# Patient Record
Sex: MALE | Race: BLACK OR AFRICAN AMERICAN | NOT HISPANIC OR LATINO | Employment: OTHER | URBAN - METROPOLITAN AREA
[De-identification: names, ages, dates, MRNs, and addresses within clinical notes are randomized per-mention and may not be internally consistent; named-entity substitution may affect disease eponyms.]

---

## 2017-01-04 ENCOUNTER — ANTI-COAG VISIT (OUTPATIENT)
Dept: CARDIOLOGY | Facility: CLINIC | Age: 62
End: 2017-01-04

## 2017-01-04 DIAGNOSIS — Z79.01 ANTICOAGULATED ON COUMADIN: ICD-10-CM

## 2017-01-04 DIAGNOSIS — Z95.2 STATUS POST HEART VALVE REPLACEMENT WITH MECHANICAL VALVE: ICD-10-CM

## 2017-01-04 LAB — INR PPP: 2.3

## 2017-01-04 NOTE — PROGRESS NOTES
Pt called in a verbal INR result dated 1/03/17 as: INR -2.3, states has been trying to reach [facility] but has been unsuccessful, advised pt to keep trying to reach [facility] and give them the result from 1/03/17 so a hard copy can be faxed to [facility]

## 2017-01-09 ENCOUNTER — ANTI-COAG VISIT (OUTPATIENT)
Dept: CARDIOLOGY | Facility: CLINIC | Age: 62
End: 2017-01-09

## 2017-01-09 DIAGNOSIS — Z95.2 STATUS POST HEART VALVE REPLACEMENT WITH MECHANICAL VALVE: ICD-10-CM

## 2017-01-09 DIAGNOSIS — Z79.01 ANTICOAGULATED ON COUMADIN: ICD-10-CM

## 2017-01-09 LAB — INR PPP: 2.8

## 2017-01-16 ENCOUNTER — ANTI-COAG VISIT (OUTPATIENT)
Dept: CARDIOLOGY | Facility: CLINIC | Age: 62
End: 2017-01-16

## 2017-01-16 DIAGNOSIS — Z95.2 STATUS POST HEART VALVE REPLACEMENT WITH MECHANICAL VALVE: ICD-10-CM

## 2017-01-16 DIAGNOSIS — Z79.01 ANTICOAGULATED ON COUMADIN: ICD-10-CM

## 2017-01-16 LAB — INR PPP: 3

## 2017-01-25 ENCOUNTER — ANTI-COAG VISIT (OUTPATIENT)
Dept: CARDIOLOGY | Facility: CLINIC | Age: 62
End: 2017-01-25

## 2017-01-25 DIAGNOSIS — Z95.2 STATUS POST HEART VALVE REPLACEMENT WITH MECHANICAL VALVE: ICD-10-CM

## 2017-01-25 DIAGNOSIS — Z79.01 ANTICOAGULATED ON COUMADIN: ICD-10-CM

## 2017-01-25 LAB — INR PPP: 3.7

## 2017-01-26 ENCOUNTER — OFFICE VISIT (OUTPATIENT)
Dept: INTERNAL MEDICINE | Facility: CLINIC | Age: 62
End: 2017-01-26
Payer: MEDICARE

## 2017-01-26 VITALS
DIASTOLIC BLOOD PRESSURE: 84 MMHG | SYSTOLIC BLOOD PRESSURE: 120 MMHG | HEIGHT: 69 IN | WEIGHT: 166.25 LBS | TEMPERATURE: 98 F | HEART RATE: 87 BPM | BODY MASS INDEX: 24.62 KG/M2

## 2017-01-26 DIAGNOSIS — J32.9 SINUSITIS, UNSPECIFIED CHRONICITY, UNSPECIFIED LOCATION: Primary | ICD-10-CM

## 2017-01-26 DIAGNOSIS — R21 RASH AND NONSPECIFIC SKIN ERUPTION: ICD-10-CM

## 2017-01-26 PROCEDURE — 99999 PR PBB SHADOW E&M-EST. PATIENT-LVL IV: CPT | Mod: PBBFAC,,, | Performed by: PHYSICIAN ASSISTANT

## 2017-01-26 PROCEDURE — 99213 OFFICE O/P EST LOW 20 MIN: CPT | Mod: S$PBB,,, | Performed by: PHYSICIAN ASSISTANT

## 2017-01-26 PROCEDURE — 99214 OFFICE O/P EST MOD 30 MIN: CPT | Mod: PBBFAC | Performed by: PHYSICIAN ASSISTANT

## 2017-01-26 RX ORDER — CODEINE PHOSPHATE AND GUAIFENESIN 10; 100 MG/5ML; MG/5ML
5-10 SOLUTION ORAL NIGHTLY
Qty: 120 ML | Refills: 0 | Status: SHIPPED | OUTPATIENT
Start: 2017-01-26 | End: 2017-07-26

## 2017-01-26 RX ORDER — AMOXICILLIN 875 MG/1
875 TABLET, FILM COATED ORAL 2 TIMES DAILY
Qty: 14 TABLET | Refills: 0 | Status: SHIPPED | OUTPATIENT
Start: 2017-01-26 | End: 2017-02-05

## 2017-01-26 NOTE — PROGRESS NOTES
Subjective:       Patient ID: Paul Villalobos Sr. is a 61 y.o. male.        Chief Complaint: Rash (on pt neck)    HPI Comments: Paul Villalobos Sr. is an established patient of Tye Concepcion MD here today for urgent care visit.    Had some type of skin lesion behind the right knee, then in the right antecubital area, and now on the right side of his neck.  Initially a red flat area that crusts over and resolves.  The knee and arm lesions have resolved. The neck skin lesion has crusted over and is healing.  Not painful or pruritic.  He is primarily concerned he could have Shingles again as he had it before and it was quite a severe case.  This skin lesion has been present x 1 week and is resolving.    Also with nasal congestion and cough productive of green mucus x 2 weeks.  No N/V/D/C.  No chest pain or shortness of breath.  No fever.         Review of Systems   Constitutional: Negative for appetite change, chills, fatigue and fever.   HENT: Positive for congestion. Negative for sore throat.    Eyes: Negative for visual disturbance.   Respiratory: Positive for cough. Negative for chest tightness and shortness of breath.    Cardiovascular: Negative for chest pain, palpitations and leg swelling.   Gastrointestinal: Negative for abdominal pain, blood in stool, constipation, diarrhea, nausea and vomiting.   Genitourinary: Negative for dysuria, frequency, hematuria and urgency.   Musculoskeletal: Negative for arthralgias and back pain.   Skin: Positive for rash.   Neurological: Negative for dizziness, syncope, weakness and headaches.   Psychiatric/Behavioral: Negative for dysphoric mood and sleep disturbance. The patient is not nervous/anxious.        Objective:      Physical Exam   Constitutional: He appears well-developed and well-nourished.   HENT:   Head: Normocephalic.   Right Ear: External ear normal.   Left Ear: External ear normal.   Nose: Rhinorrhea present. Right sinus exhibits maxillary sinus tenderness. Right sinus  "exhibits no frontal sinus tenderness. Left sinus exhibits maxillary sinus tenderness. Left sinus exhibits no frontal sinus tenderness.   Mouth/Throat: Oropharynx is clear and moist.   Eyes: Pupils are equal, round, and reactive to light.   Cardiovascular: Normal rate and regular rhythm.  Exam reveals no gallop and no friction rub.    Murmur heard.  Pulmonary/Chest: Effort normal and breath sounds normal. No respiratory distress.   Abdominal: Soft. There is no tenderness.   Musculoskeletal: He exhibits no edema.   Neurological: He is alert.   Skin: Skin is warm and dry.        Psychiatric: He has a normal mood and affect.   Nursing note and vitals reviewed.      Assessment:       1. Sinusitis, unspecified chronicity, unspecified location    2. Rash and nonspecific skin eruption        Plan:       Paul was seen today for rash.    Diagnoses and all orders for this visit:    Sinusitis, unspecified chronicity, unspecified location  -     guaifenesin-codeine 100-10 mg/5 ml (TUSSI-ORGANIDIN NR)  mg/5 mL syrup; Take 5-10 mLs by mouth every evening.  -     amoxicillin (AMOXIL) 875 MG tablet; Take 1 tablet (875 mg total) by mouth 2 (two) times daily.    Rash and nonspecific skin eruption-if he continues to have lesions appear, will consult dermatology, at this time seems to be resolving    Drink plenty of fluids, get lots of rest, and follow-up poor results.      Pt has been given instructions populated from [employer] database and has verbalized understanding of the after visit summary and information contained wherein.    Follow up with a primary care provider. May go to ER for acute shortness of breath, lightheadedness, fever, or any other emergent complaints or changes in condition.    "This note will be shared with the patient"    Future Appointments  Date Time Provider Department Center   3/13/2017 8:20 AM [facility] [address]               "

## 2017-01-26 NOTE — MR AVS SNAPSHOT
[facility]  [address]  [place] [address]  Phone: [phone]  Fax: [phone]                  Paul Villalobos Maria M   2017 9:00 AM   Office Visit    Description:  Male : 1955   Provider:  Sarah Frazier PA-C   Department:  [facility]           Reason for Visit     Rash           Diagnoses this Visit        Comments    Sinusitis, unspecified chronicity, unspecified location    -  Primary            To Do List           Future Appointments        Provider Department Dept Phone    3/13/2017 8:20 AM Nolan Rosas MD [facility] [phone]      Goals (5 Years of Data)     None       These Medications        Disp Refills Start End    guaifenesin-codeine 100-10 mg/5 ml (TUSSI-ORGANIDIN NR)  mg/5 mL syrup 120 mL 0 2017     Take 5-10 mLs by mouth every evening. - Oral    Pharmacy: [id number] - [place], [place] - [address] AT [facility] Ph #: [phone]       amoxicillin (AMOXIL) 875 MG tablet 14 tablet 0 2017    Take 1 tablet (875 mg total) by mouth 2 (two) times daily. - Oral    Pharmacy: [id number] - [place], [place] - [address] AT [facility] [address] Ph #: [phone]         [facility] On [facility]     [facility] On Call Nurse Care Line -  Assistance  Registered nurses in the [facility] On Call Center provide clinical advisement, health education, appointment booking, and other advisory services.  Call for this free service at [phone].             Medications           Message regarding Medications     Verify the changes and/or additions to your medication regime listed below are the same as discussed with your clinician today.  If any of these changes or additions are incorrect, please notify your healthcare provider.        START taking these NEW medications        Refills    guaifenesin-codeine 100-10 mg/5 ml  (TUSSI-ORGANIDIN NR)  mg/5 mL syrup 0    Sig: Take 5-10 mLs by mouth every evening.    Class: Print    Route: Oral    amoxicillin (AMOXIL) 875 MG tablet 0    Sig: Take 1 tablet (875 mg total) by mouth 2 (two) times daily.    Class: Normal    Route: Oral           Verify that the below list of medications is an accurate representation of the medications you are currently taking.  If none reported, the list may be blank. If incorrect, please contact your healthcare provider. Carry this list with you in case of emergency.           Current Medications     albuterol-ipratropium 2.5mg-0.5mg/3mL (DUO-NEB) 0.5 mg-3 mg(2.5 mg base)/3 mL nebulizer solution TAKE 3ML BY NEBULIZATION EVERY 6 HOURS AS NEEDED FOR WHEEZING.    carvedilol (COREG) 25 MG tablet Take 1 tablet (25 mg total) by mouth 2 (two) times daily with meals.    fluticasone (FLONASE) 50 mcg/actuation nasal spray SPRAY TWICE IN EACH NOSTRIL EVERY DAY    furosemide (LASIX) 80 MG tablet TAKE 1 TABLET BY MOUTH ON MONDAY WEDNESDAY AND FRIDAY AND 1/2 TABLET BY MOUTH ON ALL OTHER DAYS    hydrALAZINE (APRESOLINE) 50 MG tablet Take 1 tablet (50 mg total) by mouth every 8 (eight) hours.    ipratropium-albuterol (COMBIVENT RESPIMAT)  mcg/actuation inhaler Inhale 1 puff into the lungs every 4 (four) hours as needed for Wheezing. INHALE 2 PUFF BY MOUTH TWICE DAILY    isosorbide mononitrate (IMDUR) 30 MG 24 hr tablet Take 1 tablet (30 mg total) by mouth once daily.    lisinopril 10 MG tablet Take 1 tablet (10 mg total) by mouth once daily.    metOLazone (ZAROXOLYN) 2.5 MG tablet Take 1 tablet (2.5 mg total) by mouth daily as needed.    montelukast (SINGULAIR) 10 mg tablet TAKE 1 TABLET(10 MG) BY MOUTH EVERY EVENING    warfarin (COUMADIN) 5 MG tablet Take 2 tablets daily. Take as directed by Coumadin clinic.    amoxicillin (AMOXIL) 875 MG tablet Take 1 tablet (875 mg total) by mouth 2 (two) times daily.    guaifenesin-codeine 100-10 mg/5 ml (TUSSI-ORGANIDIN NR)  " mg/5 mL syrup Take 5-10 mLs by mouth every evening.    potassium chloride SA (K-DUR,KLOR-CON) 20 MEQ tablet TAKE 2 TABLETS BY MOUTH ON MONDAY WEDNESDAY AND FRIDAY AND 1 TABLET ON ALL OTHER DAYS    tadalafil (CIALIS) 5 MG tablet Take 1 tablet (5 mg total) by mouth daily as needed for Erectile Dysfunction.           Clinical Reference Information           Vital Signs - Last Recorded  Most recent update: 1/26/2017  9:10 AM by Princess Lu MA    BP Pulse Ht Wt BMI    120/84 (BP Location: Left arm, Patient Position: Sitting, BP Method: Manual) 87 5' 9" (1.753 m) 75.4 kg (166 lb 3.6 oz) 24.55 kg/m2      Blood Pressure          Most Recent Value    BP  120/84      Allergies as of 1/26/2017     No Known Allergies      Immunizations Administered on Date of Encounter - 1/26/2017     None      Instructions      Sinusitis (Antibiotic Treatment)    The sinuses are air-filled spaces within the bones of the face. They connect to the inside of the nose. Sinusitis is an inflammation of the tissue lining the sinus cavity. Sinus inflammation can occur during a cold. It can also be due to allergies to pollens and other particles in the air. Sinusitis can cause symptoms of sinus congestion and fullness. A sinus infection causes fever, headache and facial pain. There is often green or yellow drainage from the nose or into the back of the throat (post-nasal drip). You have been given antibiotics to treat this condition.  Home care:  · Take the full course of antibiotics as instructed. Do not stop taking them, even if you feel better.  · Drink plenty of water, hot tea, and other liquids. This may help thin mucus. It also may promote sinus drainage.  · Heat may help soothe painful areas of the face. Use a towel soaked in hot water. Or,  the shower and direct the hot spray onto your face. Using a vaporizer along with a menthol rub at night may also help.   · An expectorant containing guaifenesin may help thin the mucus and " promote drainage from the sinuses.  · Over-the-counter decongestants may be used unless a similar medicine was prescribed. Nasal sprays work the fastest. Use one that contains phenylephrine or oxymetazoline. First blow the nose gently. Then use the spray. Do not use these medicines more often than directed on the label or symptoms may get worse. You may also use tablets containing pseudoephedrine. Avoid products that combine ingredients, because side effects may be increased. Read labels. You can also ask the pharmacist for help. (NOTE: Persons with high blood pressure should not use decongestants. They can raise blood pressure.)  · Over-the-counter antihistamines may help if allergies contributed to your sinusitis.    · Do not use nasal rinses or irrigation during an acute sinus infection, unless told to by your health care provider. Rinsing may spread the infection to other sinuses.  · Use acetaminophen or ibuprofen to control pain, unless another pain medicine was prescribed. (If you have chronic liver or kidney disease or ever had a stomach ulcer, talk with your doctor before using these medicines. Aspirin should never be used in anyone under 18 years of age who is ill with a fever. It may cause severe liver damage.)  · Don't smoke. This can worsen symptoms.  Follow-up care  Follow up with your healthcare provider or our staff if you are not improving within the next week.  When to seek medical advice  Call your healthcare provider if any of these occur:  · Facial pain or headache becoming more severe  · Stiff neck  · Unusual drowsiness or confusion  · Swelling of the forehead or eyelids  · Vision problems, including blurred or double vision  · Fever of 100.4ºF (38ºC) or higher, or as directed by your healthcare provider  · Seizure  · Breathing problems  · Symptoms not resolving within 10 days  © 7794-4657 [employer]. [address] [place] [address], [place] [address]. All rights reserved. This information  is not intended as a substitute for professional medical care. Always follow your healthcare professional's instructions.             Smoking Cessation     If you would like to quit smoking:   You may be eligible for free services if you are a [place] resident and started smoking cigarettes before September 1, 1988.  Call the [employer]oking [employer]essation [employer] (S[employer]) toll free at (843) 751-3746 or (054) 923-1460.   Call 0-800-QUIT-NOW if you do not meet the above criteria.

## 2017-01-26 NOTE — PATIENT INSTRUCTIONS
Sinusitis (Antibiotic Treatment)    The sinuses are air-filled spaces within the bones of the face. They connect to the inside of the nose. Sinusitis is an inflammation of the tissue lining the sinus cavity. Sinus inflammation can occur during a cold. It can also be due to allergies to pollens and other particles in the air. Sinusitis can cause symptoms of sinus congestion and fullness. A sinus infection causes fever, headache and facial pain. There is often green or yellow drainage from the nose or into the back of the throat (post-nasal drip). You have been given antibiotics to treat this condition.  Home care:  · Take the full course of antibiotics as instructed. Do not stop taking them, even if you feel better.  · Drink plenty of water, hot tea, and other liquids. This may help thin mucus. It also may promote sinus drainage.  · Heat may help soothe painful areas of the face. Use a towel soaked in hot water. Or,  the shower and direct the hot spray onto your face. Using a vaporizer along with a menthol rub at night may also help.   · An expectorant containing guaifenesin may help thin the mucus and promote drainage from the sinuses.  · Over-the-counter decongestants may be used unless a similar medicine was prescribed. Nasal sprays work the fastest. Use one that contains phenylephrine or oxymetazoline. First blow the nose gently. Then use the spray. Do not use these medicines more often than directed on the label or symptoms may get worse. You may also use tablets containing pseudoephedrine. Avoid products that combine ingredients, because side effects may be increased. Read labels. You can also ask the pharmacist for help. (NOTE: Persons with high blood pressure should not use decongestants. They can raise blood pressure.)  · Over-the-counter antihistamines may help if allergies contributed to your sinusitis.    · Do not use nasal rinses or irrigation during an acute sinus infection, unless told to by  your health care provider. Rinsing may spread the infection to other sinuses.  · Use acetaminophen or ibuprofen to control pain, unless another pain medicine was prescribed. (If you have chronic liver or kidney disease or ever had a stomach ulcer, talk with your doctor before using these medicines. Aspirin should never be used in anyone under 18 years of age who is ill with a fever. It may cause severe liver damage.)  · Don't smoke. This can worsen symptoms.  Follow-up care  Follow up with your healthcare provider or our staff if you are not improving within the next week.  When to seek medical advice  Call your healthcare provider if any of these occur:  · Facial pain or headache becoming more severe  · Stiff neck  · Unusual drowsiness or confusion  · Swelling of the forehead or eyelids  · Vision problems, including blurred or double vision  · Fever of 100.4ºF (38ºC) or higher, or as directed by your healthcare provider  · Seizure  · Breathing problems  · Symptoms not resolving within 10 days  © 2371-3658 [employer]. [address] [place] [address], [place] [address]. All rights reserved. This information is not intended as a substitute for professional medical care. Always follow your healthcare professional's instructions.

## 2017-02-01 LAB — INR PPP: 3.9

## 2017-02-02 ENCOUNTER — ANTI-COAG VISIT (OUTPATIENT)
Dept: CARDIOLOGY | Facility: CLINIC | Age: 62
End: 2017-02-02
Payer: COMMERCIAL

## 2017-02-02 DIAGNOSIS — Z79.01 ANTICOAGULATED ON COUMADIN: ICD-10-CM

## 2017-02-02 DIAGNOSIS — Z95.2 STATUS POST HEART VALVE REPLACEMENT WITH MECHANICAL VALVE: ICD-10-CM

## 2017-02-02 PROCEDURE — G0250 MD INR TEST REVIE INTER MGMT: HCPCS | Mod: ,,,

## 2017-02-02 NOTE — PROGRESS NOTES
Patient took a much lower dose than expected and his INR climbed. He denies changes in ETOH and has been avoiding greens. He held a dose last night and will begin a reduced weekly dose until follow-up.

## 2017-02-08 ENCOUNTER — ANTI-COAG VISIT (OUTPATIENT)
Dept: CARDIOLOGY | Facility: CLINIC | Age: 62
End: 2017-02-08
Payer: MEDICARE

## 2017-02-08 DIAGNOSIS — Z95.2 STATUS POST HEART VALVE REPLACEMENT WITH MECHANICAL VALVE: ICD-10-CM

## 2017-02-08 DIAGNOSIS — Z79.01 ANTICOAGULATED ON COUMADIN: ICD-10-CM

## 2017-02-08 LAB — INR PPP: 1.5

## 2017-02-08 PROCEDURE — 99999 PR PBB SHADOW E&M-EST. PATIENT-LVL I: CPT | Mod: PBBFAC,,,

## 2017-02-08 PROCEDURE — 99211 OFF/OP EST MAY X REQ PHY/QHP: CPT | Mod: PBBFAC

## 2017-02-08 PROCEDURE — G0248 DEMONSTRATE USE HOME INR MON: HCPCS | Mod: PBBFAC,59

## 2017-02-08 NOTE — PROGRESS NOTES
Patient presents for meter follow-up appointment.  Patient demonstrated proper use of meter.  Patient denies any difficulty using home monitor or reporting results.  INR results in meter are consistent with those reported and are without discrepancies.  Patient denies self-adjusting diet or dose based on readings.  Reminded patient to continue to contact clinic with any new medications, changes in health, or changes in diet. Patient will continue monitoring INR weekly and return to clinic in 6 months.  Patient advised to contact clinic with any changes, questions, or concerns. INR low today due to the dose change last week. He will begin a new weekly dose until follow-up in one week. He will continue weekly INR results until stability is reached. I advised him and his wife to contact us with any changes or problems.

## 2017-02-15 ENCOUNTER — ANTI-COAG VISIT (OUTPATIENT)
Dept: CARDIOLOGY | Facility: CLINIC | Age: 62
End: 2017-02-15

## 2017-02-15 DIAGNOSIS — Z79.01 ANTICOAGULATED ON COUMADIN: ICD-10-CM

## 2017-02-15 DIAGNOSIS — Z95.2 STATUS POST HEART VALVE REPLACEMENT WITH MECHANICAL VALVE: ICD-10-CM

## 2017-02-15 LAB — INR PPP: 2.7

## 2017-02-20 ENCOUNTER — NURSE TRIAGE (OUTPATIENT)
Dept: ADMINISTRATIVE | Facility: CLINIC | Age: 62
End: 2017-02-20

## 2017-02-20 ENCOUNTER — HOSPITAL ENCOUNTER (EMERGENCY)
Facility: HOSPITAL | Age: 62
Discharge: HOME | End: 2017-02-20
Attending: EMERGENCY MEDICINE
Payer: COMMERCIAL

## 2017-02-20 VITALS
OXYGEN SATURATION: 97 % | WEIGHT: 165 LBS | DIASTOLIC BLOOD PRESSURE: 82 MMHG | TEMPERATURE: 99 F | BODY MASS INDEX: 24.44 KG/M2 | SYSTOLIC BLOOD PRESSURE: 145 MMHG | RESPIRATION RATE: 20 BRPM | HEIGHT: 69 IN | HEART RATE: 69 BPM

## 2017-02-20 DIAGNOSIS — N39.0 URINARY TRACT INFECTION WITH HEMATURIA, SITE UNSPECIFIED: Primary | ICD-10-CM

## 2017-02-20 DIAGNOSIS — R05.9 COUGH: ICD-10-CM

## 2017-02-20 DIAGNOSIS — R31.9 URINARY TRACT INFECTION WITH HEMATURIA, SITE UNSPECIFIED: Primary | ICD-10-CM

## 2017-02-20 LAB
BACTERIA #/AREA URNS AUTO: ABNORMAL /HPF
BASOPHILS # BLD AUTO: 0.02 K/UL
BASOPHILS NFR BLD: 0.4 %
BILIRUB UR QL STRIP: NEGATIVE
CLARITY UR REFRACT.AUTO: ABNORMAL
COLOR UR AUTO: YELLOW
DIFFERENTIAL METHOD: ABNORMAL
EOSINOPHIL # BLD AUTO: 0.1 K/UL
EOSINOPHIL NFR BLD: 2.6 %
ERYTHROCYTE [DISTWIDTH] IN BLOOD BY AUTOMATED COUNT: 13.5 %
GLUCOSE UR QL STRIP: NEGATIVE
HCT VFR BLD AUTO: 38.4 %
HGB BLD-MCNC: 12.8 G/DL
HGB UR QL STRIP: ABNORMAL
HYALINE CASTS UR QL AUTO: 5 /LPF
INR PPP: 2.6
KETONES UR QL STRIP: NEGATIVE
LEUKOCYTE ESTERASE UR QL STRIP: ABNORMAL
LYMPHOCYTES # BLD AUTO: 1.3 K/UL
LYMPHOCYTES NFR BLD: 24.9 %
MCH RBC QN AUTO: 33.1 PG
MCHC RBC AUTO-ENTMCNC: 33.3 %
MCV RBC AUTO: 99 FL
MICROSCOPIC COMMENT: ABNORMAL
MONOCYTES # BLD AUTO: 0.8 K/UL
MONOCYTES NFR BLD: 15.8 %
NEUTROPHILS # BLD AUTO: 2.8 K/UL
NEUTROPHILS NFR BLD: 56.1 %
NITRITE UR QL STRIP: POSITIVE
PH UR STRIP: 7 [PH] (ref 5–8)
PLATELET # BLD AUTO: 136 K/UL
PMV BLD AUTO: 11 FL
PROT UR QL STRIP: ABNORMAL
PROTHROMBIN TIME: 26.3 SEC
RBC # BLD AUTO: 3.87 M/UL
RBC #/AREA URNS AUTO: 8 /HPF (ref 0–4)
SP GR UR STRIP: 1.01 (ref 1–1.03)
SQUAMOUS #/AREA URNS AUTO: 2 /HPF
URN SPEC COLLECT METH UR: ABNORMAL
UROBILINOGEN UR STRIP-ACNC: 2 EU/DL
WBC # BLD AUTO: 5.06 K/UL
WBC #/AREA URNS AUTO: >100 /HPF (ref 0–5)
WBC CLUMPS UR QL AUTO: ABNORMAL

## 2017-02-20 PROCEDURE — 94761 N-INVAS EAR/PLS OXIMETRY MLT: CPT

## 2017-02-20 PROCEDURE — 85025 COMPLETE CBC W/AUTO DIFF WBC: CPT

## 2017-02-20 PROCEDURE — 87186 SC STD MICRODIL/AGAR DIL: CPT

## 2017-02-20 PROCEDURE — 99284 EMERGENCY DEPT VISIT MOD MDM: CPT | Mod: 25

## 2017-02-20 PROCEDURE — 87088 URINE BACTERIA CULTURE: CPT

## 2017-02-20 PROCEDURE — 85610 PROTHROMBIN TIME: CPT

## 2017-02-20 PROCEDURE — 25000242 PHARM REV CODE 250 ALT 637 W/ HCPCS: Performed by: PHYSICIAN ASSISTANT

## 2017-02-20 PROCEDURE — 25000003 PHARM REV CODE 250: Performed by: PHYSICIAN ASSISTANT

## 2017-02-20 PROCEDURE — 87086 URINE CULTURE/COLONY COUNT: CPT

## 2017-02-20 PROCEDURE — 81001 URINALYSIS AUTO W/SCOPE: CPT

## 2017-02-20 PROCEDURE — 99283 EMERGENCY DEPT VISIT LOW MDM: CPT | Mod: ,,, | Performed by: EMERGENCY MEDICINE

## 2017-02-20 PROCEDURE — 87077 CULTURE AEROBIC IDENTIFY: CPT

## 2017-02-20 PROCEDURE — 94640 AIRWAY INHALATION TREATMENT: CPT

## 2017-02-20 RX ORDER — IPRATROPIUM BROMIDE AND ALBUTEROL SULFATE 2.5; .5 MG/3ML; MG/3ML
3 SOLUTION RESPIRATORY (INHALATION)
Status: COMPLETED | OUTPATIENT
Start: 2017-02-20 | End: 2017-02-20

## 2017-02-20 RX ORDER — SULFAMETHOXAZOLE AND TRIMETHOPRIM 800; 160 MG/1; MG/1
1 TABLET ORAL
Status: COMPLETED | OUTPATIENT
Start: 2017-02-20 | End: 2017-02-20

## 2017-02-20 RX ORDER — SULFAMETHOXAZOLE AND TRIMETHOPRIM 800; 160 MG/1; MG/1
1 TABLET ORAL 2 TIMES DAILY
Qty: 20 TABLET | Refills: 0 | Status: SHIPPED | OUTPATIENT
Start: 2017-02-20 | End: 2017-03-02

## 2017-02-20 RX ADMIN — IPRATROPIUM BROMIDE AND ALBUTEROL SULFATE 3 ML: .5; 3 SOLUTION RESPIRATORY (INHALATION) at 07:02

## 2017-02-20 RX ADMIN — SULFAMETHOXAZOLE AND TRIMETHOPRIM 1 TABLET: 800; 160 TABLET ORAL at 08:02

## 2017-02-20 NOTE — ED AVS SNAPSHOT
[facility]  [address] [facility] [address] [facility] [address]               Paul ROBLEDO Wilfredo Sanchez   2017  7:14 PM   ED    Description:  Male : 1955   Department:  [facility]           Your Care was Coordinated By:     Provider Role From To    Ramos Gr MD Attending Provider 17 --    Sonya Monsalve PA-C Physician Assistant 17    Sonya Monsalve PA-C Physician Assistant 17 --      Reason for Visit     Hematuria     Cough           Diagnoses this Visit        Comments    Urinary tract infection with hematuria, site unspecified    -  Primary       ED Disposition     None           To Do List           Follow-up Information     Follow up with Tye Concepcion MD. Schedule an appointment as soon as possible for a visit in 1 week.    Specialty:  Internal Medicine    Contact information:    [address]  [place] [address]  [phone]         These Medications        Disp Refills Start End    sulfamethoxazole-trimethoprim 800-160mg (BACTRIM DS) 800-160 mg Tab 20 tablet 0 2017 3/2/2017    Take 1 tablet by mouth 2 (two) times daily. - Oral    [facility] [id number] [place], [place] - [address] AT [facility] Ph #: [phone]       ipratropium-albuterol (COMBIVENT RESPIMAT)  mcg/actuation inhaler 4 g 0 2017     Inhale 1 puff into the lungs every 4 (four) hours as needed for Wheezing. INHALE 2 PUFF BY MOUTH TWICE DAILY - Inhalation    [id number] - [place], [place] - [address] AT [facility] Ph #: [phone]         [facility]     [facility] -  Assistance  Registered nurses in the [facility] provide clinical advisement, health education, appointment booking, and other advisory services.  Call for this free service at [phone].              Medications           Message regarding Medications     Verify the changes and/or additions to your medication regime listed below are the same as discussed with your clinician today.  If any of these changes or additions are incorrect, please notify your healthcare provider.        START taking these NEW medications        Refills    sulfamethoxazole-trimethoprim 800-160mg (BACTRIM DS) 800-160 mg Tab 0    Sig: Take 1 tablet by mouth 2 (two) times daily.    Class: Print    Route: Oral      These medications were administered today        Dose Freq    albuterol-ipratropium 2.5mg-0.5mg/3mL nebulizer solution 3 mL 3 mL ED 1 Time    Sig: Take 3 mLs by nebulization ED 1 Time.    Class: Normal    Route: Nebulization    sulfamethoxazole-trimethoprim 800-160mg per tablet 1 tablet 1 tablet ED 1 Time    Sig: Take 1 tablet by mouth ED 1 Time.    Class: Normal    Route: Oral      STOP taking these medications     albuterol-ipratropium 2.5mg-0.5mg/3mL (DUO-NEB) 0.5 mg-3 mg(2.5 mg base)/3 mL nebulizer solution TAKE 3ML BY NEBULIZATION EVERY 6 HOURS AS NEEDED FOR WHEEZING.           Verify that the below list of medications is an accurate representation of the medications you are currently taking.  If none reported, the list may be blank. If incorrect, please contact your healthcare provider. Carry this list with you in case of emergency.           Current Medications     albuterol-ipratropium 2.5mg-0.5mg/3mL nebulizer solution 3 mL Take 3 mLs by nebulization ED 1 Time.    carvedilol (COREG) 25 MG tablet Take 1 tablet (25 mg total) by mouth 2 (two) times daily with meals.    fluticasone (FLONASE) 50 mcg/actuation nasal spray SPRAY TWICE IN EACH NOSTRIL EVERY DAY    furosemide (LASIX) 80 MG tablet TAKE 1 TABLET BY MOUTH ON MONDAY WEDNESDAY AND FRIDAY AND 1/2 TABLET BY MOUTH ON ALL OTHER DAYS    guaifenesin-codeine 100-10 mg/5 ml (TUSSI-ORGANIDIN NR)  mg/5 mL syrup Take 5-10 mLs by mouth every evening.    hydrALAZINE  "(APRESOLINE) 50 MG tablet Take 1 tablet (50 mg total) by mouth every 8 (eight) hours.    ipratropium-albuterol (COMBIVENT RESPIMAT)  mcg/actuation inhaler Inhale 1 puff into the lungs every 4 (four) hours as needed for Wheezing. INHALE 2 PUFF BY MOUTH TWICE DAILY    isosorbide mononitrate (IMDUR) 30 MG 24 hr tablet Take 1 tablet (30 mg total) by mouth once daily.    lisinopril 10 MG tablet Take 1 tablet (10 mg total) by mouth once daily.    metOLazone (ZAROXOLYN) 2.5 MG tablet Take 1 tablet (2.5 mg total) by mouth daily as needed.    montelukast (SINGULAIR) 10 mg tablet TAKE 1 TABLET(10 MG) BY MOUTH EVERY EVENING    potassium chloride SA (K-DUR,KLOR-CON) 20 MEQ tablet TAKE 2 TABLETS BY MOUTH ON MONDAY WEDNESDAY AND FRIDAY AND 1 TABLET ON ALL OTHER DAYS    sulfamethoxazole-trimethoprim 800-160mg (BACTRIM DS) 800-160 mg Tab Take 1 tablet by mouth 2 (two) times daily.    tadalafil (CIALIS) 5 MG tablet Take 1 tablet (5 mg total) by mouth daily as needed for Erectile Dysfunction.    warfarin (COUMADIN) 5 MG tablet Take 2 tablets daily. Take as directed by Coumadin clinic.           Clinical Reference Information           Your Vitals Were     BP Pulse Temp Resp Height Weight    145/82 (BP Location: Right arm, Patient Position: Sitting) 69 98.6 °F (37 °C) (Oral) 20 5' 9" (1.753 m) 74.8 kg (165 lb)    SpO2 BMI             97% 24.37 kg/m2         Allergies as of 2/20/2017     No Known Allergies      Immunizations Administered on Date of Encounter - 2/20/2017     None      ED Micro, Lab, POCT     Start Ordered       Status Ordering Provider    02/20/17 2053 02/20/17 2052  CBC auto differential  Add-on      Completed     02/20/17 1957 02/20/17 1956  Protime-INR  STAT      Final result     02/20/17 1944 02/20/17 1944    STAT,   Status:  Canceled      Canceled     02/20/17 1828 02/20/17 1828  Urinalysis Clean Catch  STAT      Final result     02/20/17 1828 02/20/17 1828  Urine culture **CANNOT BE ORDERED STAT**  Once      " In process     02/20/17 1828 02/20/17 1828  Urinalysis Microscopic  Once      Final result       ED Imaging Orders     None        Discharge Instructions         Bladder Infection, Male (Adult)    You have a bladder infection.  Urine is normally free of bacteria. But bacteria can get into the urinary tract from the skin around the rectum or it may travel in the blood from elsewhere in the body.  This is called a urinary tract infection (UTI). An infection can occur anywhere in the urinary tract. It could be in a kidney (pyelonephritis)or in the bladder (cystitis) and urethra (urethritis). The urethra is the tube that drains the urine from the bladder through the tip of the penis.  The most common place for a UTI is in the bladder. This is called a bladder infection. Most bladder infections are easily treated. They are not serious unless the infection spreads up to the kidney.  The terms bladder infection, UTI, and cystitis are often used to describe the same thing, but they arent always the same. Cystitis is an inflammation of the bladder. The most common cause of cystitis is an infection.   Keep in mind:  · Infections in the urine are called UTIs.  · Cystitis is usually caused by a UTI.  · Not all UTIs and cases of cystitis are bladder infections.  · Bladder infections are the most common type of cystitis.  Symptoms of a bladder infection  The infection causes inflammation in the urethra and bladder. This inflammation causes many of the symptoms. The most common symptoms of a bladder infection are:  · Pain or burning when urinating  · Having to go more often than usual  · Feeling like you need to go right away  · Only a small amount comes out  · Blood in urine  · Discomfort in your belly (abdomen), usually in the lower abdomen, above the pubic bone  · Cloudy, strong, or bad smelling urine  · Unable to urinate (retention)  · Urinary incontinence  · Fever  · Loss of appetite  Older adults may also feel  confused.  Causes of a bladder infection  Bladder infections are not contagious. You can't get one from someone else, from a toilet seat, or from sharing a bath.  The most common cause of bladder infections is bacteria from the bowels. The bacteria get onto the skin around the opening of the urethra. From there they can get into the urine and travel up to the bladder. This causes inflammation and an infection. This usually happens because of:  · An enlarged prostate  · Poor cleaning of the genitals  · Procedures that put a tube in your bladder, like a Schwarz catheter  · Bowel incontinence  · Older age  · Not emptying your bladder (The urine stays there, giving the bacteria a chance to grow.)  · Dehydration (This allows urine to stay in the bladder longer.)  · Constipation (This can cause the bowels to push on the bladder or urethra and keep the bladder from emptying.)  Treatment  Bladder infections are treated with antibiotics. They usually clear up quickly without complications. Treatment helps prevent a more serious kidney infection.  Medicines  Medicines can help in the treatment of a bladder infection:  · You may have been given phenazopyridine to ease burning when you urinate. It will cause your urine to be bright orange. It can stain clothing.  · You may have been prescribed antibiotics. Take this medicine until you have finished it, even if you feel better. Taking all of the medicine will make sure the infection has cleared.  You can use acetaminophen or ibuprofen for pain, fever, or discomfort, unless another medicine was prescribed. You can also alternate them, or use both together. They work differently and are a different class of medicines, so taking them together is not an overdose. If you have chronic liver or kidney disease, talk with your healthcare provider before using these medicines. Also talk with your provider if youve had a stomach ulcer or GI bleeding or are taking blood thinner  medicines.  Home care  Here are some guidelines to help you care for yourself at home:  · Drink plenty of fluids, unless your healthcare provider told you not to. Fluids will prevent dehydration and flush out your bladder.  · Use good personal hygiene. Wipe from front to back after using the toilet, and clean your penis regularly. If you arent circumcised, retract the foreskin when cleaning.  · Urinate more frequently, and dont try to hold it in for long periods of time, if possible.  · Wear loose-fitting clothes and cotton underwear. Avoid tight-fitting pants. This helps keep you clean and dry.  · Change your diet to prevent constipation. This means eating more fresh foods and more fiber, and less junk and fatty foods.  · Avoid sex until your symptoms are gone.  · Avoid caffeine, alcohol, and spicy foods. These can irritate the bladder.  Follow-up care  Follow up with your healthcare provider, or as advised if all symptoms have not cleared up within 5 days. It is important to keep your follow-up appointment. You can talk with your provider to see if you need more tests of the urinary tract. This is especially important if you have infections that keep coming back.  If a culture was done, you will be told if your treatment needs to be changed. If directed, you can call to find out the results.  If X-rays were taken, you will be told of any findings that may affect your care.  Call 911  Call 911 if any of these occur:  · Trouble breathing  · Difficulty waking up  · Feeling confused  · Fainting or loss of consciousness  · Rapid heart rate  When to seek medical advice  Call your healthcare provider right away if any of these occur:  · Fever of 100.4ºF (38ºC) or higher, or as directed by your healthcare provider  · Your symptoms dont improve after 2 days of treatment  · Back or abdominal pain that gets worse  · Repeated vomiting, or you arent able to keep medicine down  · Weakness or dizziness  Date Last Reviewed:  10/1/2016  © 4998-1939 [employer]. [address] [place] [address], [place] [address]. All rights reserved. This information is not intended as a substitute for professional medical care. Always follow your healthcare professional's instructions.          Your Scheduled Appointments     Feb 21, 2017  9:45 AM CST   Established Patient Visit with MD Christos Vazquez - Internal Medicine ([facility])    [address]  [place] [address] [phone]            Mar 13, 2017  8:20 AM CDT   Consult with MD [facility] ([facility] )    [address] [facility] [address]  [facility] [address] [phone]              Smoking Cessation     If you would like to quit smoking:   You may be eligible for free services if you are a [place] resident and started smoking cigarettes before September 1, 1988.  Call the Smoking Cessation Trust (SCT) toll free at (699) 921-1543 or (357) 917-7796.   Call 9-094-QUIT-NOW if you do not meet the above criteria.             [facility] complies with applicable Federal civil rights laws and does not discriminate on the basis of race, color, national origin, age, disability, or sex.        Language Assistance Services     ATTENTION: Language assistance services are available, free of charge. Please call [phone].      ATENCIÓN: Si habla español, tiene a flood disposición servicios gratuitos de asistencia lingüística. Llame al [phone].     CHÚ Ý: N?u b?n nói Ti?ng Vi?t, có các d?ch v? h? tr? ngôn ng? mi?n phí dành cho b?n. G?i s? [phone].

## 2017-02-20 NOTE — TELEPHONE ENCOUNTER
Reason for Disposition   Pain or burning with passing urine    Protocols used: ST URINE - BLOOD IN-A-AH

## 2017-02-21 NOTE — DISCHARGE INSTRUCTIONS
Bladder Infection, Male (Adult)    You have a bladder infection.  Urine is normally free of bacteria. But bacteria can get into the urinary tract from the skin around the rectum or it may travel in the blood from elsewhere in the body.  This is called a urinary tract infection (UTI). An infection can occur anywhere in the urinary tract. It could be in a kidney (pyelonephritis)or in the bladder (cystitis) and urethra (urethritis). The urethra is the tube that drains the urine from the bladder through the tip of the penis.  The most common place for a UTI is in the bladder. This is called a bladder infection. Most bladder infections are easily treated. They are not serious unless the infection spreads up to the kidney.  The terms bladder infection, UTI, and cystitis are often used to describe the same thing, but they arent always the same. Cystitis is an inflammation of the bladder. The most common cause of cystitis is an infection.   Keep in mind:  · Infections in the urine are called UTIs.  · Cystitis is usually caused by a UTI.  · Not all UTIs and cases of cystitis are bladder infections.  · Bladder infections are the most common type of cystitis.  Symptoms of a bladder infection  The infection causes inflammation in the urethra and bladder. This inflammation causes many of the symptoms. The most common symptoms of a bladder infection are:  · Pain or burning when urinating  · Having to go more often than usual  · Feeling like you need to go right away  · Only a small amount comes out  · Blood in urine  · Discomfort in your belly (abdomen), usually in the lower abdomen, above the pubic bone  · Cloudy, strong, or bad smelling urine  · Unable to urinate (retention)  · Urinary incontinence  · Fever  · Loss of appetite  Older adults may also feel confused.  Causes of a bladder infection  Bladder infections are not contagious. You can't get one from someone else, from a toilet seat, or from sharing a bath.  The most  common cause of bladder infections is bacteria from the bowels. The bacteria get onto the skin around the opening of the urethra. From there they can get into the urine and travel up to the bladder. This causes inflammation and an infection. This usually happens because of:  · An enlarged prostate  · Poor cleaning of the genitals  · Procedures that put a tube in your bladder, like a Schwarz catheter  · Bowel incontinence  · Older age  · Not emptying your bladder (The urine stays there, giving the bacteria a chance to grow.)  · Dehydration (This allows urine to stay in the bladder longer.)  · Constipation (This can cause the bowels to push on the bladder or urethra and keep the bladder from emptying.)  Treatment  Bladder infections are treated with antibiotics. They usually clear up quickly without complications. Treatment helps prevent a more serious kidney infection.  Medicines  Medicines can help in the treatment of a bladder infection:  · You may have been given phenazopyridine to ease burning when you urinate. It will cause your urine to be bright orange. It can stain clothing.  · You may have been prescribed antibiotics. Take this medicine until you have finished it, even if you feel better. Taking all of the medicine will make sure the infection has cleared.  You can use acetaminophen or ibuprofen for pain, fever, or discomfort, unless another medicine was prescribed. You can also alternate them, or use both together. They work differently and are a different class of medicines, so taking them together is not an overdose. If you have chronic liver or kidney disease, talk with your healthcare provider before using these medicines. Also talk with your provider if youve had a stomach ulcer or GI bleeding or are taking blood thinner medicines.  Home care  Here are some guidelines to help you care for yourself at home:  · Drink plenty of fluids, unless your healthcare provider told you not to. Fluids will prevent  dehydration and flush out your bladder.  · Use good personal hygiene. Wipe from front to back after using the toilet, and clean your penis regularly. If you arent circumcised, retract the foreskin when cleaning.  · Urinate more frequently, and dont try to hold it in for long periods of time, if possible.  · Wear loose-fitting clothes and cotton underwear. Avoid tight-fitting pants. This helps keep you clean and dry.  · Change your diet to prevent constipation. This means eating more fresh foods and more fiber, and less junk and fatty foods.  · Avoid sex until your symptoms are gone.  · Avoid caffeine, alcohol, and spicy foods. These can irritate the bladder.  Follow-up care  Follow up with your healthcare provider, or as advised if all symptoms have not cleared up within 5 days. It is important to keep your follow-up appointment. You can talk with your provider to see if you need more tests of the urinary tract. This is especially important if you have infections that keep coming back.  If a culture was done, you will be told if your treatment needs to be changed. If directed, you can call to find out the results.  If X-rays were taken, you will be told of any findings that may affect your care.  Call 911  Call 911 if any of these occur:  · Trouble breathing  · Difficulty waking up  · Feeling confused  · Fainting or loss of consciousness  · Rapid heart rate  When to seek medical advice  Call your healthcare provider right away if any of these occur:  · Fever of 100.4ºF (38ºC) or higher, or as directed by your healthcare provider  · Your symptoms dont improve after 2 days of treatment  · Back or abdominal pain that gets worse  · Repeated vomiting, or you arent able to keep medicine down  · Weakness or dizziness  Date Last Reviewed: 10/1/2016  © 6445-2758 [employer] [address] [place] [address], [place] [address]. All rights reserved. This information is not intended as a substitute for professional  medical care. Always follow your healthcare professional's instructions.

## 2017-02-21 NOTE — ED PROVIDER NOTES
Encounter Date: 2/20/2017    SCRIBE #1 NOTE: I, Melo Tavares, am scribing for, and in the presence of,  Dr. Gr. I have scribed the following portions of the note - the APC attestation.       History     Chief Complaint   Patient presents with    Hematuria    Cough     Review of patient's allergies indicates:  No Known Allergies  HPI Comments: Patient is a 60 y/o male with PMH of CHF, HTN, and asthma who presents due to a one month history of productive cough.  Patient states that he was seen by PCP on 1/25/2017 and was prescribed antibiotics and given a cough syrup with some improvement of symptoms.  Patient states he is to be taking Combivent, however has been out of medication.  Patient also complaining of of some blood in his urine and dysuria for the past three days.  Patient denies any nausea, vomiting, fevers, chills, chest pain, or any other complaints.      The history is provided by the patient.     Past Medical History   Diagnosis Date    Anemia     Asthma     CHF (congestive heart failure)     Chronic bronchitis     Cirrhosis 1/19/2015    Hepatitis C     Hyperlipidemia     Hypertension     Lung disease     Pulmonary hypertension      Past Medical History Pertinent Negatives   Diagnosis Date Noted    Diabetic retinopathy 4/4/2013    Glaucoma 4/4/2013    Macular degeneration 4/4/2013    Retinal detachment 4/4/2013    Strabismus 4/4/2013    Uveitis 4/4/2013     Past Surgical History   Procedure Laterality Date    Open heart surgery       redo AVR and MVR 2009    Lung decortication       right thoracotomy 2010    Hernia repair  2006     questionable mesh, right inguinal, unbilical    Cardiac valve replacement       AVR WITH MVr 2007    Lung decortication  2010     Family History   Problem Relation Age of Onset    Hypertension Brother     Hypertension Sister      Social History   Substance Use Topics    Smoking status: Heavy Tobacco Smoker     Packs/day: 0.50     Years: 48.00      Types: Cigarettes    Smokeless tobacco: Never Used      Comment: 5 a day    Alcohol use 0.0 oz/week     0 Standard drinks or equivalent per week     Review of Systems   Constitutional: Negative for appetite change, chills and fatigue.   HENT: Negative for congestion, ear discharge and facial swelling.    Eyes: Negative for pain, discharge and redness.   Respiratory: Positive for cough and wheezing. Negative for apnea, chest tightness and shortness of breath.    Cardiovascular: Negative for chest pain and palpitations.   Gastrointestinal: Negative for abdominal distention, blood in stool and diarrhea.   Endocrine: Negative for cold intolerance and polydipsia.   Genitourinary: Positive for dysuria and hematuria. Negative for difficulty urinating and flank pain.   Musculoskeletal: Negative for arthralgias, myalgias and neck stiffness.   Skin: Negative for color change.   Neurological: Negative for dizziness, numbness and headaches.   Psychiatric/Behavioral: Negative for agitation.       Physical Exam   Initial Vitals   BP Pulse Resp Temp SpO2   02/20/17 1823 02/20/17 1823 02/20/17 1823 02/20/17 1823 02/20/17 1823   145/82 89 18 98.6 °F (37 °C) 95 %     Physical Exam    Nursing note and vitals reviewed.  Constitutional: He appears well-developed and well-nourished.   HENT:   Head: Normocephalic and atraumatic.   Eyes: EOM are normal. Pupils are equal, round, and reactive to light.   Neck: Normal range of motion. Neck supple. No thyromegaly present. No tracheal deviation present.   Cardiovascular: Normal rate and regular rhythm. Exam reveals no gallop and no friction rub.    No murmur heard.  Pulmonary/Chest: No stridor. No respiratory distress. He has wheezes. He has no rales. He exhibits no tenderness.   Abdominal: Soft. Bowel sounds are normal. He exhibits no distension. There is no tenderness. There is no rebound and no guarding.   Musculoskeletal: Normal range of motion.   Neurological: He is alert and oriented to  person, place, and time.   Skin: Skin is warm and dry.   Psychiatric: He has a normal mood and affect.         ED Course   Procedures  Labs Reviewed   URINALYSIS - Abnormal; Notable for the following:        Result Value    Appearance, UA Hazy (*)     Protein, UA 2+ (*)     Occult Blood UA 2+ (*)     Nitrite, UA Positive (*)     Leukocytes, UA 3+ (*)     All other components within normal limits   URINALYSIS MICROSCOPIC - Abnormal; Notable for the following:     RBC, UA 8 (*)     WBC, UA >100 (*)     WBC Clumps, UA Many (*)     Bacteria, UA Few (*)     Hyaline Casts, UA 5 (*)     All other components within normal limits   PROTIME-INR - Abnormal; Notable for the following:     Prothrombin Time 26.3 (*)     INR 2.6 (*)     All other components within normal limits   CULTURE, URINE   CBC W/ AUTO DIFFERENTIAL             Medical Decision Making:   History:   Old Medical Records: I decided to obtain old medical records.  Clinical Tests:   Lab Tests: Ordered and Reviewed       APC / Resident Notes:   Patient is a 60 y/o male with PMH of CHF, HTN, and asthma who presents due to a one month history of productive cough and three day history of hematuria.  Physical exam reveals well developed male in no acute distress.  Expiratory wheezes in right upper lung fields.  No CVA tenderness.  Will obtain UA and get a PT/INR.  Patient will also undergo breathing treatment.     UA shows 2+ protein, 3+ leukocytes with >100 WBC in clumps and postive nitrates.  Patient will be placed on Bactrim and urine culture is pending. INR was 2.6.  Patient breathing improved with treatment.  Patient told to return is symptoms worsen and follow up with PCP in near future.  Plan of treatment discussed with attending and he is agreeable to above plan.        Scribe Attestation:   Scribe #1: I performed the above scribed service and the documentation accurately describes the services I performed. I attest to the accuracy of the note.    Attending  Attestation:     Physician Attestation Statement for NP/PA:   I have conducted a face to face encounter with this patient in addition to the NP/PA, due to Medical Complexity    Other NP/PA Attestation Additions:      Medical Decision Making: This is a 61 y.o. male who presents with dysuria and mild cough that has been getting better. UA was positive for a UTI so will treat for such and discharge pt to follow up with PCP.        Physician Attestation for Scribe:  Physician Attestation Statement for Scribe #1: I, Dr. Gr, reviewed documentation, as scribed by Melo Tavares in my presence, and it is both accurate and complete.                 ED Course     Clinical Impression:   There were no encounter diagnoses.    Disposition:   Disposition: Discharged  Condition: Stable       Sonya Monsalve PA-C  02/20/17 8575

## 2017-02-22 DIAGNOSIS — I48.0 PAROXYSMAL ATRIAL FIBRILLATION: ICD-10-CM

## 2017-02-22 DIAGNOSIS — B18.2 HEPATITIS C, CHRONIC: ICD-10-CM

## 2017-02-22 DIAGNOSIS — E04.1 RIGHT THYROID NODULE: ICD-10-CM

## 2017-02-22 DIAGNOSIS — E78.5 HYPERLIPIDEMIA: ICD-10-CM

## 2017-02-22 DIAGNOSIS — J44.9 COPD (CHRONIC OBSTRUCTIVE PULMONARY DISEASE): ICD-10-CM

## 2017-02-22 DIAGNOSIS — Z95.2 STATUS POST HEART VALVE REPLACEMENT WITH MECHANICAL VALVE: ICD-10-CM

## 2017-02-22 DIAGNOSIS — N18.2 CKD (CHRONIC KIDNEY DISEASE) STAGE 2, GFR 60-89 ML/MIN: ICD-10-CM

## 2017-02-22 DIAGNOSIS — I50.22 CHRONIC SYSTOLIC CONGESTIVE HEART FAILURE, NYHA CLASS 3: ICD-10-CM

## 2017-02-22 DIAGNOSIS — Z72.0 TOBACCO ABUSE: ICD-10-CM

## 2017-02-22 DIAGNOSIS — R80.9 PROTEINURIA: ICD-10-CM

## 2017-02-22 DIAGNOSIS — I10 HTN (HYPERTENSION), BENIGN: ICD-10-CM

## 2017-02-22 RX ORDER — FUROSEMIDE 80 MG/1
TABLET ORAL
Qty: 60 TABLET | Refills: 0 | OUTPATIENT
Start: 2017-02-22

## 2017-02-23 ENCOUNTER — ANTI-COAG VISIT (OUTPATIENT)
Dept: CARDIOLOGY | Facility: CLINIC | Age: 62
End: 2017-02-23

## 2017-02-23 DIAGNOSIS — Z95.2 STATUS POST HEART VALVE REPLACEMENT WITH MECHANICAL VALVE: ICD-10-CM

## 2017-02-23 DIAGNOSIS — Z79.01 ANTICOAGULATED ON COUMADIN: ICD-10-CM

## 2017-02-23 LAB
BACTERIA UR CULT: NORMAL
INR PPP: 2.3

## 2017-02-27 DIAGNOSIS — B18.2 CHRONIC HEPATITIS C WITHOUT HEPATIC COMA: ICD-10-CM

## 2017-02-27 DIAGNOSIS — I50.22 CHRONIC SYSTOLIC CONGESTIVE HEART FAILURE, NYHA CLASS 3: ICD-10-CM

## 2017-02-27 DIAGNOSIS — Z72.0 TOBACCO ABUSE: ICD-10-CM

## 2017-02-27 DIAGNOSIS — N18.2 CKD (CHRONIC KIDNEY DISEASE) STAGE 2, GFR 60-89 ML/MIN: ICD-10-CM

## 2017-02-27 DIAGNOSIS — R80.9 PROTEINURIA, UNSPECIFIED TYPE: ICD-10-CM

## 2017-02-27 DIAGNOSIS — I48.0 PAROXYSMAL ATRIAL FIBRILLATION: ICD-10-CM

## 2017-02-27 DIAGNOSIS — Z95.2 STATUS POST HEART VALVE REPLACEMENT WITH MECHANICAL VALVE: ICD-10-CM

## 2017-02-27 DIAGNOSIS — J44.9 CHRONIC OBSTRUCTIVE PULMONARY DISEASE, UNSPECIFIED COPD TYPE: ICD-10-CM

## 2017-02-27 DIAGNOSIS — E78.5 HYPERLIPIDEMIA, UNSPECIFIED HYPERLIPIDEMIA TYPE: ICD-10-CM

## 2017-02-27 DIAGNOSIS — E04.1 RIGHT THYROID NODULE: ICD-10-CM

## 2017-02-27 DIAGNOSIS — I10 HTN (HYPERTENSION), BENIGN: ICD-10-CM

## 2017-02-27 RX ORDER — FUROSEMIDE 80 MG/1
TABLET ORAL
Qty: 60 TABLET | Refills: 3 | Status: SHIPPED | OUTPATIENT
Start: 2017-02-27 | End: 2018-01-02 | Stop reason: SDUPTHER

## 2017-03-01 ENCOUNTER — ANTI-COAG VISIT (OUTPATIENT)
Dept: CARDIOLOGY | Facility: CLINIC | Age: 62
End: 2017-03-01

## 2017-03-01 DIAGNOSIS — Z95.2 STATUS POST HEART VALVE REPLACEMENT WITH MECHANICAL VALVE: ICD-10-CM

## 2017-03-01 DIAGNOSIS — Z79.01 ANTICOAGULATED ON COUMADIN: ICD-10-CM

## 2017-03-01 LAB — INR PPP: 2.9

## 2017-03-15 ENCOUNTER — ANTI-COAG VISIT (OUTPATIENT)
Dept: CARDIOLOGY | Facility: CLINIC | Age: 62
End: 2017-03-15
Payer: COMMERCIAL

## 2017-03-15 DIAGNOSIS — Z95.2 STATUS POST HEART VALVE REPLACEMENT WITH MECHANICAL VALVE: ICD-10-CM

## 2017-03-15 DIAGNOSIS — Z79.01 ANTICOAGULATED ON COUMADIN: ICD-10-CM

## 2017-03-15 LAB — INR PPP: 1.8

## 2017-03-15 PROCEDURE — G0250 MD INR TEST REVIE INTER MGMT: HCPCS | Mod: S$GLB,,, | Performed by: PHARMACIST

## 2017-03-22 ENCOUNTER — ANTI-COAG VISIT (OUTPATIENT)
Dept: CARDIOLOGY | Facility: CLINIC | Age: 62
End: 2017-03-22

## 2017-03-22 DIAGNOSIS — Z95.2 STATUS POST HEART VALVE REPLACEMENT WITH MECHANICAL VALVE: ICD-10-CM

## 2017-03-22 DIAGNOSIS — Z79.01 ANTICOAGULATED ON COUMADIN: ICD-10-CM

## 2017-03-22 LAB — INR PPP: 2

## 2017-04-05 ENCOUNTER — ANTI-COAG VISIT (OUTPATIENT)
Dept: CARDIOLOGY | Facility: CLINIC | Age: 62
End: 2017-04-05

## 2017-04-05 DIAGNOSIS — Z79.01 ANTICOAGULATED ON COUMADIN: ICD-10-CM

## 2017-04-05 DIAGNOSIS — Z95.2 STATUS POST HEART VALVE REPLACEMENT WITH MECHANICAL VALVE: ICD-10-CM

## 2017-04-05 LAB — INR PPP: 2.8

## 2017-04-05 NOTE — PROGRESS NOTES
Per patient call he was not able to call in INR result today due to insurance issue.  Reports he was advised to now call results to coumadin clinic.   Requesting to speak to someone concerning this issue.

## 2017-04-05 NOTE — PROGRESS NOTES
[facility] is in the process of verifying his new insurance company. This should be done within a week and the patient will receive a phone call from [facility] to confirm the out of pocket costs. Once he agrees to the new costs he can resume testing through [facility].

## 2017-04-07 DIAGNOSIS — Z72.0 TOBACCO ABUSE: ICD-10-CM

## 2017-04-07 DIAGNOSIS — N18.2 CKD (CHRONIC KIDNEY DISEASE) STAGE 2, GFR 60-89 ML/MIN: ICD-10-CM

## 2017-04-07 DIAGNOSIS — J44.9 CHRONIC OBSTRUCTIVE PULMONARY DISEASE, UNSPECIFIED COPD TYPE: ICD-10-CM

## 2017-04-07 DIAGNOSIS — I10 HTN (HYPERTENSION), BENIGN: ICD-10-CM

## 2017-04-07 DIAGNOSIS — I50.22 CHRONIC SYSTOLIC CONGESTIVE HEART FAILURE: ICD-10-CM

## 2017-04-07 DIAGNOSIS — B18.2 CHRONIC HEPATITIS C WITHOUT HEPATIC COMA: ICD-10-CM

## 2017-04-07 DIAGNOSIS — E78.5 HYPERLIPIDEMIA: ICD-10-CM

## 2017-04-07 DIAGNOSIS — R80.9 PROTEINURIA: ICD-10-CM

## 2017-04-07 DIAGNOSIS — Z95.2 STATUS POST HEART VALVE REPLACEMENT WITH MECHANICAL VALVE: ICD-10-CM

## 2017-04-07 DIAGNOSIS — I48.0 PAROXYSMAL ATRIAL FIBRILLATION: ICD-10-CM

## 2017-04-10 RX ORDER — METOLAZONE 2.5 MG/1
TABLET ORAL
Qty: 90 TABLET | Refills: 3 | Status: SHIPPED | OUTPATIENT
Start: 2017-04-10 | End: 2017-07-20 | Stop reason: SDUPTHER

## 2017-04-10 NOTE — PROGRESS NOTES
Verbal result taken from ____patient_____. PT/INR __2.8_____ Date drawn__4/10/17______ Hardcopy to be faxed.

## 2017-04-10 NOTE — PROGRESS NOTES
Per the patient he is still waiting on [facility]. Per patient his INR was 2.8 4/5. I advised him to continue his dose and will test again 4/19.

## 2017-04-19 ENCOUNTER — ANTI-COAG VISIT (OUTPATIENT)
Dept: CARDIOLOGY | Facility: CLINIC | Age: 62
End: 2017-04-19

## 2017-04-19 DIAGNOSIS — Z79.01 ANTICOAGULATED ON COUMADIN: ICD-10-CM

## 2017-04-19 DIAGNOSIS — Z95.2 STATUS POST HEART VALVE REPLACEMENT WITH MECHANICAL VALVE: ICD-10-CM

## 2017-04-19 LAB — INR PPP: 1.8

## 2017-04-26 ENCOUNTER — ANTI-COAG VISIT (OUTPATIENT)
Dept: CARDIOLOGY | Facility: CLINIC | Age: 62
End: 2017-04-26
Payer: COMMERCIAL

## 2017-04-26 DIAGNOSIS — Z95.2 STATUS POST HEART VALVE REPLACEMENT WITH MECHANICAL VALVE: ICD-10-CM

## 2017-04-26 DIAGNOSIS — Z79.01 ANTICOAGULATED ON COUMADIN: ICD-10-CM

## 2017-04-26 LAB — INR PPP: 2.6

## 2017-04-26 PROCEDURE — G0250 MD INR TEST REVIE INTER MGMT: HCPCS | Mod: S$GLB,,, | Performed by: PHARMACIST

## 2017-04-26 NOTE — PROGRESS NOTES
Verbal result taken from Pt called  _________. PT/INR _2.6______ Date drawn_4/26/17_______ Hardcopy to be faxed. Pt said he cannot get ([facility]) to except his INr so he calls it in. This has to due with change of INsurance they are not excepting.

## 2017-05-09 DIAGNOSIS — Z95.2 STATUS POST HEART VALVE REPLACEMENT WITH MECHANICAL VALVE: ICD-10-CM

## 2017-05-09 DIAGNOSIS — Z79.01 ANTICOAGULATED ON COUMADIN: ICD-10-CM

## 2017-05-09 RX ORDER — WARFARIN SODIUM 5 MG/1
TABLET ORAL
Qty: 60 TABLET | Refills: 0 | Status: SHIPPED | OUTPATIENT
Start: 2017-05-09 | End: 2017-07-17 | Stop reason: SDUPTHER

## 2017-05-10 ENCOUNTER — ANTI-COAG VISIT (OUTPATIENT)
Dept: CARDIOLOGY | Facility: CLINIC | Age: 62
End: 2017-05-10

## 2017-05-10 DIAGNOSIS — Z95.2 STATUS POST HEART VALVE REPLACEMENT WITH MECHANICAL VALVE: ICD-10-CM

## 2017-05-10 DIAGNOSIS — Z79.01 ANTICOAGULATED ON COUMADIN: ICD-10-CM

## 2017-05-10 LAB — INR PPP: 2.6

## 2017-05-24 ENCOUNTER — ANTI-COAG VISIT (OUTPATIENT)
Dept: CARDIOLOGY | Facility: CLINIC | Age: 62
End: 2017-05-24

## 2017-05-24 DIAGNOSIS — Z79.01 ANTICOAGULATED ON COUMADIN: ICD-10-CM

## 2017-05-24 DIAGNOSIS — Z95.2 STATUS POST HEART VALVE REPLACEMENT WITH MECHANICAL VALVE: ICD-10-CM

## 2017-05-24 LAB — INR PPP: 2.2

## 2017-06-07 ENCOUNTER — ANTI-COAG VISIT (OUTPATIENT)
Dept: CARDIOLOGY | Facility: CLINIC | Age: 62
End: 2017-06-07

## 2017-06-07 DIAGNOSIS — Z95.2 STATUS POST HEART VALVE REPLACEMENT WITH MECHANICAL VALVE: ICD-10-CM

## 2017-06-07 DIAGNOSIS — Z79.01 ANTICOAGULATED ON COUMADIN: ICD-10-CM

## 2017-06-07 LAB — INR PPP: 5

## 2017-06-12 ENCOUNTER — ANTI-COAG VISIT (OUTPATIENT)
Dept: CARDIOLOGY | Facility: CLINIC | Age: 62
End: 2017-06-12
Payer: COMMERCIAL

## 2017-06-12 DIAGNOSIS — Z95.2 STATUS POST HEART VALVE REPLACEMENT WITH MECHANICAL VALVE: ICD-10-CM

## 2017-06-12 DIAGNOSIS — Z79.01 ANTICOAGULATED ON COUMADIN: ICD-10-CM

## 2017-06-12 LAB — INR PPP: 1.5

## 2017-06-12 PROCEDURE — G0250 MD INR TEST REVIE INTER MGMT: HCPCS | Mod: ,,, | Performed by: PHARMACIST

## 2017-06-15 ENCOUNTER — ANTI-COAG VISIT (OUTPATIENT)
Dept: CARDIOLOGY | Facility: CLINIC | Age: 62
End: 2017-06-15

## 2017-06-15 DIAGNOSIS — Z95.2 STATUS POST HEART VALVE REPLACEMENT WITH MECHANICAL VALVE: ICD-10-CM

## 2017-06-15 DIAGNOSIS — I10 HTN (HYPERTENSION), BENIGN: ICD-10-CM

## 2017-06-15 DIAGNOSIS — J44.9 CHRONIC OBSTRUCTIVE PULMONARY DISEASE, UNSPECIFIED COPD TYPE: ICD-10-CM

## 2017-06-15 DIAGNOSIS — N18.2 CKD (CHRONIC KIDNEY DISEASE) STAGE 2, GFR 60-89 ML/MIN: ICD-10-CM

## 2017-06-15 DIAGNOSIS — R80.9 PROTEINURIA: ICD-10-CM

## 2017-06-15 DIAGNOSIS — E78.5 HYPERLIPIDEMIA: ICD-10-CM

## 2017-06-15 DIAGNOSIS — B18.2 CHRONIC HEPATITIS C WITHOUT HEPATIC COMA: ICD-10-CM

## 2017-06-15 DIAGNOSIS — Z72.0 TOBACCO ABUSE: ICD-10-CM

## 2017-06-15 DIAGNOSIS — I48.0 PAROXYSMAL ATRIAL FIBRILLATION: ICD-10-CM

## 2017-06-15 DIAGNOSIS — I50.22 CHRONIC SYSTOLIC CONGESTIVE HEART FAILURE: ICD-10-CM

## 2017-06-15 DIAGNOSIS — Z79.01 ANTICOAGULATED ON COUMADIN: Primary | ICD-10-CM

## 2017-06-15 LAB — INR PPP: 1.6

## 2017-06-15 RX ORDER — METOLAZONE 2.5 MG/1
TABLET ORAL
Qty: 30 TABLET | Refills: 0 | Status: SHIPPED | OUTPATIENT
Start: 2017-06-15 | End: 2017-07-20 | Stop reason: SDUPTHER

## 2017-06-15 RX ORDER — ENOXAPARIN SODIUM 100 MG/ML
INJECTION SUBCUTANEOUS
Qty: 10 SYRINGE | Refills: 1 | Status: SHIPPED | OUTPATIENT
Start: 2017-06-15 | End: 2017-07-11

## 2017-06-15 NOTE — PROGRESS NOTES
"The pt's INR is subtherapeutic for the second time in a row.  Due to his hx of a [facility] heart valve, I am asking him to use lovenox while I boost his INR.  The pt is 60yo M with Height: 5'9" (varying entries in EPIC), Weight: 165lbs./75kg, Scr: 1.2 and CrCl: Greater than 30mL/min.  I have called in Lovenox 80mg Q12hrs.  See calendar for warfarin dosing.  "

## 2017-06-19 LAB — INR PPP: 2.6

## 2017-06-20 ENCOUNTER — ANTI-COAG VISIT (OUTPATIENT)
Dept: CARDIOLOGY | Facility: CLINIC | Age: 62
End: 2017-06-20

## 2017-06-20 DIAGNOSIS — Z79.01 ANTICOAGULATED ON COUMADIN: ICD-10-CM

## 2017-06-20 DIAGNOSIS — Z95.2 STATUS POST HEART VALVE REPLACEMENT WITH MECHANICAL VALVE: ICD-10-CM

## 2017-06-27 ENCOUNTER — HOSPITAL ENCOUNTER (EMERGENCY)
Facility: HOSPITAL | Age: 62
Discharge: HOME | End: 2017-06-27
Attending: EMERGENCY MEDICINE
Payer: COMMERCIAL

## 2017-06-27 VITALS
DIASTOLIC BLOOD PRESSURE: 101 MMHG | TEMPERATURE: 98 F | OXYGEN SATURATION: 98 % | BODY MASS INDEX: 25.48 KG/M2 | HEIGHT: 69 IN | HEART RATE: 80 BPM | WEIGHT: 172 LBS | SYSTOLIC BLOOD PRESSURE: 160 MMHG | RESPIRATION RATE: 18 BRPM

## 2017-06-27 DIAGNOSIS — J81.0 ACUTE PULMONARY EDEMA: Primary | ICD-10-CM

## 2017-06-27 DIAGNOSIS — R06.02 SOB (SHORTNESS OF BREATH): ICD-10-CM

## 2017-06-27 DIAGNOSIS — I10 UNCONTROLLED HYPERTENSION: ICD-10-CM

## 2017-06-27 DIAGNOSIS — I50.43 ACUTE ON CHRONIC COMBINED SYSTOLIC AND DIASTOLIC CONGESTIVE HEART FAILURE: ICD-10-CM

## 2017-06-27 LAB
ALBUMIN SERPL BCP-MCNC: 3.7 G/DL
ALP SERPL-CCNC: 46 U/L
ALT SERPL W/O P-5'-P-CCNC: 30 U/L
ANION GAP SERPL CALC-SCNC: 8 MMOL/L
AST SERPL-CCNC: 32 U/L
BASOPHILS # BLD AUTO: 0.03 K/UL
BASOPHILS NFR BLD: 0.6 %
BILIRUB SERPL-MCNC: 1.7 MG/DL
BNP SERPL-MCNC: 1486 PG/ML
BUN SERPL-MCNC: 16 MG/DL
CALCIUM SERPL-MCNC: 9.6 MG/DL
CHLORIDE SERPL-SCNC: 108 MMOL/L
CO2 SERPL-SCNC: 29 MMOL/L
CREAT SERPL-MCNC: 1.1 MG/DL
DIFFERENTIAL METHOD: ABNORMAL
EOSINOPHIL # BLD AUTO: 0.1 K/UL
EOSINOPHIL NFR BLD: 2.4 %
ERYTHROCYTE [DISTWIDTH] IN BLOOD BY AUTOMATED COUNT: 13.5 %
EST. GFR  (AFRICAN AMERICAN): >60 ML/MIN/1.73 M^2
EST. GFR  (NON AFRICAN AMERICAN): >60 ML/MIN/1.73 M^2
GLUCOSE SERPL-MCNC: 85 MG/DL
HCT VFR BLD AUTO: 39.2 %
HGB BLD-MCNC: 13 G/DL
LYMPHOCYTES # BLD AUTO: 0.8 K/UL
LYMPHOCYTES NFR BLD: 17.1 %
MAGNESIUM SERPL-MCNC: 2.1 MG/DL
MCH RBC QN AUTO: 32.7 PG
MCHC RBC AUTO-ENTMCNC: 33.2 %
MCV RBC AUTO: 99 FL
MONOCYTES # BLD AUTO: 0.9 K/UL
MONOCYTES NFR BLD: 17.5 %
NEUTROPHILS # BLD AUTO: 3.1 K/UL
NEUTROPHILS NFR BLD: 62.4 %
PLATELET # BLD AUTO: 107 K/UL
PMV BLD AUTO: 11.4 FL
POTASSIUM SERPL-SCNC: 4 MMOL/L
PROT SERPL-MCNC: 7.7 G/DL
RBC # BLD AUTO: 3.97 M/UL
SODIUM SERPL-SCNC: 145 MMOL/L
TROPONIN I SERPL DL<=0.01 NG/ML-MCNC: 0.04 NG/ML
WBC # BLD AUTO: 4.92 K/UL

## 2017-06-27 PROCEDURE — 83735 ASSAY OF MAGNESIUM: CPT

## 2017-06-27 PROCEDURE — 84484 ASSAY OF TROPONIN QUANT: CPT

## 2017-06-27 PROCEDURE — 80053 COMPREHEN METABOLIC PANEL: CPT

## 2017-06-27 PROCEDURE — 25000003 PHARM REV CODE 250: Performed by: EMERGENCY MEDICINE

## 2017-06-27 PROCEDURE — 83880 ASSAY OF NATRIURETIC PEPTIDE: CPT

## 2017-06-27 PROCEDURE — 99284 EMERGENCY DEPT VISIT MOD MDM: CPT | Mod: 25

## 2017-06-27 PROCEDURE — 94640 AIRWAY INHALATION TREATMENT: CPT

## 2017-06-27 PROCEDURE — 85025 COMPLETE CBC W/AUTO DIFF WBC: CPT

## 2017-06-27 PROCEDURE — 96375 TX/PRO/DX INJ NEW DRUG ADDON: CPT

## 2017-06-27 PROCEDURE — 93005 ELECTROCARDIOGRAM TRACING: CPT

## 2017-06-27 PROCEDURE — 25000242 PHARM REV CODE 250 ALT 637 W/ HCPCS: Performed by: EMERGENCY MEDICINE

## 2017-06-27 PROCEDURE — 96374 THER/PROPH/DIAG INJ IV PUSH: CPT

## 2017-06-27 PROCEDURE — 63600175 PHARM REV CODE 636 W HCPCS: Performed by: EMERGENCY MEDICINE

## 2017-06-27 RX ORDER — HYDRALAZINE HYDROCHLORIDE 20 MG/ML
20 INJECTION INTRAMUSCULAR; INTRAVENOUS
Status: COMPLETED | OUTPATIENT
Start: 2017-06-27 | End: 2017-06-27

## 2017-06-27 RX ORDER — FUROSEMIDE 10 MG/ML
80 INJECTION INTRAMUSCULAR; INTRAVENOUS
Status: COMPLETED | OUTPATIENT
Start: 2017-06-27 | End: 2017-06-27

## 2017-06-27 RX ORDER — IPRATROPIUM BROMIDE AND ALBUTEROL SULFATE 2.5; .5 MG/3ML; MG/3ML
3 SOLUTION RESPIRATORY (INHALATION)
Status: COMPLETED | OUTPATIENT
Start: 2017-06-27 | End: 2017-06-27

## 2017-06-27 RX ORDER — ALBUTEROL SULFATE 2.5 MG/.5ML
5 SOLUTION RESPIRATORY (INHALATION)
Status: COMPLETED | OUTPATIENT
Start: 2017-06-27 | End: 2017-06-27

## 2017-06-27 RX ADMIN — HYDRALAZINE HYDROCHLORIDE 20 MG: 20 INJECTION INTRAMUSCULAR; INTRAVENOUS at 04:06

## 2017-06-27 RX ADMIN — NITROGLYCERIN 1 INCH: 20 OINTMENT TOPICAL at 04:06

## 2017-06-27 RX ADMIN — FUROSEMIDE 80 MG: 10 INJECTION, SOLUTION INTRAMUSCULAR; INTRAVENOUS at 04:06

## 2017-06-27 RX ADMIN — IPRATROPIUM BROMIDE AND ALBUTEROL SULFATE 3 ML: .5; 3 SOLUTION RESPIRATORY (INHALATION) at 04:06

## 2017-06-27 RX ADMIN — ALBUTEROL SULFATE 5 MG: 2.5 SOLUTION RESPIRATORY (INHALATION) at 04:06

## 2017-06-27 NOTE — ED PROVIDER NOTES
"Encounter Date: 6/27/2017    SCRIBE #1 NOTE: I, Phoebeiwonashelly Benjie, am scribing for, and in the presence of,  Harmeet Campbell MD. I have scribed the following portions of the note - Other sections scribed: HPI and ROS.       History     Chief Complaint   Patient presents with    Shortness of Breath     " I have been short of breath since Sunday. I have been coughing stuff up since Sunday. I want yall to give me some Lasix."     Chief Complaint: SOB    HPI: This 61 y.o. Male with CHF, HTN, asthma, HDL, Hep C, anemia, COPD, bronchitis, cirrhosis, lung disease, open heart surgery, cardiac valve replacement presents to the ED c/o SOB. Symptoms began at 9 am this morning. Symptoms are severe and worsening. There's an associated cough. There's no attempted treatment. Patient denies fever, chills, headache, ear pain, sore throat, nausea, vomiting, diarrhea, dysuria or rash.       The history is provided by the patient. No  was used.     Review of patient's allergies indicates:  No Known Allergies  Past Medical History:   Diagnosis Date    Anemia     Asthma     CHF (congestive heart failure)     Chronic bronchitis     Cirrhosis 1/19/2015    Hepatitis C     Hyperlipidemia     Hypertension     Lung disease     Pulmonary hypertension      Past Surgical History:   Procedure Laterality Date    CARDIAC VALVE REPLACEMENT      AVR WITH MVr 2007    HERNIA REPAIR  2006    questionable mesh, right inguinal, unbilical    LUNG DECORTICATION      right thoracotomy 2010    LUNG DECORTICATION  2010    open heart surgery      redo AVR and MVR 2009     Family History   Problem Relation Age of Onset    Hypertension Brother     Hypertension Sister      Social History   Substance Use Topics    Smoking status: Heavy Tobacco Smoker     Packs/day: 0.50     Years: 48.00     Types: Cigarettes    Smokeless tobacco: Never Used      Comment: 5 a day    Alcohol use 0.0 oz/week     Review of Systems   Constitutional: " Negative for chills and fever.   HENT: Negative for ear pain and sore throat.    Eyes: Negative for pain.   Respiratory: Positive for cough and shortness of breath.    Cardiovascular: Negative for chest pain.   Gastrointestinal: Negative for abdominal pain, diarrhea, nausea and vomiting.   Genitourinary: Negative for dysuria.   Musculoskeletal: Negative for myalgias (arm or leg pain).   Skin: Negative for rash.   Neurological: Negative for headaches.       Physical Exam     Initial Vitals [06/27/17 1447]   BP Pulse Resp Temp SpO2   (!) 191/95 89 20 98.4 °F (36.9 °C) 96 %      MAP       127         Physical Exam  The patient was examined specifically for the following:   General:No significant distress, Good color, Warm and dry. Head and neck:Scalp atraumatic, Neck supple. Neurological:Appropriate conversation, Gross motor deficits. Eyes:Conjugate gaze, Clear corneas. ENT: No epistaxis. Cardiac: Regular rate and rhythm, Grossly normal heart tones. Pulmonary: Wheezing, Rales. Gastrointestinal: Abdominal tenderness, Abdominal distention. Musculoskeletal: Extremity deformity, Apparent pain with range of motion of the joints. Skin: Rash.   The findings on examination were normal except for the following: Patient has rales and wheezing bilaterally.  The blood pressures 191/95.  The abdomen is soft.  There is minimal bilateral pedal edema.    ED Course   Procedures  Labs Reviewed   COMPREHENSIVE METABOLIC PANEL - Abnormal; Notable for the following:        Result Value    Total Bilirubin 1.7 (*)     Alkaline Phosphatase 46 (*)     All other components within normal limits   CBC W/ AUTO DIFFERENTIAL - Abnormal; Notable for the following:     RBC 3.97 (*)     Hemoglobin 13.0 (*)     Hematocrit 39.2 (*)     MCV 99 (*)     MCH 32.7 (*)     Platelets 107 (*)     Lymph # 0.8 (*)     Lymph% 17.1 (*)     Mono% 17.5 (*)     All other components within normal limits   B-TYPE NATRIURETIC PEPTIDE - Abnormal; Notable for the following:      BNP 1,486 (*)     All other components within normal limits   TROPONIN I - Abnormal; Notable for the following:     Troponin I 0.036 (*)     All other components within normal limits   MAGNESIUM     EKG Readings: (Independently Interpreted)   This patient is in a       X-Rays:   Independently Interpreted Readings:   Other Readings:  Chest x-ray reveals mild pulmonary edema.      Medical decision making: Given the above, this patient presents to the emergency room with shortness of breath and uncontrolled hypertension.  The patient did not take his medicine this morning as a history of congestive heart failure.  He was treated with nitroglycerin hydralazine and Lasix.  He made more than a liter of urine.  He is improved.  He feels back to normal.  He was offered admission but declined.  I will discharge him to return if he gets worse or if new problems develop.  I believe he had uncontrolled hypertension and pulmonary edema.  He has improved.  I will discharge him to continue his regular medicines.          Scribe Attestation:   Scribe #1: I performed the above scribed service and the documentation accurately describes the services I performed. I attest to the accuracy of the note.    Attending Attestation:           Physician Attestation for Scribe:  Physician Attestation Statement for Scribe #1: I, Harmeet Campbell MD, reviewed documentation, as scribed by Dustin Waldrop in my presence, and it is both accurate and complete.                 ED Course     Clinical Impression:   The primary encounter diagnosis was Acute pulmonary edema. Diagnoses of SOB (shortness of breath), Uncontrolled hypertension, and Acute on chronic combined systolic and diastolic congestive heart failure were also pertinent to this visit.                           Harmeet Campbell MD  06/28/17 1490

## 2017-06-27 NOTE — DISCHARGE INSTRUCTIONS
Low-sodium diet.  Please continue usual medicines including Lasix 80 mg twice a day for 5 days.  Return immediately if you get worse or if new problems develop.  Please restrict her fluid intake 2 500 mL or 1 pint per day.  Please restrict her salt intake.  Return immediately if you get worse or if new problems develop, more shortness of breath.

## 2017-06-27 NOTE — ED TRIAGE NOTES
Pt presents to ED c/o sob at rest and upon exertion x 3 days.  Hx of smoking, but states he last smoked 3 days ago.  Hx of copd, HTN, CHF.  Denies chest pains, ha, nvd. States he didn't take any medications today.

## 2017-06-28 ENCOUNTER — ANTI-COAG VISIT (OUTPATIENT)
Dept: CARDIOLOGY | Facility: CLINIC | Age: 62
End: 2017-06-28

## 2017-06-28 DIAGNOSIS — Z79.01 ANTICOAGULATED ON COUMADIN: ICD-10-CM

## 2017-06-28 DIAGNOSIS — Z95.2 STATUS POST HEART VALVE REPLACEMENT WITH MECHANICAL VALVE: ICD-10-CM

## 2017-06-28 LAB — INR PPP: 3

## 2017-07-06 ENCOUNTER — ANTI-COAG VISIT (OUTPATIENT)
Dept: CARDIOLOGY | Facility: CLINIC | Age: 62
End: 2017-07-06

## 2017-07-06 DIAGNOSIS — Z79.01 ANTICOAGULATED ON COUMADIN: ICD-10-CM

## 2017-07-06 DIAGNOSIS — Z95.2 STATUS POST HEART VALVE REPLACEMENT WITH MECHANICAL VALVE: ICD-10-CM

## 2017-07-06 LAB — INR PPP: 3.9

## 2017-07-11 ENCOUNTER — HOSPITAL ENCOUNTER (EMERGENCY)
Facility: HOSPITAL | Age: 62
Discharge: HOME | End: 2017-07-12
Attending: EMERGENCY MEDICINE | Admitting: EMERGENCY MEDICINE
Payer: MEDICARE

## 2017-07-11 VITALS
HEIGHT: 69 IN | RESPIRATION RATE: 16 BRPM | SYSTOLIC BLOOD PRESSURE: 151 MMHG | WEIGHT: 165 LBS | HEART RATE: 79 BPM | DIASTOLIC BLOOD PRESSURE: 75 MMHG | TEMPERATURE: 101 F | OXYGEN SATURATION: 93 % | BODY MASS INDEX: 24.44 KG/M2

## 2017-07-11 DIAGNOSIS — K59.00 CONSTIPATION, UNSPECIFIED CONSTIPATION TYPE: Primary | ICD-10-CM

## 2017-07-11 DIAGNOSIS — R05.9 COUGH: ICD-10-CM

## 2017-07-11 DIAGNOSIS — R50.9 FEVER, UNSPECIFIED FEVER CAUSE: ICD-10-CM

## 2017-07-11 LAB
ALBUMIN SERPL BCP-MCNC: 3.7 G/DL
ALP SERPL-CCNC: 52 U/L
ALT SERPL W/O P-5'-P-CCNC: 26 U/L
ANION GAP SERPL CALC-SCNC: 12 MMOL/L
AST SERPL-CCNC: 36 U/L
BACTERIA #/AREA URNS AUTO: ABNORMAL /HPF
BASOPHILS # BLD AUTO: 0.04 K/UL
BASOPHILS NFR BLD: 0.5 %
BILIRUB SERPL-MCNC: 1.4 MG/DL
BILIRUB UR QL STRIP: NEGATIVE
BUN SERPL-MCNC: 21 MG/DL
CALCIUM SERPL-MCNC: 9.8 MG/DL
CHLORIDE SERPL-SCNC: 98 MMOL/L
CLARITY UR REFRACT.AUTO: CLEAR
CO2 SERPL-SCNC: 29 MMOL/L
COLOR UR AUTO: YELLOW
CREAT SERPL-MCNC: 1 MG/DL
DIFFERENTIAL METHOD: ABNORMAL
EOSINOPHIL # BLD AUTO: 0.1 K/UL
EOSINOPHIL NFR BLD: 0.7 %
ERYTHROCYTE [DISTWIDTH] IN BLOOD BY AUTOMATED COUNT: 13.7 %
EST. GFR  (AFRICAN AMERICAN): >60 ML/MIN/1.73 M^2
EST. GFR  (NON AFRICAN AMERICAN): >60 ML/MIN/1.73 M^2
GLUCOSE SERPL-MCNC: 98 MG/DL
GLUCOSE UR QL STRIP: NEGATIVE
HCT VFR BLD AUTO: 39.4 %
HGB BLD-MCNC: 13.4 G/DL
HGB UR QL STRIP: ABNORMAL
HYALINE CASTS UR QL AUTO: 0 /LPF
INR PPP: 3.6
KETONES UR QL STRIP: NEGATIVE
LEUKOCYTE ESTERASE UR QL STRIP: NEGATIVE
LYMPHOCYTES # BLD AUTO: 1.6 K/UL
LYMPHOCYTES NFR BLD: 18.8 %
MAGNESIUM SERPL-MCNC: 2 MG/DL
MCH RBC QN AUTO: 32.6 PG
MCHC RBC AUTO-ENTMCNC: 34 %
MCV RBC AUTO: 96 FL
MICROSCOPIC COMMENT: ABNORMAL
MONOCYTES # BLD AUTO: 1.3 K/UL
MONOCYTES NFR BLD: 14.5 %
NEUTROPHILS # BLD AUTO: 5.7 K/UL
NEUTROPHILS NFR BLD: 65.2 %
NITRITE UR QL STRIP: NEGATIVE
PH UR STRIP: 5 [PH] (ref 5–8)
PLATELET # BLD AUTO: 138 K/UL
PMV BLD AUTO: 11.1 FL
POTASSIUM SERPL-SCNC: 3.6 MMOL/L
PROT SERPL-MCNC: 8.1 G/DL
PROT UR QL STRIP: ABNORMAL
PROTHROMBIN TIME: 36.8 SEC
RBC # BLD AUTO: 4.11 M/UL
RBC #/AREA URNS AUTO: 10 /HPF (ref 0–4)
SODIUM SERPL-SCNC: 139 MMOL/L
SP GR UR STRIP: 1.02 (ref 1–1.03)
SQUAMOUS #/AREA URNS AUTO: 1 /HPF
URN SPEC COLLECT METH UR: ABNORMAL
UROBILINOGEN UR STRIP-ACNC: ABNORMAL EU/DL
WBC # BLD AUTO: 8.71 K/UL
WBC #/AREA URNS AUTO: 4 /HPF (ref 0–5)

## 2017-07-11 PROCEDURE — 83735 ASSAY OF MAGNESIUM: CPT

## 2017-07-11 PROCEDURE — 99284 EMERGENCY DEPT VISIT MOD MDM: CPT | Mod: 25

## 2017-07-11 PROCEDURE — 25000242 PHARM REV CODE 250 ALT 637 W/ HCPCS: Performed by: INTERNAL MEDICINE

## 2017-07-11 PROCEDURE — 94760 N-INVAS EAR/PLS OXIMETRY 1: CPT

## 2017-07-11 PROCEDURE — 94640 AIRWAY INHALATION TREATMENT: CPT

## 2017-07-11 PROCEDURE — 81001 URINALYSIS AUTO W/SCOPE: CPT

## 2017-07-11 PROCEDURE — 85610 PROTHROMBIN TIME: CPT

## 2017-07-11 PROCEDURE — 25000003 PHARM REV CODE 250: Performed by: INTERNAL MEDICINE

## 2017-07-11 PROCEDURE — 99284 EMERGENCY DEPT VISIT MOD MDM: CPT | Mod: ,,, | Performed by: EMERGENCY MEDICINE

## 2017-07-11 PROCEDURE — 85025 COMPLETE CBC W/AUTO DIFF WBC: CPT

## 2017-07-11 PROCEDURE — 80053 COMPREHEN METABOLIC PANEL: CPT

## 2017-07-11 RX ORDER — PSEUDOEPHEDRINE/ACETAMINOPHEN 30MG-500MG
100 TABLET ORAL
Status: COMPLETED | OUTPATIENT
Start: 2017-07-11 | End: 2017-07-11

## 2017-07-11 RX ORDER — IPRATROPIUM BROMIDE AND ALBUTEROL SULFATE 2.5; .5 MG/3ML; MG/3ML
3 SOLUTION RESPIRATORY (INHALATION)
Status: COMPLETED | OUTPATIENT
Start: 2017-07-11 | End: 2017-07-11

## 2017-07-11 RX ORDER — SYRING-NEEDL,DISP,INSUL,0.3 ML 29 G X1/2"
300 SYRINGE, EMPTY DISPOSABLE MISCELLANEOUS
Status: COMPLETED | OUTPATIENT
Start: 2017-07-11 | End: 2017-07-11

## 2017-07-11 RX ADMIN — Medication 100 ML: at 11:07

## 2017-07-11 RX ADMIN — MAGNESIUM CITRATE 300 ML: 1.75 LIQUID ORAL at 11:07

## 2017-07-11 RX ADMIN — SODIUM CHLORIDE 500 ML: 0.9 INJECTION, SOLUTION INTRAVENOUS at 11:07

## 2017-07-11 RX ADMIN — IPRATROPIUM BROMIDE AND ALBUTEROL SULFATE 3 ML: .5; 3 SOLUTION RESPIRATORY (INHALATION) at 11:07

## 2017-07-12 ENCOUNTER — ANTI-COAG VISIT (OUTPATIENT)
Dept: CARDIOLOGY | Facility: CLINIC | Age: 62
End: 2017-07-12

## 2017-07-12 DIAGNOSIS — Z79.01 ANTICOAGULATED ON COUMADIN: ICD-10-CM

## 2017-07-12 DIAGNOSIS — Z95.2 STATUS POST HEART VALVE REPLACEMENT WITH MECHANICAL VALVE: ICD-10-CM

## 2017-07-12 LAB — INR PPP: 3

## 2017-07-12 RX ORDER — POLYETHYLENE GLYCOL 3350 17 G/17G
17 POWDER, FOR SOLUTION ORAL DAILY
Qty: 510 G | Refills: 0 | Status: SHIPPED | OUTPATIENT
Start: 2017-07-12 | End: 2017-07-20 | Stop reason: SDUPTHER

## 2017-07-12 NOTE — ED PROVIDER NOTES
"Encounter Date: 7/11/2017    SCRIBE #1 NOTE: I, Gómez Ruth, am scribing for, and in the presence of,  Dr. Terry . I have scribed the following portions of the note - the Resident attestation.       History     Chief Complaint   Patient presents with    Constipation     states hasnt had a BM or passed gas in over 1 week.      Paul Villalobos Sr. is a 61 y.o. male with pmhx of HTN, HLD, HF, COPD who presents to the ED with c/o constipation x 1 week.  States 1 week ago, had BM described as hard to pass, "few hard pellets" only.  Since that time, has been straining, unable to have BM.  Associated symptoms include "muscle spasms" of the entire back and fingers/hands.  Has chronic cough with white sputum production, no change from previous.  Denies fevers/chills, chest pain, dyspnea, abdominal pain, nausea/vomiting, blood in stool, dark/tarry stool, dysuria, hematuria, or other complaints.            Review of patient's allergies indicates:  No Known Allergies  Past Medical History:   Diagnosis Date    Anemia     Asthma     CHF (congestive heart failure)     Chronic bronchitis     Cirrhosis 1/19/2015    Hepatitis C     Hyperlipidemia     Hypertension     Lung disease     Pulmonary hypertension      Past Surgical History:   Procedure Laterality Date    CARDIAC VALVE REPLACEMENT      AVR WITH MVr 2007    HERNIA REPAIR  2006    questionable mesh, right inguinal, unbilical    LUNG DECORTICATION      right thoracotomy 2010    LUNG DECORTICATION  2010    open heart surgery      redo AVR and MVR 2009     Family History   Problem Relation Age of Onset    Hypertension Brother     Hypertension Sister      Social History   Substance Use Topics    Smoking status: Heavy Tobacco Smoker     Packs/day: 0.50     Years: 48.00     Types: Cigarettes    Smokeless tobacco: Never Used      Comment: 5 a day    Alcohol use 0.0 oz/week     Review of Systems   Constitutional: Negative for chills and fever.   HENT: Negative " for sore throat and trouble swallowing.    Eyes: Negative for photophobia and visual disturbance.   Respiratory: Negative for cough and shortness of breath.    Cardiovascular: Negative for chest pain, palpitations and leg swelling.   Gastrointestinal: Positive for constipation. Negative for abdominal distention, abdominal pain, blood in stool, diarrhea, nausea, rectal pain and vomiting.   Genitourinary: Negative for dysuria and hematuria.   Musculoskeletal: Negative for arthralgias and myalgias.   Skin: Negative for rash and wound.   Neurological: Negative for weakness and light-headedness.   Psychiatric/Behavioral: Negative for agitation and confusion.   All other systems reviewed and are negative.      Physical Exam     Initial Vitals [07/11/17 1931]   BP Pulse Resp Temp SpO2   (!) 151/75 85 16 (!) 100.7 °F (38.2 °C) 95 %      MAP       100.33         Physical Exam    Nursing note and vitals reviewed.  Constitutional: He appears well-developed and well-nourished. He is not diaphoretic. No distress.   HENT:   Head: Normocephalic and atraumatic.   Eyes: EOM are normal. Pupils are equal, round, and reactive to light. Right eye exhibits no discharge. Left eye exhibits no discharge.   Neck: Normal range of motion. Neck supple. No JVD present.   Cardiovascular: Normal rate, regular rhythm and intact distal pulses. Exam reveals no friction rub.    No murmur heard.  Pulmonary/Chest: He has wheezes (diffuse end expiratory wheezing). He has no rhonchi. He has no rales.   Abdominal: Soft. Bowel sounds are normal. He exhibits no mass. There is no tenderness. There is no rebound and no guarding.   Abdomen softly distended.  No CVA tenderness.   Genitourinary:   Genitourinary Comments: Normal rectal tone, no hard stool in vault.  No gross blood, no masses.   Musculoskeletal: Normal range of motion. He exhibits no edema or tenderness.        Thoracic back: He exhibits spasm. He exhibits normal range of motion and no bony  tenderness.        Lumbar back: He exhibits spasm. He exhibits normal range of motion and no bony tenderness.   Mild spasm bilateral paraspinous muscles   Neurological: He is alert and oriented to person, place, and time. He has normal strength. No cranial nerve deficit.   5/5 gross motor flexion/extension bilateral LE, normal gait   Skin: Skin is warm and dry. No pallor.         ED Course   Procedures  Labs Reviewed - No data to display                APC / Resident Notes:   U EM resident note:  61 y.o. M with c/o constipation, exam significant only for diffuse end expiratory wheezing and mild paraspinous muscle spasm; mildly febrile at 100.5 though pt denies subjective fevers.  CXR without evidence of pneumonia.  UA with few RBCs, some protein, no evidence of UTI.  Pt with relief of all symptoms s/p enema in ED.  Given fever and unclear etiology, offered further workup including admission to which he declines given resolution of symptoms, states that he would prefer to follow up with his PCP this week.  He denies any history of IVDU, abdominal exam remains overall benign.  Stressed the importance of close follow up and strict return precautions given for development of any new or otherwise concerning symptoms.  Patient amenable to plan.    Sonya Cordova  [facility]-Emergency Medicine [facility]  07/12/2017  5:26 AM         Scribe Attestation:   Scribe #1: I performed the above scribed service and the documentation accurately describes the services I performed. I attest to the accuracy of the note.    Attending Attestation:   Physician Attestation Statement for Resident:  As the supervising MD   Physician Attestation Statement: I have personally seen and examined this patient.   I agree with the above history. -: 61 y.o. male presets to the ED for constipation and back stiffness. Pt denies leg weakness.      As the supervising MD I agree with the above PE.   -: Pt has a low-grade fever. Pt appears non toxic  and has a benign abdomen.    As the supervising MD I agree with the above treatment, course, plan, and disposition.   -: Labs, CXR, and urine are all normal. Pt requested to be discharged and will follow- up with doctor. Pt is not septic. I have concern for having a heart valve and stressed the importance of a follow-up.           Physician Attestation for Scribe:  Physician Attestation Statement for Scribe #1: I, Dr. Terry, reviewed documentation, as scribed by Gómez Ruth  in my presence, and it is both accurate and complete.                 ED Course     Clinical Impression:   The primary encounter diagnosis was Constipation, unspecified constipation type. Diagnoses of Cough and Fever, unspecified fever cause were also pertinent to this visit.                           Sonya Cordova MD  Resident  07/12/17 2178

## 2017-07-12 NOTE — ED TRIAGE NOTES
Pt reports inability to pass gas or BM x 7 days. Pt denies any changes to medications, lifestyle, or surgeries. Pt reports generalized abdominal and rectal pain.

## 2017-07-12 NOTE — ED NOTES
LOC: The patient is awake, alert, aware of environment with an appropriate affect.  APPEARANCE: Pt resting comfortably, in no acute distress  SKIN: Skin warm, dry and intact, normal skin turgor, moist mucus membranes  RESPIRATORY: Airway is open and patent, respirations are spontaneous  CARDIAC: Normal rate and rhythm  ABDOMEN: generalized tenderness, inability to pass gas or BM x7days. Decreased appetite.   NEUROLOGIC: patient moving all extremities spontaneously  Follows all commands appropriately  MUSCULOSKELETAL: No obvious deformities.

## 2017-07-17 ENCOUNTER — ANTI-COAG VISIT (OUTPATIENT)
Dept: CARDIOLOGY | Facility: CLINIC | Age: 62
End: 2017-07-17
Payer: MEDICARE

## 2017-07-17 DIAGNOSIS — Z95.2 STATUS POST HEART VALVE REPLACEMENT WITH MECHANICAL VALVE: ICD-10-CM

## 2017-07-17 DIAGNOSIS — Z79.01 ANTICOAGULATED ON COUMADIN: ICD-10-CM

## 2017-07-17 LAB — INR PPP: 2.7

## 2017-07-17 PROCEDURE — 99211 OFF/OP EST MAY X REQ PHY/QHP: CPT | Mod: PBBFAC

## 2017-07-17 PROCEDURE — G0250 MD INR TEST REVIE INTER MGMT: HCPCS | Mod: ,,,

## 2017-07-17 PROCEDURE — 99999 PR PBB SHADOW E&M-EST. PATIENT-LVL I: CPT | Mod: PBBFAC,,,

## 2017-07-17 RX ORDER — WARFARIN SODIUM 5 MG/1
TABLET ORAL
Qty: 60 TABLET | Refills: 0 | Status: SHIPPED | OUTPATIENT
Start: 2017-07-17 | End: 2017-08-28 | Stop reason: SDUPTHER

## 2017-07-17 NOTE — PROGRESS NOTES
Patient presents for meter follow-up appointment.  Patient demonstrated proper use of meter.  Patient denies any difficulty using home monitor or reporting results.  INR results in meter are consistent with those reported and are without discrepancies.  Patient denies self-adjusting diet or dose based on readings.  Reminded patient to continue to contact clinic with any new medications, changes in health, or changes in diet. Patient will continue monitoring INR weekly and return to clinic in 12 months.  Patient advised to contact clinic with any changes, questions, or concerns.

## 2017-07-19 DIAGNOSIS — I50.22 CHRONIC SYSTOLIC CONGESTIVE HEART FAILURE: ICD-10-CM

## 2017-07-19 DIAGNOSIS — Z95.2 STATUS POST HEART VALVE REPLACEMENT WITH MECHANICAL VALVE: ICD-10-CM

## 2017-07-19 DIAGNOSIS — E78.5 HYPERLIPIDEMIA: ICD-10-CM

## 2017-07-19 DIAGNOSIS — N18.2 CKD (CHRONIC KIDNEY DISEASE) STAGE 2, GFR 60-89 ML/MIN: ICD-10-CM

## 2017-07-19 DIAGNOSIS — J44.9 CHRONIC OBSTRUCTIVE PULMONARY DISEASE, UNSPECIFIED COPD TYPE: ICD-10-CM

## 2017-07-19 DIAGNOSIS — Z72.0 TOBACCO ABUSE: ICD-10-CM

## 2017-07-19 DIAGNOSIS — I10 HTN (HYPERTENSION), BENIGN: ICD-10-CM

## 2017-07-19 DIAGNOSIS — I48.0 PAROXYSMAL ATRIAL FIBRILLATION: ICD-10-CM

## 2017-07-19 DIAGNOSIS — R80.9 PROTEINURIA: ICD-10-CM

## 2017-07-19 DIAGNOSIS — B18.2 CHRONIC HEPATITIS C WITHOUT HEPATIC COMA: ICD-10-CM

## 2017-07-20 ENCOUNTER — TELEPHONE (OUTPATIENT)
Dept: INTERNAL MEDICINE | Facility: CLINIC | Age: 62
End: 2017-07-20

## 2017-07-20 ENCOUNTER — OFFICE VISIT (OUTPATIENT)
Dept: INTERNAL MEDICINE | Facility: CLINIC | Age: 62
End: 2017-07-20
Payer: MEDICARE

## 2017-07-20 VITALS
OXYGEN SATURATION: 96 % | BODY MASS INDEX: 23.67 KG/M2 | HEART RATE: 83 BPM | WEIGHT: 159.81 LBS | SYSTOLIC BLOOD PRESSURE: 132 MMHG | HEIGHT: 69 IN | TEMPERATURE: 98 F | DIASTOLIC BLOOD PRESSURE: 74 MMHG

## 2017-07-20 DIAGNOSIS — N18.2 CKD (CHRONIC KIDNEY DISEASE) STAGE 2, GFR 60-89 ML/MIN: ICD-10-CM

## 2017-07-20 DIAGNOSIS — J44.9 CHRONIC OBSTRUCTIVE PULMONARY DISEASE, UNSPECIFIED COPD TYPE: ICD-10-CM

## 2017-07-20 DIAGNOSIS — R04.0 EPISTAXIS: ICD-10-CM

## 2017-07-20 DIAGNOSIS — Z95.2 STATUS POST HEART VALVE REPLACEMENT WITH MECHANICAL VALVE: ICD-10-CM

## 2017-07-20 DIAGNOSIS — E04.1 RIGHT THYROID NODULE: ICD-10-CM

## 2017-07-20 DIAGNOSIS — I48.0 PAROXYSMAL ATRIAL FIBRILLATION: Primary | ICD-10-CM

## 2017-07-20 DIAGNOSIS — K59.00 CONSTIPATION, UNSPECIFIED CONSTIPATION TYPE: ICD-10-CM

## 2017-07-20 DIAGNOSIS — I10 HTN (HYPERTENSION), BENIGN: ICD-10-CM

## 2017-07-20 DIAGNOSIS — R80.9 PROTEINURIA, UNSPECIFIED TYPE: ICD-10-CM

## 2017-07-20 DIAGNOSIS — Z72.0 TOBACCO ABUSE: ICD-10-CM

## 2017-07-20 DIAGNOSIS — E78.5 HYPERLIPIDEMIA, UNSPECIFIED HYPERLIPIDEMIA TYPE: ICD-10-CM

## 2017-07-20 DIAGNOSIS — I50.22 CHRONIC SYSTOLIC CONGESTIVE HEART FAILURE, NYHA CLASS 3: ICD-10-CM

## 2017-07-20 PROCEDURE — 99214 OFFICE O/P EST MOD 30 MIN: CPT | Mod: S$PBB,,, | Performed by: INTERNAL MEDICINE

## 2017-07-20 PROCEDURE — 99213 OFFICE O/P EST LOW 20 MIN: CPT | Mod: PBBFAC | Performed by: INTERNAL MEDICINE

## 2017-07-20 PROCEDURE — 99999 PR PBB SHADOW E&M-EST. PATIENT-LVL III: CPT | Mod: PBBFAC,,, | Performed by: INTERNAL MEDICINE

## 2017-07-20 RX ORDER — METOLAZONE 2.5 MG/1
TABLET ORAL
Qty: 30 TABLET | Refills: 0 | Status: SHIPPED | OUTPATIENT
Start: 2017-07-20 | End: 2017-07-24 | Stop reason: SDUPTHER

## 2017-07-20 RX ORDER — LISINOPRIL 10 MG/1
10 TABLET ORAL DAILY
Refills: 9 | COMMUNITY
Start: 2017-05-09 | End: 2017-11-20

## 2017-07-20 RX ORDER — POLYETHYLENE GLYCOL 3350 17 G/17G
17 POWDER, FOR SOLUTION ORAL DAILY
Qty: 510 G | Refills: 11 | Status: ON HOLD | OUTPATIENT
Start: 2017-07-20 | End: 2017-08-09 | Stop reason: HOSPADM

## 2017-07-20 RX ORDER — POTASSIUM CHLORIDE 20 MEQ/1
TABLET, EXTENDED RELEASE ORAL
Qty: 180 TABLET | Refills: 11 | Status: SHIPPED | OUTPATIENT
Start: 2017-07-20 | End: 2017-11-28 | Stop reason: SDUPTHER

## 2017-07-20 NOTE — TELEPHONE ENCOUNTER
----- Message from Celeste Das sent at 7/20/2017  1:48 PM CDT -----  Contact: [facility] [phone]   [facility] called to clarify directions for combient respimat. There are two sets of directions.

## 2017-07-20 NOTE — PROGRESS NOTES
Subjective:       Patient ID: Paul Villalobos Sr. is a 61 y.o. male.    Chief Complaint: Follow-up    2 ED visits recent.    1st one in ED for volume overload 2/2 med noncompliance, given liquid IV with good diuresis in ED and then discharged.    Last week --  Constipation, scabulous stools. Given enema, now on daily mirilax, symptoms much btr.    Since I last saw him saw ENT, who then referred him to second ENT doctor, he did not want to f/u with that one.      Review of Systems   Constitutional: Negative for activity change, appetite change, fatigue, fever and unexpected weight change.   HENT: Positive for voice change (chronic hoarseness). Negative for congestion.    Respiratory: Positive for cough and shortness of breath (stable though). Negative for chest tightness and wheezing.    Cardiovascular: Negative for chest pain, palpitations and leg swelling.   Gastrointestinal: Positive for constipation. Negative for abdominal distention, abdominal pain, nausea and vomiting.   Genitourinary: Negative for decreased urine volume.   Musculoskeletal: Negative for neck pain and neck stiffness.   Skin: Negative for rash and wound.   Neurological: Negative for tremors, syncope and weakness.   Hematological: Negative for adenopathy. Does not bruise/bleed easily.   Psychiatric/Behavioral: Negative for dysphoric mood.       Objective:      Physical Exam   Constitutional: He is oriented to person, place, and time. He appears well-developed and well-nourished. No distress.   HENT:   Head: Normocephalic and atraumatic.   Eyes: No scleral icterus.   Neck: Normal range of motion. No thyromegaly present.   Cardiovascular: Exam reveals no gallop and no friction rub.    Murmur heard.  Bigeminy sounding   Pulmonary/Chest: Effort normal. No respiratory distress. He has wheezes. He has no rales.   Abdominal: Soft. Bowel sounds are normal. He exhibits no distension and no mass. There is no tenderness. There is no rebound and no guarding.    Musculoskeletal: Normal range of motion. He exhibits no edema.   Lymphadenopathy:     He has no cervical adenopathy.   Neurological: He is alert and oriented to person, place, and time.   Skin: No lesion noted.   Psychiatric: He has a normal mood and affect. Thought content normal.       Assessment:       1. Constipation, unspecified constipation type    2. Tobacco abuse    3. Status post heart valve replacement with mechanical valve    4. Right thyroid nodule    5. Paroxysmal atrial fibrillation    6. Proteinuria, unspecified type    7. Hyperlipidemia, unspecified hyperlipidemia type    8. HTN (hypertension), benign    9. Chronic obstructive pulmonary disease, unspecified COPD type    10. CKD (chronic kidney disease) stage 2, GFR 60-89 ml/min    11. Chronic systolic congestive heart failure, NYHA class 3    12. Epistaxis        Plan:       Paul was seen today for follow-up.    Diagnoses and all orders for this visit:    Constipation, unspecified constipation type  -     polyethylene glycol (GLYCOLAX) 17 gram/dose powder; Take 17 g by mouth once daily.  Will try half serving, 17g maybe too strong    Tobacco abuse  68552: Smoking and tobacco cessation counseling visit; intensive, greater than 10 minutes    Status post heart valve replacement with mechanical valve      Paroxysmal atrial fibrillation  Has rate control, refer to cards, on coumadin    Proteinuria, unspecified type    Hyperlipidemia, unspecified hyperlipidemia type  Consider recheck in future    HTN (hypertension), benign  controlled    Chronic obstructive pulmonary disease, unspecified COPD type  -     Discontinue: ipratropium-albuterol (COMBIVENT RESPIMAT)  mcg/actuation inhaler; Inhale 1 puff into the lungs every 4 (four) hours as needed for Wheezing. INHALE 2 PUFF BY MOUTH TWICE DAILY    CKD (chronic kidney disease) stage 2, GFR 60-89 ml/min    Chronic systolic congestive heart failure, NYHA class 3  -     potassium chloride SA  (K-RAIN,KLOR-CON) 20 MEQ tablet; TAKE 2 TABLETS BY MOUTH ON MONDAY WEDNESDAY AND FRIDAY AND 1 TABLET ON ALL OTHER DAYS  -     Ambulatory Referral to Cardiology    Hoarseness, declines second ENT opinion    Epistaxis  -     sodium chloride 0.9 % SprA; 1 spray by Nasal route 2 (two) times daily.    Health Maintenance       Date Due Completion Date    TETANUS VACCINE 12/29/1971 ---    Colonoscopy 12/29/2003 ---    Zoster Vaccine 12/29/2013 ---    Influenza Vaccine 08/01/2017 9/20/2016    Pneumococcal PPSV23 (Medium Risk) (2) 12/29/2018 3/27/2013    Lipid Panel 01/31/2019 1/31/2014      Next time check in re colo      Return in about 4 months (around 11/20/2017).

## 2017-07-24 DIAGNOSIS — Z72.0 TOBACCO ABUSE: ICD-10-CM

## 2017-07-24 DIAGNOSIS — I10 HTN (HYPERTENSION), BENIGN: ICD-10-CM

## 2017-07-24 DIAGNOSIS — N18.2 CKD (CHRONIC KIDNEY DISEASE) STAGE 2, GFR 60-89 ML/MIN: ICD-10-CM

## 2017-07-24 DIAGNOSIS — I48.0 PAROXYSMAL ATRIAL FIBRILLATION: ICD-10-CM

## 2017-07-24 DIAGNOSIS — Z95.2 STATUS POST HEART VALVE REPLACEMENT WITH MECHANICAL VALVE: ICD-10-CM

## 2017-07-24 DIAGNOSIS — B18.2 CHRONIC HEPATITIS C WITHOUT HEPATIC COMA: ICD-10-CM

## 2017-07-24 DIAGNOSIS — I50.22 CHRONIC SYSTOLIC CONGESTIVE HEART FAILURE: ICD-10-CM

## 2017-07-24 DIAGNOSIS — J44.9 CHRONIC OBSTRUCTIVE PULMONARY DISEASE, UNSPECIFIED COPD TYPE: ICD-10-CM

## 2017-07-24 RX ORDER — METOLAZONE 2.5 MG/1
TABLET ORAL
Qty: 30 TABLET | Refills: 6 | Status: SHIPPED | OUTPATIENT
Start: 2017-07-24 | End: 2018-01-02 | Stop reason: SDUPTHER

## 2017-07-26 ENCOUNTER — OFFICE VISIT (OUTPATIENT)
Dept: CARDIOLOGY | Facility: CLINIC | Age: 62
End: 2017-07-26
Payer: MEDICARE

## 2017-07-26 VITALS
BODY MASS INDEX: 23.54 KG/M2 | WEIGHT: 158.94 LBS | DIASTOLIC BLOOD PRESSURE: 87 MMHG | HEART RATE: 82 BPM | SYSTOLIC BLOOD PRESSURE: 167 MMHG | HEIGHT: 69 IN

## 2017-07-26 DIAGNOSIS — Z95.2 STATUS POST HEART VALVE REPLACEMENT WITH MECHANICAL VALVE: Primary | ICD-10-CM

## 2017-07-26 DIAGNOSIS — I48.0 PAROXYSMAL ATRIAL FIBRILLATION: ICD-10-CM

## 2017-07-26 DIAGNOSIS — I50.22 CHRONIC SYSTOLIC CONGESTIVE HEART FAILURE, NYHA CLASS 3: ICD-10-CM

## 2017-07-26 DIAGNOSIS — N18.2 CKD (CHRONIC KIDNEY DISEASE) STAGE 2, GFR 60-89 ML/MIN: ICD-10-CM

## 2017-07-26 DIAGNOSIS — I10 HTN (HYPERTENSION), BENIGN: ICD-10-CM

## 2017-07-26 DIAGNOSIS — Z95.2 S/P AORTIC VALVE AND MITRAL VALVE REPLACEMENT: ICD-10-CM

## 2017-07-26 DIAGNOSIS — E78.5 HYPERLIPIDEMIA, UNSPECIFIED HYPERLIPIDEMIA TYPE: ICD-10-CM

## 2017-07-26 DIAGNOSIS — Z72.0 TOBACCO ABUSE: ICD-10-CM

## 2017-07-26 PROCEDURE — 99213 OFFICE O/P EST LOW 20 MIN: CPT | Mod: PBBFAC | Performed by: INTERNAL MEDICINE

## 2017-07-26 PROCEDURE — 99215 OFFICE O/P EST HI 40 MIN: CPT | Mod: S$PBB,,, | Performed by: INTERNAL MEDICINE

## 2017-07-26 PROCEDURE — 99999 PR PBB SHADOW E&M-EST. PATIENT-LVL III: CPT | Mod: PBBFAC,,, | Performed by: INTERNAL MEDICINE

## 2017-07-26 RX ORDER — NAPROXEN SODIUM 220 MG/1
81 TABLET, FILM COATED ORAL DAILY
Qty: 90 TABLET | Refills: 3 | Status: SHIPPED | OUTPATIENT
Start: 2017-07-26 | End: 2017-11-14 | Stop reason: ALTCHOICE

## 2017-07-26 NOTE — LETTER
July 29, 2017      Tye Concepcion MD  [address]  [place] [address]           [facility] - Cardiology  [address]  [place] [address]  Phone: [phone]          Patient: Paul Villalobos Sr.   MR Number: [id number]   YOB: 1955   Date of Visit: 7/26/2017       Dear Dr. Tye Concepcion:    Thank you for referring Paul Villalobos to me for evaluation. Attached you will find relevant portions of my assessment and plan of care.    If you have questions, please do not hesitate to call me. I look forward to following Paul Villalobos along with you.    Sincerely,        Enclosure  CC:  No Recipients    If you would like to receive this communication electronically, please contact [email or web address] or [phone] to request more information on [place] [facility] access.    For providers and/or their staff who would like to refer a patient to [facility], please contact us through our one-stop-shop provider referral line, [facility], at [phone].    If you feel you have received this communication in error or would no longer like to receive these types of communications, please e-mail [email or web address]

## 2017-07-26 NOTE — PROGRESS NOTES
Subjective:   Patient ID:  Paul Villalobos Sr. is a 61 y.o. male is a new patient who presents for evaluation of Chronic systolic congestive heart failure, NYHA class 3    60 Y/O M with PMH of chronic HFrEF EF 35%, Mechanical AV, Mechanical Mitral Valve 2009 for valve regurge , HTN, HLD, Hep C, asthma. Patient saw Dr Guzman and Dr CLARISSE amaya in 2012 but hasn't been following with cardiologist. Stated that he has been feeling SOB since January with NYHA class II to III symptoms, can walk for 2 blocks before getting SOB, patient has been complaining of dry cough, 4 pillows orthopnea now prefer to sleep in the semisitting position, leg swelling over the past 2 month. Today patient was getting more short of breath decided to come to the ED.  Denied chest pain, denied ever having cardiac catheterization or cardiac stress test.  EKG: NSR, Old RBBB with RVH    Echo:    1 - Severe left atrial enlargement.     2 - Eccentric hypertrophy.     3 - Mildly to moderately depressed left ventricular systolic function (EF 40%).     4 - Right ventricular enlargement with low normal to mildly depressed systolic function.     5 - Mechanical aortic valve prosthesis 21 mm, effective prosthetic valve area is 1.0 cm2.     6 - Mechanical mitral valve prosthesis 27 mm , effective orifice area 2.9 cm2.     7 - Pulmonary hypertension. The estimated PA systolic pressure is 44 mmHg.    HPI:  Patient's feel well.   No chest pain, Orthopnea, PND of heart failure symptoms.       Patient Active Problem List   Diagnosis    Status post heart valve replacement with mechanical valve    HTN (hypertension), benign    Tobacco abuse    CHF (NYHA class III, ACC/AHA stage C)    Hyperlipidemia    Anticoagulated on Coumadin    H/O hepatitis C    CKD (chronic kidney disease) stage 2, GFR 60-89 ml/min    Asymptomatic cholelithiasis    Proteinuria    Right thyroid nodule    Paroxysmal atrial fibrillation    Hepatic cirrhosis due to chronic hepatitis C  "infection    Asthmatic bronchitis    Erectile dysfunction    Cirrhosis    Acute pulmonary edema     BP (!) 167/87 (BP Location: Left arm, Patient Position: Sitting, BP Method: Automatic)   Pulse 82   Ht 5' 9" (1.753 m)   Wt 72.1 kg (158 lb 15.2 oz)   BMI 23.47 kg/m²   Body mass index is 23.47 kg/m².  CrCl cannot be calculated (Patient's most recent sCr result is older than the maximum 7 days allowed.).    Lab Results   Component Value Date     07/11/2017    K 3.6 07/11/2017    CL 98 07/11/2017    CO2 29 07/11/2017    BUN 21 07/11/2017    CREATININE 1.0 07/11/2017    GLU 98 07/11/2017    HGBA1C 5.6 10/29/2015    MG 2.0 07/11/2017    AST 36 07/11/2017    ALT 26 07/11/2017    ALBUMIN 3.7 07/11/2017    PROT 8.1 07/11/2017    BILITOT 1.4 (H) 07/11/2017    WBC 8.71 07/11/2017    HGB 13.4 (L) 07/11/2017    HCT 39.4 (L) 07/11/2017    MCV 96 07/11/2017     (L) 07/11/2017    INR 2.7 07/17/2017    INR 3.8 (H) 12/14/2009    PSA 0.73 04/03/2014    TSH 1.068 03/27/2013    CHOL 208 (H) 01/31/2014    HDL 77 (H) 01/31/2014    LDLCALC 113.0 01/31/2014    TRIG 90 01/31/2014       Current Outpatient Prescriptions   Medication Sig    carvedilol (COREG) 25 MG tablet Take 1 tablet (25 mg total) by mouth 2 (two) times daily with meals.    furosemide (LASIX) 80 MG tablet TAKE 1 TABLET BY MOUTH ON MONDAY WEDNESDAY AND FRIDAY AND 1/2 TABLET BY MOUTH ON ALL OTHER DAYS    ipratropium-albuterol (COMBIVENT RESPIMAT)  mcg/actuation inhaler Inhale 1 puff into the lungs every 4 (four) hours as needed for Wheezing.    isosorbide mononitrate (IMDUR) 30 MG 24 hr tablet Take 1 tablet (30 mg total) by mouth once daily.    lisinopril 10 MG tablet Take 10 mg by mouth once daily.     metOLazone (ZAROXOLYN) 2.5 MG tablet Take 1 tablet (2.5 mg total) by mouth daily as needed.    metOLazone (ZAROXOLYN) 2.5 MG tablet TAKE 1 TABLET(2.5 MG) BY MOUTH DAILY AS NEEDED    polyethylene glycol (GLYCOLAX) 17 gram/dose powder Take 17 g " by mouth once daily.    potassium chloride SA (K-DUR,KLOR-CON) 20 MEQ tablet TAKE 2 TABLETS BY MOUTH ON MONDAY WEDNESDAY AND FRIDAY AND 1 TABLET ON ALL OTHER DAYS    sodium chloride 0.9 % SprA 1 spray by Nasal route 2 (two) times daily.    warfarin (COUMADIN) 5 MG tablet TAKE 2 TABLETS BY MOUTH EVERY DAY     No current facility-administered medications for this visit.        Review of Systems   Constitution: Positive for malaise/fatigue (some fatigue). Negative for chills, fever, weakness, weight gain and weight loss.   Cardiovascular: Positive for dyspnea on exertion. Negative for chest pain, irregular heartbeat, near-syncope, orthopnea, palpitations, paroxysmal nocturnal dyspnea and syncope. Leg swelling: sometimes.   Respiratory: Positive for cough, shortness of breath, sputum production and wheezing.    Hematologic/Lymphatic: Does not bruise/bleed easily.   Musculoskeletal: Negative for arthritis, joint pain and stiffness.   Gastrointestinal: Positive for constipation. Negative for hematochezia and melena.        Completed hepatitis C in July or August   Genitourinary: Negative for hematuria.   Neurological: Negative for brief paralysis, focal weakness and seizures.       Objective:   Physical Exam   Constitutional: He is oriented to person, place, and time. He appears well-developed and well-nourished. No distress.   HENT:   Head: Normocephalic and atraumatic.   Nose: Nose normal.   Mouth/Throat: No oropharyngeal exudate.   Eyes: Conjunctivae and EOM are normal. Pupils are equal, round, and reactive to light. Right eye exhibits no discharge. Left eye exhibits no discharge. No scleral icterus.   Neck: Normal range of motion. Neck supple. No JVD present. No tracheal deviation present. No thyromegaly present.   Cardiovascular: Normal rate, regular rhythm, normal heart sounds and intact distal pulses.  Exam reveals no gallop and no friction rub.    No murmur heard.  Mechanical click of the mitral and aortic  valve   Pulmonary/Chest: Effort normal and breath sounds normal. No stridor. No respiratory distress. He has no wheezes. He has no rales. He exhibits no tenderness.   Abdominal: Soft. Bowel sounds are normal. He exhibits no distension and no mass. There is no tenderness.   Musculoskeletal: He exhibits no edema or tenderness.   Lymphadenopathy:     He has no cervical adenopathy.   Neurological: He is alert and oriented to person, place, and time. He displays normal reflexes. No cranial nerve deficit. He exhibits normal muscle tone. Coordination normal.   Skin: Skin is warm. No rash noted. He is not diaphoretic. No erythema. No pallor.   Psychiatric: He has a normal mood and affect. His behavior is normal. Judgment and thought content normal.       Assessment:     1. Status post heart valve replacement with mechanical valve    2. CKD (chronic kidney disease) stage 2, GFR 60-89 ml/min    3. HTN (hypertension), benign    4. Paroxysmal atrial fibrillation    5. Chronic systolic congestive heart failure, NYHA class 3    6. Hyperlipidemia, unspecified hyperlipidemia type    7. Tobacco abuse    8. S/P aortic valve and mitral valve replacement        Plan:   ASCVD 34%

## 2017-08-01 ENCOUNTER — CLINICAL SUPPORT (OUTPATIENT)
Dept: SMOKING CESSATION | Facility: CLINIC | Age: 62
End: 2017-08-01
Payer: COMMERCIAL

## 2017-08-01 DIAGNOSIS — F17.200 NICOTINE DEPENDENCE: Primary | ICD-10-CM

## 2017-08-01 PROCEDURE — 99407 BEHAV CHNG SMOKING > 10 MIN: CPT | Mod: S$GLB,,,

## 2017-08-01 NOTE — PROGRESS NOTES
Pt not ready to return to the program at this time. Contact information provided to schedule when ready. Will resolve this episode.

## 2017-08-02 ENCOUNTER — ANTI-COAG VISIT (OUTPATIENT)
Dept: CARDIOLOGY | Facility: CLINIC | Age: 62
End: 2017-08-02

## 2017-08-02 DIAGNOSIS — Z79.01 ANTICOAGULATED ON COUMADIN: ICD-10-CM

## 2017-08-02 DIAGNOSIS — Z95.2 STATUS POST HEART VALVE REPLACEMENT WITH MECHANICAL VALVE: ICD-10-CM

## 2017-08-02 LAB — INR PPP: 2.5

## 2017-08-08 ENCOUNTER — HOSPITAL ENCOUNTER (OUTPATIENT)
Facility: HOSPITAL | Age: 62
Discharge: HOME | End: 2017-08-09
Attending: EMERGENCY MEDICINE | Admitting: EMERGENCY MEDICINE
Payer: MEDICARE

## 2017-08-08 DIAGNOSIS — Z79.01 ANTICOAGULATED ON COUMADIN: ICD-10-CM

## 2017-08-08 DIAGNOSIS — R07.9 CHEST PAIN: ICD-10-CM

## 2017-08-08 DIAGNOSIS — M54.6 ACUTE BILATERAL THORACIC BACK PAIN: ICD-10-CM

## 2017-08-08 DIAGNOSIS — N18.2 CKD (CHRONIC KIDNEY DISEASE) STAGE 2, GFR 60-89 ML/MIN: ICD-10-CM

## 2017-08-08 DIAGNOSIS — I95.9 HYPOTENSION, UNSPECIFIED HYPOTENSION TYPE: Primary | ICD-10-CM

## 2017-08-08 DIAGNOSIS — I48.0 PAROXYSMAL ATRIAL FIBRILLATION: ICD-10-CM

## 2017-08-08 DIAGNOSIS — I10 HTN (HYPERTENSION), BENIGN: ICD-10-CM

## 2017-08-08 PROBLEM — R79.89 ELEVATED TROPONIN: Status: ACTIVE | Noted: 2017-08-08

## 2017-08-08 PROBLEM — R19.7 DIARRHEA: Status: ACTIVE | Noted: 2017-08-08

## 2017-08-08 LAB
ABO + RH BLD: NORMAL
ALBUMIN SERPL BCP-MCNC: 3.7 G/DL
ALP SERPL-CCNC: 59 U/L
ALT SERPL W/O P-5'-P-CCNC: 28 U/L
ANION GAP SERPL CALC-SCNC: 9 MMOL/L
APTT BLDCRRT: 40.2 SEC
AST SERPL-CCNC: 31 U/L
BACTERIA #/AREA URNS AUTO: ABNORMAL /HPF
BASOPHILS # BLD AUTO: 0.02 K/UL
BASOPHILS NFR BLD: 0.3 %
BILIRUB SERPL-MCNC: 1.2 MG/DL
BILIRUB UR QL STRIP: NEGATIVE
BLD GP AB SCN CELLS X3 SERPL QL: NORMAL
BNP SERPL-MCNC: 677 PG/ML
BUN SERPL-MCNC: 21 MG/DL
CALCIUM SERPL-MCNC: 9.7 MG/DL
CHLORIDE SERPL-SCNC: 97 MMOL/L
CK SERPL-CCNC: 81 U/L
CLARITY UR REFRACT.AUTO: ABNORMAL
CO2 SERPL-SCNC: 29 MMOL/L
COLOR UR AUTO: YELLOW
CREAT SERPL-MCNC: 1.4 MG/DL
CRP SERPL-MCNC: 36.6 MG/L
DIFFERENTIAL METHOD: ABNORMAL
EOSINOPHIL # BLD AUTO: 0.1 K/UL
EOSINOPHIL NFR BLD: 1.9 %
ERYTHROCYTE [DISTWIDTH] IN BLOOD BY AUTOMATED COUNT: 13.3 %
ERYTHROCYTE [SEDIMENTATION RATE] IN BLOOD BY WESTERGREN METHOD: 17 MM/HR
EST. GFR  (AFRICAN AMERICAN): >60 ML/MIN/1.73 M^2
EST. GFR  (NON AFRICAN AMERICAN): 53.1 ML/MIN/1.73 M^2
GLUCOSE SERPL-MCNC: 105 MG/DL
GLUCOSE UR QL STRIP: ABNORMAL
HCT VFR BLD AUTO: 43.5 %
HGB BLD-MCNC: 14.6 G/DL
HGB UR QL STRIP: ABNORMAL
HYALINE CASTS UR QL AUTO: 10 /LPF
INR PPP: 3.9
KETONES UR QL STRIP: NEGATIVE
LACTATE SERPL-SCNC: 1.4 MMOL/L
LEUKOCYTE ESTERASE UR QL STRIP: NEGATIVE
LYMPHOCYTES # BLD AUTO: 1.5 K/UL
LYMPHOCYTES NFR BLD: 22.5 %
MCH RBC QN AUTO: 32.7 PG
MCHC RBC AUTO-ENTMCNC: 33.6 G/DL
MCV RBC AUTO: 97 FL
MICROSCOPIC COMMENT: ABNORMAL
MONOCYTES # BLD AUTO: 1.1 K/UL
MONOCYTES NFR BLD: 16 %
NEUTROPHILS # BLD AUTO: 4 K/UL
NEUTROPHILS NFR BLD: 59.3 %
NITRITE UR QL STRIP: NEGATIVE
PH UR STRIP: 5 [PH] (ref 5–8)
PLATELET # BLD AUTO: 168 K/UL
PMV BLD AUTO: 11.5 FL
POTASSIUM SERPL-SCNC: 3.6 MMOL/L
PROT SERPL-MCNC: 8 G/DL
PROT UR QL STRIP: ABNORMAL
PROTHROMBIN TIME: 39.5 SEC
RBC # BLD AUTO: 4.47 M/UL
RBC #/AREA URNS AUTO: 13 /HPF (ref 0–4)
SODIUM SERPL-SCNC: 135 MMOL/L
SP GR UR STRIP: 1.02 (ref 1–1.03)
SQUAMOUS #/AREA URNS AUTO: 1 /HPF
TROPONIN I SERPL DL<=0.01 NG/ML-MCNC: 0.05 NG/ML
TROPONIN I SERPL DL<=0.01 NG/ML-MCNC: 0.07 NG/ML
URN SPEC COLLECT METH UR: ABNORMAL
UROBILINOGEN UR STRIP-ACNC: 2 EU/DL
WBC # BLD AUTO: 6.67 K/UL
WBC #/AREA URNS AUTO: 4 /HPF (ref 0–5)

## 2017-08-08 PROCEDURE — 85025 COMPLETE CBC W/AUTO DIFF WBC: CPT

## 2017-08-08 PROCEDURE — 84484 ASSAY OF TROPONIN QUANT: CPT | Mod: 91

## 2017-08-08 PROCEDURE — 93010 ELECTROCARDIOGRAM REPORT: CPT | Mod: ,,, | Performed by: INTERNAL MEDICINE

## 2017-08-08 PROCEDURE — 82550 ASSAY OF CK (CPK): CPT

## 2017-08-08 PROCEDURE — 99285 EMERGENCY DEPT VISIT HI MDM: CPT | Mod: 25

## 2017-08-08 PROCEDURE — 85730 THROMBOPLASTIN TIME PARTIAL: CPT

## 2017-08-08 PROCEDURE — 85610 PROTHROMBIN TIME: CPT

## 2017-08-08 PROCEDURE — 85651 RBC SED RATE NONAUTOMATED: CPT

## 2017-08-08 PROCEDURE — 93005 ELECTROCARDIOGRAM TRACING: CPT

## 2017-08-08 PROCEDURE — 86901 BLOOD TYPING SEROLOGIC RH(D): CPT

## 2017-08-08 PROCEDURE — 25000242 PHARM REV CODE 250 ALT 637 W/ HCPCS: Performed by: PHYSICIAN ASSISTANT

## 2017-08-08 PROCEDURE — 96361 HYDRATE IV INFUSION ADD-ON: CPT

## 2017-08-08 PROCEDURE — 93010 ELECTROCARDIOGRAM REPORT: CPT | Mod: 76,,, | Performed by: INTERNAL MEDICINE

## 2017-08-08 PROCEDURE — 83605 ASSAY OF LACTIC ACID: CPT

## 2017-08-08 PROCEDURE — G0378 HOSPITAL OBSERVATION PER HR: HCPCS

## 2017-08-08 PROCEDURE — 84484 ASSAY OF TROPONIN QUANT: CPT

## 2017-08-08 PROCEDURE — 81001 URINALYSIS AUTO W/SCOPE: CPT

## 2017-08-08 PROCEDURE — 94640 AIRWAY INHALATION TREATMENT: CPT

## 2017-08-08 PROCEDURE — 25000003 PHARM REV CODE 250: Performed by: EMERGENCY MEDICINE

## 2017-08-08 PROCEDURE — 63600175 PHARM REV CODE 636 W HCPCS: Performed by: EMERGENCY MEDICINE

## 2017-08-08 PROCEDURE — 87040 BLOOD CULTURE FOR BACTERIA: CPT | Mod: 59

## 2017-08-08 PROCEDURE — 80053 COMPREHEN METABOLIC PANEL: CPT

## 2017-08-08 PROCEDURE — 83880 ASSAY OF NATRIURETIC PEPTIDE: CPT

## 2017-08-08 PROCEDURE — 25000003 PHARM REV CODE 250: Performed by: PHYSICIAN ASSISTANT

## 2017-08-08 PROCEDURE — 96374 THER/PROPH/DIAG INJ IV PUSH: CPT

## 2017-08-08 PROCEDURE — 99220 PR INITIAL OBSERVATION CARE,LEVL III: CPT | Mod: ,,, | Performed by: PHYSICIAN ASSISTANT

## 2017-08-08 PROCEDURE — 99285 EMERGENCY DEPT VISIT HI MDM: CPT | Mod: ,,, | Performed by: EMERGENCY MEDICINE

## 2017-08-08 PROCEDURE — 86140 C-REACTIVE PROTEIN: CPT

## 2017-08-08 PROCEDURE — 86900 BLOOD TYPING SEROLOGIC ABO: CPT

## 2017-08-08 RX ORDER — FUROSEMIDE 80 MG/1
80 TABLET ORAL
Status: DISCONTINUED | OUTPATIENT
Start: 2017-08-09 | End: 2017-08-09 | Stop reason: HOSPADM

## 2017-08-08 RX ORDER — SIMETHICONE 80 MG
1 TABLET,CHEWABLE ORAL 3 TIMES DAILY PRN
Status: DISCONTINUED | OUTPATIENT
Start: 2017-08-08 | End: 2017-08-09 | Stop reason: HOSPADM

## 2017-08-08 RX ORDER — RAMELTEON 8 MG/1
8 TABLET ORAL NIGHTLY PRN
Status: DISCONTINUED | OUTPATIENT
Start: 2017-08-08 | End: 2017-08-09 | Stop reason: HOSPADM

## 2017-08-08 RX ORDER — MORPHINE SULFATE 2 MG/ML
4 INJECTION, SOLUTION INTRAMUSCULAR; INTRAVENOUS
Status: COMPLETED | OUTPATIENT
Start: 2017-08-08 | End: 2017-08-08

## 2017-08-08 RX ORDER — ONDANSETRON 2 MG/ML
4 INJECTION INTRAMUSCULAR; INTRAVENOUS EVERY 12 HOURS PRN
Status: DISCONTINUED | OUTPATIENT
Start: 2017-08-08 | End: 2017-08-09 | Stop reason: HOSPADM

## 2017-08-08 RX ORDER — SODIUM CHLORIDE 9 MG/ML
INJECTION, SOLUTION INTRAVENOUS CONTINUOUS
Status: ACTIVE | OUTPATIENT
Start: 2017-08-08 | End: 2017-08-09

## 2017-08-08 RX ORDER — FUROSEMIDE 40 MG/1
40 TABLET ORAL
Status: DISCONTINUED | OUTPATIENT
Start: 2017-08-10 | End: 2017-08-09 | Stop reason: HOSPADM

## 2017-08-08 RX ORDER — POTASSIUM CHLORIDE 20 MEQ/1
20 TABLET, EXTENDED RELEASE ORAL
Status: DISCONTINUED | OUTPATIENT
Start: 2017-08-10 | End: 2017-08-09 | Stop reason: HOSPADM

## 2017-08-08 RX ORDER — ACETAMINOPHEN 325 MG/1
650 TABLET ORAL EVERY 4 HOURS PRN
Status: DISCONTINUED | OUTPATIENT
Start: 2017-08-08 | End: 2017-08-09 | Stop reason: HOSPADM

## 2017-08-08 RX ORDER — LISINOPRIL 10 MG/1
10 TABLET ORAL DAILY
Status: DISCONTINUED | OUTPATIENT
Start: 2017-08-09 | End: 2017-08-09 | Stop reason: HOSPADM

## 2017-08-08 RX ORDER — CYCLOBENZAPRINE HCL 5 MG
5 TABLET ORAL 3 TIMES DAILY PRN
Status: DISCONTINUED | OUTPATIENT
Start: 2017-08-08 | End: 2017-08-09 | Stop reason: HOSPADM

## 2017-08-08 RX ORDER — IPRATROPIUM BROMIDE AND ALBUTEROL SULFATE 2.5; .5 MG/3ML; MG/3ML
3 SOLUTION RESPIRATORY (INHALATION) EVERY 4 HOURS PRN
Status: DISCONTINUED | OUTPATIENT
Start: 2017-08-08 | End: 2017-08-09 | Stop reason: HOSPADM

## 2017-08-08 RX ORDER — ISOSORBIDE MONONITRATE 30 MG/1
30 TABLET, EXTENDED RELEASE ORAL DAILY
Status: DISCONTINUED | OUTPATIENT
Start: 2017-08-09 | End: 2017-08-09 | Stop reason: HOSPADM

## 2017-08-08 RX ORDER — IBUPROFEN 200 MG
24 TABLET ORAL
Status: DISCONTINUED | OUTPATIENT
Start: 2017-08-08 | End: 2017-08-09 | Stop reason: HOSPADM

## 2017-08-08 RX ORDER — IBUPROFEN 200 MG
16 TABLET ORAL
Status: DISCONTINUED | OUTPATIENT
Start: 2017-08-08 | End: 2017-08-09 | Stop reason: HOSPADM

## 2017-08-08 RX ORDER — FAMOTIDINE 20 MG/1
20 TABLET, FILM COATED ORAL 2 TIMES DAILY
Status: DISCONTINUED | OUTPATIENT
Start: 2017-08-08 | End: 2017-08-09 | Stop reason: HOSPADM

## 2017-08-08 RX ORDER — POTASSIUM CHLORIDE 20 MEQ/1
40 TABLET, EXTENDED RELEASE ORAL
Status: DISCONTINUED | OUTPATIENT
Start: 2017-08-09 | End: 2017-08-09 | Stop reason: HOSPADM

## 2017-08-08 RX ORDER — ACETAMINOPHEN 500 MG
1000 TABLET ORAL
Status: COMPLETED | OUTPATIENT
Start: 2017-08-08 | End: 2017-08-08

## 2017-08-08 RX ORDER — LOPERAMIDE HYDROCHLORIDE 2 MG/1
2 CAPSULE ORAL ONCE
Status: COMPLETED | OUTPATIENT
Start: 2017-08-08 | End: 2017-08-08

## 2017-08-08 RX ORDER — ONDANSETRON 8 MG/1
8 TABLET, ORALLY DISINTEGRATING ORAL EVERY 8 HOURS PRN
Status: DISCONTINUED | OUTPATIENT
Start: 2017-08-08 | End: 2017-08-09 | Stop reason: HOSPADM

## 2017-08-08 RX ORDER — GLUCAGON 1 MG
1 KIT INJECTION
Status: DISCONTINUED | OUTPATIENT
Start: 2017-08-08 | End: 2017-08-09 | Stop reason: HOSPADM

## 2017-08-08 RX ORDER — NAPROXEN SODIUM 220 MG/1
81 TABLET, FILM COATED ORAL DAILY
Status: DISCONTINUED | OUTPATIENT
Start: 2017-08-09 | End: 2017-08-09 | Stop reason: HOSPADM

## 2017-08-08 RX ORDER — HYDROCODONE BITARTRATE AND ACETAMINOPHEN 5; 325 MG/1; MG/1
1 TABLET ORAL EVERY 4 HOURS PRN
Status: DISCONTINUED | OUTPATIENT
Start: 2017-08-08 | End: 2017-08-09 | Stop reason: HOSPADM

## 2017-08-08 RX ORDER — WARFARIN SODIUM 5 MG/1
10 TABLET ORAL DAILY
Status: DISCONTINUED | OUTPATIENT
Start: 2017-08-09 | End: 2017-08-09 | Stop reason: HOSPADM

## 2017-08-08 RX ORDER — LIDOCAINE 50 MG/G
1 PATCH TOPICAL
Status: DISCONTINUED | OUTPATIENT
Start: 2017-08-08 | End: 2017-08-09 | Stop reason: HOSPADM

## 2017-08-08 RX ORDER — CARVEDILOL 25 MG/1
25 TABLET ORAL 2 TIMES DAILY WITH MEALS
Status: DISCONTINUED | OUTPATIENT
Start: 2017-08-09 | End: 2017-08-09 | Stop reason: HOSPADM

## 2017-08-08 RX ADMIN — SODIUM CHLORIDE 1000 ML: 0.9 INJECTION, SOLUTION INTRAVENOUS at 06:08

## 2017-08-08 RX ADMIN — IPRATROPIUM BROMIDE AND ALBUTEROL SULFATE 3 ML: .5; 3 SOLUTION RESPIRATORY (INHALATION) at 08:08

## 2017-08-08 RX ADMIN — SODIUM CHLORIDE: 0.9 INJECTION, SOLUTION INTRAVENOUS at 11:08

## 2017-08-08 RX ADMIN — ACETAMINOPHEN 1000 MG: 500 TABLET ORAL at 06:08

## 2017-08-08 RX ADMIN — SODIUM CHLORIDE 1000 ML: 0.9 INJECTION, SOLUTION INTRAVENOUS at 04:08

## 2017-08-08 RX ADMIN — FAMOTIDINE 20 MG: 20 TABLET, FILM COATED ORAL at 10:08

## 2017-08-08 RX ADMIN — HYDROCODONE BITARTRATE AND ACETAMINOPHEN 1 TABLET: 5; 325 TABLET ORAL at 10:08

## 2017-08-08 RX ADMIN — MORPHINE SULFATE 4 MG: 2 INJECTION, SOLUTION INTRAMUSCULAR; INTRAVENOUS at 07:08

## 2017-08-08 RX ADMIN — LIDOCAINE 1 PATCH: 50 PATCH TOPICAL at 10:08

## 2017-08-08 RX ADMIN — LOPERAMIDE HYDROCHLORIDE 2 MG: 2 CAPSULE ORAL at 10:08

## 2017-08-08 NOTE — ED PROVIDER NOTES
Encounter Date: 8/8/2017    SCRIBE #1 NOTE: I, Torie Thomas, am scribing for, and in the presence of, Dr. Estevez.       History     Chief Complaint   Patient presents with    Chest Pain     Time seen by provider: 3:57 PM    This is a 61 y.o. male with a history of CHF, EF of 35%,  aortic and mitral valve, and Hep C and who is anticoagulated on Coumadin presents with complaint of worsening CP around to his back which he describes as sharp for 2 days. The patient reports that his discomfort worsens with lifting of his head, deep inspiration, and any movement. The patient reports associated neck pain, pain across shoulders, flatulence, loose stool, worsening SOB with the pain but denies bloating, diaphoresis, black/bloody stool, and generalized weakness. The patient reports that he took Carvedilol, Lisinopril, and Isosorbide Mononitrate this morning. He indicates that he currently has associated dizziness and CP.        The history is provided by the patient.     Review of patient's allergies indicates:  No Known Allergies  Past Medical History:   Diagnosis Date    Anemia     Asthma     CHF (congestive heart failure)     Chronic bronchitis     Cirrhosis 1/19/2015    Hepatitis C     Hyperlipidemia     Hypertension     Lung disease     Pulmonary hypertension      Past Surgical History:   Procedure Laterality Date    CARDIAC VALVE REPLACEMENT      AVR WITH MVr 2007    HERNIA REPAIR  2006    questionable mesh, right inguinal, unbilical    LUNG DECORTICATION      right thoracotomy 2010    LUNG DECORTICATION  2010    open heart surgery      redo AVR and MVR 2009     Family History   Problem Relation Age of Onset    Hypertension Mother     Hypertension Father     Hypertension Brother     Heart disease Brother     Heart attack Brother     Hypertension Sister      Social History   Substance Use Topics    Smoking status: Current Every Day Smoker     Packs/day: 0.50     Years: 48.00     Types:  Cigarettes    Smokeless tobacco: Never Used      Comment: 5 a day    Alcohol use 0.0 oz/week      Comment: seldom     Review of Systems   Constitutional: Negative for diaphoresis.   HENT: Negative for sore throat.    Eyes: Negative for visual disturbance.   Respiratory: Positive for shortness of breath.    Cardiovascular: Positive for chest pain.   Gastrointestinal: Negative for blood in stool.        Flatulence and loose stools. Denies bloating.   Musculoskeletal: Positive for back pain (Across shoulders) and neck pain.   Skin: Negative for pallor.   Neurological: Positive for dizziness. Negative for weakness (Generalized).       Physical Exam     Initial Vitals [08/08/17 1543]   BP Pulse Resp Temp SpO2   (!) 77/51 88 19 98.6 °F (37 °C) 96 %      MAP       59.67         Physical Exam    Nursing note and vitals reviewed.  Constitutional: No distress.   Comfortable and well-appearing.   HENT:   Mouth/Throat: Mucous membranes are normal.   Eyes:   Negative for conjunctival pallor.   Neck: Neck supple.   Cardiovascular: Normal rate and regular rhythm.   Pulmonary/Chest: Breath sounds normal.   Diffuse upper chest wall and upper back musculoskeletal tenderness.   Abdominal: Soft. There is no tenderness.   Musculoskeletal: He exhibits no edema.   Neurological: He is alert and oriented to person, place, and time.   Patient alert and answers questions appropriately.   Skin: No rash noted.         ED Course   Procedures  Labs Reviewed   B-TYPE NATRIURETIC PEPTIDE - Abnormal; Notable for the following:        Result Value     (*)     All other components within normal limits   CBC W/ AUTO DIFFERENTIAL - Abnormal; Notable for the following:     RBC 4.47 (*)     MCH 32.7 (*)     Mono # 1.1 (*)     Mono% 16.0 (*)     All other components within normal limits   COMPREHENSIVE METABOLIC PANEL - Abnormal; Notable for the following:     Sodium 135 (*)     Total Bilirubin 1.2 (*)     eGFR if non  53.1 (*)      All other components within normal limits   APTT - Abnormal; Notable for the following:     aPTT 40.2 (*)     All other components within normal limits   TROPONIN I - Abnormal; Notable for the following:     Troponin I 0.067 (*)     All other components within normal limits   PROTIME-INR - Abnormal; Notable for the following:     Prothrombin Time 39.5 (*)     INR 3.9 (*)     All other components within normal limits   URINALYSIS, REFLEX TO URINE CULTURE - Abnormal; Notable for the following:     Appearance, UA Hazy (*)     Protein, UA 2+ (*)     Glucose, UA 1+ (*)     Occult Blood UA 2+ (*)     All other components within normal limits    Narrative:     Preferred Collection Type->Urine, Clean Catch   URINALYSIS MICROSCOPIC - Abnormal; Notable for the following:     RBC, UA 13 (*)     Hyaline Casts, UA 10 (*)     All other components within normal limits    Narrative:     Preferred Collection Type->Urine, Clean Catch   LACTIC ACID, PLASMA   CK   TYPE & SCREEN             Medical Decision Making:   History:   Old Medical Records: I decided to obtain old medical records.  Old Records Summarized: other records.       <> Summary of Records: 61 y.o. male with a history of CHF, EF of 35%,  aortic and mitral valve, and Hep C.  He is followed by Cardiology here.  Anticoagulated on Coumadin.  Initial Assessment:   62 yo m, hx as above, seen here 3 weeks ago for constipation, received an enema and rx'd laxatives and since then has been taking laxatives regularly with frequent loose BMs.  Now presenting for severe CP, worse w movement and inspiration.  No SOB.  No abd pain. On initial exam, pt very hypotensive though well-appearing.  Significant reproducible chest and back tenderness on exam  Differential Diagnosis:   Hypotension - suspect dehydration 2/2 laxative use.  Pt very well-appearing clinically and responding well to IVF.  Lactate normal and EKG unchanged (RBBB)    CP - pt with significant cardiac hx but pain  seems very reproducible in nature/very musculoskeletal.  Other considerations would be rhabdo, dissection, pericarditis  Clinical Tests:   Lab Tests: Ordered and Reviewed  Radiological Study: Ordered and Reviewed  Medical Tests: Ordered and Reviewed  ED Management:  -labs  -IVF  -admit to obs if all negative            Scribe Attestation:   Scribe #1: I performed the above scribed service and the documentation accurately describes the services I performed. I attest to the accuracy of the note.    Attending Attestation:           Physician Attestation for Scribe:  Physician Attestation Statement for Scribe #1: I, Dr. Estevez, reviewed documentation, as scribed by Torie Petty in my presence, and it is both accurate and complete.                 ED Course     Clinical Impression:   The primary encounter diagnosis was Hypotension, unspecified hypotension type. A diagnosis of Chest pain was also pertinent to this visit.                           Komal Estevez MD  08/09/17 0989

## 2017-08-08 NOTE — PROVIDER PROGRESS NOTES - EMERGENCY DEPT.
Encounter Date: 8/8/2017    ED Physician Progress Notes       SCRIBE NOTE: I, Torie Petty, am scribing for, and in the presence of,  Dr. Estevez.  Physician Statement: I, Dr. Estevez, personally performed the services described in this documentation as scribed by Torie Petty in my presence, and it is both accurate and complete.      EKG - STEMI Decision  Initial Reading: No STEMI present.

## 2017-08-08 NOTE — ED TRIAGE NOTES
Patient presents with chest tenderness that started on Sunday. Patient is tender to touch across chest and through back. Patient also has been taking multiple laxatives and having loose stool. Upon arrival to room patients blood pressure 69/46, Dr. Estevez at bedside

## 2017-08-09 VITALS
BODY MASS INDEX: 23.05 KG/M2 | OXYGEN SATURATION: 96 % | HEIGHT: 69 IN | WEIGHT: 155.63 LBS | DIASTOLIC BLOOD PRESSURE: 62 MMHG | SYSTOLIC BLOOD PRESSURE: 97 MMHG | TEMPERATURE: 98 F | RESPIRATION RATE: 18 BRPM | HEART RATE: 83 BPM

## 2017-08-09 LAB
ANION GAP SERPL CALC-SCNC: 12 MMOL/L
BUN SERPL-MCNC: 22 MG/DL
CALCIUM SERPL-MCNC: 8.6 MG/DL
CHLORIDE SERPL-SCNC: 103 MMOL/L
CO2 SERPL-SCNC: 24 MMOL/L
CREAT SERPL-MCNC: 1.3 MG/DL
EST. GFR  (AFRICAN AMERICAN): >60 ML/MIN/1.73 M^2
EST. GFR  (NON AFRICAN AMERICAN): 58.1 ML/MIN/1.73 M^2
GLUCOSE SERPL-MCNC: 89 MG/DL
INR PPP: 3.6
MAGNESIUM SERPL-MCNC: 1.9 MG/DL
PHOSPHATE SERPL-MCNC: 3 MG/DL
POTASSIUM SERPL-SCNC: 3.5 MMOL/L
PROTHROMBIN TIME: 36.5 SEC
SODIUM SERPL-SCNC: 139 MMOL/L
TROPONIN I SERPL DL<=0.01 NG/ML-MCNC: 0.06 NG/ML

## 2017-08-09 PROCEDURE — 84484 ASSAY OF TROPONIN QUANT: CPT

## 2017-08-09 PROCEDURE — G0378 HOSPITAL OBSERVATION PER HR: HCPCS

## 2017-08-09 PROCEDURE — 36415 COLL VENOUS BLD VENIPUNCTURE: CPT

## 2017-08-09 PROCEDURE — 99217 PR OBSERVATION CARE DISCHARGE: CPT | Mod: ,,, | Performed by: PHYSICIAN ASSISTANT

## 2017-08-09 PROCEDURE — 84100 ASSAY OF PHOSPHORUS: CPT

## 2017-08-09 PROCEDURE — 25000003 PHARM REV CODE 250: Performed by: PHYSICIAN ASSISTANT

## 2017-08-09 PROCEDURE — 85610 PROTHROMBIN TIME: CPT

## 2017-08-09 PROCEDURE — 80048 BASIC METABOLIC PNL TOTAL CA: CPT

## 2017-08-09 PROCEDURE — 94640 AIRWAY INHALATION TREATMENT: CPT

## 2017-08-09 PROCEDURE — 94761 N-INVAS EAR/PLS OXIMETRY MLT: CPT

## 2017-08-09 PROCEDURE — 83735 ASSAY OF MAGNESIUM: CPT

## 2017-08-09 PROCEDURE — 25000242 PHARM REV CODE 250 ALT 637 W/ HCPCS: Performed by: PHYSICIAN ASSISTANT

## 2017-08-09 RX ORDER — HYDROCODONE BITARTRATE AND ACETAMINOPHEN 5; 325 MG/1; MG/1
1 TABLET ORAL EVERY 4 HOURS PRN
Qty: 8 TABLET | Refills: 0 | Status: SHIPPED | OUTPATIENT
Start: 2017-08-09 | End: 2017-11-14 | Stop reason: ALTCHOICE

## 2017-08-09 RX ADMIN — POTASSIUM CHLORIDE 40 MEQ: 1500 TABLET, EXTENDED RELEASE ORAL at 08:08

## 2017-08-09 RX ADMIN — IPRATROPIUM BROMIDE AND ALBUTEROL SULFATE 3 ML: .5; 3 SOLUTION RESPIRATORY (INHALATION) at 04:08

## 2017-08-09 RX ADMIN — LISINOPRIL 10 MG: 10 TABLET ORAL at 08:08

## 2017-08-09 RX ADMIN — HYDROCODONE BITARTRATE AND ACETAMINOPHEN 1 TABLET: 5; 325 TABLET ORAL at 08:08

## 2017-08-09 RX ADMIN — HYDROCODONE BITARTRATE AND ACETAMINOPHEN 1 TABLET: 5; 325 TABLET ORAL at 02:08

## 2017-08-09 RX ADMIN — FAMOTIDINE 20 MG: 20 TABLET, FILM COATED ORAL at 08:08

## 2017-08-09 RX ADMIN — FUROSEMIDE 80 MG: 80 TABLET ORAL at 08:08

## 2017-08-09 RX ADMIN — ASPIRIN 81 MG CHEWABLE TABLET 81 MG: 81 TABLET CHEWABLE at 08:08

## 2017-08-09 RX ADMIN — CARVEDILOL 25 MG: 25 TABLET, FILM COATED ORAL at 08:08

## 2017-08-09 RX ADMIN — ISOSORBIDE MONONITRATE 30 MG: 30 TABLET, EXTENDED RELEASE ORAL at 08:08

## 2017-08-09 RX ADMIN — IPRATROPIUM BROMIDE AND ALBUTEROL SULFATE 3 ML: .5; 3 SOLUTION RESPIRATORY (INHALATION) at 12:08

## 2017-08-09 NOTE — H&P
"[facility] Medicine  History & Physical    Patient Name: Paul Villalobos Sr.  MRN: [id number]  Admission Date: 8/8/2017  Attending Physician: Jose Zaldivar MD   Primary Care Provider: Tye Concepcion MD    [facility]: [facility] MED E Sridhar Becerra PA-C     Patient information was obtained from patient, past medical records and ER records.     Subjective:     Principal Problem:Hypotension    Chief Complaint:   Chief Complaint   Patient presents with    Chest Pain        HPI: Paul Villalobos Sr. is a 61 M with PMHx of CHF (EF of 35%),  aortic and mitral valve (on coumadin), Hep C, tobacco abuse, paroxysmal afib, CKD who presents for evaluation of worsening diarrhea and flatulence since initiating miralax for consitpation 2 weeks ago. Patient reports having 6-8 BMs a day, watery, no blood. He has tried to back off the miralax dose amount without much success. He additionally complaints of sharp, constant neck, back, and chest pain for the past 2 weeks as well. His pain is worse with movements of either shoulder and he has discomfort holding up and rotating his head. His pain worsens with lifting of his head, deep inspiration, and he associates it with his "gas". He denies any fever, recent abx, sick contacts, abdominal pain, N/V, SOB/LEIVA worse than usual, syncope, weakness. He denies any trauma and states he sits at home all day and watches TV.     In ED, patient found to be hypotensive which improved s/p 2L. He was given 4mg morphine with almost complete resolution of symptoms.     Past Medical History:   Diagnosis Date    Anemia     Asthma     CHF (congestive heart failure)     Chronic bronchitis     Cirrhosis 1/19/2015    Hepatitis C     Hyperlipidemia     Hypertension     Lung disease     Pulmonary hypertension        Past Surgical History:   Procedure Laterality Date    CARDIAC VALVE REPLACEMENT      AVR WITH MVr 2007    HERNIA REPAIR  2006    questionable " mesh, right inguinal, unbilical    LUNG DECORTICATION      right thoracotomy 2010    LUNG DECORTICATION  2010    open heart surgery      redo AVR and MVR 2009       Review of patient's allergies indicates:  No Known Allergies    No current facility-administered medications on file prior to encounter.      Current Outpatient Prescriptions on File Prior to Encounter   Medication Sig    aspirin 81 MG Chew Take 1 tablet (81 mg total) by mouth once daily.    carvedilol (COREG) 25 MG tablet Take 1 tablet (25 mg total) by mouth 2 (two) times daily with meals.    furosemide (LASIX) 80 MG tablet TAKE 1 TABLET BY MOUTH ON MONDAY WEDNESDAY AND FRIDAY AND 1/2 TABLET BY MOUTH ON ALL OTHER DAYS    ipratropium-albuterol (COMBIVENT RESPIMAT)  mcg/actuation inhaler Inhale 1 puff into the lungs every 4 (four) hours as needed for Wheezing.    isosorbide mononitrate (IMDUR) 30 MG 24 hr tablet Take 1 tablet (30 mg total) by mouth once daily.    lisinopril 10 MG tablet Take 10 mg by mouth once daily.     metOLazone (ZAROXOLYN) 2.5 MG tablet Take 1 tablet (2.5 mg total) by mouth daily as needed.    metOLazone (ZAROXOLYN) 2.5 MG tablet TAKE 1 TABLET(2.5 MG) BY MOUTH DAILY AS NEEDED    polyethylene glycol (GLYCOLAX) 17 gram/dose powder Take 17 g by mouth once daily.    potassium chloride SA (K-DUR,KLOR-CON) 20 MEQ tablet TAKE 2 TABLETS BY MOUTH ON MONDAY WEDNESDAY AND FRIDAY AND 1 TABLET ON ALL OTHER DAYS    sodium chloride 0.9 % SprA 1 spray by Nasal route 2 (two) times daily.    warfarin (COUMADIN) 5 MG tablet TAKE 2 TABLETS BY MOUTH EVERY DAY     Family History     Problem Relation (Age of Onset)    Heart attack Brother    Heart disease Brother    Hypertension Mother, Father, Brother, Sister        Social History Main Topics    Smoking status: Current Every Day Smoker     Packs/day: 0.50     Years: 48.00     Types: Cigarettes    Smokeless tobacco: Never Used      Comment: 5 a day    Alcohol use 0.0 oz/week       Comment: seldom    Drug use: No    Sexual activity: Not on file     Review of Systems   Constitutional: Negative for chills, fatigue and fever.   Respiratory: Positive for cough, shortness of breath and wheezing. Negative for chest tightness.    Cardiovascular: Negative for palpitations and leg swelling.   Gastrointestinal: Positive for diarrhea. Negative for abdominal distention, abdominal pain, constipation, nausea and vomiting.        Flatulence   Genitourinary: Negative for difficulty urinating and dysuria.   Musculoskeletal: Positive for back pain, myalgias and neck pain. Negative for arthralgias, gait problem and neck stiffness.   Neurological: Negative for dizziness, tremors, weakness, light-headedness, numbness and headaches.   Psychiatric/Behavioral: Negative for agitation and confusion.     Objective:     Vital Signs (Most Recent):  Temp: 98.7 °F (37.1 °C) (08/08/17 1836)  Pulse: 71 (08/08/17 2132)  Resp: 18 (08/08/17 2022)  BP: 102/64 (08/08/17 2132)  SpO2: 99 % (08/08/17 2132) Vital Signs (24h Range):  Temp:  [98.6 °F (37 °C)-98.7 °F (37.1 °C)] 98.7 °F (37.1 °C)  Pulse:  [70-88] 71  Resp:  [16-19] 18  SpO2:  [96 %-100 %] 99 %  BP: ()/(46-66) 102/64     Weight: 70.6 kg (155 lb 10.3 oz)  Body mass index is 22.98 kg/m².    Physical Exam   Constitutional: He is oriented to person, place, and time. He appears well-developed and well-nourished. No distress.   HENT:   Head: Normocephalic and atraumatic.   Eyes: EOM are normal. Pupils are equal, round, and reactive to light.   Neck: Normal range of motion. Neck supple. No thyromegaly present.   Cardiovascular: Normal rate and regular rhythm.    Murmur heard.  Pulmonary/Chest: Effort normal. No stridor. No respiratory distress. He has wheezes. He has rales.   Abdominal: Soft. Bowel sounds are normal. He exhibits no distension. There is no tenderness.   Musculoskeletal: Normal range of motion. He exhibits tenderness (paraspinal tenderness and increased mm  tone in thoracic back, elicits pain response). He exhibits no edema or deformity.   Bilateral full passive shoulder ROM without pain, FROM neck   Neurological: He is alert and oriented to person, place, and time. No cranial nerve deficit.   No focal deficits   Skin: No rash noted. He is not diaphoretic.   Psychiatric: He has a normal mood and affect. His behavior is normal.   Nursing note and vitals reviewed.       Significant Labs:   CBC:   Recent Labs  Lab 08/08/17  1616   WBC 6.67   HGB 14.6   HCT 43.5        CMP:   Recent Labs  Lab 08/08/17  1616   *   K 3.6   CL 97   CO2 29      BUN 21   CREATININE 1.4   CALCIUM 9.7   PROT 8.0   ALBUMIN 3.7   BILITOT 1.2*   ALKPHOS 59   AST 31   ALT 28   ANIONGAP 9   EGFRNONAA 53.1*     Cardiac Markers:   Recent Labs  Lab 08/08/17  1616   *     Lactic Acid:   Recent Labs  Lab 08/08/17  1616   LACTATE 1.4     Troponin:   Recent Labs  Lab 08/08/17  1616 08/08/17  2103   TROPONINI 0.067* 0.052*     Urine Studies:   Recent Labs  Lab 08/08/17 2002   COLORU Yellow   APPEARANCEUA Hazy*   PHUR 5.0   SPECGRAV 1.020   PROTEINUA 2+*   GLUCUA 1+*   KETONESU Negative   BILIRUBINUA Negative   OCCULTUA 2+*   NITRITE Negative   UROBILINOGEN 2.0   LEUKOCYTESUR Negative   RBCUA 13*   WBCUA 4   BACTERIA None   SQUAMEPITHEL 1   HYALINECASTS 10*       Significant Imaging: CXR: I have reviewed all pertinent results/findings within the past 24 hours and my personal findings are:  no acute changes  EKG: I have reviewed all pertinent results/findings within the past 24 hours and my personal findings are: RBBB, hold. no new ischemic changes    Assessment/Plan:     * Hypotension    - in setting of diarrhea from miralax  - hold nightly BP meds, gentle IVFs overnight  - can consider titration down of medications in outpatient setting  - patient asymptomatic, check orthostatics        Acute bilateral thoracic back pain    - lidoderm, flexeril  - denies trauma  - check Mg, Phos  -  CPK WNL, will check ESR/CRP to r/o polymyalgia rheumatica, suspicion low  - no rash to suggest shingles  - no spinal tenderness or trauma to suggest compression fracture  - likely muscular        Diarrhea    - hold miralax, recently started for constipation  - will give simethicone PRN and imodium x1  - do not suspect infectious cause at this time        Elevated troponin    - at baseline, will trend as patient with vague complaints of chest pain, but suspect largely MSK  - EKG without ischemic changes        Paroxysmal atrial fibrillation    - monitor on tele, rate controlled on admit        HTN (hypertension), benign    - continue coreg, lasix, lisinopril, imdur  - per patient, he is usually around SBP 100s        Anticoagulated on Coumadin    - continue coumadin  - hold dose tonight, INR 3.9, goal 2.5-3.5  - daily INR        Status post heart valve replacement with mechanical valve    - on coumadin        CHF (NYHA class III, ACC/AHA stage C)    - compensated, continue coreg, lasix, lisinopril  - hold nightly doses tonight in setting of hypotension        Cirrhosis    - no signs of overt liver failure        CKD (chronic kidney disease) stage 2, GFR 60-89 ml/min    - slight decrease in GFR from baseline, suspect pre-renal cause in setting of diarrhea        Tobacco abuse    - cessation counseling           VTE Risk Mitigation         Ordered     warfarin (COUMADIN) tablet 10 mg  Daily     Route:  Oral        08/08/17 2003     Medium Risk of VTE  Once      08/08/17 2003     Reason for No Pharmacological VTE Prophylaxis  Once      08/08/17 2003             Sridhar Becerra PA-C  Department of Hospital Medicine   [facility]

## 2017-08-09 NOTE — ASSESSMENT & PLAN NOTE
- in setting of diarrhea from miralax  - hold nightly BP meds, gentle IVFs overnight, BP improved today  - can consider titration down of medications in outpatient setting - patient to follow up with PCP  - patient asymptomatic, requesting discharge home today

## 2017-08-09 NOTE — ASSESSMENT & PLAN NOTE
- in setting of diarrhea from miralax  - hold nightly BP meds, gentle IVFs overnight  - can consider titration down of medications in outpatient setting  - patient asymptomatic, check orthostatics

## 2017-08-09 NOTE — PLAN OF CARE
Tye Concepcion MD  [address] / [place] [address]    Future Appointments  Date Time Provider Department Center   11/21/2017 8:40 AM Tye Concepcion MD [facility] Mckeon [facility]         [id number] - [place], [place] - [address] AT [facility] [address]  [address]  [facility] [address]  Phone: [phone] Fax: [phone]    [facility] - [place], [place] - [address]  [address] [facility] [address]  Phone: [phone] Fax: [phone]      Payor: MEDICARE / Plan: MEDICARE PART A & B / Product Type: Government /        08/09/17 1405   Discharge Assessment   Assessment Type Discharge Planning Assessment   Confirmed/corrected address and phone number on facesheet? Yes   Assessment information obtained from? Patient   Expected Length of Stay (days) 2   Communicated expected length of stay with patient/caregiver yes   Prior to hospitilization cognitive status: Alert/Oriented   Prior to hospitalization functional status: Assistive Equipment   Current cognitive status: Alert/Oriented   Current Functional Status: Assistive Equipment   Arrived From home or self-care   Lives With spouse   Able to Return to Prior Arrangements yes   Is patient able to care for self after discharge? Yes   Who are your caregiver(s) and their phone number(s)? (Yasmin Villalobos  spouse [phone])   Patient's perception of discharge disposition home or selfcare   Patient currently receives home health services? No   Patient currently receives any other outside agency services? No   Equipment Currently Used at Home rollator   Does the patient have transportation to healthcare appointments? Yes   Transportation Available family or friend will provide   On Dialysis? No   Discharge Plan A Home with family   Discharge Plan B Home with family   Patient/Family In Agreement With Plan yes

## 2017-08-09 NOTE — SUBJECTIVE & OBJECTIVE
Past Medical History:   Diagnosis Date    Anemia     Asthma     CHF (congestive heart failure)     Chronic bronchitis     Cirrhosis 1/19/2015    Hepatitis C     Hyperlipidemia     Hypertension     Lung disease     Pulmonary hypertension        Past Surgical History:   Procedure Laterality Date    CARDIAC VALVE REPLACEMENT      AVR WITH MVr 2007    HERNIA REPAIR  2006    questionable mesh, right inguinal, unbilical    LUNG DECORTICATION      right thoracotomy 2010    LUNG DECORTICATION  2010    open heart surgery      redo AVR and MVR 2009       Review of patient's allergies indicates:  No Known Allergies    No current facility-administered medications on file prior to encounter.      Current Outpatient Prescriptions on File Prior to Encounter   Medication Sig    aspirin 81 MG Chew Take 1 tablet (81 mg total) by mouth once daily.    carvedilol (COREG) 25 MG tablet Take 1 tablet (25 mg total) by mouth 2 (two) times daily with meals.    furosemide (LASIX) 80 MG tablet TAKE 1 TABLET BY MOUTH ON MONDAY WEDNESDAY AND FRIDAY AND 1/2 TABLET BY MOUTH ON ALL OTHER DAYS    ipratropium-albuterol (COMBIVENT RESPIMAT)  mcg/actuation inhaler Inhale 1 puff into the lungs every 4 (four) hours as needed for Wheezing.    isosorbide mononitrate (IMDUR) 30 MG 24 hr tablet Take 1 tablet (30 mg total) by mouth once daily.    lisinopril 10 MG tablet Take 10 mg by mouth once daily.     metOLazone (ZAROXOLYN) 2.5 MG tablet Take 1 tablet (2.5 mg total) by mouth daily as needed.    metOLazone (ZAROXOLYN) 2.5 MG tablet TAKE 1 TABLET(2.5 MG) BY MOUTH DAILY AS NEEDED    polyethylene glycol (GLYCOLAX) 17 gram/dose powder Take 17 g by mouth once daily.    potassium chloride SA (K-DUR,KLOR-CON) 20 MEQ tablet TAKE 2 TABLETS BY MOUTH ON MONDAY WEDNESDAY AND FRIDAY AND 1 TABLET ON ALL OTHER DAYS    sodium chloride 0.9 % SprA 1 spray by Nasal route 2 (two) times daily.    warfarin (COUMADIN) 5 MG tablet TAKE 2 TABLETS BY  MOUTH EVERY DAY     Family History     Problem Relation (Age of Onset)    Heart attack Brother    Heart disease Brother    Hypertension Mother, Father, Brother, Sister        Social History Main Topics    Smoking status: Current Every Day Smoker     Packs/day: 0.50     Years: 48.00     Types: Cigarettes    Smokeless tobacco: Never Used      Comment: 5 a day    Alcohol use 0.0 oz/week      Comment: seldom    Drug use: No    Sexual activity: Not on file     Review of Systems   Constitutional: Negative for chills, fatigue and fever.   Respiratory: Positive for cough, shortness of breath and wheezing. Negative for chest tightness.    Cardiovascular: Negative for palpitations and leg swelling.   Gastrointestinal: Positive for diarrhea. Negative for abdominal distention, abdominal pain, constipation, nausea and vomiting.        Flatulence   Genitourinary: Negative for difficulty urinating and dysuria.   Musculoskeletal: Positive for back pain, myalgias and neck pain. Negative for arthralgias, gait problem and neck stiffness.   Neurological: Negative for dizziness, tremors, weakness, light-headedness, numbness and headaches.   Psychiatric/Behavioral: Negative for agitation and confusion.     Objective:     Vital Signs (Most Recent):  Temp: 98.7 °F (37.1 °C) (08/08/17 1836)  Pulse: 71 (08/08/17 2132)  Resp: 18 (08/08/17 2022)  BP: 102/64 (08/08/17 2132)  SpO2: 99 % (08/08/17 2132) Vital Signs (24h Range):  Temp:  [98.6 °F (37 °C)-98.7 °F (37.1 °C)] 98.7 °F (37.1 °C)  Pulse:  [70-88] 71  Resp:  [16-19] 18  SpO2:  [96 %-100 %] 99 %  BP: ()/(46-66) 102/64     Weight: 70.6 kg (155 lb 10.3 oz)  Body mass index is 22.98 kg/m².    Physical Exam   Constitutional: He is oriented to person, place, and time. He appears well-developed and well-nourished. No distress.   HENT:   Head: Normocephalic and atraumatic.   Eyes: EOM are normal. Pupils are equal, round, and reactive to light.   Neck: Normal range of motion. Neck  supple. No thyromegaly present.   Cardiovascular: Normal rate and regular rhythm.    Murmur heard.  Pulmonary/Chest: Effort normal. No stridor. No respiratory distress. He has wheezes. He has rales.   Abdominal: Soft. Bowel sounds are normal. He exhibits no distension. There is no tenderness.   Musculoskeletal: Normal range of motion. He exhibits tenderness (paraspinal tenderness and increased mm tone in thoracic back, elicits pain response). He exhibits no edema or deformity.   Bilateral full passive shoulder ROM without pain, FROM neck   Neurological: He is alert and oriented to person, place, and time. No cranial nerve deficit.   No focal deficits   Skin: No rash noted. He is not diaphoretic.   Psychiatric: He has a normal mood and affect. His behavior is normal.   Nursing note and vitals reviewed.       Significant Labs:   CBC:   Recent Labs  Lab 08/08/17  1616   WBC 6.67   HGB 14.6   HCT 43.5        CMP:   Recent Labs  Lab 08/08/17  1616   *   K 3.6   CL 97   CO2 29      BUN 21   CREATININE 1.4   CALCIUM 9.7   PROT 8.0   ALBUMIN 3.7   BILITOT 1.2*   ALKPHOS 59   AST 31   ALT 28   ANIONGAP 9   EGFRNONAA 53.1*     Cardiac Markers:   Recent Labs  Lab 08/08/17  1616   *     Lactic Acid:   Recent Labs  Lab 08/08/17  1616   LACTATE 1.4     Troponin:   Recent Labs  Lab 08/08/17  1616 08/08/17  2103   TROPONINI 0.067* 0.052*     Urine Studies:   Recent Labs  Lab 08/08/17 2002   COLORU Yellow   APPEARANCEUA Hazy*   PHUR 5.0   SPECGRAV 1.020   PROTEINUA 2+*   GLUCUA 1+*   KETONESU Negative   BILIRUBINUA Negative   OCCULTUA 2+*   NITRITE Negative   UROBILINOGEN 2.0   LEUKOCYTESUR Negative   RBCUA 13*   WBCUA 4   BACTERIA None   SQUAMEPITHEL 1   HYALINECASTS 10*       Significant Imaging: CXR: I have reviewed all pertinent results/findings within the past 24 hours and my personal findings are:  no acute changes  EKG: I have reviewed all pertinent results/findings within the past 24 hours and my  personal findings are: RBBB, hold. no new ischemic changes

## 2017-08-09 NOTE — NURSING
Pt discharged Home with family.  Went over discharge instructions, new and/or changed medications, follow-up appointments and education with patient. Pt given a paper prescription for medication. Patient verbalized understanding. Answered all questions in reference to discharge.     Pain rating at time of discharge: 5  Good.   NADN    Patient ambulated with a steady gait to wheelchair with a family member and transport at discharge.    Vitals:    08/09/17 1211   BP:    Pulse: 83   Resp: 18   Temp:        Nancy Omer RN

## 2017-08-09 NOTE — MEDICAL/APP STUDENT
"[facility] Hospital Medicine  Progress Note    Patient Name: Paul Villalobos Sr.  MRN: [id number]  Patient Class: OP- Observation   Admission Date: 8/8/2017  Length of Stay: 0 days  Attending Physician: Jose Zaldivar MD  Primary Care Provider: Tye Concepcion MD    [facility] Medicine Team: [facility]P MED E Oscar Beckham    Subjective:     Principal Problem:Hypotension    HPI: Paul Villalobos Sr. is a 61 M with PMHx of CHF (EF of 35%),  aortic and mitral valve (on coumadin), Hep C, tobacco abuse, paroxysmal afib, CKD who presents for evaluation of worsening diarrhea and flatulence since initiating miralax for consitpation 2 weeks ago. Patient reports having 6-8 BMs a day, watery, no blood. He has tried to back off the miralax dose amount without much success. He additionally complaints of sharp, constant neck, back, and chest pain for the past 2 weeks as well. His pain is worse with movements of either shoulder and he has discomfort holding up and rotating his head. His pain worsens with lifting of his head, deep inspiration, and he associates it with his "gas". He denies any fever, recent abx, sick contacts, abdominal pain, N/V, SOB/LEIVA worse than usual, syncope, weakness. He denies any trauma and states he sits at home all day and watches TV.          Hospital Course: In ED, patient found to be hypotensive which improved s/p 2L. He was given 4mg morphine with almost complete resolution of symptoms. Troponins elevated but at patient's baseline; will continue trending. CXR shows no acute changes. UA negative for leukocytes/nitrites, WBC4, blood cultures NGTD. Sed rate elevated at 17, CRP elevated at 36.6.  Patient is now normotensive and still experiencing back/thoracic wall pain. His diarrhea resolved overnight after administration of imodium.     Interval History: Mr. Villalobos was in his chair eating breakfast this morning and in good spirits. He reported resolution of his diarrhea after he received " imodium overnight, with one last episode 5 minutes after administration. He rates his back, shoulder, and thoracic pain as 20/10, and he has been using heating pads with not much relief. He denies any acute events overnight, F/C, cardiac chest pain, SOB, and abdominal pain.    Review of Systems   Constitutional: Positive for activity change. Negative for appetite change, chills, fatigue and fever.   Respiratory: Positive for cough, shortness of breath and wheezing.    Cardiovascular: Negative for chest pain, palpitations and leg swelling.   Gastrointestinal: Positive for diarrhea. Negative for abdominal pain, blood in stool, constipation, nausea and vomiting.        Flatulence     Endocrine: Negative for cold intolerance.   Genitourinary: Negative for difficulty urinating and dysuria.   Musculoskeletal: Positive for arthralgias, back pain, myalgias and neck pain.   Neurological: Negative for dizziness, seizures, syncope, light-headedness and headaches.     Objective:     Vital Signs (Most Recent):  Temp: 97.9 °F (36.6 °C) (08/09/17 0800)  Pulse: 79 (08/09/17 0800)  Resp: 18 (08/09/17 0800)  BP: 136/66 (08/09/17 0800)  SpO2: 95 % (08/09/17 0800) Vital Signs (24h Range):  Temp:  [97.9 °F (36.6 °C)-98.7 °F (37.1 °C)] 97.9 °F (36.6 °C)  Pulse:  [66-88] 79  Resp:  [16-19] 18  SpO2:  [95 %-100 %] 95 %  BP: ()/(46-71) 136/66     Weight: 70.6 kg (155 lb 10.3 oz)  Body mass index is 22.98 kg/m².    Intake/Output Summary (Last 24 hours) at 08/09/17 1131  Last data filed at 08/08/17 2002   Gross per 24 hour   Intake                0 ml   Output              200 ml   Net             -200 ml      Physical Exam   Constitutional: He appears well-developed and well-nourished. No distress.   Cardiovascular: Normal rate.  An irregularly irregular rhythm present.   Murmur heard.  Pulmonary/Chest: Effort normal. He has wheezes. He has rhonchi. He has rales. He exhibits tenderness.   Abdominal: Soft. Bowel sounds are normal. There  is no tenderness.   Musculoskeletal:        Right shoulder: He exhibits tenderness and bony tenderness. He exhibits no swelling.        Left shoulder: He exhibits tenderness and bony tenderness. He exhibits no swelling.        Right hip: Normal.        Left hip: Normal.        Right knee: Normal.        Left knee: Normal.        Cervical back: He exhibits tenderness and bony tenderness. He exhibits no swelling.        Thoracic back: He exhibits tenderness and bony tenderness. He exhibits no edema.   Skin: Skin is warm and intact. Capillary refill takes less than 2 seconds.       Significant Labs:   Blood Culture:   Recent Labs  Lab 08/08/17  1616 08/08/17  1929   LABBLOO No Growth to date No Growth to date     CBC:   Recent Labs  Lab 08/08/17  1616   WBC 6.67   HGB 14.6   HCT 43.5        CMP:   Recent Labs  Lab 08/08/17  1616 08/09/17  0359   * 139   K 3.6 3.5   CL 97 103   CO2 29 24    89   BUN 21 22   CREATININE 1.4 1.3   CALCIUM 9.7 8.6*   PROT 8.0  --    ALBUMIN 3.7  --    BILITOT 1.2*  --    ALKPHOS 59  --    AST 31  --    ALT 28  --    ANIONGAP 9 12   EGFRNONAA 53.1* 58.1*     Cardiac Markers:   Recent Labs  Lab 08/08/17  1616   *     Coagulation:   Recent Labs  Lab 08/08/17  1616 08/09/17  0359   INR 3.9* 3.6*   APTT 40.2*  --      Lactic Acid:   Recent Labs  Lab 08/08/17  1616   LACTATE 1.4       Significant Imaging: I have reviewed all pertinent imaging results/findings within the past 24 hours.    Assessment/Plan:      Active Diagnoses:    Diagnosis Date Noted POA    PRINCIPAL PROBLEM:  Hypotension [i95.9]  -admitted with BP of 77/51; resolved after 2L NS in ED and normotensive at this time  -likely due hypovolemia 2/2 multiple diarrhea episodes PTA  -afebrile, WBC and lactate WNL; blood cultures NGTD; UA clear  -continue home HTN meds  -check orthostatics  08/08/2017 Yes    Elevated troponin [r74.8]  -troponins elevated but flat; patient at baseline from previous  encounters  -EKG shows no signs of acute ischemia or infarction  -will continue trending 08/08/2017 Yes    Diarrhea [r19.7]  -last episode overnight; patient reports resolution s/p imodium admin  -infectious cause not suspected at this time; likely due to recent miralax use at home for constipation  -continue to hold miralax  -simethicone PRN if diarrhea returns 08/08/2017 Yes    Acute bilateral thoracic back pain [m54.6]  -patient denies recent injury/overexertion; no signs of trauma per exam  -Sed rate elevated at 17, CRP elevated at 36.6; cannot r/o polymyalgia rheumatica but low suspicion at this time  -CXR shows no acute changes with degenerative changes of shoulders and spine  -continue flexeril, lidoderm; norco 5-325 q4 PRN 08/08/2017 Yes    Cirrhosis [k74.60]  -no signs of overt hepatic failure  -AST/ALT WNL 01/19/2015 Yes    Paroxysmal atrial fibrillation [i48.0]  -afib with NVR present on EKG at admission  -on warfarin and ASA for anticoagulation  -telemetry while in house   05/02/2013 Yes    CKD (chronic kidney disease) stage 2, GFR 60-89 ml/min [n18.2]  -GFR 58.1 with slight decrease in function since labs in 06/17 likely 2/2 hypovolemia, Cr 1.3   03/28/2013 Yes    Anticoagulated on Coumadin [Z51.81, Z79.01]  -3.9 on arrival, 3.6 on 8/9  -continue home meds 03/28/2013 Not Applicable    CHF (NYHA class III, ACC/AHA stage C) [i50.9]  -no acute exacerbation; CXR shows chronic interstitial changes  -continue lasix, lisinopril, and coreg with consideration and adjustments for any further hypotensive episodes 07/17/2012 Yes    HTN (hypertension), benign [i10]  -patient normotensive since administration of 2L NS in ED  -continue lasix, imdur, lisinopril, and coreg 07/17/2012 Yes    Status post heart valve replacement with mechanical valve [z95.2]  -continue coumadin and ASA 07/17/2012 Not Applicable    Tobacco abuse [z72.0]  -discussed smoking cessation with patient; he acknowledged his need to quit  and expressed interest.  -cessation counseling while in house 07/17/2012 Yes      Problems Resolved During this Admission:    Diagnosis Date Noted Date Resolved POA     VTE Risk Mitigation         Ordered     warfarin (COUMADIN) tablet 10 mg  Daily     Route:  Oral        08/08/17 2003     Medium Risk of VTE  Once      08/08/17 2003     Reason for No Pharmacological VTE Prophylaxis  Once      08/08/17 2003             CHUNG Villanueva  Department of Hospital Medicine   [facility]

## 2017-08-09 NOTE — ASSESSMENT & PLAN NOTE
- at baseline, will trend as patient with vague complaints of chest pain, but suspect largely MSK  - EKG without ischemic changes

## 2017-08-09 NOTE — HOSPITAL COURSE
62 yo male admitted to observation for further evaluation and treatment of hypotension. In the ED, patient found to be hypotensive to 77/51, improved s/p 2L NS and remained WNL overnight. He was given 4mg morphine with almost complete resolution of symptoms. Continued PRN pain control for bilateral thoracic back pain, patient ambulating and functioning well in room today, no acute distress. No further diarrhea since admission. Patient remained afebrile, without leukocytosis or significant electrolyte derangements during admission. INR elevated (3.9) on admission, held 8/8 and 3.6 on day of discharge. Patient has home INR monitoring machine, instructed to hold evening dose of coumadin again tonight and re-check tomorrow, he will contact the coumadin clinic with INR results for further instructions. Patient requesting discharge home. Stable for discharge home with close outpatient PCP follow up.

## 2017-08-09 NOTE — ASSESSMENT & PLAN NOTE
- hold miralax, recently started for constipation  - will give simethicone PRN and imodium x1  - do not suspect infectious cause at this time

## 2017-08-09 NOTE — ASSESSMENT & PLAN NOTE
- hold miralax, recently started for constipation  - Resolved with simethicone PRN and imodium x1  - do not suspect infectious cause at this time

## 2017-08-09 NOTE — ASSESSMENT & PLAN NOTE
- compensated, continue coreg, lasix, lisinopril  - hold nightly doses tonight in setting of hypotension

## 2017-08-09 NOTE — HPI
"Paul Villalobos Sr. is a 61 M with PMHx of CHF (EF of 35%),  aortic and mitral valve (on coumadin), Hep C, tobacco abuse, paroxysmal afib, CKD who presents for evaluation of worsening diarrhea and flatulence since initiating miralax for consitpation 2 weeks ago. Patient reports having 6-8 BMs a day, watery, no blood. He has tried to back off the miralax dose amount without much success. He additionally complaints of sharp, constant neck, back, and chest pain for the past 2 weeks as well. His pain is worse with movements of either shoulder and he has discomfort holding up and rotating his head. His pain worsens with lifting of his head, deep inspiration, and he associates it with his "gas". He denies any fever, recent abx, sick contacts, abdominal pain, N/V, SOB/LEIVA worse than usual, syncope, weakness. He denies any trauma and states he sits at home all day and watches TV.     In ED, patient found to be hypotensive which improved s/p 2L. He was given 4mg morphine with almost complete resolution of symptoms.   "

## 2017-08-09 NOTE — ASSESSMENT & PLAN NOTE
- compensated, continue coreg, lasix, lisinopril  - held nightly doses tonight in setting of hypotension

## 2017-08-09 NOTE — DISCHARGE INSTRUCTIONS
Please hold warfarin tonight, re-check INR with home machine tomorrow and call coumadin clinic to discuss results/for further instructions.

## 2017-08-09 NOTE — ASSESSMENT & PLAN NOTE
- continue coreg, lasix, lisinopril, imdur  - per patient, he is usually around SBP 100s  - Patient to follow up with PCP for further titration of BP medications

## 2017-08-09 NOTE — ASSESSMENT & PLAN NOTE
- lidoderm, flexeril  - denies trauma  - check Mg, Phos  - CPK WNL, will check ESR/CRP to r/o polymyalgia rheumatica, suspicion low  - no rash to suggest shingles  - no spinal tenderness or trauma to suggest compression fracture  - likely muscular

## 2017-08-09 NOTE — ASSESSMENT & PLAN NOTE
- lidoderm, flexeril  - denies trauma  - Mg, Phos WNL  - CPK WNL, ESR/CRP mildly elevated. Sill low suspicion for polymyalgia rheumatica given presentation and ESR not significantly elevated   - no rash to suggest shingles  - no spinal tenderness or trauma to suggest compression fracture  - likely muscular. Improved with PRN pain control today.

## 2017-08-09 NOTE — DISCHARGE SUMMARY
"[facility] Hospital Medicine  Discharge Summary      Patient Name: Paul Villalobos Sr.  MRN: [id number]  Admission Date: 8/8/2017  Hospital Length of Stay: 0 days  Discharge Date and Time:  08/09/2017 3:25 PM  Attending Physician: Jose Zaldivar MD   Discharging Provider: Sonya Burrell PA-C  Primary Care Provider: Tye Concepcion MD  Hospital Medicine Team: [facility] MED E Sonya Burrell PA-C    HPI:   Paul Villalobos Sr. is a 61 M with PMHx of CHF (EF of 35%),  aortic and mitral valve (on coumadin), Hep C, tobacco abuse, paroxysmal afib, CKD who presents for evaluation of worsening diarrhea and flatulence since initiating miralax for consitpation 2 weeks ago. Patient reports having 6-8 BMs a day, watery, no blood. He has tried to back off the miralax dose amount without much success. He additionally complaints of sharp, constant neck, back, and chest pain for the past 2 weeks as well. His pain is worse with movements of either shoulder and he has discomfort holding up and rotating his head. His pain worsens with lifting of his head, deep inspiration, and he associates it with his "gas". He denies any fever, recent abx, sick contacts, abdominal pain, N/V, SOB/LEIVA worse than usual, syncope, weakness. He denies any trauma and states he sits at home all day and watches TV.     In ED, patient found to be hypotensive which improved s/p 2L. He was given 4mg morphine with almost complete resolution of symptoms.     * No surgery found *      Indwelling Lines/Drains at time of discharge:   Lines/Drains/Airways          No matching active lines, drains, or airways        Hospital Course:   60 yo male admitted to observation for further evaluation and treatment of hypotension. In the ED, patient found to be hypotensive to 77/51, improved s/p 2L NS and remained WNL overnight. He was given 4mg morphine with almost complete resolution of symptoms. Continued PRN pain control for bilateral thoracic back pain, " patient ambulating and functioning well in room today, no acute distress. No further diarrhea since admission. Patient remained afebrile, without leukocytosis or significant electrolyte derangements during admission. INR elevated (3.9) on admission, held 8/8 and 3.6 on day of discharge. Patient has home INR monitoring machine, instructed to hold evening dose of coumadin again tonight and re-check tomorrow, he will contact the coumadin clinic with INR results for further instructions. Patient requesting discharge home. Stable for discharge home with close outpatient PCP follow up.      Consults:     Significant Diagnostic Studies: Radiology: X-Ray: CXR: X-Ray Chest 1 View (CXR):   Results for orders placed or performed during the hospital encounter of 08/08/17   X-Ray Chest 1 View    Narrative    Chest AP portable    Indication:Chest pain    Comparison:7/11/2017    Findings:  The cardiomediastinal silhouette is enlarged, noting postsurgical change and calcification of the aorta, stable.  There is no pleural effusion.  The trachea is midline.  The lungs are symmetrically expanded bilaterally with coarse interstitial attenuation, somewhat improved since the previous exam suggesting chronic change versus mild interstitial edema. No large focal consolidation seen.  There is no pneumothorax.  The osseous structures are remarkable for degenerative changes of the spine and shoulders.    Impression    Chronic interstitial findings, slightly improved, suggesting possible chronic interstitial edema, no large focal consolidation.        Electronically signed by: CONRAD MOLINA MD  Date:     08/08/17  Time:    16:56        Pending Diagnostic Studies:     None        Final Active Diagnoses:    Diagnosis Date Noted POA    PRINCIPAL PROBLEM:  Hypotension [I95.9] 08/08/2017 Yes    Acute bilateral thoracic back pain [M54.6] 08/08/2017 Yes    Diarrhea [R19.7] 08/08/2017 Yes    Elevated troponin [R74.8] 08/08/2017 Yes     Paroxysmal atrial fibrillation [I48.0] 05/02/2013 Yes    HTN (hypertension), benign [I10] 07/17/2012 Yes    Anticoagulated on Coumadin [Z51.81, Z79.01] 03/28/2013 Not Applicable    Status post heart valve replacement with mechanical valve [Z95.2] 07/17/2012 Not Applicable    CHF (NYHA class III, ACC/AHA stage C) [I50.9] 07/17/2012 Yes    Cirrhosis [K74.60] 01/19/2015 Yes    CKD (chronic kidney disease) stage 2, GFR 60-89 ml/min [N18.2] 03/28/2013 Yes    Tobacco abuse [Z72.0] 07/17/2012 Yes      Problems Resolved During this Admission:    Diagnosis Date Noted Date Resolved POA      * Hypotension    - in setting of diarrhea from miralax  - hold nightly BP meds, gentle IVFs overnight, BP improved today  - can consider titration down of medications in outpatient setting - patient to follow up with PCP  - patient asymptomatic, requesting discharge home today        Acute bilateral thoracic back pain    - lidoderm, flexeril  - denies trauma  - Mg, Phos WNL  - CPK WNL, ESR/CRP mildly elevated. Sill low suspicion for polymyalgia rheumatica given presentation and ESR not significantly elevated   - no rash to suggest shingles  - no spinal tenderness or trauma to suggest compression fracture  - likely muscular. Improved with PRN pain control today.        Diarrhea    - hold miralax, recently started for constipation  - Resolved with simethicone PRN and imodium x1  - do not suspect infectious cause at this time        Elevated troponin    - at baseline, trended and remained flat  - EKG without ischemic changes        Paroxysmal atrial fibrillation    - monitor on tele, rate controlled on admit        HTN (hypertension), benign    - continue coreg, lasix, lisinopril, imdur  - per patient, he is usually around SBP 100s  - Patient to follow up with PCP for further titration of BP medications        Anticoagulated on Coumadin    - Dose held 8/8 as INR 3.9, goal 2.5-3.5  - INR 3.6 on 8/9. Patient instructed to hold tonights  dose, will re-check on home coumadin monitoring machine tomorrow morning and call coumadin clinic for further medication adjustment.         Status post heart valve replacement with mechanical valve    - on coumadin        CHF (NYHA class III, ACC/AHA stage C)    - compensated, continue coreg, lasix, lisinopril  - held nightly doses tonight in setting of hypotension        Cirrhosis    - no signs of overt liver failure        CKD (chronic kidney disease) stage 2, GFR 60-89 ml/min    - slight decrease in GFR from baseline, suspect pre-renal cause in setting of diarrhea        Tobacco abuse    - cessation counseling             Discharged Condition: stable    Disposition: Home or Self Care    Follow Up:  Follow-up Information     Tye Concepcion MD On 9/29/2017.    Specialty:  Internal Medicine  Why:  10:50am  Primary Care hospital discharge follow up  Contact information:  [address]  [facility] [address] [phone]                 Patient Instructions:     Diet Cardiac     Activity as tolerated     Call MD for:  severe uncontrolled pain     Call MD for:  persistent nausea and vomiting or diarrhea     Call MD for:  persistent dizziness, light-headedness, or visual disturbances     Call MD for:  increased confusion or weakness       Medications:  Reconciled Home Medications:   Current Discharge Medication List      START taking these medications    Details   hydrocodone-acetaminophen 5-325mg (NORCO) 5-325 mg per tablet Take 1 tablet by mouth every 4 (four) hours as needed for Pain.  Qty: 8 tablet, Refills: 0         CONTINUE these medications which have NOT CHANGED    Details   aspirin 81 MG Chew Take 1 tablet (81 mg total) by mouth once daily.  Qty: 90 tablet, Refills: 3      carvedilol (COREG) 25 MG tablet Take 1 tablet (25 mg total) by mouth 2 (two) times daily with meals.  Qty: 180 tablet, Refills: 11    Associated Diagnoses: Tobacco abuse; Status post heart valve replacement with mechanical valve; Right  thyroid nodule; Paroxysmal atrial fibrillation; Proteinuria; Hyperlipidemia, unspecified hyperlipidemia type; HTN (hypertension), benign; Chronic hepatitis C without hepatic coma; Chronic obstructive pulmonary disease, unspecified COPD type; CKD (chronic kidney disease) stage 2, GFR 60-89 ml/min; Chronic systolic congestive heart failure, NYHA class 3      furosemide (LASIX) 80 MG tablet TAKE 1 TABLET BY MOUTH ON MONDAY WEDNESDAY AND FRIDAY AND 1/2 TABLET BY MOUTH ON ALL OTHER DAYS  Qty: 60 tablet, Refills: 3    Associated Diagnoses: Tobacco abuse; Status post heart valve replacement with mechanical valve; Right thyroid nodule; Paroxysmal atrial fibrillation; Proteinuria, unspecified type; Hyperlipidemia, unspecified hyperlipidemia type; HTN (hypertension), benign; Chronic hepatitis C without hepatic coma; Chronic obstructive pulmonary disease, unspecified COPD type; CKD (chronic kidney disease) stage 2, GFR 60-89 ml/min; Chronic systolic congestive heart failure, NYHA class 3      ipratropium-albuterol (COMBIVENT RESPIMAT)  mcg/actuation inhaler Inhale 1 puff into the lungs every 4 (four) hours as needed for Wheezing.  Qty: 4 g, Refills: 11    Associated Diagnoses: Chronic obstructive pulmonary disease, unspecified COPD type      isosorbide mononitrate (IMDUR) 30 MG 24 hr tablet Take 1 tablet (30 mg total) by mouth once daily.  Qty: 90 tablet, Refills: 11    Associated Diagnoses: Tobacco abuse; Status post heart valve replacement with mechanical valve; Right thyroid nodule; Paroxysmal atrial fibrillation; Proteinuria; Hyperlipidemia, unspecified hyperlipidemia type; HTN (hypertension), benign; Chronic hepatitis C without hepatic coma; Chronic obstructive pulmonary disease, unspecified COPD type; CKD (chronic kidney disease) stage 2, GFR 60-89 ml/min; Chronic systolic congestive heart failure, NYHA class 3      lisinopril 10 MG tablet Take 10 mg by mouth once daily.   Refills: 9      !! metOLazone (ZAROXOLYN)  2.5 MG tablet Take 1 tablet (2.5 mg total) by mouth daily as needed.  Qty: 90 tablet, Refills: 11    Associated Diagnoses: Status post heart valve replacement with mechanical valve; Paroxysmal atrial fibrillation; Proteinuria; Hyperlipidemia, unspecified hyperlipidemia type; HTN (hypertension), benign; Tobacco abuse; Chronic hepatitis C without hepatic coma; Chronic obstructive pulmonary disease, unspecified COPD type; CKD (chronic kidney disease) stage 2, GFR 60-89 ml/min; Chronic systolic congestive heart failure      !! metOLazone (ZAROXOLYN) 2.5 MG tablet TAKE 1 TABLET(2.5 MG) BY MOUTH DAILY AS NEEDED  Qty: 30 tablet, Refills: 6    Associated Diagnoses: Status post heart valve replacement with mechanical valve; Paroxysmal atrial fibrillation; HTN (hypertension), benign; Tobacco abuse; Chronic hepatitis C without hepatic coma; Chronic obstructive pulmonary disease, unspecified COPD type; CKD (chronic kidney disease) stage 2, GFR 60-89 ml/min; Chronic systolic congestive heart failure      potassium chloride SA (K-DUR,KLOR-CON) 20 MEQ tablet TAKE 2 TABLETS BY MOUTH ON MONDAY WEDNESDAY AND FRIDAY AND 1 TABLET ON ALL OTHER DAYS  Qty: 180 tablet, Refills: 11    Associated Diagnoses: Chronic systolic congestive heart failure, NYHA class 3      sodium chloride 0.9 % SprA 1 spray by Nasal route 2 (two) times daily.  Qty: 126 mL, Refills: 11    Associated Diagnoses: Epistaxis      warfarin (COUMADIN) 5 MG tablet TAKE 2 TABLETS BY MOUTH EVERY DAY  Qty: 60 tablet, Refills: 0    Associated Diagnoses: Anticoagulated on Coumadin; Status post heart valve replacement with mechanical valve       !! - Potential duplicate medications found. Please discuss with provider.      STOP taking these medications       polyethylene glycol (GLYCOLAX) 17 gram/dose powder Comments:   Reason for Stopping:             Time spent on the discharge of patient: 35 minutes    HOS POC IP DISCHARGE SUMMARY    Sonya Burrell PA-C  Department of  Hospital Medicine [facility]  Staff: Dr. Zaldivar

## 2017-08-09 NOTE — ASSESSMENT & PLAN NOTE
- Dose held 8/8 as INR 3.9, goal 2.5-3.5  - INR 3.6 on 8/9. Patient instructed to hold tonights dose, will re-check on home coumadin monitoring machine tomorrow morning and call coumadin clinic for further medication adjustment.

## 2017-08-09 NOTE — PLAN OF CARE
Problem: Patient Care Overview  Goal: Plan of Care Review  Outcome: Ongoing (interventions implemented as appropriate)  Patient awake, alert, and oriented. Patient's vitals remain stable. Patient remains free from falls/injury throughout shift. Patient on continuous cardiac monitoring per MD orders. Pain managed with prn meds. Normal saline infusing continuously at rate of 75ml/hr. Will continue to monitor.

## 2017-08-10 NOTE — PLAN OF CARE
08/10/17 1122   Final Note   Assessment Type Final Discharge Note   Discharge Disposition Home   Discharge planning education complete? Yes   What phone number can be called within the next 1-3 days to see how you are doing after discharge? [phone]   Hospital Follow Up  Appt(s) scheduled? Yes   Discharge plans and expectations educations in teach back method with documentation complete? Yes   Discharge/Hospital Encounter Summary to (non[facility]) PCP n/a   Referral to Outpatient Case Management complete? No   Referral to / orders for Home Health Complete? No   Any social issues identified prior to discharge? No   Did you assess the readiness or willingness of the family or caregiver to support self management of care? Yes     Follow-up With  Details  Why  Contact Info   Tye Concepcion MD  On 9/29/2017  10:50am Primary Care hospital discharge follow up/ message sent to clinic to assist with earlier hospital discharge follow up  [address]  [facility] [address]  [phone]     Future Appointments  Date Time Provider Department Center   9/29/2017 10:50 AM Tye Concepcion MD [facility]   11/21/2017 8:40 AM Tye Concepcion MD [facility]

## 2017-08-11 NOTE — PROGRESS NOTES
Per patient admitted 8/8, discharged 8/9  Back pain  Reports taking 10mg 8/9 & 8/10   Reports Hydrocodone

## 2017-08-11 NOTE — PROGRESS NOTES
The pt was recently admitted from 8/8 through 8/9 for worsening diarrhea and neck/back/chest pain.  See calendar for recent INRs and warfarin doses and he was discharged with [place].  His INR was elevated on 8/9, which may have been from the pain he was having, and his warfarin dose had been held on 8/8. I am asking him to resume his past stable weekly dose and to re-check another level for me on 8/14.

## 2017-08-13 LAB
BACTERIA BLD CULT: NORMAL
BACTERIA BLD CULT: NORMAL

## 2017-08-14 ENCOUNTER — ANTI-COAG VISIT (OUTPATIENT)
Dept: CARDIOLOGY | Facility: CLINIC | Age: 62
End: 2017-08-14

## 2017-08-14 DIAGNOSIS — Z79.01 ANTICOAGULATED ON COUMADIN: ICD-10-CM

## 2017-08-14 DIAGNOSIS — Z95.2 STATUS POST HEART VALVE REPLACEMENT WITH MECHANICAL VALVE: ICD-10-CM

## 2017-08-14 LAB — INR PPP: 2.9

## 2017-08-16 ENCOUNTER — TELEPHONE (OUTPATIENT)
Dept: INTERNAL MEDICINE | Facility: CLINIC | Age: 62
End: 2017-08-16

## 2017-08-16 NOTE — TELEPHONE ENCOUNTER
Spoke with pt, he wanted an appt after 2 pm but none was available before his upcoming so he kept the appt.

## 2017-08-16 NOTE — TELEPHONE ENCOUNTER
Spoke with pt and scheduled pt appt and a appt letter will be mailed out as a reminder for the pt upcoming appts

## 2017-08-16 NOTE — TELEPHONE ENCOUNTER
----- Message from Michelle Duncan sent at 8/16/2017 11:30 AM CDT -----  Contact: Pt  Pt is wanting to get his appt scheduled at a later time    Pt can be reached at [phone]    Thanks

## 2017-08-16 NOTE — TELEPHONE ENCOUNTER
Hi, please call him -- I see that he was just admitted for one day to the hospital. Please offer him a hospital followup appt with me within 1-2 weeks.  Thank you, Tye Concepcion

## 2017-08-21 ENCOUNTER — ANTI-COAG VISIT (OUTPATIENT)
Dept: CARDIOLOGY | Facility: CLINIC | Age: 62
End: 2017-08-21
Payer: MEDICARE

## 2017-08-21 DIAGNOSIS — Z79.01 ANTICOAGULATED ON COUMADIN: ICD-10-CM

## 2017-08-21 DIAGNOSIS — Z95.2 STATUS POST HEART VALVE REPLACEMENT WITH MECHANICAL VALVE: ICD-10-CM

## 2017-08-21 LAB — INR PPP: 3.3

## 2017-08-21 PROCEDURE — G0250 MD INR TEST REVIE INTER MGMT: HCPCS | Mod: ,,, | Performed by: PHARMACIST

## 2017-08-24 ENCOUNTER — OFFICE VISIT (OUTPATIENT)
Dept: INTERNAL MEDICINE | Facility: CLINIC | Age: 62
End: 2017-08-24
Payer: MEDICARE

## 2017-08-24 VITALS
DIASTOLIC BLOOD PRESSURE: 70 MMHG | TEMPERATURE: 98 F | OXYGEN SATURATION: 98 % | SYSTOLIC BLOOD PRESSURE: 110 MMHG | BODY MASS INDEX: 25.44 KG/M2 | WEIGHT: 158.31 LBS | HEIGHT: 66 IN | HEART RATE: 81 BPM

## 2017-08-24 DIAGNOSIS — K74.60 HEPATIC CIRRHOSIS, UNSPECIFIED HEPATIC CIRRHOSIS TYPE: ICD-10-CM

## 2017-08-24 DIAGNOSIS — Z72.0 TOBACCO ABUSE: ICD-10-CM

## 2017-08-24 DIAGNOSIS — I50.9 CHF (NYHA CLASS III, ACC/AHA STAGE C): Primary | ICD-10-CM

## 2017-08-24 DIAGNOSIS — I48.0 PAROXYSMAL ATRIAL FIBRILLATION: ICD-10-CM

## 2017-08-24 DIAGNOSIS — I10 HTN (HYPERTENSION), BENIGN: ICD-10-CM

## 2017-08-24 PROCEDURE — 3074F SYST BP LT 130 MM HG: CPT | Mod: ,,, | Performed by: INTERNAL MEDICINE

## 2017-08-24 PROCEDURE — 99214 OFFICE O/P EST MOD 30 MIN: CPT | Mod: S$PBB,,, | Performed by: INTERNAL MEDICINE

## 2017-08-24 PROCEDURE — 3078F DIAST BP <80 MM HG: CPT | Mod: ,,, | Performed by: INTERNAL MEDICINE

## 2017-08-24 PROCEDURE — 99999 PR PBB SHADOW E&M-EST. PATIENT-LVL IV: CPT | Mod: PBBFAC,,, | Performed by: INTERNAL MEDICINE

## 2017-08-24 PROCEDURE — 99214 OFFICE O/P EST MOD 30 MIN: CPT | Mod: PBBFAC | Performed by: INTERNAL MEDICINE

## 2017-08-24 NOTE — PROGRESS NOTES
Subjective:       Patient ID: Paul Villalobos Sr. is a 61 y.o. male.    Chief Complaint: Follow-up    Feeling well now.    Recent admission for low BPs, diarrhea. Symptoms resolved    Back pains and shoulder pains -- resolved as well.    No new other symptoms.    Still smoking.          Review of Systems   Constitutional: Negative for activity change, appetite change, fatigue, fever and unexpected weight change.   HENT: Negative for congestion and voice change.    Respiratory: Positive for cough and shortness of breath (stable though). Negative for chest tightness and wheezing.    Cardiovascular: Negative for chest pain, palpitations and leg swelling.   Gastrointestinal: Negative for abdominal distention, abdominal pain, constipation, nausea and vomiting.   Genitourinary: Negative for decreased urine volume.   Musculoskeletal: Negative for neck pain and neck stiffness.   Skin: Negative for rash and wound.   Neurological: Negative for tremors, syncope and weakness.   Hematological: Negative for adenopathy. Does not bruise/bleed easily.   Psychiatric/Behavioral: Negative for dysphoric mood.       Objective:      Physical Exam   Constitutional: He is oriented to person, place, and time. He appears well-developed and well-nourished. No distress.   HENT:   Head: Normocephalic and atraumatic.   Eyes: No scleral icterus.   Neck: Normal range of motion. No thyromegaly present.   Cardiovascular: Normal rate.  Exam reveals no gallop and no friction rub.    Murmur heard.  Irregularly irregular, mechanical systolic murmur     Pulmonary/Chest: Effort normal. No respiratory distress. He has wheezes. He has no rales.   Abdominal: Soft. Bowel sounds are normal. He exhibits no distension and no mass. There is no tenderness. There is no rebound and no guarding.   Musculoskeletal: Normal range of motion. He exhibits no edema.   Lymphadenopathy:     He has no cervical adenopathy.   Neurological: He is alert and oriented to person, place,  and time.   Skin: No lesion noted.   Psychiatric: He has a normal mood and affect. Thought content normal.       Assessment:       No diagnosis found.    Plan:       Here for hosp f/u appointment.    Diarrhea, shoulder/back pains have resolved.    HTN controlled.    73520: Smoking and tobacco cessation counseling visit; intensive, greater than 10 minutes not ready to go back to smoking cessation    CHF euvolemic    Next time discuss colo, need coumadin though    Keep next scheduled visit

## 2017-08-28 DIAGNOSIS — Z79.01 ANTICOAGULATED ON COUMADIN: ICD-10-CM

## 2017-08-28 DIAGNOSIS — Z95.2 STATUS POST HEART VALVE REPLACEMENT WITH MECHANICAL VALVE: ICD-10-CM

## 2017-08-29 RX ORDER — WARFARIN SODIUM 5 MG/1
TABLET ORAL
Qty: 60 TABLET | Refills: 0 | Status: SHIPPED | OUTPATIENT
Start: 2017-08-29 | End: 2017-10-01 | Stop reason: SDUPTHER

## 2017-08-30 ENCOUNTER — ANTI-COAG VISIT (OUTPATIENT)
Dept: CARDIOLOGY | Facility: CLINIC | Age: 62
End: 2017-08-30

## 2017-08-30 DIAGNOSIS — Z95.2 STATUS POST HEART VALVE REPLACEMENT WITH MECHANICAL VALVE: ICD-10-CM

## 2017-08-30 DIAGNOSIS — Z79.01 ANTICOAGULATED ON COUMADIN: ICD-10-CM

## 2017-08-30 LAB — INR PPP: 4.1

## 2017-08-30 NOTE — PROGRESS NOTES
Y[place] with [facility] called in a verbal result dated 8/30/17 as: INR -4.1, hard copy to be faxed

## 2017-09-13 ENCOUNTER — ANTI-COAG VISIT (OUTPATIENT)
Dept: CARDIOLOGY | Facility: CLINIC | Age: 62
End: 2017-09-13

## 2017-09-13 DIAGNOSIS — Z79.01 ANTICOAGULATED ON COUMADIN: ICD-10-CM

## 2017-09-13 DIAGNOSIS — Z95.2 STATUS POST HEART VALVE REPLACEMENT WITH MECHANICAL VALVE: ICD-10-CM

## 2017-09-13 LAB — INR PPP: 3.8

## 2017-09-18 ENCOUNTER — PATIENT MESSAGE (OUTPATIENT)
Dept: INTERNAL MEDICINE | Facility: CLINIC | Age: 62
End: 2017-09-18

## 2017-09-26 ENCOUNTER — OFFICE VISIT (OUTPATIENT)
Dept: INTERNAL MEDICINE | Facility: CLINIC | Age: 62
End: 2017-09-26
Payer: MEDICARE

## 2017-09-26 ENCOUNTER — DOCUMENTATION ONLY (OUTPATIENT)
Dept: INTERNAL MEDICINE | Facility: CLINIC | Age: 62
End: 2017-09-26

## 2017-09-26 VITALS
HEART RATE: 80 BPM | SYSTOLIC BLOOD PRESSURE: 140 MMHG | HEIGHT: 69 IN | BODY MASS INDEX: 23.54 KG/M2 | DIASTOLIC BLOOD PRESSURE: 80 MMHG | WEIGHT: 158.94 LBS

## 2017-09-26 DIAGNOSIS — Z12.11 ENCOUNTER FOR COLORECTAL CANCER SCREENING: ICD-10-CM

## 2017-09-26 DIAGNOSIS — N47.8 FORESKIN PROBLEM: Primary | ICD-10-CM

## 2017-09-26 DIAGNOSIS — N48.1 BALANITIS: ICD-10-CM

## 2017-09-26 DIAGNOSIS — Z12.12 ENCOUNTER FOR COLORECTAL CANCER SCREENING: ICD-10-CM

## 2017-09-26 PROCEDURE — 99999 PR PBB SHADOW E&M-EST. PATIENT-LVL III: CPT | Mod: PBBFAC,,, | Performed by: INTERNAL MEDICINE

## 2017-09-26 PROCEDURE — 3079F DIAST BP 80-89 MM HG: CPT | Mod: ,,, | Performed by: INTERNAL MEDICINE

## 2017-09-26 PROCEDURE — 99213 OFFICE O/P EST LOW 20 MIN: CPT | Mod: S$PBB,,, | Performed by: INTERNAL MEDICINE

## 2017-09-26 PROCEDURE — 3077F SYST BP >= 140 MM HG: CPT | Mod: ,,, | Performed by: INTERNAL MEDICINE

## 2017-09-26 PROCEDURE — 99213 OFFICE O/P EST LOW 20 MIN: CPT | Mod: PBBFAC | Performed by: INTERNAL MEDICINE

## 2017-09-26 NOTE — MEDICAL/APP STUDENT
Subjective:       Patient ID: Paul Villalobos Sr. is a 61 y.o. male.  PMH: CHF, Cirrhosis, Hep C    Chief Complaint: No chief complaint on file.    HPI   Mr. Villalobos present to clinic due to a rash on his genitals.  The rash started on September 18th and was located on the glans and foreskin of his penis.  He complained of a red itchy rash without any discharge and no trouble with voiding.  He used his wife's antifungal cream Terconazole since September 18th during the day and then used DNA ointment cream at night.  He is not complaining of pain today and wanted to come and make sure that there wasn't anything major going wrong.  He said he has previously had a rash like this and is interested in getting circumcision done.    SHx: Still smokes cigarettes and is interested in quitting but not through any program.  Drinks on the weekends 1-2 drinks per week.    Review of Systems   Constitutional: Negative for activity change, appetite change, chills and diaphoresis.   HENT: Negative for congestion, dental problem, drooling and ear discharge.    Eyes: Negative for pain, discharge, redness and itching.   Respiratory: Negative for apnea, cough and chest tightness.    Cardiovascular: Negative for chest pain.   Gastrointestinal: Negative for abdominal distention, abdominal pain and anal bleeding.   Endocrine: Negative for cold intolerance and heat intolerance.   Genitourinary: Positive for penile pain and penile swelling. Negative for difficulty urinating and dysuria.   Musculoskeletal: Negative for arthralgias, back pain, gait problem and joint swelling.   Skin: Positive for color change. Negative for pallor.   Allergic/Immunologic: Negative for environmental allergies and food allergies.   Neurological: Negative for dizziness and facial asymmetry.   Hematological: Negative for adenopathy. Does not bruise/bleed easily.   Psychiatric/Behavioral: Negative for agitation and behavioral problems.       Objective:       Vitals:     09/26/17 1009   BP: (!) 140/80   Pulse: 80       Physical Exam    Gen: Ptx well developed well nourished and looks stated age.  HEENT:  PERTL,  Sclera mildly yellow.  No lymphadenopathy  CVS: pulses 2+; Audible click due to valve replacement with S1 and S2.  Resp: lungs clear with normal breath sounds.  Abdo: soft non-tender non distended. Bowel sounds present.  :  Non tender glans penis and foreskin. No visible redness, swelling or discharge.    Assessment:       1. Foreskin problem    2. Balanitis likely to do location of rash and uncircumcised    3. Encounter for colorectal cancer screening        Plan:       Balanitis: Recovered at this time from wife's medication.  Informed to contact doctor about future use of other peoples medications.    Circumcision: referral to Urologist for evaluation of circumcision and if it would be appropriate.    Due for Colonoscopy: Not interested in this time but would do occult stool test.    Flu vaccine: Refused today plans to go with wife to [facility].    RTC: Nov for routine follow up.

## 2017-09-26 NOTE — PROGRESS NOTES
Subjective:       Patient ID: Paul Villalobos Sr. is a 61 y.o. male.    Chief Complaint: No chief complaint on file.    Here for rash. Groin irritation, 1 week, red and irriated on penis and shaft as well. Used wife's teraconazole cre and OTC rash cream. Has improved since. No urethral d/c.    Also wants a circ.      Review of Systems   Constitutional: Negative for activity change, appetite change, fatigue, fever and unexpected weight change.   HENT: Negative for congestion and voice change.    Respiratory: Positive for cough and shortness of breath (stable though). Negative for chest tightness and wheezing.    Cardiovascular: Negative for chest pain, palpitations and leg swelling.   Gastrointestinal: Negative for abdominal distention, abdominal pain, constipation, nausea and vomiting.   Genitourinary: Negative for decreased urine volume.   Musculoskeletal: Negative for neck pain and neck stiffness.   Skin: Positive for rash. Negative for wound.   Neurological: Negative for tremors, syncope and weakness.   Hematological: Negative for adenopathy. Does not bruise/bleed easily.   Psychiatric/Behavioral: Negative for dysphoric mood.       Objective:      Physical Exam   Constitutional: He appears well-developed and well-nourished. No distress.   Genitourinary:   Genitourinary Comments: uncirc foreskin, mild redness on glans w/o adhesions, foreskin retract well.    No scrotal rash.   Skin: He is not diaphoretic.       Assessment:       1. Foreskin problem    2. Balanitis    3. Encounter for colorectal cancer screening        Plan:       Diagnoses and all orders for this visit:    Foreskin problem  -     Ambulatory referral to Urology    Balanitis  -     Ambulatory referral to Urology  Well treated with the terconazole.    Encounter for colorectal cancer screening  -     Fecal Immunochemical Test (iFOBT); Future    htn did not yet take today's [employer]    Health Maintenance       Date Due Completion Date    TETANUS VACCINE  12/29/1971 ---    Colonoscopy 12/29/2003 ---    Zoster Vaccine 12/29/2013 ---    Influenza Vaccine 08/01/2017 9/20/2016    Pneumococcal PPSV23 (Medium Risk) (2) 12/29/2018 3/27/2013    Lipid Panel 01/31/2019 1/31/2014          Keep next scheduled visit

## 2017-09-27 ENCOUNTER — ANTI-COAG VISIT (OUTPATIENT)
Dept: CARDIOLOGY | Facility: CLINIC | Age: 62
End: 2017-09-27

## 2017-09-27 ENCOUNTER — OFFICE VISIT (OUTPATIENT)
Dept: UROLOGY | Facility: CLINIC | Age: 62
End: 2017-09-27
Payer: MEDICARE

## 2017-09-27 VITALS
HEIGHT: 69 IN | DIASTOLIC BLOOD PRESSURE: 90 MMHG | WEIGHT: 158.75 LBS | SYSTOLIC BLOOD PRESSURE: 148 MMHG | HEART RATE: 66 BPM | BODY MASS INDEX: 23.51 KG/M2

## 2017-09-27 DIAGNOSIS — Z95.2 STATUS POST HEART VALVE REPLACEMENT WITH MECHANICAL VALVE: ICD-10-CM

## 2017-09-27 DIAGNOSIS — Z79.01 ANTICOAGULATED ON COUMADIN: ICD-10-CM

## 2017-09-27 DIAGNOSIS — N13.8 BPH WITH URINARY OBSTRUCTION: Primary | ICD-10-CM

## 2017-09-27 DIAGNOSIS — N40.1 BPH WITH URINARY OBSTRUCTION: Primary | ICD-10-CM

## 2017-09-27 DIAGNOSIS — E78.5 HYPERLIPIDEMIA, UNSPECIFIED HYPERLIPIDEMIA TYPE: ICD-10-CM

## 2017-09-27 DIAGNOSIS — I10 HTN (HYPERTENSION), BENIGN: ICD-10-CM

## 2017-09-27 DIAGNOSIS — N52.01 ERECTILE DYSFUNCTION DUE TO ARTERIAL INSUFFICIENCY: ICD-10-CM

## 2017-09-27 PROBLEM — R19.7 DIARRHEA: Status: RESOLVED | Noted: 2017-08-08 | Resolved: 2017-09-27

## 2017-09-27 PROBLEM — R79.89 ELEVATED TROPONIN: Status: RESOLVED | Noted: 2017-08-08 | Resolved: 2017-09-27

## 2017-09-27 LAB — INR PPP: 4.1

## 2017-09-27 PROCEDURE — 99213 OFFICE O/P EST LOW 20 MIN: CPT | Mod: PBBFAC | Performed by: UROLOGY

## 2017-09-27 PROCEDURE — 99204 OFFICE O/P NEW MOD 45 MIN: CPT | Mod: S$PBB,,, | Performed by: UROLOGY

## 2017-09-27 PROCEDURE — 99999 PR PBB SHADOW E&M-EST. PATIENT-LVL III: CPT | Mod: PBBFAC,,, | Performed by: UROLOGY

## 2017-09-27 PROCEDURE — 3080F DIAST BP >= 90 MM HG: CPT | Mod: ,,, | Performed by: UROLOGY

## 2017-09-27 PROCEDURE — 3077F SYST BP >= 140 MM HG: CPT | Mod: ,,, | Performed by: UROLOGY

## 2017-09-27 NOTE — LETTER
September 27, 2017      Tye Concepcion MD  [address]  [place] [address]           [facility] - Urology 4th Floor  [address]  [facility] [address]  Phone: [phone]          Patient: Paul Villalobos Sr.   MR Number: [id number]   YOB: 1955   Date of Visit: 9/27/2017       Dear Dr. Tye Concepcion:    Thank you for referring Paul Villalobos to me for evaluation. Attached you will find relevant portions of my assessment and plan of care.    If you have questions, please do not hesitate to call me. I look forward to following Paul Villalobos along with you.    Sincerely,    Rodolfo Caicedo MD    Enclosure  CC:  No Recipients    If you would like to receive this communication electronically, please contact [email or web address] or [phone] to request more information on [employer] Link access.    For providers and/or their staff who would like to refer a patient to [facility], please contact us through our one-stop-shop provider referral line, [facility], at [phone].    If you feel you have received this communication in error or would no longer like to receive these types of communications, please e-mail [email or web address]

## 2017-09-27 NOTE — PROGRESS NOTES
CHIEF COMPLAINT:    Mr. Villalobos is a 61 y.o. male presenting with a history of balanitis.    PRESENTING ILLNESS:    Paul Villalobos Sr. is a 61 y.o. male with multiple medical problems including CHF and cirrhosis who developed what sounds like balanitis.  This resolved by using his wife's anti-yeast cream.  He requests circumcision.  No difficulty retracting his foreskin.    He also c/o ED.  He has an active Rx for NTG.  This has been present for > 1 year.    He has no hematuria.  No dysuria.    He refused prostate cancer screening.    REVIEW OF SYSTEMS:    Paul Villalobos Sr. denies any history of headache, blurred vision, fever, nausea, vomiting, chills, abdominal pain, bleeding per rectum, cough, SOB, recent loss of consciousness, recent mental status changes, seizures, dizziness, or upper or lower extremity weakness.    BABITA  1. 3  2. 1  3. 1  4. 1  5. 1      PATIENT HISTORY:    Past Medical History:   Diagnosis Date    Anemia     Asthma     CHF (congestive heart failure)     Chronic bronchitis     Cirrhosis 1/19/2015    Hepatitis C     Hyperlipidemia     Hypertension     Lung disease     Pulmonary hypertension        Past Surgical History:   Procedure Laterality Date    CARDIAC VALVE REPLACEMENT      AVR WITH MVr 2007    HERNIA REPAIR  2006    questionable mesh, right inguinal, unbilical    LUNG DECORTICATION      right thoracotomy 2010    LUNG DECORTICATION  2010    open heart surgery      redo AVR and MVR 2009       Family History   Problem Relation Age of Onset    Hypertension Mother     Hypertension Father     Hypertension Brother     Heart disease Brother     Heart attack Brother     Hypertension Sister        Social History     Social History    Marital status:      Spouse name: N/A    Number of children: N/A    Years of education: N/A     Occupational History    Not on file.     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       Allergies:  Review of patient's allergies indicates no known allergies.    Medications:    Current Outpatient Prescriptions:     aspirin 81 MG Chew, Take 1 tablet (81 mg total) by mouth once daily., Disp: 90 tablet, Rfl: 3    carvedilol (COREG) 25 MG tablet, Take 1 tablet (25 mg total) by mouth 2 (two) times daily with meals., Disp: 180 tablet, Rfl: 11    furosemide (LASIX) 80 MG tablet, TAKE 1 TABLET BY MOUTH ON MONDAY WEDNESDAY AND FRIDAY AND 1/2 TABLET BY MOUTH ON ALL OTHER DAYS, Disp: 60 tablet, Rfl: 3    hydrocodone-acetaminophen 5-325mg (NORCO) 5-325 mg per tablet, Take 1 tablet by mouth every 4 (four) hours as needed for Pain., Disp: 8 tablet, Rfl: 0    ipratropium-albuterol (COMBIVENT RESPIMAT)  mcg/actuation inhaler, Inhale 1 puff into the lungs every 4 (four) hours as needed for Wheezing., Disp: 4 g, Rfl: 11    isosorbide mononitrate (IMDUR) 30 MG 24 hr tablet, Take 1 tablet (30 mg total) by mouth once daily., Disp: 90 tablet, Rfl: 11    lisinopril 10 MG tablet, Take 10 mg by mouth once daily. , Disp: , Rfl: 9    metOLazone (ZAROXOLYN) 2.5 MG tablet, TAKE 1 TABLET(2.5 MG) BY MOUTH DAILY AS NEEDED, Disp: 30 tablet, Rfl: 6    potassium chloride SA (K-DUR,KLOR-CON) 20 MEQ tablet, TAKE 2 TABLETS BY MOUTH ON MONDAY WEDNESDAY AND FRIDAY AND 1 TABLET ON ALL OTHER DAYS, Disp: 180 tablet, Rfl: 11    sodium chloride 0.9 % SprA, 1 spray by Nasal route 2 (two) times daily., Disp: 126 mL, Rfl: 11    warfarin (COUMADIN) 5 MG tablet, TAKE 2 TABLETS BY MOUTH EVERY DAY, Disp: 60 tablet, Rfl: 0    PHYSICAL EXAMINATION:    The patient generally appears in good health, is appropriately  interactive, and is in no apparent distress.     Eyes: anicteric sclerae, moist conjunctivae; no lid-lag; PERRLA     HENT: Atraumatic; oropharynx clear with moist mucous membranes and no mucosal ulcerations;normal hard and soft palate.  No evidence of lymphadenopathy.    Neck: Trachea midline.  No thyromegaly.    Musculoskeletal: No abnormal gait.    Skin: No lesions.    Mental: Cooperative with normal affect.  Is oriented to time, place, and person.    Neuro: Grossly intact.    Chest: Normal inspiratory effort.   No accessory muscles.  No audible wheezes.  Respirations symmetric on inspiration and expiration.    Heart: Regular rhythm.      Abdomen:  Soft, non-tender. No masses or organomegaly. Bladder is not palpable. No evidence of flank discomfort. No evidence of inguinal hernia.    Genitourinary: The penis is not circumcised with no evidence of plaques or induration. The urethral meatus is normal. The testes, epididymides, and cord structures are normal in size and contour bilaterally. The scrotum is normal in size and contour.    Refused HOMERO    Extremities: No clubbing, cyanosis, or edema      LABS:      Lab Results   Component Value Date    PSA 0.73 04/03/2014       IMPRESSION:    Encounter Diagnoses   Name Primary?    BPH with urinary obstruction Yes    Erectile dysfunction due to arterial insufficiency     HTN (hypertension), benign     Hyperlipidemia, unspecified hyperlipidemia type      HTN, controlled  Hyperlipidemia, controlled    PLAN:    1. Recommend prostate cancer screening with HOMERO and PSA.  He refused.  Discussed possibility of death from prostate cancer.  2. Discussed that he is not a surgical candidate for elective circumcision.  He became very upset by this.  3. Discussed options for his ED.  He became very upset at the cost of 2nd line therapies and walked out the office.  4. RTC prn.    Copy to: Carlene

## 2017-09-29 ENCOUNTER — LAB VISIT (OUTPATIENT)
Dept: LAB | Facility: HOSPITAL | Age: 62
End: 2017-09-29
Attending: INTERNAL MEDICINE
Payer: MEDICARE

## 2017-09-29 DIAGNOSIS — Z12.11 ENCOUNTER FOR COLORECTAL CANCER SCREENING: ICD-10-CM

## 2017-09-29 DIAGNOSIS — Z12.12 ENCOUNTER FOR COLORECTAL CANCER SCREENING: ICD-10-CM

## 2017-09-29 PROCEDURE — 82274 ASSAY TEST FOR BLOOD FECAL: CPT

## 2017-10-01 DIAGNOSIS — Z95.2 STATUS POST HEART VALVE REPLACEMENT WITH MECHANICAL VALVE: ICD-10-CM

## 2017-10-01 DIAGNOSIS — Z79.01 ANTICOAGULATED ON COUMADIN: ICD-10-CM

## 2017-10-02 LAB — HEMOCCULT STL QL IA: NEGATIVE

## 2017-10-02 RX ORDER — WARFARIN SODIUM 5 MG/1
TABLET ORAL
Qty: 60 TABLET | Refills: 0 | Status: SHIPPED | OUTPATIENT
Start: 2017-10-02 | End: 2017-11-14 | Stop reason: DRUGHIGH

## 2017-10-03 RX ORDER — WARFARIN SODIUM 5 MG/1
7.5 TABLET ORAL DAILY
Qty: 150 TABLET | Refills: 3 | Status: SHIPPED | OUTPATIENT
Start: 2017-10-03 | End: 2018-11-06 | Stop reason: SDUPTHER

## 2017-10-11 ENCOUNTER — ANTI-COAG VISIT (OUTPATIENT)
Dept: CARDIOLOGY | Facility: CLINIC | Age: 62
End: 2017-10-11
Payer: MEDICARE

## 2017-10-11 DIAGNOSIS — Z95.2 STATUS POST HEART VALVE REPLACEMENT WITH MECHANICAL VALVE: ICD-10-CM

## 2017-10-11 DIAGNOSIS — Z79.01 ANTICOAGULATED ON COUMADIN: ICD-10-CM

## 2017-10-11 LAB — INR PPP: 2.7

## 2017-10-11 PROCEDURE — G0250 MD INR TEST REVIE INTER MGMT: HCPCS | Mod: S$GLB,,, | Performed by: PHARMACIST

## 2017-10-25 ENCOUNTER — ANTI-COAG VISIT (OUTPATIENT)
Dept: CARDIOLOGY | Facility: CLINIC | Age: 62
End: 2017-10-25

## 2017-10-25 DIAGNOSIS — Z95.2 STATUS POST HEART VALVE REPLACEMENT WITH MECHANICAL VALVE: ICD-10-CM

## 2017-10-25 DIAGNOSIS — Z79.01 ANTICOAGULATED ON COUMADIN: ICD-10-CM

## 2017-10-25 LAB — INR PPP: 2.3

## 2017-10-31 ENCOUNTER — PATIENT MESSAGE (OUTPATIENT)
Dept: HEPATOLOGY | Facility: CLINIC | Age: 62
End: 2017-10-31

## 2017-10-31 ENCOUNTER — PATIENT MESSAGE (OUTPATIENT)
Dept: TRANSPLANT | Facility: CLINIC | Age: 62
End: 2017-10-31

## 2017-10-31 ENCOUNTER — TELEPHONE (OUTPATIENT)
Dept: TRANSPLANT | Facility: CLINIC | Age: 62
End: 2017-10-31

## 2017-10-31 DIAGNOSIS — I50.22 CHRONIC SYSTOLIC CONGESTIVE HEART FAILURE: Primary | ICD-10-CM

## 2017-11-07 ENCOUNTER — TELEPHONE (OUTPATIENT)
Dept: HEPATOLOGY | Facility: CLINIC | Age: 62
End: 2017-11-07

## 2017-11-07 NOTE — TELEPHONE ENCOUNTER
----- Message from Andrea Lu sent at 11/6/2017 11:22 AM CST -----  Contact: Pt   Pt would like a call back from staff to reschedule missed appt    Pt is requesting to rescheduled to 11/14/17, if possible    Can be reached at [phone]

## 2017-11-08 ENCOUNTER — ANTI-COAG VISIT (OUTPATIENT)
Dept: CARDIOLOGY | Facility: CLINIC | Age: 62
End: 2017-11-08

## 2017-11-08 DIAGNOSIS — Z95.2 STATUS POST HEART VALVE REPLACEMENT WITH MECHANICAL VALVE: ICD-10-CM

## 2017-11-08 DIAGNOSIS — Z79.01 ANTICOAGULATED ON COUMADIN: ICD-10-CM

## 2017-11-08 LAB — INR PPP: 2

## 2017-11-14 ENCOUNTER — OFFICE VISIT (OUTPATIENT)
Dept: INTERNAL MEDICINE | Facility: CLINIC | Age: 62
End: 2017-11-14
Payer: MEDICARE

## 2017-11-14 ENCOUNTER — LAB VISIT (OUTPATIENT)
Dept: LAB | Facility: HOSPITAL | Age: 62
End: 2017-11-14
Attending: INTERNAL MEDICINE
Payer: MEDICARE

## 2017-11-14 ENCOUNTER — OFFICE VISIT (OUTPATIENT)
Dept: TRANSPLANT | Facility: CLINIC | Age: 62
End: 2017-11-14
Attending: INTERNAL MEDICINE
Payer: MEDICARE

## 2017-11-14 VITALS
WEIGHT: 158.5 LBS | OXYGEN SATURATION: 98 % | SYSTOLIC BLOOD PRESSURE: 130 MMHG | DIASTOLIC BLOOD PRESSURE: 70 MMHG | HEART RATE: 80 BPM | BODY MASS INDEX: 23.47 KG/M2 | HEIGHT: 69 IN

## 2017-11-14 VITALS
HEART RATE: 64 BPM | WEIGHT: 159.38 LBS | BODY MASS INDEX: 23.6 KG/M2 | SYSTOLIC BLOOD PRESSURE: 108 MMHG | HEIGHT: 69 IN | DIASTOLIC BLOOD PRESSURE: 55 MMHG

## 2017-11-14 DIAGNOSIS — R05.9 COUGH: ICD-10-CM

## 2017-11-14 DIAGNOSIS — I50.9 CHF (NYHA CLASS III, ACC/AHA STAGE C): Primary | ICD-10-CM

## 2017-11-14 DIAGNOSIS — E87.6 DIURETIC-INDUCED HYPOKALEMIA: ICD-10-CM

## 2017-11-14 DIAGNOSIS — Z79.01 ANTICOAGULATED ON COUMADIN: ICD-10-CM

## 2017-11-14 DIAGNOSIS — I50.22 CHRONIC SYSTOLIC CONGESTIVE HEART FAILURE: ICD-10-CM

## 2017-11-14 DIAGNOSIS — T50.2X5A DIURETIC-INDUCED HYPOKALEMIA: ICD-10-CM

## 2017-11-14 DIAGNOSIS — I10 HTN (HYPERTENSION), BENIGN: ICD-10-CM

## 2017-11-14 DIAGNOSIS — J44.1 COPD EXACERBATION: Primary | ICD-10-CM

## 2017-11-14 DIAGNOSIS — I48.0 PAROXYSMAL ATRIAL FIBRILLATION: ICD-10-CM

## 2017-11-14 DIAGNOSIS — Z95.2 STATUS POST HEART VALVE REPLACEMENT WITH MECHANICAL VALVE: ICD-10-CM

## 2017-11-14 DIAGNOSIS — Z72.0 TOBACCO ABUSE: ICD-10-CM

## 2017-11-14 LAB
ALBUMIN SERPL BCP-MCNC: 3.4 G/DL
ALP SERPL-CCNC: 58 U/L
ALT SERPL W/O P-5'-P-CCNC: 32 U/L
ANION GAP SERPL CALC-SCNC: 8 MMOL/L
AST SERPL-CCNC: 37 U/L
BILIRUB SERPL-MCNC: 1.5 MG/DL
BNP SERPL-MCNC: 286 PG/ML
BUN SERPL-MCNC: 24 MG/DL
CALCIUM SERPL-MCNC: 9.7 MG/DL
CHLORIDE SERPL-SCNC: 97 MMOL/L
CO2 SERPL-SCNC: 34 MMOL/L
CREAT SERPL-MCNC: 1.1 MG/DL
EST. GFR  (AFRICAN AMERICAN): >60 ML/MIN/1.73 M^2
EST. GFR  (NON AFRICAN AMERICAN): >60 ML/MIN/1.73 M^2
GLUCOSE SERPL-MCNC: 104 MG/DL
POTASSIUM SERPL-SCNC: 3.3 MMOL/L
PROT SERPL-MCNC: 7.7 G/DL
SODIUM SERPL-SCNC: 139 MMOL/L

## 2017-11-14 PROCEDURE — 99214 OFFICE O/P EST MOD 30 MIN: CPT | Mod: S$PBB,,, | Performed by: INTERNAL MEDICINE

## 2017-11-14 PROCEDURE — 99213 OFFICE O/P EST LOW 20 MIN: CPT | Mod: PBBFAC | Performed by: INTERNAL MEDICINE

## 2017-11-14 PROCEDURE — 36415 COLL VENOUS BLD VENIPUNCTURE: CPT

## 2017-11-14 PROCEDURE — 80053 COMPREHEN METABOLIC PANEL: CPT

## 2017-11-14 PROCEDURE — 99213 OFFICE O/P EST LOW 20 MIN: CPT | Mod: PBBFAC,27 | Performed by: INTERNAL MEDICINE

## 2017-11-14 PROCEDURE — 99999 PR PBB SHADOW E&M-EST. PATIENT-LVL III: CPT | Mod: PBBFAC,,, | Performed by: INTERNAL MEDICINE

## 2017-11-14 PROCEDURE — 83880 ASSAY OF NATRIURETIC PEPTIDE: CPT

## 2017-11-14 RX ORDER — CODEINE PHOSPHATE AND GUAIFENESIN 10; 100 MG/5ML; MG/5ML
10 SOLUTION ORAL EVERY 8 HOURS PRN
Qty: 236 ML | Refills: 1 | Status: SHIPPED | OUTPATIENT
Start: 2017-11-14 | End: 2018-01-02 | Stop reason: SDUPTHER

## 2017-11-14 RX ORDER — DOXYCYCLINE 100 MG/1
100 CAPSULE ORAL 2 TIMES DAILY
Qty: 14 CAPSULE | Refills: 0 | Status: SHIPPED | OUTPATIENT
Start: 2017-11-14 | End: 2017-11-21

## 2017-11-14 NOTE — PATIENT INSTRUCTIONS
Potassium take 2 of the 20 meq tablets today when get home and 2 more 4 hrs later  Take one potassium tablet every day and 2 on metolazone days

## 2017-11-14 NOTE — PROGRESS NOTES
Subjective:       Patient ID: Paul Villalobos Sr. is a 61 y.o. male.    Chief Complaint: Follow-up    Pains in L flank/back, since 2-3 months. He thinks from constipation. Very constipated. He has not been taking miralax daily out of concern that it will cause too much loose stools.    Also c/o worsening cough and thick sputum,. Sputum change is in the last few weeks, yellow and thick. No blood. Denies sinus congestion or pain.            Review of Systems   Constitutional: Positive for chills. Negative for activity change, appetite change, fatigue, fever and unexpected weight change.   HENT: Negative for congestion and voice change.    Respiratory: Positive for cough and shortness of breath (stable though). Negative for chest tightness and wheezing.    Cardiovascular: Negative for chest pain, palpitations and leg swelling.   Gastrointestinal: Positive for abdominal pain (flank pain) and constipation. Negative for abdominal distention, nausea and vomiting.   Genitourinary: Negative for decreased urine volume, difficulty urinating, dysuria and hematuria.   Musculoskeletal: Negative for neck pain and neck stiffness.   Skin: Negative for rash and wound.   Neurological: Negative for tremors, syncope and weakness.   Hematological: Negative for adenopathy. Does not bruise/bleed easily.   Psychiatric/Behavioral: Negative for dysphoric mood.       Objective:      Physical Exam   Constitutional: He is oriented to person, place, and time. He appears well-developed and well-nourished. No distress.   HENT:   Head: Normocephalic and atraumatic.   Eyes: No scleral icterus.   Neck: Normal range of motion. No thyromegaly present.   Cardiovascular: Normal rate.  Exam reveals no gallop and no friction rub.    Murmur heard.  Irregularly irregular, mechanical systolic murmur     Pulmonary/Chest: Effort normal. No respiratory distress. He has wheezes. He has no rales.   Abdominal: Soft. Bowel sounds are normal. He exhibits distension. He  exhibits no mass. There is tenderness. There is no rebound and no guarding. No hernia.   Mild distension.  Soft, normal bowel sounds.  Mild tenderness L flank area.   Musculoskeletal: Normal range of motion. He exhibits no edema.   Lymphadenopathy:     He has no cervical adenopathy.   Neurological: He is alert and oriented to person, place, and time.   Skin: No lesion noted.   Psychiatric: He has a normal mood and affect. Thought content normal.       Assessment:       1. COPD exacerbation    2. Cough        Plan:       Paul was seen today for follow-up.    Diagnoses and all orders for this visit:    COPD exacerbation  Cough  -     guaifenesin-codeine 100-10 mg/5 ml (TUSSI-ORGANIDIN NR)  mg/5 mL syrup; Take 10 mLs by mouth every 8 (eight) hours as needed for Cough.  -     doxycycline (VIBRAMYCIN) 100 MG Cap; Take 1 capsule (100 mg total) by mouth 2 (two) times daily.  Likely copd flare since changed sputum and hx of copd and chills as well.  Explained that if not better in 1-2 weeks, pt should rtc/call PCP    HTN controlled.    Euvolemic.    Smoking -- plans to quit after new year.    Constipation -- take daily miralax, likely cause of his L flank pains.    Health Maintenance       Date Due Completion Date    TETANUS VACCINE 12/29/1971 ---    Zoster Vaccine 12/29/2013 ---    Fecal Occult Blood Test (FOBT)/[employer] 09/29/2018 9/29/2017    Pneumococcal PPSV23 (Medium Risk) (2) 12/29/2018 3/27/2013    Lipid Panel 01/31/2019 1/31/2014          Return in about 3 months (around 2/14/2018).

## 2017-11-14 NOTE — PROGRESS NOTES
"Subjective:     Patient ID:  Paul Villalobos Sr. is a 61 y.o. male who presents for follow-up of Congestive Heart Failure    HPI:  60 yo AAM with CHF chronic systolic and diastolic, EF as low as 25% with recovery to 40-45%, s/p mechanical AVR/MVR with redo, DM, HTN, Hep C, asthma, tobacco abuse, and PH returns for visit though overdue almost 1.5 yrs. Currently sleeping on 3 pillows but for general comfort, not breathing.  No CP.  Some LEIVA but Class 2 symptoms.  He has not been taking extra KCl with metolazone and is taking metolazone 3x/week. Also, not consistently taking KCl on other days either.    Review of Systems   Constitution: Positive for weight loss (10# since last visit). Negative for chills, fever, weakness, malaise/fatigue, night sweats and weight gain.   Cardiovascular: Positive for dyspnea on exertion. Negative for chest pain, irregular heartbeat, leg swelling, near-syncope, orthopnea (see HPI), palpitations, paroxysmal nocturnal dyspnea and syncope.   Respiratory: Negative for cough, sputum production and wheezing.    Hematologic/Lymphatic: Does not bruise/bleed easily.   Musculoskeletal: Positive for back pain and neck pain. Negative for arthritis, joint pain and stiffness.        Pain over entire left side of body from neck to hip   Gastrointestinal: Negative for hematochezia and melena.   Genitourinary: Negative for hematuria.   Neurological: Negative for brief paralysis, focal weakness and seizures.     Objective:   Physical Exam   Constitutional: He is oriented to person, place, and time. No distress.   BP (!) 108/55   Pulse 64   Ht 5' 9" (1.753 m)   Wt 72.3 kg (159 lb 6.3 oz)   BMI 23.54 kg/m²   Last visit wt 76.4 kg (168 lb 6.9 oz)  BMI 24.86 kg/m2  Thin, chronically ill appearing AAM in NAD   HENT:   Head: Normocephalic and atraumatic.   Mouth/Throat: No oropharyngeal exudate.   Eyes: Conjunctivae are normal. Right eye exhibits no discharge. Left eye exhibits no discharge. No scleral icterus. "   Neck: No JVD (below 8 cm) present. No thyromegaly present.   Cardiovascular: Normal rate and regular rhythm.  Exam reveals no gallop.    Murmur (Metallic valve sounds and soft systolic murmur LSB with radiation into carotid right> left versus carotid bruits) heard.  Pulmonary/Chest: No respiratory distress. He has no wheezes. He has no rales (but quiet breath sounds).   Abdominal: Soft. Bowel sounds are normal. He exhibits no distension (liver span normal). There is no tenderness. There is no rebound and no guarding.   Musculoskeletal: He exhibits no edema or tenderness.   Neurological: He is alert and oriented to person, place, and time.   Skin: Skin is warm and dry. He is not diaphoretic.   Psychiatric: He has a normal mood and affect. His behavior is normal. Judgment and thought content normal.      Lab Results   Component Value Date     (H) 11/14/2017     11/14/2017    K 3.3 (L) 11/14/2017    CL 97 11/14/2017    CO2 34 (H) 11/14/2017    BUN 24 (H) 11/14/2017    CREATININE 1.1 11/14/2017     11/14/2017    AST 37 11/14/2017    ALT 32 11/14/2017    ALBUMIN 3.4 (L) 11/14/2017    PROT 7.7 11/14/2017    BILITOT 1.5 (H) 11/14/2017    WBC 6.67 08/08/2017    HGB 14.6 08/08/2017    HCT 43.5 08/08/2017     08/08/2017    INR 2.0 11/08/2017   Last lipids 2014  Lab Results   Component Value Date    CHOL 208 (H) 01/31/2014    HDL 77 (H) 01/31/2014    LDLCALC 113.0 01/31/2014    TRIG 90 01/31/2014     Assessment:     1. CHF (NYHA class III, ACC/AHA stage C)    2. Diuretic-induced hypokalemia    3. Status post heart valve replacement with mechanical valve    4. Anticoagulated on Coumadin    5. HTN (hypertension), benign    6. Paroxysmal atrial fibrillation    7. Tobacco abuse      Plan:   Potassium take 2 of the 20 meq tablets today when get home and 2 more 4 hrs later  Take one potassium tablet every day and 2 on metolazone days  F/u lipids 2018 or 2019  BMP on correct KCl dose in 2 weeks  RTC 6  months with ECHO, doppler

## 2017-11-18 DIAGNOSIS — Z95.2 STATUS POST HEART VALVE REPLACEMENT WITH MECHANICAL VALVE: ICD-10-CM

## 2017-11-18 DIAGNOSIS — I48.0 PAROXYSMAL ATRIAL FIBRILLATION: ICD-10-CM

## 2017-11-18 DIAGNOSIS — N18.2 CKD (CHRONIC KIDNEY DISEASE) STAGE 2, GFR 60-89 ML/MIN: ICD-10-CM

## 2017-11-18 DIAGNOSIS — J44.9 CHRONIC OBSTRUCTIVE PULMONARY DISEASE, UNSPECIFIED COPD TYPE: ICD-10-CM

## 2017-11-18 DIAGNOSIS — R80.9 PROTEINURIA: ICD-10-CM

## 2017-11-18 DIAGNOSIS — I50.22 CHRONIC SYSTOLIC CONGESTIVE HEART FAILURE: ICD-10-CM

## 2017-11-18 DIAGNOSIS — I50.22 CHRONIC SYSTOLIC CONGESTIVE HEART FAILURE, NYHA CLASS 3: ICD-10-CM

## 2017-11-18 DIAGNOSIS — Z72.0 TOBACCO ABUSE: ICD-10-CM

## 2017-11-18 DIAGNOSIS — I10 HTN (HYPERTENSION), BENIGN: ICD-10-CM

## 2017-11-18 DIAGNOSIS — E78.5 HYPERLIPIDEMIA, UNSPECIFIED HYPERLIPIDEMIA TYPE: ICD-10-CM

## 2017-11-18 DIAGNOSIS — B18.2 CHRONIC HEPATITIS C WITHOUT HEPATIC COMA: ICD-10-CM

## 2017-11-18 DIAGNOSIS — E04.1 RIGHT THYROID NODULE: ICD-10-CM

## 2017-11-20 RX ORDER — ISOSORBIDE MONONITRATE 30 MG/1
TABLET, EXTENDED RELEASE ORAL
Qty: 90 TABLET | Refills: 11 | Status: SHIPPED | OUTPATIENT
Start: 2017-11-20 | End: 2018-06-22

## 2017-11-20 RX ORDER — LISINOPRIL 10 MG/1
TABLET ORAL
Qty: 90 TABLET | Refills: 11 | Status: SHIPPED | OUTPATIENT
Start: 2017-11-20 | End: 2018-01-19

## 2017-11-20 RX ORDER — CARVEDILOL 25 MG/1
TABLET ORAL
Qty: 180 TABLET | Refills: 11 | Status: SHIPPED | OUTPATIENT
Start: 2017-11-20 | End: 2018-06-22 | Stop reason: SDUPTHER

## 2017-11-21 LAB — INR PPP: 2.3

## 2017-11-22 ENCOUNTER — ANTI-COAG VISIT (OUTPATIENT)
Dept: CARDIOLOGY | Facility: CLINIC | Age: 62
End: 2017-11-22

## 2017-11-22 DIAGNOSIS — Z95.2 STATUS POST HEART VALVE REPLACEMENT WITH MECHANICAL VALVE: ICD-10-CM

## 2017-11-22 DIAGNOSIS — Z79.01 ANTICOAGULATED ON COUMADIN: ICD-10-CM

## 2017-11-28 ENCOUNTER — LAB VISIT (OUTPATIENT)
Dept: LAB | Facility: HOSPITAL | Age: 62
End: 2017-11-28
Attending: INTERNAL MEDICINE
Payer: MEDICARE

## 2017-11-28 DIAGNOSIS — I10 HTN (HYPERTENSION), BENIGN: ICD-10-CM

## 2017-11-28 DIAGNOSIS — E87.6 DIURETIC-INDUCED HYPOKALEMIA: ICD-10-CM

## 2017-11-28 DIAGNOSIS — I50.22 CHRONIC SYSTOLIC CONGESTIVE HEART FAILURE, NYHA CLASS 3: ICD-10-CM

## 2017-11-28 DIAGNOSIS — I50.9 CHF (NYHA CLASS III, ACC/AHA STAGE C): ICD-10-CM

## 2017-11-28 DIAGNOSIS — T50.2X5A DIURETIC-INDUCED HYPOKALEMIA: ICD-10-CM

## 2017-11-28 LAB
ANION GAP SERPL CALC-SCNC: 8 MMOL/L
BUN SERPL-MCNC: 18 MG/DL
CALCIUM SERPL-MCNC: 9.4 MG/DL
CHLORIDE SERPL-SCNC: 101 MMOL/L
CO2 SERPL-SCNC: 31 MMOL/L
CREAT SERPL-MCNC: 1.1 MG/DL
EST. GFR  (AFRICAN AMERICAN): >60 ML/MIN/1.73 M^2
EST. GFR  (NON AFRICAN AMERICAN): >60 ML/MIN/1.73 M^2
GLUCOSE SERPL-MCNC: 134 MG/DL
POTASSIUM SERPL-SCNC: 4.4 MMOL/L
SODIUM SERPL-SCNC: 140 MMOL/L

## 2017-11-28 PROCEDURE — 80048 BASIC METABOLIC PNL TOTAL CA: CPT

## 2017-11-28 PROCEDURE — 36415 COLL VENOUS BLD VENIPUNCTURE: CPT

## 2017-11-28 RX ORDER — POTASSIUM CHLORIDE 20 MEQ/1
20 TABLET, EXTENDED RELEASE ORAL DAILY
Qty: 45 TABLET | Refills: 5 | Status: ON HOLD
Start: 2017-11-28 | End: 2017-12-26

## 2017-11-28 NOTE — TELEPHONE ENCOUNTER
1210 pm:  F/U on today'nurse lab phone review:   Reviewed BMP results from today's collection as well as recent clinic note by Dr. Echevarria.  K+ improved to 4.4 from 3.3.  Called pt at this time to inform of the improved results and determine how he is taking Potassium. Pt told me he is taking (1) potassium every day and takes (2) on the days he takes Metolazone.  Noted this K+ instruction in Dr. Villanueva clinic note and on the AVS. Will change medication profile to reflect this change.

## 2017-11-29 ENCOUNTER — ANTI-COAG VISIT (OUTPATIENT)
Dept: CARDIOLOGY | Facility: CLINIC | Age: 62
End: 2017-11-29
Payer: MEDICARE

## 2017-11-29 DIAGNOSIS — Z95.2 STATUS POST HEART VALVE REPLACEMENT WITH MECHANICAL VALVE: ICD-10-CM

## 2017-11-29 DIAGNOSIS — Z79.01 ANTICOAGULATED ON COUMADIN: ICD-10-CM

## 2017-11-29 LAB — INR PPP: 2.7

## 2017-11-29 PROCEDURE — G0250 MD INR TEST REVIE INTER MGMT: HCPCS | Mod: ,,, | Performed by: PHARMACIST

## 2017-12-13 ENCOUNTER — ANTI-COAG VISIT (OUTPATIENT)
Dept: CARDIOLOGY | Facility: CLINIC | Age: 62
End: 2017-12-13

## 2017-12-13 ENCOUNTER — TELEPHONE (OUTPATIENT)
Dept: HEPATOLOGY | Facility: CLINIC | Age: 62
End: 2017-12-13

## 2017-12-13 DIAGNOSIS — Z79.01 ANTICOAGULATED ON COUMADIN: ICD-10-CM

## 2017-12-13 DIAGNOSIS — Z95.2 STATUS POST HEART VALVE REPLACEMENT WITH MECHANICAL VALVE: ICD-10-CM

## 2017-12-13 LAB — INR PPP: 3.2

## 2017-12-13 NOTE — TELEPHONE ENCOUNTER
----- Message from Vlad Negro sent at 12/12/2017 12:35 PM CST -----  Contact: PT  Please call patient regarding rescheduling his appt from 12-11-17.    States he need a morning appt, evenings is not good for him.     Call him @ [phone]

## 2017-12-21 ENCOUNTER — TELEPHONE (OUTPATIENT)
Dept: HEPATOLOGY | Facility: CLINIC | Age: 62
End: 2017-12-21

## 2017-12-21 NOTE — TELEPHONE ENCOUNTER
----- Message from Rosalie Ayala sent at 12/21/2017  3:50 PM CST -----  Contact: pt  Returning call  to schedule appt said he needs morning appt please call him @ # `[phone].

## 2017-12-25 ENCOUNTER — HOSPITAL ENCOUNTER (INPATIENT)
Facility: HOSPITAL | Age: 62
LOS: 1 days | Discharge: HOME | DRG: 291 | End: 2017-12-26
Attending: EMERGENCY MEDICINE | Admitting: HOSPITALIST
Payer: MEDICARE

## 2017-12-25 DIAGNOSIS — I50.9 HEART FAILURE: ICD-10-CM

## 2017-12-25 DIAGNOSIS — I50.9 ACUTE ON CHRONIC CONGESTIVE HEART FAILURE, UNSPECIFIED CONGESTIVE HEART FAILURE TYPE: Primary | ICD-10-CM

## 2017-12-25 DIAGNOSIS — I50.22 CHRONIC SYSTOLIC CONGESTIVE HEART FAILURE, NYHA CLASS 3: ICD-10-CM

## 2017-12-25 DIAGNOSIS — R06.02 SOB (SHORTNESS OF BREATH): ICD-10-CM

## 2017-12-25 DIAGNOSIS — I49.9 CARDIAC RHYTHM DISTURBANCE: ICD-10-CM

## 2017-12-25 DIAGNOSIS — I50.9 CHF (NYHA CLASS III, ACC/AHA STAGE C): ICD-10-CM

## 2017-12-25 PROBLEM — J96.21 ACUTE ON CHRONIC RESPIRATORY FAILURE WITH HYPOXIA: Status: ACTIVE | Noted: 2017-12-25

## 2017-12-25 LAB
ALBUMIN SERPL BCP-MCNC: 3.5 G/DL
ALP SERPL-CCNC: 61 U/L
ALT SERPL W/O P-5'-P-CCNC: 26 U/L
ANION GAP SERPL CALC-SCNC: 10 MMOL/L
ANION GAP SERPL CALC-SCNC: 10 MMOL/L
APTT BLDCRRT: 35.6 SEC
AST SERPL-CCNC: 31 U/L
BASOPHILS # BLD AUTO: 0.04 K/UL
BASOPHILS NFR BLD: 0.6 %
BILIRUB SERPL-MCNC: 0.7 MG/DL
BNP SERPL-MCNC: 667 PG/ML
BUN SERPL-MCNC: 30 MG/DL
BUN SERPL-MCNC: 31 MG/DL
CALCIUM SERPL-MCNC: 9.5 MG/DL
CALCIUM SERPL-MCNC: 9.7 MG/DL
CHLORIDE SERPL-SCNC: 94 MMOL/L
CHLORIDE SERPL-SCNC: 97 MMOL/L
CHOLEST SERPL-MCNC: 178 MG/DL
CHOLEST/HDLC SERPL: 2.6 {RATIO}
CO2 SERPL-SCNC: 33 MMOL/L
CO2 SERPL-SCNC: 35 MMOL/L
CREAT SERPL-MCNC: 1.3 MG/DL
CREAT SERPL-MCNC: 1.3 MG/DL
DIFFERENTIAL METHOD: ABNORMAL
EOSINOPHIL # BLD AUTO: 0.3 K/UL
EOSINOPHIL NFR BLD: 4.4 %
ERYTHROCYTE [DISTWIDTH] IN BLOOD BY AUTOMATED COUNT: 13.4 %
EST. GFR  (AFRICAN AMERICAN): >60 ML/MIN/1.73 M^2
EST. GFR  (AFRICAN AMERICAN): >60 ML/MIN/1.73 M^2
EST. GFR  (NON AFRICAN AMERICAN): 58.1 ML/MIN/1.73 M^2
EST. GFR  (NON AFRICAN AMERICAN): 58.1 ML/MIN/1.73 M^2
FLUAV AG SPEC QL IA: NEGATIVE
FLUBV AG SPEC QL IA: NEGATIVE
GLUCOSE SERPL-MCNC: 121 MG/DL
GLUCOSE SERPL-MCNC: 164 MG/DL
HCT VFR BLD AUTO: 37.8 %
HDLC SERPL-MCNC: 69 MG/DL
HDLC SERPL: 38.8 %
HGB BLD-MCNC: 12.6 G/DL
IMM GRANULOCYTES # BLD AUTO: 0.01 K/UL
IMM GRANULOCYTES NFR BLD AUTO: 0.2 %
INR PPP: 2.7
LDLC SERPL CALC-MCNC: 98.8 MG/DL
LYMPHOCYTES # BLD AUTO: 1.3 K/UL
LYMPHOCYTES NFR BLD: 20.8 %
MAGNESIUM SERPL-MCNC: 2.1 MG/DL
MCH RBC QN AUTO: 32.8 PG
MCHC RBC AUTO-ENTMCNC: 33.3 G/DL
MCV RBC AUTO: 98 FL
MONOCYTES # BLD AUTO: 0.9 K/UL
MONOCYTES NFR BLD: 14.5 %
NEUTROPHILS # BLD AUTO: 3.8 K/UL
NEUTROPHILS NFR BLD: 59.5 %
NONHDLC SERPL-MCNC: 109 MG/DL
NRBC BLD-RTO: 0 /100 WBC
PLATELET # BLD AUTO: 128 K/UL
PMV BLD AUTO: 10.6 FL
POTASSIUM SERPL-SCNC: 2.9 MMOL/L
POTASSIUM SERPL-SCNC: 3.7 MMOL/L
PROT SERPL-MCNC: 7.5 G/DL
PROTHROMBIN TIME: 26.8 SEC
RBC # BLD AUTO: 3.84 M/UL
SODIUM SERPL-SCNC: 139 MMOL/L
SODIUM SERPL-SCNC: 140 MMOL/L
SPECIMEN SOURCE: NORMAL
TRIGL SERPL-MCNC: 51 MG/DL
TROPONIN I SERPL DL<=0.01 NG/ML-MCNC: 0.06 NG/ML
TROPONIN I SERPL DL<=0.01 NG/ML-MCNC: 0.07 NG/ML
WBC # BLD AUTO: 6.4 K/UL

## 2017-12-25 PROCEDURE — 63600175 PHARM REV CODE 636 W HCPCS: Performed by: STUDENT IN AN ORGANIZED HEALTH CARE EDUCATION/TRAINING PROGRAM

## 2017-12-25 PROCEDURE — 83880 ASSAY OF NATRIURETIC PEPTIDE: CPT

## 2017-12-25 PROCEDURE — 84484 ASSAY OF TROPONIN QUANT: CPT | Mod: 91

## 2017-12-25 PROCEDURE — 84484 ASSAY OF TROPONIN QUANT: CPT

## 2017-12-25 PROCEDURE — 93005 ELECTROCARDIOGRAM TRACING: CPT

## 2017-12-25 PROCEDURE — 36415 COLL VENOUS BLD VENIPUNCTURE: CPT

## 2017-12-25 PROCEDURE — 25000242 PHARM REV CODE 250 ALT 637 W/ HCPCS: Performed by: STUDENT IN AN ORGANIZED HEALTH CARE EDUCATION/TRAINING PROGRAM

## 2017-12-25 PROCEDURE — 99285 EMERGENCY DEPT VISIT HI MDM: CPT | Mod: 25

## 2017-12-25 PROCEDURE — 93010 ELECTROCARDIOGRAM REPORT: CPT | Mod: 77,,, | Performed by: INTERNAL MEDICINE

## 2017-12-25 PROCEDURE — 25000003 PHARM REV CODE 250: Performed by: INTERNAL MEDICINE

## 2017-12-25 PROCEDURE — 20600001 HC STEP DOWN PRIVATE ROOM

## 2017-12-25 PROCEDURE — 85730 THROMBOPLASTIN TIME PARTIAL: CPT

## 2017-12-25 PROCEDURE — 25000003 PHARM REV CODE 250: Performed by: STUDENT IN AN ORGANIZED HEALTH CARE EDUCATION/TRAINING PROGRAM

## 2017-12-25 PROCEDURE — 80053 COMPREHEN METABOLIC PANEL: CPT

## 2017-12-25 PROCEDURE — 80061 LIPID PANEL: CPT

## 2017-12-25 PROCEDURE — 85610 PROTHROMBIN TIME: CPT

## 2017-12-25 PROCEDURE — 99285 EMERGENCY DEPT VISIT HI MDM: CPT | Mod: ,,, | Performed by: EMERGENCY MEDICINE

## 2017-12-25 PROCEDURE — 63600175 PHARM REV CODE 636 W HCPCS: Performed by: INTERNAL MEDICINE

## 2017-12-25 PROCEDURE — 87400 INFLUENZA A/B EACH AG IA: CPT

## 2017-12-25 PROCEDURE — 85025 COMPLETE CBC W/AUTO DIFF WBC: CPT

## 2017-12-25 PROCEDURE — 93010 ELECTROCARDIOGRAM REPORT: CPT | Mod: ,,, | Performed by: INTERNAL MEDICINE

## 2017-12-25 PROCEDURE — 94640 AIRWAY INHALATION TREATMENT: CPT

## 2017-12-25 PROCEDURE — 63600175 PHARM REV CODE 636 W HCPCS: Performed by: EMERGENCY MEDICINE

## 2017-12-25 PROCEDURE — 83735 ASSAY OF MAGNESIUM: CPT

## 2017-12-25 PROCEDURE — 25000003 PHARM REV CODE 250: Performed by: EMERGENCY MEDICINE

## 2017-12-25 PROCEDURE — 96365 THER/PROPH/DIAG IV INF INIT: CPT

## 2017-12-25 PROCEDURE — 96366 THER/PROPH/DIAG IV INF ADDON: CPT

## 2017-12-25 PROCEDURE — 80048 BASIC METABOLIC PNL TOTAL CA: CPT

## 2017-12-25 PROCEDURE — 94761 N-INVAS EAR/PLS OXIMETRY MLT: CPT

## 2017-12-25 PROCEDURE — 96375 TX/PRO/DX INJ NEW DRUG ADDON: CPT

## 2017-12-25 PROCEDURE — 99223 1ST HOSP IP/OBS HIGH 75: CPT | Mod: AI,GC,, | Performed by: HOSPITALIST

## 2017-12-25 RX ORDER — PREDNISONE 20 MG/1
40 TABLET ORAL
Status: COMPLETED | OUTPATIENT
Start: 2017-12-25 | End: 2017-12-25

## 2017-12-25 RX ORDER — PREDNISONE 20 MG/1
40 TABLET ORAL DAILY
Status: DISCONTINUED | OUTPATIENT
Start: 2017-12-26 | End: 2017-12-26 | Stop reason: HOSPADM

## 2017-12-25 RX ORDER — MOXIFLOXACIN HYDROCHLORIDE 400 MG/1
400 TABLET ORAL DAILY
Status: DISCONTINUED | OUTPATIENT
Start: 2017-12-25 | End: 2017-12-26

## 2017-12-25 RX ORDER — ASPIRIN 325 MG
325 TABLET ORAL
Status: COMPLETED | OUTPATIENT
Start: 2017-12-25 | End: 2017-12-25

## 2017-12-25 RX ORDER — IPRATROPIUM BROMIDE AND ALBUTEROL SULFATE 2.5; .5 MG/3ML; MG/3ML
3 SOLUTION RESPIRATORY (INHALATION) EVERY 4 HOURS
Status: DISCONTINUED | OUTPATIENT
Start: 2017-12-25 | End: 2017-12-26 | Stop reason: HOSPADM

## 2017-12-25 RX ORDER — FUROSEMIDE 80 MG/1
80 TABLET ORAL DAILY
Status: DISCONTINUED | OUTPATIENT
Start: 2017-12-25 | End: 2017-12-25

## 2017-12-25 RX ORDER — FUROSEMIDE 10 MG/ML
60 INJECTION INTRAMUSCULAR; INTRAVENOUS ONCE
Status: DISCONTINUED | OUTPATIENT
Start: 2017-12-25 | End: 2017-12-25

## 2017-12-25 RX ORDER — POTASSIUM CHLORIDE 20 MEQ/1
60 TABLET, EXTENDED RELEASE ORAL ONCE
Status: COMPLETED | OUTPATIENT
Start: 2017-12-25 | End: 2017-12-25

## 2017-12-25 RX ORDER — FLUTICASONE FUROATE AND VILANTEROL 200; 25 UG/1; UG/1
1 POWDER RESPIRATORY (INHALATION) DAILY
Status: DISCONTINUED | OUTPATIENT
Start: 2017-12-25 | End: 2017-12-26 | Stop reason: HOSPADM

## 2017-12-25 RX ORDER — GLUCAGON 1 MG
1 KIT INJECTION
Status: DISCONTINUED | OUTPATIENT
Start: 2017-12-25 | End: 2017-12-26 | Stop reason: HOSPADM

## 2017-12-25 RX ORDER — SODIUM CHLORIDE 0.9 % (FLUSH) 0.9 %
5 SYRINGE (ML) INJECTION
Status: DISCONTINUED | OUTPATIENT
Start: 2017-12-25 | End: 2017-12-26 | Stop reason: HOSPADM

## 2017-12-25 RX ORDER — IBUPROFEN 200 MG
16 TABLET ORAL
Status: DISCONTINUED | OUTPATIENT
Start: 2017-12-25 | End: 2017-12-26 | Stop reason: HOSPADM

## 2017-12-25 RX ORDER — IPRATROPIUM BROMIDE AND ALBUTEROL SULFATE 2.5; .5 MG/3ML; MG/3ML
3 SOLUTION RESPIRATORY (INHALATION)
Status: COMPLETED | OUTPATIENT
Start: 2017-12-25 | End: 2017-12-25

## 2017-12-25 RX ORDER — DOXYCYCLINE HYCLATE 100 MG
100 TABLET ORAL
Status: DISCONTINUED | OUTPATIENT
Start: 2017-12-25 | End: 2017-12-25

## 2017-12-25 RX ORDER — POTASSIUM CHLORIDE 20 MEQ/15ML
40 SOLUTION ORAL ONCE
Status: COMPLETED | OUTPATIENT
Start: 2017-12-25 | End: 2017-12-25

## 2017-12-25 RX ORDER — FUROSEMIDE 10 MG/ML
60 INJECTION INTRAMUSCULAR; INTRAVENOUS
Status: COMPLETED | OUTPATIENT
Start: 2017-12-25 | End: 2017-12-25

## 2017-12-25 RX ORDER — POTASSIUM CHLORIDE 20 MEQ/15ML
40 SOLUTION ORAL EVERY 4 HOURS
Status: DISCONTINUED | OUTPATIENT
Start: 2017-12-25 | End: 2017-12-25

## 2017-12-25 RX ORDER — WARFARIN 7.5 MG/1
7.5 TABLET ORAL DAILY
Status: DISCONTINUED | OUTPATIENT
Start: 2017-12-25 | End: 2017-12-26

## 2017-12-25 RX ORDER — IBUPROFEN 200 MG
24 TABLET ORAL
Status: DISCONTINUED | OUTPATIENT
Start: 2017-12-25 | End: 2017-12-26 | Stop reason: HOSPADM

## 2017-12-25 RX ORDER — FUROSEMIDE 10 MG/ML
60 INJECTION INTRAMUSCULAR; INTRAVENOUS ONCE
Status: DISCONTINUED | OUTPATIENT
Start: 2017-12-25 | End: 2017-12-26

## 2017-12-25 RX ORDER — POTASSIUM CHLORIDE 7.45 MG/ML
10 INJECTION INTRAVENOUS
Status: COMPLETED | OUTPATIENT
Start: 2017-12-25 | End: 2017-12-25

## 2017-12-25 RX ORDER — METOLAZONE 2.5 MG/1
2.5 TABLET ORAL ONCE
Status: COMPLETED | OUTPATIENT
Start: 2017-12-25 | End: 2017-12-25

## 2017-12-25 RX ORDER — FUROSEMIDE 10 MG/ML
80 INJECTION INTRAMUSCULAR; INTRAVENOUS ONCE
Status: COMPLETED | OUTPATIENT
Start: 2017-12-25 | End: 2017-12-25

## 2017-12-25 RX ADMIN — PREDNISONE 40 MG: 20 TABLET ORAL at 04:12

## 2017-12-25 RX ADMIN — IPRATROPIUM BROMIDE AND ALBUTEROL SULFATE 3 ML: .5; 3 SOLUTION RESPIRATORY (INHALATION) at 05:12

## 2017-12-25 RX ADMIN — FUROSEMIDE 80 MG: 80 TABLET ORAL at 05:12

## 2017-12-25 RX ADMIN — FUROSEMIDE 60 MG: 10 INJECTION, SOLUTION INTRAMUSCULAR; INTRAVENOUS at 06:12

## 2017-12-25 RX ADMIN — POTASSIUM CHLORIDE 40 MEQ: 20 SOLUTION ORAL at 06:12

## 2017-12-25 RX ADMIN — POTASSIUM CHLORIDE 60 MEQ: 1500 TABLET, EXTENDED RELEASE ORAL at 12:12

## 2017-12-25 RX ADMIN — POTASSIUM CHLORIDE 40 MEQ: 20 SOLUTION ORAL at 01:12

## 2017-12-25 RX ADMIN — ASPIRIN 325 MG ORAL TABLET 325 MG: 325 PILL ORAL at 05:12

## 2017-12-25 RX ADMIN — IPRATROPIUM BROMIDE AND ALBUTEROL SULFATE 3 ML: .5; 3 SOLUTION RESPIRATORY (INHALATION) at 01:12

## 2017-12-25 RX ADMIN — WARFARIN SODIUM 7.5 MG: 7.5 TABLET ORAL at 05:12

## 2017-12-25 RX ADMIN — FLUTICASONE FUROATE AND VILANTEROL TRIFENATATE 1 PUFF: 200; 25 POWDER RESPIRATORY (INHALATION) at 12:12

## 2017-12-25 RX ADMIN — MOXIFLOXACIN HYDROCHLORIDE 400 MG: 400 TABLET, FILM COATED ORAL at 10:12

## 2017-12-25 RX ADMIN — FUROSEMIDE 80 MG: 10 INJECTION, SOLUTION INTRAMUSCULAR; INTRAVENOUS at 10:12

## 2017-12-25 RX ADMIN — POTASSIUM CHLORIDE 10 MEQ: 10 INJECTION, SOLUTION INTRAVENOUS at 05:12

## 2017-12-25 RX ADMIN — IPRATROPIUM BROMIDE AND ALBUTEROL SULFATE 3 ML: .5; 3 SOLUTION RESPIRATORY (INHALATION) at 04:12

## 2017-12-25 RX ADMIN — IPRATROPIUM BROMIDE AND ALBUTEROL SULFATE 3 ML: .5; 3 SOLUTION RESPIRATORY (INHALATION) at 09:12

## 2017-12-25 RX ADMIN — IPRATROPIUM BROMIDE AND ALBUTEROL SULFATE 3 ML: .5; 3 SOLUTION RESPIRATORY (INHALATION) at 07:12

## 2017-12-25 RX ADMIN — METOLAZONE 2.5 MG: 2.5 TABLET ORAL at 10:12

## 2017-12-25 NOTE — ASSESSMENT & PLAN NOTE
Currently hypo to normotensive  Diuresing patient  Holding coreg 25 BID in acute CHF  Takes lisinopril 10mg at home  Will continue to monitor, likely add coreg in am

## 2017-12-25 NOTE — ASSESSMENT & PLAN NOTE
Most recent echo in 2015 with EF 40%, Dd, and pHTN 44  Holding beta blocker in acute setting, though COPD more likely as patient at baseline dry weight and orthopnea  Patient also on lasix 80 MWF with 40mg in between and metolazone 2.5mg QD  Given 60 IV lasix in ED  Gave 80 IV once  Patient with hypotension with SBP in 90s late morning  Will continue to monitor and likely transition to PO lasix in pm likely 80mg  Re-started home metolazone  Strict I/O's, daily weights  Cardiac diet, 1,500 fluid restriction, 2g sodium  Repeating TTE

## 2017-12-25 NOTE — HPI
Mr. Villalobos is a 61yoM with PMHx of COPD (bronchitis), pAfib (on coumadin), PVD (pt claims b/l femoral stents in 2004), CHF (EF 40% and Dd), AVR/MVR 2007 redo 2009 presents with 2-3 weeks of cough, subjective fevers for a week, 3 days of copious mucous colored green to yellow with cough of scott blood this am. Patient has chronic cough with white mucous at baseline but the copious amount and discoloration is different. Also endorses LEIVA and angina increased over this time with only few feet.Claims he has felt chest heaviness before when he has a cold but this time is worse over 2 weeks. Patient uses comombivent, duo-nebs, home O2 3.5L during sleep, has increased combivent from once every 2 days to around the clock for the past 3 weeks. Orthopnea at baseline (sleeps on 5 pillows at night) and PND somewhat worse but stable. Patient claims wife and relative have had flu like symptoms over the past week. No previous sick contacts before then. Patient claims he misses doses of lasix/metalolazone claiming he takes medicines once every 2-3 days.    In the ED CXR with pulm edema and bilateral pleural effusions. Patient given duonebs, 40mg steroids, and 60mg IV Lasix. Troponins drawn, remained flat.

## 2017-12-25 NOTE — NURSING
ADMIT  PT ARRIVED TO RM [id number], TELE APPLIED, ORIENTED PT TO RM AND NEW SURROUNDINGS, PT DENIES ANY CHEST PAIN OR NAUSEA OR VOMITING. ADMIT INCOMPLETE- UPDATED PRIMARY NURSE

## 2017-12-25 NOTE — ASSESSMENT & PLAN NOTE
Patient with increase and cough amount and color of sputum  Also report of some hemoptysis  On RA breathing comfortably on PE  Likely exacerbation of chronic bronchitis  Will treat with prednisone for 5 days  Moxifloxacin for 5 days  Also scheduled duonebs and breo

## 2017-12-25 NOTE — ASSESSMENT & PLAN NOTE
Alternating sinus and afib   Holding beta blocker overnight in acute setting CHF, will likely resume in am  Currently rate controlled  Resumed home coumadin  Daily PT/INR

## 2017-12-25 NOTE — ED NOTES
"Pt c/o mid sternal chest pressure/pain 10/10 X2 days. Pt also c/o productive cough with yellow sputum, pain in back when coughing, SOB, chills, fatigue X2 weeks. Pt also states "My nose was just bleeding". Pt denies N/V/D or change in urination.   "

## 2017-12-25 NOTE — ASSESSMENT & PLAN NOTE
Status post aortic and mitral valve replacements 2007, redo [facility] AVR/MVR 8/09,( 21 mm [employer] Top [place],  27-mm [employer] mechanical )   Currently no murmur, but patient having increased LEIVA  Will repeat TTE to r/o valvular disease/malfunction  Patient taking coumadin

## 2017-12-25 NOTE — ED PROVIDER NOTES
Encounter Date: 12/25/2017    SCRIBE #1 NOTE: I, Lc Mckeon, am scribing for, and in the presence of,  Melissa Chavira MD. I have scribed the following portions of the note - the Resident attestation.       History     Chief Complaint   Patient presents with    Generalized Body Aches     Pt states that he has been coughing for the past two weeks and tonight started to experience chills and all over body aches. Pt coughing up thick yellow sputum.      HPI     62yo man with hx COPD, CHF, HTN, mechanical AVR/MVR presenting with two days of cough productive of white/yellow sputum and increasing shortness of breath. States he at first had chest discomfort only when coughing but now has a constant heaviness in the center of his chest. Notes subjective fevers, chills and aches. Has been using home inhaler without relief. Has not taken anything for cough or fevers. Denies nausea, vomiting, diarrhea.     Review of patient's allergies indicates:  No Known Allergies  Past Medical History:   Diagnosis Date    Anemia     Asthma     CHF (congestive heart failure)     Chronic bronchitis     Cirrhosis 1/19/2015    Hepatitis C     Hyperlipidemia     Hypertension     Lung disease     Pulmonary hypertension      Past Surgical History:   Procedure Laterality Date    CARDIAC VALVE REPLACEMENT      AVR WITH MVr 2007    HERNIA REPAIR  2006    questionable mesh, right inguinal, unbilical    LUNG DECORTICATION      right thoracotomy 2010    LUNG DECORTICATION  2010    open heart surgery      redo AVR and MVR 2009     Family History   Problem Relation Age of Onset    Hypertension Mother     Hypertension Father     Hypertension Brother     Heart disease Brother     Heart attack Brother     Hypertension Sister      Social History   Substance Use Topics    Smoking status: Current Every Day Smoker     Packs/day: 0.50     Years: 48.00     Types: Cigarettes    Smokeless tobacco: Never Used      Comment: 5 a day    Alcohol  use 0.0 oz/week      Comment: seldom     Review of Systems   Constitutional: Positive for fever.   HENT: Positive for congestion. Negative for sore throat.    Respiratory: Positive for cough and shortness of breath.    Cardiovascular: Positive for chest pain.   Gastrointestinal: Negative for abdominal pain, diarrhea, nausea and vomiting.   Neurological: Negative for syncope.   All other systems reviewed and are negative.      Physical Exam     Initial Vitals [12/25/17 0340]   BP Pulse Resp Temp SpO2   136/73 78 20 98.6 °F (37 °C) 97 %      MAP       94         Physical Exam    Nursing note and vitals reviewed.  Constitutional: He appears well-developed and well-nourished. No distress.   HENT:   Head: Normocephalic and atraumatic.   Mouth/Throat: Oropharynx is clear and moist.   Eyes: Conjunctivae and EOM are normal. Pupils are equal, round, and reactive to light.   Neck: Normal range of motion.   Cardiovascular: Normal rate, normal heart sounds and intact distal pulses. An irregularly irregular rhythm present.   Pulmonary/Chest: No respiratory distress.   Diminished breath sounds in the right lung field, scattered mild inspiratory wheezing in the left lung field, well healed scar on right flank from prior chest tube   Abdominal: Soft. He exhibits no distension. There is no tenderness.   Musculoskeletal: Normal range of motion. He exhibits no edema.   Neurological: He is alert and oriented to person, place, and time.   Skin: Skin is warm and dry.   Psychiatric: He has a normal mood and affect.         ED Course   Procedures  Labs Reviewed   CBC W/ AUTO DIFFERENTIAL - Abnormal; Notable for the following:        Result Value    RBC 3.84 (*)     Hemoglobin 12.6 (*)     Hematocrit 37.8 (*)     MCH 32.8 (*)     Platelets 128 (*)     All other components within normal limits   COMPREHENSIVE METABOLIC PANEL - Abnormal; Notable for the following:     Potassium 2.9 (*)     CO2 33 (*)     Glucose 121 (*)     BUN, Bld 31 (*)      eGFR if non  58.1 (*)     All other components within normal limits   TROPONIN I - Abnormal; Notable for the following:     Troponin I 0.071 (*)     All other components within normal limits   B-TYPE NATRIURETIC PEPTIDE - Abnormal; Notable for the following:      (*)     All other components within normal limits   PROTIME-INR - Abnormal; Notable for the following:     Prothrombin Time 26.8 (*)     INR 2.7 (*)     All other components within normal limits    Narrative:     ADD ON MAGNESIUM PER DR NORRIS BUCHANAN/ORDER# [id number] @ 5:27AM   12/25/17  ADD ON PT AND PTT PER DR JOHN VEGA/ORDER# [id number] AND [id number]   @ 5:22AM 12/25/17   APTT - Abnormal; Notable for the following:     aPTT 35.6 (*)     All other components within normal limits    Narrative:     ADD ON MAGNESIUM PER DR NORRIS BUCHANAN/ORDER# [id number] @ 5:27AM   12/25/17  ADD ON PT AND PTT PER DR JOHN VEGA/ORDER# [id number]   @ 5:22AM 12/25/17   INFLUENZA A AND B ANTIGEN   MAGNESIUM   APTT   PROTIME-INR   MAGNESIUM    Narrative:     ADD ON MAGNESIUM PER DR NORRIS BUCHANAN/ORDER# [id number] @ 5:27AM   12/25/17  ADD ON PT AND PTT PER DR JOHN VEGA/ORDER# [id number] AND [id number]   @ 5:22AM 12/25/17   LIPID PANEL   HEMOGLOBIN A1C          MDM: MD VAZQUEZ Vallecillo-II 4:05 AM  This is a 60yo man with hx CHF, COPD, HTN, prosthetic heart valves presenting with cough, SOB, subjective fever for two days. Differential includes URI, COPD exacerbation, CHF exacerbation, pneumonia, pneumothorax. Less likely ACS/MI, dissection. Will get labs, CXR, EKG. Providing duoneb and prednisone.     Update: KIARA Vallecillo 4:12 AM  EKG shows afib 75bpm, RBBB, T wave inversions in leads V1-V3/aVR/aVL, no STEMI - comparable to prior EKG.     Update: KIARA Vallecillo 4:49 AM  Patient care handed off to Dr. Buchanan pending labs and CXR and reassessment.        Medical Decision Making:   History:   Old Medical Records: I  decided to obtain old medical records.  Clinical Tests:   Lab Tests: Ordered and Reviewed  Radiological Study: Ordered and Reviewed  Medical Tests: Ordered and Reviewed       APC / Resident Notes:   Update:  Care taken over at 5 am pending labs and CXR. Patient denies CP on my initial eval, states he does feel sore across his chest however. His labs are significant for elevated trop and BNP and hypokalemia. K repleated IV and PO, given lasix and an aspirin, consulted to hospital medicine for admission.   Sonya Buchanan MD PGY4  [employer] [facility]  7:06 AM         Scribe Attestation:   Scribe #1: I performed the above scribed service and the documentation accurately describes the services I performed. I attest to the accuracy of the note.    Attending Attestation:   Physician Attestation Statement for Resident:  As the supervising MD   Physician Attestation Statement: I have personally seen and examined this patient.   I agree with the above history. -: 61 y.o gentleman with productive cough and worsening SOB. COPD exacerbation vs CHF. Patient had symptomatic improvement after breathing treatment but also had signs of volume overload, consistent with CHF. Will begin diuresis. Will also replace potassium IV.    As the supervising MD I agree with the above PE.    As the supervising MD I agree with the above treatment, course, plan, and disposition.  I have reviewed and agree with the residents interpretation of the following: lab data, x-rays and EKG.  I have reviewed the following: old records at this facility.                    ED Course      Clinical Impression:   The primary encounter diagnosis was Acute on chronic congestive heart failure, unspecified congestive heart failure type. Diagnoses of SOB (shortness of breath), Heart failure, and Cardiac rhythm disturbance were also pertinent to this visit.                           Sonya Buchanan MD  Resident  12/25/17 5482       Melissa Chavira MD  12/26/17 1198

## 2017-12-25 NOTE — ED NOTES
Patient identifiers verified and correct for Paul VENKAT [facility]..    LOC: The patient is awake, alert and aware of environment with an appropriate affect, the patient is oriented x 3 and speaking appropriately.  APPEARANCE: Patient resting comfortably and in no acute distress, patient is clean and well groomed, patient's clothing is properly fastened.  SKIN: The skin is warm and dry, color consistent with ethnicity, patient has normal skin turgor and moist mucus membranes, skin intact, no breakdown or bruising noted.  MUSCULOSKELETAL: Patient moving all extremities spontaneously, no obvious swelling or deformities noted.  RESPIRATORY: Airway is open and patent, respirations are spontaneous, patient has a normal effort and rate, no accessory muscle use noted  CARDIAC: Patient has a normal rate and regular rhythm, no periphreal edema noted, capillary refill < 3 seconds.  ABDOMEN: Soft and non tender to palpation, no distention noted, normoactive bowel sounds present in all four quadrants.  NEUROLOGIC: PERRL, 3mm bilaterally, eyes open spontaneously, behavior appropriate to situation, follows commands, facial expression symmetrical, bilateral hand grasp equal and even, purposeful motor response noted, normal sensation in all extremities when touched with a finger.

## 2017-12-25 NOTE — SUBJECTIVE & OBJECTIVE
Past Medical History:   Diagnosis Date    Anemia     Asthma     CHF (congestive heart failure)     Chronic bronchitis     Cirrhosis 1/19/2015    Hepatitis C     Hyperlipidemia     Hypertension     Lung disease     Pulmonary hypertension        Past Surgical History:   Procedure Laterality Date    CARDIAC VALVE REPLACEMENT      AVR WITH MVr 2007    HERNIA REPAIR  2006    questionable mesh, right inguinal, unbilical    LUNG DECORTICATION      right thoracotomy 2010    LUNG DECORTICATION  2010    open heart surgery      redo AVR and MVR 2009       Review of patient's allergies indicates:  No Known Allergies    No current facility-administered medications on file prior to encounter.      Current Outpatient Prescriptions on File Prior to Encounter   Medication Sig    carvedilol (COREG) 25 MG tablet TAKE 1 TABLET(25 MG) BY MOUTH TWICE DAILY WITH MEALS    furosemide (LASIX) 80 MG tablet TAKE 1 TABLET BY MOUTH ON MONDAY WEDNESDAY AND FRIDAY AND 1/2 TABLET BY MOUTH ON ALL OTHER DAYS    ipratropium-albuterol (COMBIVENT RESPIMAT)  mcg/actuation inhaler Inhale 1 puff into the lungs every 4 (four) hours as needed for Wheezing.    isosorbide mononitrate (IMDUR) 30 MG 24 hr tablet TAKE 1 TABLET(30 MG) BY MOUTH EVERY DAY    lisinopril 10 MG tablet TAKE 1 TABLET(10 MG) BY MOUTH EVERY DAY    metOLazone (ZAROXOLYN) 2.5 MG tablet TAKE 1 TABLET(2.5 MG) BY MOUTH DAILY AS NEEDED    potassium chloride SA (K-DUR,KLOR-CON) 20 MEQ tablet Take 1 tablet (20 mEq total) by mouth once daily. Take (2) Potassium tablets =40 mg on days you take Metolazone    warfarin (COUMADIN) 5 MG tablet Take 1.5 tablets (7.5 mg total) by mouth Daily. Current Dose ( 10 mg on Sun, Wed, Fri; 7.5 mg all other days) or as directed by coumadin clinic.     Family History     Problem Relation (Age of Onset)    Heart attack Brother    Heart disease Brother    Hypertension Mother, Father, Brother, Sister        Social History Main Topics     Smoking status: Current Every Day Smoker     Packs/day: 0.50     Years: 48.00     Types: Cigarettes    Smokeless tobacco: Never Used      Comment: 5 a day    Alcohol use 0.0 oz/week      Comment: seldom    Drug use: No    Sexual activity: Not on file     Review of Systems   Constitutional: Positive for chills and fever.   HENT: Positive for congestion.    Eyes: Negative for visual disturbance.   Respiratory: Positive for cough.         LEIVA   Cardiovascular: Positive for chest pain. Negative for palpitations and leg swelling.   Gastrointestinal: Negative for abdominal pain, constipation, nausea and vomiting.   Genitourinary: Negative for dysuria and hematuria.   Neurological: Negative for weakness, light-headedness and numbness.   Psychiatric/Behavioral: Negative for confusion.     Objective:     Vital Signs (Most Recent):  Temp: 98.3 °F (36.8 °C) (12/25/17 1236)  Pulse: 89 (12/25/17 1349)  Resp: 14 (12/25/17 1349)  BP: (!) 90/53 (12/25/17 1236)  SpO2: (!) 90 % (12/25/17 1236) Vital Signs (24h Range):  Temp:  [98.3 °F (36.8 °C)-99.8 °F (37.7 °C)] 98.3 °F (36.8 °C)  Pulse:  [63-89] 89  Resp:  [14-22] 14  SpO2:  [90 %-98 %] 90 %  BP: ()/(53-76) 90/53     Weight: 68 kg (150 lb)  Body mass index is 22.15 kg/m².    Physical Exam   Constitutional: No distress.   HENT:   Head: Normocephalic and atraumatic.   Right Ear: External ear normal.   Eyes: Conjunctivae and EOM are normal. No scleral icterus.   Neck:   +hepatojugular reflex   Cardiovascular: Normal rate, regular rhythm, normal heart sounds and intact distal pulses.    Pulmonary/Chest: Effort normal. No respiratory distress. He has wheezes. He has rales. He exhibits no tenderness.   Bibasilar insp crackles, diffuse end-exp wheezing   Abdominal: Soft. Bowel sounds are normal. He exhibits no distension. There is no tenderness.   Musculoskeletal: He exhibits no edema.   Skin: He is not diaphoretic.         CRANIAL NERVES     CN III, IV, VI   Extraocular motions  are normal.        Significant Labs:   CBC:   Recent Labs  Lab 12/25/17  0404   WBC 6.40   HGB 12.6*   HCT 37.8*   *     CMP:   Recent Labs  Lab 12/25/17  0404      K 2.9*   CL 97   CO2 33*   *   BUN 31*   CREATININE 1.3   CALCIUM 9.5   PROT 7.5   ALBUMIN 3.5   BILITOT 0.7   ALKPHOS 61   AST 31   ALT 26   ANIONGAP 10   EGFRNONAA 58.1*     Magnesium:   Recent Labs  Lab 12/25/17  0404   MG 2.1     Troponin:   Recent Labs  Lab 12/25/17  0404 12/25/17  0938   TROPONINI 0.071* 0.063*       Significant Imaging: I have reviewed all pertinent imaging results/findings within the past 24 hours.

## 2017-12-25 NOTE — H&P
[facility] Hospital Medicine  History & Physical    Patient Name: Paul Villalobos Sr.  MRN: [id number]  Admission Date: 12/25/2017  Attending Physician: Yecenia Dumont MD   Primary Care Provider: Tye Concepcion MD    [facility] Medicine Team: [facility] 4 Imani Buchanan MD     Patient information was obtained from patient, spouse/SO, past medical records and ER records.     Subjective:     Principal Problem:Acute on chronic respiratory failure with hypoxia    Chief Complaint:   Chief Complaint   Patient presents with    Generalized Body Aches     Pt states that he has been coughing for the past two weeks and tonight started to experience chills and all over body aches. Pt coughing up thick yellow sputum.         HPI: Mr. Villalobos is a 61yoM with PMHx of COPD (bronchitis), pAfib (on coumadin), PVD (pt claims b/l femoral stents in 2004), CHF (EF 40% and Dd), AVR/MVR 2007 redo 2009 presents with 2-3 weeks of cough, subjective fevers for a week, 3 days of copious mucous colored green to yellow with cough of scott blood this am. Patient has chronic cough with white mucous at baseline but the copious amount and discoloration is different. Also endorses LEIVA and angina increased over this time with only few feet.Claims he has felt chest heaviness before when he has a cold but this time is worse over 2 weeks. Patient uses comombivent, duo-nebs, home O2 3.5L during sleep, has increased combivent from once every 2 days to around the clock for the past 3 weeks. Orthopnea at baseline (sleeps on 5 pillows at night) and PND somewhat worse but stable. Patient claims wife and relative have had flu like symptoms over the past week. No previous sick contacts before then. Patient claims he misses doses of lasix/metalolazone claiming he takes medicines once every 2-3 days.    In the ED CXR with pulm edema and bilateral pleural effusions. Patient given duonebs, 40mg steroids, and 60mg IV Lasix. Troponins drawn, remained  flat.    Past Medical History:   Diagnosis Date    Anemia     Asthma     CHF (congestive heart failure)     Chronic bronchitis     Cirrhosis 1/19/2015    Hepatitis C     Hyperlipidemia     Hypertension     Lung disease     Pulmonary hypertension        Past Surgical History:   Procedure Laterality Date    CARDIAC VALVE REPLACEMENT      AVR WITH MVr 2007    HERNIA REPAIR  2006    questionable mesh, right inguinal, unbilical    LUNG DECORTICATION      right thoracotomy 2010    LUNG DECORTICATION  2010    open heart surgery      redo AVR and MVR 2009       Review of patient's allergies indicates:  No Known Allergies    No current facility-administered medications on file prior to encounter.      Current Outpatient Prescriptions on File Prior to Encounter   Medication Sig    carvedilol (COREG) 25 MG tablet TAKE 1 TABLET(25 MG) BY MOUTH TWICE DAILY WITH MEALS    furosemide (LASIX) 80 MG tablet TAKE 1 TABLET BY MOUTH ON MONDAY WEDNESDAY AND FRIDAY AND 1/2 TABLET BY MOUTH ON ALL OTHER DAYS    ipratropium-albuterol (COMBIVENT RESPIMAT)  mcg/actuation inhaler Inhale 1 puff into the lungs every 4 (four) hours as needed for Wheezing.    isosorbide mononitrate (IMDUR) 30 MG 24 hr tablet TAKE 1 TABLET(30 MG) BY MOUTH EVERY DAY    lisinopril 10 MG tablet TAKE 1 TABLET(10 MG) BY MOUTH EVERY DAY    metOLazone (ZAROXOLYN) 2.5 MG tablet TAKE 1 TABLET(2.5 MG) BY MOUTH DAILY AS NEEDED    potassium chloride SA (K-DUR,KLOR-CON) 20 MEQ tablet Take 1 tablet (20 mEq total) by mouth once daily. Take (2) Potassium tablets =40 mg on days you take Metolazone    warfarin (COUMADIN) 5 MG tablet Take 1.5 tablets (7.5 mg total) by mouth Daily. Current Dose ( 10 mg on Sun, Wed, Fri; 7.5 mg all other days) or as directed by coumadin clinic.     Family History     Problem Relation (Age of Onset)    Heart attack Brother    Heart disease Brother    Hypertension Mother, Father, Brother, Sister        Social History Main  Topics    Smoking status: Current Every Day Smoker     Packs/day: 0.50     Years: 48.00     Types: Cigarettes    Smokeless tobacco: Never Used      Comment: 5 a day    Alcohol use 0.0 oz/week      Comment: seldom    Drug use: No    Sexual activity: Not on file     Review of Systems   Constitutional: Positive for chills and fever.   HENT: Positive for congestion.    Eyes: Negative for visual disturbance.   Respiratory: Positive for cough.         LEIVA   Cardiovascular: Positive for chest pain. Negative for palpitations and leg swelling.   Gastrointestinal: Negative for abdominal pain, constipation, nausea and vomiting.   Genitourinary: Negative for dysuria and hematuria.   Neurological: Negative for weakness, light-headedness and numbness.   Psychiatric/Behavioral: Negative for confusion.     Objective:     Vital Signs (Most Recent):  Temp: 98.3 °F (36.8 °C) (12/25/17 1236)  Pulse: 89 (12/25/17 1349)  Resp: 14 (12/25/17 1349)  BP: (!) 90/53 (12/25/17 1236)  SpO2: (!) 90 % (12/25/17 1236) Vital Signs (24h Range):  Temp:  [98.3 °F (36.8 °C)-99.8 °F (37.7 °C)] 98.3 °F (36.8 °C)  Pulse:  [63-89] 89  Resp:  [14-22] 14  SpO2:  [90 %-98 %] 90 %  BP: ()/(53-76) 90/53     Weight: 68 kg (150 lb)  Body mass index is 22.15 kg/m².    Physical Exam   Constitutional: No distress.   HENT:   Head: Normocephalic and atraumatic.   Right Ear: External ear normal.   Eyes: Conjunctivae and EOM are normal. No scleral icterus.   Neck:   +hepatojugular reflex   Cardiovascular: Normal rate, regular rhythm, normal heart sounds and intact distal pulses.    Pulmonary/Chest: Effort normal. No respiratory distress. He has wheezes. He has rales. He exhibits no tenderness.   Bibasilar insp crackles, diffuse end-exp wheezing   Abdominal: Soft. Bowel sounds are normal. He exhibits no distension. There is no tenderness.   Musculoskeletal: He exhibits no edema.   Skin: He is not diaphoretic.         CRANIAL NERVES     CN III, IV, VI    Extraocular motions are normal.        Significant Labs:   CBC:   Recent Labs  Lab 12/25/17  0404   WBC 6.40   HGB 12.6*   HCT 37.8*   *     CMP:   Recent Labs  Lab 12/25/17 0404      K 2.9*   CL 97   CO2 33*   *   BUN 31*   CREATININE 1.3   CALCIUM 9.5   PROT 7.5   ALBUMIN 3.5   BILITOT 0.7   ALKPHOS 61   AST 31   ALT 26   ANIONGAP 10   EGFRNONAA 58.1*     Magnesium:   Recent Labs  Lab 12/25/17  0404   MG 2.1     Troponin:   Recent Labs  Lab 12/25/17  0404 12/25/17  0938   TROPONINI 0.071* 0.063*       Significant Imaging: I have reviewed all pertinent imaging results/findings within the past 24 hours.    Assessment/Plan:     * Acute on chronic respiratory failure with hypoxia    Patient with increase and cough amount and color of sputum  Also report of some hemoptysis  On RA breathing comfortably on PE  Likely exacerbation of chronic bronchitis  Will treat with prednisone for 5 days  Moxifloxacin for 5 days  Also scheduled duonebs and breo          Acute on chronic congestive heart failure    Most recent echo in 2015 with EF 40%, Dd, and pHTN 44  Holding beta blocker in acute setting, though COPD more likely as patient at baseline dry weight and orthopnea  Patient also on lasix 80 MWF with 40mg in between and metolazone 2.5mg QD  Given 60 IV lasix in ED  Gave 80 IV once  Patient with hypotension with SBP in 90s late morning  Will continue to monitor and likely transition to PO lasix in pm likely 80mg  Re-started home metolazone  Strict I/O's, daily weights  Cardiac diet, 1,500 fluid restriction, 2g sodium  Repeating TTE          Paroxysmal atrial fibrillation    Alternating sinus and afib   Holding beta blocker overnight in acute setting CHF, will likely resume in am  Currently rate controlled  Resumed home coumadin  Daily PT/INR          CKD (chronic kidney disease) stage 2, GFR 60-89 ml/min    Baseline ~1.2  Today crt 1.3  Will continue to monitor daily BMP          Hyperlipidemia     Currently patient is not taking statin, unclear why  Ordered lipid panel          Tobacco abuse    Smoking cessation counseling  Nicotine patch ordered            HTN (hypertension), benign    Currently hypo to normotensive  Diuresing patient  Holding coreg 25 BID in acute CHF  Takes lisinopril 10mg at home  Will continue to monitor, likely add coreg in am          Status post heart valve replacement with mechanical valve    Status post aortic and mitral valve replacements 2007, redo [facility] AVR/MVR 8/09,( 21 mm [employer] [place],  27-mm [employer] mechanical )   Currently no murmur, but patient having increased LEIVA  Will repeat TTE to r/o valvular disease/malfunction  Patient taking coumadin              VTE Risk Mitigation         Ordered     warfarin (COUMADIN) tablet 7.5 mg  Daily     Route:  Oral        12/25/17 6099             Imani Buchanan MD  Department of Hospital Medicine   [facility]

## 2017-12-26 VITALS
RESPIRATION RATE: 18 BRPM | HEIGHT: 69 IN | HEART RATE: 80 BPM | TEMPERATURE: 98 F | SYSTOLIC BLOOD PRESSURE: 139 MMHG | BODY MASS INDEX: 21.91 KG/M2 | DIASTOLIC BLOOD PRESSURE: 78 MMHG | OXYGEN SATURATION: 96 % | WEIGHT: 147.94 LBS

## 2017-12-26 LAB
ALBUMIN SERPL BCP-MCNC: 3.7 G/DL
ALP SERPL-CCNC: 86 U/L
ALT SERPL W/O P-5'-P-CCNC: 35 U/L
ANION GAP SERPL CALC-SCNC: 11 MMOL/L
ANION GAP SERPL CALC-SCNC: 12 MMOL/L
AST SERPL-CCNC: 34 U/L
BASOPHILS # BLD AUTO: 0.04 K/UL
BASOPHILS NFR BLD: 0.4 %
BILIRUB SERPL-MCNC: 0.7 MG/DL
BUN SERPL-MCNC: 34 MG/DL
BUN SERPL-MCNC: 37 MG/DL
CALCIUM SERPL-MCNC: 10.4 MG/DL
CALCIUM SERPL-MCNC: 9.9 MG/DL
CHLORIDE SERPL-SCNC: 94 MMOL/L
CHLORIDE SERPL-SCNC: 95 MMOL/L
CO2 SERPL-SCNC: 36 MMOL/L
CO2 SERPL-SCNC: 37 MMOL/L
CREAT SERPL-MCNC: 1.4 MG/DL
CREAT SERPL-MCNC: 1.7 MG/DL
DIFFERENTIAL METHOD: ABNORMAL
EOSINOPHIL # BLD AUTO: 0.1 K/UL
EOSINOPHIL NFR BLD: 0.9 %
ERYTHROCYTE [DISTWIDTH] IN BLOOD BY AUTOMATED COUNT: 13.4 %
EST. GFR  (AFRICAN AMERICAN): 48.5 ML/MIN/1.73 M^2
EST. GFR  (AFRICAN AMERICAN): >60 ML/MIN/1.73 M^2
EST. GFR  (NON AFRICAN AMERICAN): 42 ML/MIN/1.73 M^2
EST. GFR  (NON AFRICAN AMERICAN): 53.1 ML/MIN/1.73 M^2
ESTIMATED AVG GLUCOSE: 105 MG/DL
ESTIMATED PA SYSTOLIC PRESSURE: 55.06
GLUCOSE SERPL-MCNC: 110 MG/DL
GLUCOSE SERPL-MCNC: 134 MG/DL
HBA1C MFR BLD HPLC: 5.3 %
HCT VFR BLD AUTO: 41.2 %
HGB BLD-MCNC: 13.8 G/DL
IMM GRANULOCYTES # BLD AUTO: 0.01 K/UL
IMM GRANULOCYTES NFR BLD AUTO: 0.1 %
INR PPP: 4.2
INR PPP: 4.3
LYMPHOCYTES # BLD AUTO: 1.6 K/UL
LYMPHOCYTES NFR BLD: 17.6 %
MAGNESIUM SERPL-MCNC: 2.3 MG/DL
MCH RBC QN AUTO: 32.5 PG
MCHC RBC AUTO-ENTMCNC: 33.5 G/DL
MCV RBC AUTO: 97 FL
MONOCYTES # BLD AUTO: 1.1 K/UL
MONOCYTES NFR BLD: 12 %
NEUTROPHILS # BLD AUTO: 6.4 K/UL
NEUTROPHILS NFR BLD: 69 %
NRBC BLD-RTO: 0 /100 WBC
PHOSPHATE SERPL-MCNC: 3.9 MG/DL
PLATELET # BLD AUTO: 164 K/UL
PMV BLD AUTO: 11.1 FL
POTASSIUM SERPL-SCNC: 3.3 MMOL/L
POTASSIUM SERPL-SCNC: 3.5 MMOL/L
PROT SERPL-MCNC: 8.4 G/DL
PROTHROMBIN TIME: 42.7 SEC
PROTHROMBIN TIME: 43.3 SEC
RBC # BLD AUTO: 4.24 M/UL
RETIRED EF AND QEF - SEE NOTES: 30 (ref 55–65)
SODIUM SERPL-SCNC: 141 MMOL/L
SODIUM SERPL-SCNC: 144 MMOL/L
TRICUSPID VALVE REGURGITATION: ABNORMAL
WBC # BLD AUTO: 9.2 K/UL

## 2017-12-26 PROCEDURE — 94640 AIRWAY INHALATION TREATMENT: CPT

## 2017-12-26 PROCEDURE — 84100 ASSAY OF PHOSPHORUS: CPT

## 2017-12-26 PROCEDURE — 97161 PT EVAL LOW COMPLEX 20 MIN: CPT

## 2017-12-26 PROCEDURE — 25000003 PHARM REV CODE 250: Performed by: STUDENT IN AN ORGANIZED HEALTH CARE EDUCATION/TRAINING PROGRAM

## 2017-12-26 PROCEDURE — 93306 TTE W/DOPPLER COMPLETE: CPT | Mod: 26,,, | Performed by: INTERNAL MEDICINE

## 2017-12-26 PROCEDURE — 80053 COMPREHEN METABOLIC PANEL: CPT

## 2017-12-26 PROCEDURE — 25000242 PHARM REV CODE 250 ALT 637 W/ HCPCS: Performed by: STUDENT IN AN ORGANIZED HEALTH CARE EDUCATION/TRAINING PROGRAM

## 2017-12-26 PROCEDURE — 93306 TTE W/DOPPLER COMPLETE: CPT

## 2017-12-26 PROCEDURE — 99239 HOSP IP/OBS DSCHRG MGMT >30: CPT | Mod: GC,,, | Performed by: HOSPITALIST

## 2017-12-26 PROCEDURE — 25000003 PHARM REV CODE 250: Performed by: INTERNAL MEDICINE

## 2017-12-26 PROCEDURE — 83036 HEMOGLOBIN GLYCOSYLATED A1C: CPT

## 2017-12-26 PROCEDURE — 80048 BASIC METABOLIC PNL TOTAL CA: CPT

## 2017-12-26 PROCEDURE — 63600175 PHARM REV CODE 636 W HCPCS: Performed by: INTERNAL MEDICINE

## 2017-12-26 PROCEDURE — 83735 ASSAY OF MAGNESIUM: CPT

## 2017-12-26 PROCEDURE — 94761 N-INVAS EAR/PLS OXIMETRY MLT: CPT

## 2017-12-26 PROCEDURE — 25000003 PHARM REV CODE 250: Performed by: HOSPITALIST

## 2017-12-26 PROCEDURE — 97165 OT EVAL LOW COMPLEX 30 MIN: CPT

## 2017-12-26 PROCEDURE — 97530 THERAPEUTIC ACTIVITIES: CPT

## 2017-12-26 PROCEDURE — 36415 COLL VENOUS BLD VENIPUNCTURE: CPT

## 2017-12-26 PROCEDURE — 85610 PROTHROMBIN TIME: CPT

## 2017-12-26 PROCEDURE — 85025 COMPLETE CBC W/AUTO DIFF WBC: CPT

## 2017-12-26 PROCEDURE — 85610 PROTHROMBIN TIME: CPT | Mod: 91

## 2017-12-26 RX ORDER — AMOXICILLIN AND CLAVULANATE POTASSIUM 875; 125 MG/1; MG/1
1 TABLET, FILM COATED ORAL EVERY 12 HOURS
Status: DISCONTINUED | OUTPATIENT
Start: 2017-12-27 | End: 2017-12-26

## 2017-12-26 RX ORDER — FUROSEMIDE 80 MG/1
80 TABLET ORAL ONCE
Status: COMPLETED | OUTPATIENT
Start: 2017-12-26 | End: 2017-12-26

## 2017-12-26 RX ORDER — AMOXICILLIN AND CLAVULANATE POTASSIUM 875; 125 MG/1; MG/1
1 TABLET, FILM COATED ORAL EVERY 12 HOURS
Status: DISCONTINUED | OUTPATIENT
Start: 2017-12-26 | End: 2017-12-26 | Stop reason: HOSPADM

## 2017-12-26 RX ORDER — PREDNISONE 20 MG/1
40 TABLET ORAL DAILY
Qty: 4 TABLET | Refills: 0 | Status: SHIPPED | OUTPATIENT
Start: 2017-12-28 | End: 2017-12-30

## 2017-12-26 RX ORDER — METOLAZONE 2.5 MG/1
2.5 TABLET ORAL ONCE
Status: COMPLETED | OUTPATIENT
Start: 2017-12-26 | End: 2017-12-26

## 2017-12-26 RX ORDER — AMOXICILLIN AND CLAVULANATE POTASSIUM 875; 125 MG/1; MG/1
1 TABLET, FILM COATED ORAL EVERY 12 HOURS
Qty: 3 TABLET | Refills: 0 | Status: SHIPPED | OUTPATIENT
Start: 2017-12-26 | End: 2017-12-26

## 2017-12-26 RX ORDER — POTASSIUM CHLORIDE 20 MEQ/1
80 TABLET, EXTENDED RELEASE ORAL ONCE
Status: COMPLETED | OUTPATIENT
Start: 2017-12-26 | End: 2017-12-26

## 2017-12-26 RX ORDER — AMOXICILLIN AND CLAVULANATE POTASSIUM 875; 125 MG/1; MG/1
1 TABLET, FILM COATED ORAL EVERY 12 HOURS
Qty: 6 TABLET | Refills: 0 | Status: SHIPPED | OUTPATIENT
Start: 2017-12-26 | End: 2017-12-29

## 2017-12-26 RX ORDER — POTASSIUM CHLORIDE 20 MEQ/1
40 TABLET, EXTENDED RELEASE ORAL DAILY
Qty: 45 TABLET | Refills: 5
Start: 2017-12-26 | End: 2019-01-18 | Stop reason: SDUPTHER

## 2017-12-26 RX ADMIN — METOLAZONE 2.5 MG: 2.5 TABLET ORAL at 09:12

## 2017-12-26 RX ADMIN — IPRATROPIUM BROMIDE AND ALBUTEROL SULFATE 3 ML: .5; 3 SOLUTION RESPIRATORY (INHALATION) at 05:12

## 2017-12-26 RX ADMIN — FUROSEMIDE 80 MG: 80 TABLET ORAL at 10:12

## 2017-12-26 RX ADMIN — POTASSIUM CHLORIDE 80 MEQ: 1500 TABLET, EXTENDED RELEASE ORAL at 09:12

## 2017-12-26 RX ADMIN — PREDNISONE 40 MG: 20 TABLET ORAL at 09:12

## 2017-12-26 RX ADMIN — FLUTICASONE FUROATE AND VILANTEROL TRIFENATATE 1 PUFF: 200; 25 POWDER RESPIRATORY (INHALATION) at 09:12

## 2017-12-26 RX ADMIN — IPRATROPIUM BROMIDE AND ALBUTEROL SULFATE 3 ML: .5; 3 SOLUTION RESPIRATORY (INHALATION) at 08:12

## 2017-12-26 RX ADMIN — AMOXICILLIN AND CLAVULANATE POTASSIUM 1 TABLET: 875; 125 TABLET, FILM COATED ORAL at 12:12

## 2017-12-26 RX ADMIN — IPRATROPIUM BROMIDE AND ALBUTEROL SULFATE 3 ML: .5; 3 SOLUTION RESPIRATORY (INHALATION) at 12:12

## 2017-12-26 NOTE — ASSESSMENT & PLAN NOTE
Currently patient is not taking statin  Ordered lipid panel, unrevealing  Defer to follow up with PCP, patient has unclear history of PVD, likely needs high intensity statin

## 2017-12-26 NOTE — PROGRESS NOTES
Patient called to report that he is in the hospital since yesterday, states was hospitalized due to chest pain, Patient states he is being discharged today, INR lab draw is due 12/27, Patient's call back # is [phone]

## 2017-12-26 NOTE — PLAN OF CARE
Problem: Patient Care Overview  Goal: Plan of Care Review  Outcome: Ongoing (interventions implemented as appropriate)  Pt with no falls or injury overnight. Pt makes statement of no pain. Visitor remained in room overnight. Bed low and locked, Call light within reach.

## 2017-12-26 NOTE — PLAN OF CARE
Pt to d/c today.     12/26/17 1327   Final Note   Assessment Type Final Discharge Note   Discharge Disposition Home   What phone number can be called within the next 1-3 days to see how you are doing after discharge? [phone]   Hospital Follow Up  Appt(s) scheduled? Yes   Right Care Referral Info   Post Acute Recommendation No Care

## 2017-12-26 NOTE — PLAN OF CARE
"CM to bedside - pt provided assessment info. Pt w/ rollator in place, lives w/ spouse. Pt will d/c home w/ no needs.     CM provided patient anticipated CALI which will be update by nursing staff. Patient provided a Blue "My Health Packet" for d/c planning and health tool. Patient verbalized understanding.    Future Appointments  Date Time Provider Department Center   1/9/2018 10:20 AM Tye Concepcion MD [facility]   1/30/2018 9:00 AM Haritha Mcfadden MD [facility]   2/20/2018 8:00 AM Tye Concepcion MD [facility]        12/26/17 1321   Discharge Assessment   Assessment Type Discharge Planning Assessment   Confirmed/corrected address and phone number on facesheet? Yes   Expected Length of Stay (days) 2   Prior to hospitilization cognitive status: Alert/Oriented   Prior to hospitalization functional status: Assistive Equipment   Current cognitive status: Alert/Oriented   Current Functional Status: Assistive Equipment   Facility Arrived From: N/A   Lives With spouse   Able to Return to Prior Arrangements yes   Is patient able to care for self after discharge? Yes   Who are your caregiver(s) and their phone number(s)? spouse - Yasmin [phone]   Patient's perception of discharge disposition home or selfcare   Readmission Within The Last 30 Days no previous admission in last 30 days   Patient currently being followed by outpatient case management? No   Patient currently receives any other outside agency services? No   Equipment Currently Used at Home rollator   Do you have any problems affording any of your prescribed medications? No   Is the patient taking medications as prescribed? yes   Does the patient have transportation home? Yes   Transportation Available car;family or friend will provide   Dialysis Name and Scheduled days N/A   Does the patient receive services at the Coumadin Clinic? Yes  (home INR meter - called into [facility] coumadin clinic; monitored by Dr. Guallpa)   Discharge Plan " A Home with family   Discharge Plan B Home with family;Home Health   Patient/Family In Agreement With Plan yes

## 2017-12-26 NOTE — PLAN OF CARE
Problem: Occupational Therapy Goal  Goal: Occupational Therapy Goal  Outcome: Outcome(s) achieved Date Met: 12/26/17  OT eval and Bryan. No need for skilled OT services at this time

## 2017-12-26 NOTE — PT/OT/SLP EVAL
"Occupational Therapy   Evaluation and Discharge Note    Name: Paul Villalobos Sr.  MRN: [id number]  Admitting Diagnosis:  Acute on chronic respiratory failure with hypoxia      Recommendations:     Discharge Recommendations: home  Discharge Equipment Recommendations:  none  Barriers to discharge:  None    History:     Occupational Profile:  Living Environment: Pt lives with his wife in a 2 level apartment, with 19 steps to the upper level with Bilateral handrails. Tub/ shower at home   Previous level of function: Indep with ADL/Self care and functional mobility PTA  Equipment Owned:  rollator  Assistance upon Discharge: none. Recommend D/C home with no needs     Past Medical History:   Diagnosis Date    Anemia     Asthma     CHF (congestive heart failure)     Chronic bronchitis     Cirrhosis 1/19/2015    Hepatitis C     Hyperlipidemia     Hypertension     Lung disease     Pulmonary hypertension        Past Surgical History:   Procedure Laterality Date    CARDIAC VALVE REPLACEMENT      AVR WITH MVr 2007    HERNIA REPAIR  2006    questionable mesh, right inguinal, unbilical    LUNG DECORTICATION      right thoracotomy 2010    LUNG DECORTICATION  2010    open heart surgery      redo AVR and MVR 2009       Subjective     Chief Complaint:  " I have good days and bad days, Today is a good day for me"  Patient/Family stated goals: To go home   Communicated with: RN prior to session.  Pain/Comfort:  · Pain Rating 1: 0/10    Objective:     Patient reclined in bed finishing breakfast with: peripheral IV. Spouse at bedside     General Precautions: Standard, fall   Occupational Performance:    Bed Mobility:    · Patient completed Supine to Sit with independence    Functional Mobility/Transfers:  · Patient completed Sit <> Stand Transfer with independence  with  no assistive device   · Patient completed Bed <> Chair Transfer using Stand Pivot technique with independence with no assistive device  Pt walked 400 feet in " "hallway independently     Activities of Daily Living:  · LB Dressing: independence donning socks and house slippers    Cognitive/Visual Perceptual:  Cognitive/Psychosocial Skills:     -       Oriented to: Person, Place, Time and Situation   -       Follows Commands/attention:Follows multistep  commands  -       Communication: clear/fluent  -       Memory: No Deficits noted  -       Safety awareness/insight to disability: intact   -       Mood/Affect/Coping skills/emotional control: Appropriate to situation    Physical Exam:  Balance:    -       static/ dyanmic standing balance: Independent   Upper Extremity Range of Motion:     -       Right Upper Extremity: WFL  -       Left Upper Extremity: WFL  Upper Extremity Strength:    -       Right Upper Extremity: WFL  -       Left Upper Extremity: WFL    Patient left up in chair with all lines intact, call button in reach, RN  notified and spouse  present    [facility] 6 Click:  [facility] Total Score: 24    Treatment & Education:  Bed mobility, sit to stand, functional ambulation in hallway with indep  Pt educated on role of OT, plan of care, safety awareness, DME, importance of out of bed activity, energy conservation techniques     Education:    Assessment:     Paul Villalobos SrMaria M is a 61 y.o. male with a medical diagnosis of Acute on chronic respiratory failure with hypoxia. At this time, patient is functioning at their prior level of function and does not require further acute OT services.     Clinical Decision Makin.  OT Low:  "Pt evaluation falls under low complexity for evaluation coding due to performance deficits noted in 1-3 areas as stated above and 0 co-morbities affecting current functional status. Data obtained from problem focused assessments. No modifications or assistance was required for completion of evaluation. Only brief occupational profile and history review completed."     Plan:     During this hospitalization, patient does not require further acute OT " services.  Please re-consult if situation changes.    · Plan of Care Reviewed with: patient, spouse    This Plan of care has been discussed with the patient who was involved in its development and understands and is in agreement with the identified goals and treatment plan    GOALS:    Occupational Therapy Goals     Not on file          Multidisciplinary Problems (Resolved)        Problem: Occupational Therapy Goal    Goal Priority Disciplines Outcome Interventions   Occupational Therapy Goal   (Resolved)     OT, PT/OT Outcome(s) achieved                    Time Tracking:     OT Date of Treatment: 12/26/17  OT Start Time: 0856  OT Stop Time: 0919  OT Total Time (min): 23 min    Billable Minutes:Evaluation 10  Therapeutic Activity 13    Hind S THEODORE Dia  12/26/2017

## 2017-12-26 NOTE — HOSPITAL COURSE
Patient admitted to hospital medicine 12/25, optimized for COPD and CHF given steroids, moxifloxacin, schedule duonebs and breo, appeared near euvolemia on day of d/c with marked improved SBP and less purulent sputum and no episodes on hemoptysis. D/C with 5 day total of steroids and abx (augmentin as interaction of fluroquinolone and warfarin) and follow up close with PCP. Patient should follow up with coumadin clinic 12/27.

## 2017-12-26 NOTE — ASSESSMENT & PLAN NOTE
Currently hypo to normotensive  Diuresing patient  Held coreg 25 BID in acute CHF, resume on d/c  Takes lisinopril 10mg at home

## 2017-12-26 NOTE — DISCHARGE INSTRUCTIONS
Please take new medicines as prescribed and continue home duo-nebs as need and combivent as prescribed. Please follow up with coumadin clinic this week scheduled for 12/27. Also follow up with your primary care provider. He should review your echo results with you and refer you to cardiology if necessary. Please keep low sodium diet and no more than 1,800cc fluid intake daily.

## 2017-12-26 NOTE — PROGRESS NOTES
He received 7.5mg on 12/25. Please have patient hold dose tonight, and repeat INR 12/27 as planned. Looks like he will be d/c on augmentin..

## 2017-12-26 NOTE — DISCHARGE SUMMARY
[facility] Hospital Medicine  Discharge Summary      Patient Name: Paul Villalobos Sr.  MRN: [id number]  Admission Date: 12/25/2017  Hospital Length of Stay: 1 days  Discharge Date and Time:  12/26/2017 11:55 AM  Attending Physician: Yecenia Dumont MD   Discharging Provider: Imani Buchanan MD  Primary Care Provider: Tye Concepcion MD  Hospital Medicine Team: [facility] 4 Imani Buchanan MD    HPI:   Mr. Villalobos is a 61yoM with PMHx of COPD (bronchitis), pAfib (on coumadin), PVD (pt claims b/l femoral stents in 2004), CHF (EF 40% and Dd), AVR/MVR 2007 redo 2009 presents with 2-3 weeks of cough, subjective fevers for a week, 3 days of copious mucous colored green to yellow with cough of scott blood this am. Patient has chronic cough with white mucous at baseline but the copious amount and discoloration is different. Also endorses LEIVA and angina increased over this time with only few feet.Claims he has felt chest heaviness before when he has a cold but this time is worse over 2 weeks. Patient uses comombivent, duo-nebs, home O2 3.5L during sleep, has increased combivent from once every 2 days to around the clock for the past 3 weeks. Orthopnea at baseline (sleeps on 5 pillows at night) and PND somewhat worse but stable. Patient claims wife and relative have had flu like symptoms over the past week. No previous sick contacts before then. Patient claims he misses doses of lasix/metalolazone claiming he takes medicines once every 2-3 days.    In the ED CXR with pulm edema and bilateral pleural effusions. Patient given duonebs, 40mg steroids, and 60mg IV Lasix. Troponins drawn, remained flat.    * No surgery found *      Hospital Course:   Patient admitted to hospital medicine 12/25, optimized for COPD and CHF given steroids, moxifloxacin, schedule duonebs and breo, appeared near euvolemia on day of d/c with marked improved SBP and less purulent sputum and no episodes on hemoptysis. D/C with 5 day  total of steroids and abx (augmentin as interaction of fluroquinolone and warfarin) and follow up close with PCP. Patient should follow up with coumadin clinic 12/27.     Review of Systems   Constitutional: Negative for chills and fever.   HENT: Positive for congestion.    Eyes: Negative for visual disturbance.   Respiratory: Positive for cough (less purulent)       LEIVA (improved to baseline)  Cardiovascular: Negative for chest pain. Negative for palpitations and leg swelling.   Gastrointestinal: Negative for abdominal pain, constipation, nausea and vomiting.   Genitourinary: Negative for dysuria and hematuria.   Neurological: Negative for weakness, light-headedness and numbness.   Psychiatric/Behavioral: Negative for confusion.        Vitals:    12/26/17 0749 12/26/17 0823 12/26/17 0951 12/26/17 1100   BP: 139/78      BP Location: Left arm      Patient Position: Lying      Pulse: 68 88 84 89   Resp: 18 20 18    Temp: 98.4 °F (36.9 °C)      TempSrc: Oral      SpO2: (!) 93%  96%    Weight:       Height:       Physical Exam   Constitutional: No distress.   HENT:   Head: Normocephalic and atraumatic.   Right Ear: External ear normal.   Eyes: Conjunctivae and EOM are normal. No scleral icterus.   Cardiovascular: Normal rate, irregular rhythm, normal heart sounds and intact distal pulses.    Pulmonary/Chest: Effort normal. No respiratory distress. He has some bibasilae wheezes. He has some bibasilar rales. He exhibits no tenderness.   Abdominal: Soft. Bowel sounds are normal. He exhibits no distension. There is no tenderness.   Musculoskeletal: He exhibits no edema.   Skin: He is not diaphoretic.        Consults:     * Acute on chronic respiratory failure with hypoxia    Patient with increase and cough amount and color of sputum on admission  Also report of some hemoptysis  On RA breathing comfortably on PE  Likely exacerbation of chronic bronchitis  Will treat with prednisone for 5 days  Moxifloxacin for 2 days, will do  PO aumentin as patient takes warfarin  Also scheduled duonebs and breo inpatient  Patient to continue home combivent and prn duonebs          Acute on chronic congestive heart failure    Most recent echo in 2015 with EF 40%, Dd, and pHTN 44  Holding beta blocker in acute setting, though COPD more likely as patient at baseline dry weight and orthopnea  Patient also on lasix 80 MWF with 40mg in between and metolazone 2.5mg QD  Given 60 IV lasix in ED  Gave 80 IV once then 80mg PO  Po home 80 started 12/26, d/c with home regimen as patient endorses not complying with home regimen  Re-started home metolazone  Strict I/O's, daily weights  Cardiac diet, 1,500-1,800 fluid restriction, 2g sodium  Repeating TTE, pending, PCP follow up for review of results          Paroxysmal atrial fibrillation    Alternating sinus and afib   Held beta blocker overnight in acute setting CHF, resume beta blocker  Currently rate controlled  Resumed home coumadin  Daily PT/INR while inpatient, patient to follow up in coumadin clinic appt 12/27          CKD (chronic kidney disease) stage 2, GFR 60-89 ml/min    Baseline ~1.2  Today crt 1.4            Hyperlipidemia    Currently patient is not taking statin  Ordered lipid panel, unrevealing  Defer to follow up with PCP, patient has unclear history of PVD, likely needs high intensity statin          Tobacco abuse    Smoking cessation counseling  Nicotine patch ordered inpatient            HTN (hypertension), benign    Currently hypo to normotensive  Diuresing patient  Held coreg 25 BID in acute CHF, resume on d/c  Takes lisinopril 10mg at home            Status post heart valve replacement with mechanical valve    Status post aortic and mitral valve replacements 2007, redo [facility] AVR/MVR 8/09,( 21 mm [employer] Top Hat,  27-mm [employer] mechanical )   Currently no murmur, but patient having increased LEIVA  Will repeat TTE to r/o valvular disease/malfunction, results in process, f/u with PCP  Patient  taking coumadin              Final Active Diagnoses:    Diagnosis Date Noted POA    PRINCIPAL PROBLEM:  Acute on chronic respiratory failure with hypoxia [J96.21] 12/25/2017 Yes    Acute on chronic congestive heart failure [I50.9] 12/25/2017 Yes    Paroxysmal atrial fibrillation [I48.0] 05/02/2013 Yes    CKD (chronic kidney disease) stage 2, GFR 60-89 ml/min [N18.2] 03/28/2013 Yes    Hyperlipidemia [E78.5] 03/28/2013 Yes    HTN (hypertension), benign [I10] 07/17/2012 Yes    Status post heart valve replacement with mechanical valve [Z95.2] 07/17/2012 Not Applicable    Tobacco abuse [Z72.0] 07/17/2012 Yes      Problems Resolved During this Admission:    Diagnosis Date Noted Date Resolved POA       Discharged Condition: stable    Disposition: Home or Self Care    Follow Up:    Patient Instructions:   No discharge procedures on file.    Significant Diagnostic Studies: Labs:   CMP   Recent Labs  Lab 12/25/17  0404 12/25/17  1502 12/26/17  0426    139 144   K 2.9* 3.7 3.3*   CL 97 94* 95   CO2 33* 35* 37*   * 164* 110   BUN 31* 30* 37*   CREATININE 1.3 1.3 1.4   CALCIUM 9.5 9.7 9.9   PROT 7.5  --  8.4   ALBUMIN 3.5  --  3.7   BILITOT 0.7  --  0.7   ALKPHOS 61  --  86   AST 31  --  34   ALT 26  --  35   ANIONGAP 10 10 12   ESTGFRAFRICA >60.0 >60.0 >60.0   EGFRNONAA 58.1* 58.1* 53.1*   , CBC   Recent Labs  Lab 12/25/17  0404 12/26/17  0426   WBC 6.40 9.20   HGB 12.6* 13.8*   HCT 37.8* 41.2   * 164   , INR   Lab Results   Component Value Date    INR 4.2 (H) 12/26/2017    INR 4.3 (H) 12/26/2017    INR 2.7 (H) 12/25/2017   , Lipid Panel   Lab Results   Component Value Date    CHOL 178 12/25/2017    HDL 69 12/25/2017    LDLCALC 98.8 12/25/2017    TRIG 51 12/25/2017    CHOLHDL 38.8 12/25/2017    and Troponin   Recent Labs  Lab 12/25/17  0938   TROPONINI 0.063*       Pending Diagnostic Studies:     Procedure Component Value Units Date/Time    2D echo with color flow doppler [[id number]] Collected:   12/26/17 1100    Order Status:  Sent Lab Status:  In process Updated:  12/26/17 1152    Narrative:       This study is in progress....    Basic metabolic panel [[id number]]     Order Status:  Sent Lab Status:  No result     Specimen:  Blood from Blood          Medications:  Reconciled Home Medications:   Current Discharge Medication List      START taking these medications    Details   amoxicillin-clavulanate 875-125mg (AUGMENTIN) 875-125 mg per tablet Take 1 tablet by mouth every 12 (twelve) hours.  Qty: 6 tablet, Refills: 0      predniSONE (DELTASONE) 20 MG tablet Take 2 tablets (40 mg total) by mouth once daily.  Qty: 4 tablet, Refills: 0         CONTINUE these medications which have CHANGED    Details   potassium chloride SA (K-DUR,KLOR-CON) 20 MEQ tablet Take 2 tablets (40 mEq total) by mouth once daily. Take 40mEQ daily.  Qty: 45 tablet, Refills: 5    Associated Diagnoses: Chronic systolic congestive heart failure, NYHA class 3         CONTINUE these medications which have NOT CHANGED    Details   carvedilol (COREG) 25 MG tablet TAKE 1 TABLET(25 MG) BY MOUTH TWICE DAILY WITH MEALS  Qty: 180 tablet, Refills: 11    Associated Diagnoses: Tobacco abuse; Status post heart valve replacement with mechanical valve; Right thyroid nodule; Paroxysmal atrial fibrillation; Proteinuria; Hyperlipidemia, unspecified hyperlipidemia type; HTN (hypertension), benign; Chronic hepatitis C without hepatic coma; Chronic obstructive pulmonary disease, unspecified COPD type; CKD (chronic kidney disease) stage 2, GFR 60-89 ml/min; Chronic systolic congestive heart failure, NYHA class 3      furosemide (LASIX) 80 MG tablet TAKE 1 TABLET BY MOUTH ON MONDAY WEDNESDAY AND FRIDAY AND 1/2 TABLET BY MOUTH ON ALL OTHER DAYS  Qty: 60 tablet, Refills: 3    Associated Diagnoses: Tobacco abuse; Status post heart valve replacement with mechanical valve; Right thyroid nodule; Paroxysmal atrial fibrillation; Proteinuria, unspecified type;  Hyperlipidemia, unspecified hyperlipidemia type; HTN (hypertension), benign; Chronic hepatitis C without hepatic coma; Chronic obstructive pulmonary disease, unspecified COPD type; CKD (chronic kidney disease) stage 2, GFR 60-89 ml/min; Chronic systolic congestive heart failure, NYHA class 3      ipratropium-albuterol (COMBIVENT RESPIMAT)  mcg/actuation inhaler Inhale 1 puff into the lungs every 4 (four) hours as needed for Wheezing.  Qty: 4 g, Refills: 11    Associated Diagnoses: Chronic obstructive pulmonary disease, unspecified COPD type      isosorbide mononitrate (IMDUR) 30 MG 24 hr tablet TAKE 1 TABLET(30 MG) BY MOUTH EVERY DAY  Qty: 90 tablet, Refills: 11    Associated Diagnoses: Tobacco abuse; Status post heart valve replacement with mechanical valve; Right thyroid nodule; Paroxysmal atrial fibrillation; Proteinuria; Hyperlipidemia, unspecified hyperlipidemia type; HTN (hypertension), benign; Chronic hepatitis C without hepatic coma; Chronic obstructive pulmonary disease, unspecified COPD type; CKD (chronic kidney disease) stage 2, GFR 60-89 ml/min; Chronic systolic congestive heart failure, NYHA class 3      lisinopril 10 MG tablet TAKE 1 TABLET(10 MG) BY MOUTH EVERY DAY  Qty: 90 tablet, Refills: 11    Associated Diagnoses: Status post heart valve replacement with mechanical valve; Paroxysmal atrial fibrillation; Proteinuria; Hyperlipidemia, unspecified hyperlipidemia type; HTN (hypertension), benign; Tobacco abuse; Chronic hepatitis C without hepatic coma; Chronic obstructive pulmonary disease, unspecified COPD type; CKD (chronic kidney disease) stage 2, GFR 60-89 ml/min; Chronic systolic congestive heart failure      metOLazone (ZAROXOLYN) 2.5 MG tablet TAKE 1 TABLET(2.5 MG) BY MOUTH DAILY AS NEEDED  Qty: 30 tablet, Refills: 6    Associated Diagnoses: Status post heart valve replacement with mechanical valve; Paroxysmal atrial fibrillation; HTN (hypertension), benign; Tobacco abuse; Chronic  hepatitis C without hepatic coma; Chronic obstructive pulmonary disease, unspecified COPD type; CKD (chronic kidney disease) stage 2, GFR 60-89 ml/min; Chronic systolic congestive heart failure      warfarin (COUMADIN) 5 MG tablet Take 1.5 tablets (7.5 mg total) by mouth Daily. Current Dose ( 10 mg on Sun, Wed, Fri; 7.5 mg all other days) or as directed by coumadin clinic.  Qty: 150 tablet, Refills: 3    Comments: Called in prescription to ([facility])([phone]) Left mgs on vm on 10/2/17.  Associated Diagnoses: Anticoagulated on Coumadin; Status post heart valve replacement with mechanical valve             Indwelling Lines/Drains at time of discharge:   Lines/Drains/Airways          No matching active lines, drains, or airways          Time spent on the discharge of patient: 45 minutes  Patient was seen and examined on the date of discharge and determined to be suitable for discharge.         Imani Buchanan MD  Department of Hospital Medicine  [facility]

## 2017-12-26 NOTE — ASSESSMENT & PLAN NOTE
Most recent echo in 2015 with EF 40%, Dd, and pHTN 44  Holding beta blocker in acute setting, though COPD more likely as patient at baseline dry weight and orthopnea  Patient also on lasix 80 MWF with 40mg in between and metolazone 2.5mg QD  Given 60 IV lasix in ED  Gave 80 IV once then 80mg PO  Po home 80 started 12/26, d/c with home regimen as patient endorses not complying with home regimen  Re-started home metolazone  Strict I/O's, daily weights  Cardiac diet, 1,500-1,800 fluid restriction, 2g sodium  Repeating TTE, pending, PCP follow up for review of results

## 2017-12-26 NOTE — PT/OT/SLP EVAL
Physical Therapy Evaluation and Discharge Note    Patient Name: Paul Villalobos Sr.   MRN: [id number]    Recommendations:     Discharge Recommendations: home   Discharge Equipment Recommendations: none   Barriers to discharge: Inaccessible home environment    Assessment:     Paul Villalobos Sr. is a 61 y.o. male admitted with a medical diagnosis of Acute on chronic respiratory failure with hypoxia.  At this time, patient is functioning at their prior level of function and does not require further acute PT services.     Recent Surgery: * No surgery found *      Plan:     During this hospitalization, patient does not require further acute PT services.  Please re-consult if situation changes.     Plan of Care Reviewed with: patient, spouse    Subjective     Communicated with RN prior to session.  Patient found seated on sofa upon PT entry to room, agreeable to evaluation.      Chief Complaint: none  Patient comments/goals: to return home safely    Pain/Comfort:  · Pain Rating 1: 0/10  · Pain Rating Post-Intervention 1: 0/10    Patients cultural, spiritual, Voodoo conflicts given the current situation: none noted    Living Environment:  Pt lives with his wife in a 2 level apartment, with 19 steps to the upper level with Bilateral handrails. Tub/ shower at home   Previous level of function: Indep with ADL/Self care and functional mobility PTA    Patient has the following equipment: rollator.      Objective:     Patient found with: peripheral IV     General Precautions: Standard, fall   Orthopedic Precautions:N/A   Braces: N/A     Exams:  · Gross Motor Coordination:  WFL  · RLE ROM: WFL  · RLE Strength: WFL  · LLE ROM: WFL  · LLE Strength: WFL    Functional Mobility:    Transfers:    Sit/Stand: from sofa with Independent and No Assistive Device  Stand/Sit: to sofa with Independent and No Assistive Device     Gait:  Pt ambulated 200 feet with no LOB or SOB.  (I) with no AD    Stairs:  Pt ascended/descended 1 flight(s) with No  Assistive Device with bilateral handrails with Supervision or Set-up Assistance.     AM-PAC 6 CLICK MOBILITY  Total Score: 24     Therapeutic Activities and Exercises:  · PT evaluation performed.  · White board updated.  · Pt educated on role of PT, safety with functional mobility  · Pt encouraged to ambulate 3x/daily with nursing staff or family.      Patient left up in chair with all lines intact, call button in reach and RN notified.    History:     Past Medical History:   Diagnosis Date    Anemia     Asthma     CHF (congestive heart failure)     Chronic bronchitis     Cirrhosis 1/19/2015    Hepatitis C     Hyperlipidemia     Hypertension     Lung disease     Pulmonary hypertension        Past Surgical History:   Procedure Laterality Date    CARDIAC VALVE REPLACEMENT      AVR WITH MVr 2007    HERNIA REPAIR  2006    questionable mesh, right inguinal, unbilical    LUNG DECORTICATION      right thoracotomy 2010    LUNG DECORTICATION  2010    open heart surgery      redo AVR and MVR 2009       Time Tracking:     PT Received On: 12/26/17  PT Start Time: 1320     PT Stop Time: 1330  PT Total Time (min): 10 min     Billable Minutes: Evaluation 10      Farooq Morse, PT, DPT  12/26/2017   [phone]

## 2017-12-26 NOTE — ASSESSMENT & PLAN NOTE
Patient with increase and cough amount and color of sputum on admission  Also report of some hemoptysis  On RA breathing comfortably on PE  Likely exacerbation of chronic bronchitis  Will treat with prednisone for 5 days  Moxifloxacin for 2 days, will do PO aumentin as patient takes warfarin  Also scheduled kavita and breo inpatient  Patient to continue home combivent and prn duonebs

## 2017-12-26 NOTE — ASSESSMENT & PLAN NOTE
Status post aortic and mitral valve replacements 2007, redo [facility] AVR/MVR 8/09,( 21 mm [employer] [place],  27-mm [employer] mechanical )   Currently no murmur, but patient having increased LEIVA  Will repeat TTE to r/o valvular disease/malfunction, results in process, f/u with PCP  Patient taking coumadin

## 2017-12-26 NOTE — NURSING
Plan of care discussed with patient, no questions at this time. Fall pre-cautions in place. Vs within defined limits. Will continue to monitor.

## 2017-12-26 NOTE — ASSESSMENT & PLAN NOTE
Alternating sinus and afib   Held beta blocker overnight in acute setting CHF, resume beta blocker  Currently rate controlled  Resumed home coumadin  Daily PT/INR while inpatient, patient to follow up in coumadin clinic appt 12/27

## 2017-12-27 ENCOUNTER — PATIENT OUTREACH (OUTPATIENT)
Dept: ADMINISTRATIVE | Facility: CLINIC | Age: 62
End: 2017-12-27

## 2017-12-27 ENCOUNTER — ANTI-COAG VISIT (OUTPATIENT)
Dept: CARDIOLOGY | Facility: CLINIC | Age: 62
End: 2017-12-27

## 2017-12-27 DIAGNOSIS — Z79.01 ANTICOAGULATED ON COUMADIN: ICD-10-CM

## 2017-12-27 DIAGNOSIS — Z95.2 STATUS POST HEART VALVE REPLACEMENT WITH MECHANICAL VALVE: ICD-10-CM

## 2017-12-27 LAB — INR PPP: 3.1

## 2017-12-27 NOTE — PROGRESS NOTES
Confirmed correct dose   Held dose 12/26   ER 12/25  Prednisone 20mg completes tomorrow, Amox [id number] 1q 12 hrs (6tabs), completes Friday.  Reports eating lettuce and spinach salad also a helping of mustard greens on last night.

## 2017-12-27 NOTE — PATIENT INSTRUCTIONS

## 2017-12-29 ENCOUNTER — ANTI-COAG VISIT (OUTPATIENT)
Dept: CARDIOLOGY | Facility: CLINIC | Age: 62
End: 2017-12-29

## 2017-12-29 DIAGNOSIS — Z95.2 STATUS POST HEART VALVE REPLACEMENT WITH MECHANICAL VALVE: ICD-10-CM

## 2017-12-29 DIAGNOSIS — Z79.01 ANTICOAGULATED ON COUMADIN: ICD-10-CM

## 2017-12-29 LAB — INR PPP: 2.1

## 2017-12-29 NOTE — PROGRESS NOTES
The pt was recently admitted from 12/25-12/26 for cough/fevers/mucous.  See calendar for recent INRs and warfarin doses.  He was discharged with augmentin and prednisone 40mg daily.

## 2018-01-02 ENCOUNTER — OFFICE VISIT (OUTPATIENT)
Dept: INTERNAL MEDICINE | Facility: CLINIC | Age: 63
End: 2018-01-02
Payer: MEDICARE

## 2018-01-02 VITALS
DIASTOLIC BLOOD PRESSURE: 76 MMHG | WEIGHT: 155.63 LBS | HEIGHT: 69 IN | BODY MASS INDEX: 23.05 KG/M2 | HEART RATE: 92 BPM | SYSTOLIC BLOOD PRESSURE: 118 MMHG

## 2018-01-02 DIAGNOSIS — R05.9 COUGH: ICD-10-CM

## 2018-01-02 DIAGNOSIS — I10 HTN (HYPERTENSION), BENIGN: ICD-10-CM

## 2018-01-02 DIAGNOSIS — Z95.2 STATUS POST HEART VALVE REPLACEMENT WITH MECHANICAL VALVE: ICD-10-CM

## 2018-01-02 DIAGNOSIS — I50.22 CHRONIC SYSTOLIC CONGESTIVE HEART FAILURE: ICD-10-CM

## 2018-01-02 DIAGNOSIS — R80.9 PROTEINURIA, UNSPECIFIED TYPE: ICD-10-CM

## 2018-01-02 DIAGNOSIS — E78.5 HYPERLIPIDEMIA, UNSPECIFIED HYPERLIPIDEMIA TYPE: ICD-10-CM

## 2018-01-02 DIAGNOSIS — Z72.0 TOBACCO ABUSE: ICD-10-CM

## 2018-01-02 DIAGNOSIS — I50.43 ACUTE ON CHRONIC COMBINED SYSTOLIC AND DIASTOLIC CONGESTIVE HEART FAILURE: ICD-10-CM

## 2018-01-02 DIAGNOSIS — N40.1 BPH WITH URINARY OBSTRUCTION: ICD-10-CM

## 2018-01-02 DIAGNOSIS — I50.22 CHRONIC SYSTOLIC CONGESTIVE HEART FAILURE, NYHA CLASS 3: ICD-10-CM

## 2018-01-02 DIAGNOSIS — I48.0 PAROXYSMAL ATRIAL FIBRILLATION: ICD-10-CM

## 2018-01-02 DIAGNOSIS — N13.8 BPH WITH URINARY OBSTRUCTION: ICD-10-CM

## 2018-01-02 DIAGNOSIS — B18.2 CHRONIC HEPATITIS C WITHOUT HEPATIC COMA: ICD-10-CM

## 2018-01-02 DIAGNOSIS — E04.1 RIGHT THYROID NODULE: ICD-10-CM

## 2018-01-02 DIAGNOSIS — N18.2 CKD (CHRONIC KIDNEY DISEASE) STAGE 2, GFR 60-89 ML/MIN: ICD-10-CM

## 2018-01-02 DIAGNOSIS — J44.9 CHRONIC OBSTRUCTIVE PULMONARY DISEASE, UNSPECIFIED COPD TYPE: ICD-10-CM

## 2018-01-02 PROCEDURE — 99496 TRANSJ CARE MGMT HIGH F2F 7D: CPT | Mod: PBBFAC | Performed by: INTERNAL MEDICINE

## 2018-01-02 PROCEDURE — 99213 OFFICE O/P EST LOW 20 MIN: CPT | Mod: PBBFAC | Performed by: INTERNAL MEDICINE

## 2018-01-02 PROCEDURE — 99999 PR PBB SHADOW E&M-EST. PATIENT-LVL III: CPT | Mod: PBBFAC,,, | Performed by: INTERNAL MEDICINE

## 2018-01-02 PROCEDURE — 99496 TRANSJ CARE MGMT HIGH F2F 7D: CPT | Mod: S$GLB,,, | Performed by: INTERNAL MEDICINE

## 2018-01-02 RX ORDER — IPRATROPIUM BROMIDE AND ALBUTEROL SULFATE 2.5; .5 MG/3ML; MG/3ML
SOLUTION RESPIRATORY (INHALATION)
Qty: 360 ML | Refills: 11 | Status: SHIPPED | OUTPATIENT
Start: 2018-01-02 | End: 2019-04-01 | Stop reason: SDUPTHER

## 2018-01-02 RX ORDER — FUROSEMIDE 80 MG/1
80 TABLET ORAL DAILY
Qty: 30 TABLET | Refills: 11 | Status: SHIPPED | OUTPATIENT
Start: 2018-01-02 | End: 2019-01-23 | Stop reason: SDUPTHER

## 2018-01-02 RX ORDER — METOLAZONE 2.5 MG/1
2.5 TABLET ORAL DAILY PRN
Qty: 30 TABLET | Refills: 6
Start: 2018-01-02 | End: 2018-09-06

## 2018-01-02 RX ORDER — CODEINE PHOSPHATE AND GUAIFENESIN 10; 100 MG/5ML; MG/5ML
10 SOLUTION ORAL EVERY 8 HOURS PRN
Qty: 236 ML | Refills: 1 | Status: SHIPPED | OUTPATIENT
Start: 2018-01-02 | End: 2018-09-14

## 2018-01-02 NOTE — PROGRESS NOTES
Subjective:       Patient ID: Paul Villalobos Sr. is a 62 y.o. male.    Chief Complaint: Follow-up    Here for hosp f/u.    When asked about med adherence he states that he was not routinely taking lasix daily, rx should have run out for rx from Dr Guzman.    No complaints other than size of K tabs.    Breathing well now.      Review of Systems   Constitutional: Negative for activity change, appetite change, chills, fatigue, fever and unexpected weight change.   HENT: Negative for congestion and voice change.    Respiratory: Positive for shortness of breath (stable though). Negative for cough, chest tightness and wheezing.    Cardiovascular: Negative for chest pain, palpitations and leg swelling.   Gastrointestinal: Negative for abdominal distention, abdominal pain, nausea and vomiting.   Genitourinary: Negative for decreased urine volume, difficulty urinating, dysuria and hematuria.   Musculoskeletal: Negative for neck pain and neck stiffness.   Skin: Negative for rash and wound.   Neurological: Negative for tremors, syncope and weakness.   Hematological: Negative for adenopathy. Does not bruise/bleed easily.   Psychiatric/Behavioral: Negative for dysphoric mood.       Objective:      Physical Exam   Constitutional: He is oriented to person, place, and time. He appears well-developed and well-nourished. No distress.   HENT:   Head: Normocephalic and atraumatic.   Eyes: No scleral icterus.   Neck: Normal range of motion. No thyromegaly present.   Cardiovascular: Normal rate.  Exam reveals no gallop and no friction rub.    Murmur heard.  Irregularly irregular, mechanical systolic murmur     Pulmonary/Chest: Effort normal. No respiratory distress. He has wheezes (mild). He has no rales.   Abdominal: Soft. Bowel sounds are normal. He exhibits no distension and no mass. There is no tenderness. There is no rebound and no guarding. No hernia.   Soft, normal bowel sounds.     Musculoskeletal: Normal range of motion. He  exhibits no edema.   Lymphadenopathy:     He has no cervical adenopathy.   Neurological: He is alert and oriented to person, place, and time.   Skin: No lesion noted.   Psychiatric: He has a normal mood and affect. Thought content normal.       Assessment:       1. Acute on chronic combined systolic and diastolic congestive heart failure    2. BPH with urinary obstruction    3. Paroxysmal atrial fibrillation    4. CKD (chronic kidney disease) stage 2, GFR 60-89 ml/min    5. HTN (hypertension), benign    6. Tobacco abuse    7. Cough    8. Status post heart valve replacement with mechanical valve    9. Right thyroid nodule    10. Proteinuria, unspecified type    11. Hyperlipidemia, unspecified hyperlipidemia type    12. Chronic hepatitis C without hepatic coma    13. Chronic obstructive pulmonary disease, unspecified COPD type    14. Chronic systolic congestive heart failure, NYHA class 3    15. Chronic systolic congestive heart failure        Plan:       Paul was seen today for follow-up.    Diagnoses and all orders for this visit:    Acute on chronic combined systolic and diastolic congestive heart failure  Now euvolemic    BPH with urinary obstruction  Good UOP    Paroxysmal atrial fibrillation  Rate controlled    CKD (chronic kidney disease) stage 2, GFR 60-89 ml/min  -     furosemide (LASIX) 80 MG tablet; Take 1 tablet (80 mg total) by mouth once daily.  -     metOLazone (ZAROXOLYN) 2.5 MG tablet; Take 1 tablet (2.5 mg total) by mouth daily as needed. TAKE 1 TABLET(2.5 MG) BY MOUTH DAILY AS NEEDED    HTN (hypertension), benign    Tobacco abuse    Cough  -     guaifenesin-codeine 100-10 mg/5 ml (TUSSI-ORGANIDIN NR)  mg/5 mL syrup; Take 10 mLs by mouth every 8 (eight) hours as needed for Cough.    Status post heart valve replacement with mechanical valve    Right thyroid nodule    Proteinuria, unspecified type    Hyperlipidemia, unspecified hyperlipidemia type    Chronic hepatitis C without hepatic  coma    Chronic obstructive pulmonary disease, unspecified COPD type  -     albuterol-ipratropium 2.5mg-0.5mg/3mL (DUO-NEB) 0.5 mg-3 mg(2.5 mg base)/3 mL nebulizer solution; TAKE 3ML BY NEBULIZATION EVERY 6 HOURS AS NEEDED FOR WHEEZING.    Chronic systolic congestive heart failure, NYHA class 3  -     furosemide (LASIX) 80 MG tablet; Take 1 tablet (80 mg total) by mouth once daily.  -     metOLazone (ZAROXOLYN) 2.5 MG tablet; Take 1 tablet (2.5 mg total) by mouth daily as needed. TAKE 1 TABLET(2.5 MG) BY MOUTH DAILY AS NEEDED  -     Basic metabolic panel; Future  -     Magnesium; Future  Make lasix same dose daily and metolazone prn  Check lytes in 2 weeks on current regimen    Chronic systolic congestive heart failure  Due to see Dr Echevarria is May       Health Maintenance       Date Due Completion Date    TETANUS VACCINE 12/29/1971 ---    Zoster Vaccine 12/29/2013 ---    Fecal Occult Blood Test (FOBT)/[employer] 09/29/2018 9/29/2017    Pneumococcal PPSV23 (Medium Risk) (2) 12/29/2018 3/27/2013    Lipid Panel 12/25/2022 12/25/2017        Transitional Care Note    Family and/or Caretaker present at visit?  Yes wife on phone during appt.  Diagnostic tests reviewed/disposition: No diagnosic tests pending after this hospitalization.  Disease/illness education: provided  Flare may have come from poorly med adherence  Home health/community services discussion/referrals: Patient does not have home health established from hospital visit.  They do not need home health.  If needed, we will set up home health for the patient.   Establishment or re-establishment of referral orders for community resources: No other necessary community resources.   Discussion with other health care providers: No discussion with other health care providers necessary.             Keep next scheduled visit

## 2018-01-03 ENCOUNTER — ANTI-COAG VISIT (OUTPATIENT)
Dept: CARDIOLOGY | Facility: CLINIC | Age: 63
End: 2018-01-03

## 2018-01-03 DIAGNOSIS — Z95.2 STATUS POST HEART VALVE REPLACEMENT WITH MECHANICAL VALVE: ICD-10-CM

## 2018-01-03 DIAGNOSIS — Z79.01 ANTICOAGULATED ON COUMADIN: ICD-10-CM

## 2018-01-03 LAB — INR PPP: 2.7

## 2018-01-06 NOTE — PHYSICIAN QUERY
PT Name: Paul Villalobos Sr.  MR #: [id number]     Physician Query Form - Documentation Clarification      Arturo Taylor RN CDS    Contact Information: [phone]    This form is a permanent document in the medical record.     Query Date: January 5, 2018    By submitting this query, we are merely seeking further clarification of documentation. Please utilize your independent clinical judgment when addressing the question(s) below.    The Medical record reflects the following:    Supporting Clinical Findings Location in Medical Record   CHF exacerbation (EF 40% and Dd)    Holding beta blocker overnight in acute setting CHF, will likely resume in am    [  ] Other (please specify): ___acute on chronic combined heart failure__________        H&P 12/25          Other Documentation 1/5      Lab 12/25   Concentric hypertrophy.     2 - Moderately depressed left ventricular systolic function (EF 30-35%).     3 - Mildly to moderately depressed right ventricular systolic function .     4 - Severe left atrial enlargement.     5 - Normally functioning aortic and mitral valve mechanical prostheses.     6 - Pulmonary hypertension. The estimated PA systolic pressure is 55 mmHg.     7 - Intermediate central venous pressure.     Furosemide 60 Inj IV ED 1 time  Furosemide inj. 80 mg IV once  Furosemide tablet 80 mg oral Once  Metolazone tablet  2.5 oral once Echo 12/26                          MAR 12/25                                                                            Doctor, Please specify diagnosis or diagnoses associated with above clinical findings.    Please Clarify the primary diagnosis with some specificity    Provider Use Only    [X  ] Acute on chronic Combined systolic diastolic  Heart Failure    [  ] Other heart failure conditions(Specify)_____________________________                                                                                                                 [  ] Clinically  undetermined

## 2018-01-10 ENCOUNTER — ANTI-COAG VISIT (OUTPATIENT)
Dept: CARDIOLOGY | Facility: CLINIC | Age: 63
End: 2018-01-10
Payer: MEDICARE

## 2018-01-10 DIAGNOSIS — Z95.2 STATUS POST HEART VALVE REPLACEMENT WITH MECHANICAL VALVE: ICD-10-CM

## 2018-01-10 DIAGNOSIS — Z79.01 ANTICOAGULATED ON COUMADIN: ICD-10-CM

## 2018-01-10 LAB — INR PPP: 2.7

## 2018-01-10 PROCEDURE — G0250 MD INR TEST REVIE INTER MGMT: HCPCS | Mod: ,,, | Performed by: PHARMACIST

## 2018-01-16 ENCOUNTER — LAB VISIT (OUTPATIENT)
Dept: LAB | Facility: HOSPITAL | Age: 63
End: 2018-01-16
Attending: INTERNAL MEDICINE
Payer: MEDICARE

## 2018-01-16 DIAGNOSIS — I50.22 CHRONIC SYSTOLIC CONGESTIVE HEART FAILURE, NYHA CLASS 3: ICD-10-CM

## 2018-01-16 LAB
ANION GAP SERPL CALC-SCNC: 8 MMOL/L
BUN SERPL-MCNC: 23 MG/DL
CALCIUM SERPL-MCNC: 9.7 MG/DL
CHLORIDE SERPL-SCNC: 106 MMOL/L
CO2 SERPL-SCNC: 29 MMOL/L
CREAT SERPL-MCNC: 1.1 MG/DL
EST. GFR  (AFRICAN AMERICAN): >60 ML/MIN/1.73 M^2
EST. GFR  (NON AFRICAN AMERICAN): >60 ML/MIN/1.73 M^2
GLUCOSE SERPL-MCNC: 103 MG/DL
MAGNESIUM SERPL-MCNC: 2 MG/DL
POTASSIUM SERPL-SCNC: 4 MMOL/L
SODIUM SERPL-SCNC: 143 MMOL/L

## 2018-01-16 PROCEDURE — 80048 BASIC METABOLIC PNL TOTAL CA: CPT

## 2018-01-16 PROCEDURE — 83735 ASSAY OF MAGNESIUM: CPT

## 2018-01-16 PROCEDURE — 36415 COLL VENOUS BLD VENIPUNCTURE: CPT

## 2018-01-19 ENCOUNTER — OFFICE VISIT (OUTPATIENT)
Dept: CARDIOLOGY | Facility: CLINIC | Age: 63
End: 2018-01-19
Payer: MEDICARE

## 2018-01-19 VITALS
HEIGHT: 69 IN | HEART RATE: 86 BPM | WEIGHT: 159.38 LBS | BODY MASS INDEX: 23.6 KG/M2 | DIASTOLIC BLOOD PRESSURE: 74 MMHG | SYSTOLIC BLOOD PRESSURE: 132 MMHG

## 2018-01-19 DIAGNOSIS — J44.9 CHRONIC OBSTRUCTIVE PULMONARY DISEASE, UNSPECIFIED COPD TYPE: ICD-10-CM

## 2018-01-19 DIAGNOSIS — N18.2 CKD (CHRONIC KIDNEY DISEASE) STAGE 2, GFR 60-89 ML/MIN: ICD-10-CM

## 2018-01-19 DIAGNOSIS — I50.22 CHRONIC SYSTOLIC CONGESTIVE HEART FAILURE: Primary | ICD-10-CM

## 2018-01-19 DIAGNOSIS — B18.2 CHRONIC HEPATITIS C WITHOUT HEPATIC COMA: ICD-10-CM

## 2018-01-19 DIAGNOSIS — I10 HTN (HYPERTENSION), BENIGN: ICD-10-CM

## 2018-01-19 DIAGNOSIS — E78.5 HYPERLIPIDEMIA, UNSPECIFIED HYPERLIPIDEMIA TYPE: ICD-10-CM

## 2018-01-19 DIAGNOSIS — Z72.0 TOBACCO ABUSE: ICD-10-CM

## 2018-01-19 DIAGNOSIS — Z95.2 STATUS POST HEART VALVE REPLACEMENT WITH MECHANICAL VALVE: ICD-10-CM

## 2018-01-19 DIAGNOSIS — I48.0 PAROXYSMAL ATRIAL FIBRILLATION: ICD-10-CM

## 2018-01-19 PROCEDURE — 99213 OFFICE O/P EST LOW 20 MIN: CPT | Mod: PBBFAC | Performed by: INTERNAL MEDICINE

## 2018-01-19 PROCEDURE — 99999 PR PBB SHADOW E&M-EST. PATIENT-LVL III: CPT | Mod: PBBFAC,,, | Performed by: INTERNAL MEDICINE

## 2018-01-19 PROCEDURE — 99214 OFFICE O/P EST MOD 30 MIN: CPT | Mod: S$GLB,,, | Performed by: INTERNAL MEDICINE

## 2018-01-19 RX ORDER — LISINOPRIL 20 MG/1
20 TABLET ORAL DAILY
Qty: 90 TABLET | Refills: 3 | Status: SHIPPED | OUTPATIENT
Start: 2018-01-19 | End: 2018-05-14 | Stop reason: SDUPTHER

## 2018-01-19 NOTE — PROGRESS NOTES
"Subjective:   Patient ID:  Paul Villalobos Sr. is a 62 y.o. male who presents for follow-up of Status post heart valve replacement with mechanical valve (5 months fu)  Recent hospital discharge:  "Mr. Villalobos is a 61yoM with PMHx of COPD (bronchitis), pAfib (on coumadin), PVD (pt claims b/l femoral stents in 2004), CHF (EF 40% and Dd), AVR/MVR 2007 redo 2009 presents with 2-3 weeks of cough, subjective fevers for a week, 3 days of copious mucous colored green to yellow with cough of scott blood this am. Patient has chronic cough with white mucous at baseline but the copious amount and discoloration is different. Also endorses LEIVA and angina increased over this time with only few feet.Claims he has felt chest heaviness before when he has a cold but this time is worse over 2 weeks. Patient uses comombivent, duo-nebs, home O2 3.5L during sleep, has increased combivent from once every 2 days to around the clock for the past 3 weeks. Orthopnea at baseline (sleeps on 5 pillows at night) and PND somewhat worse but stable. Patient claims wife and relative have had flu like symptoms over the past week. No previous sick contacts before then. Patient claims he misses doses of lasix/metalolazone claiming he takes medicines once every 2-3 days.  Cxray showed pleural effusion and pulmonary edema as well and patient was gently diuresed.      In the ED CXR with pulm edema and bilateral pleural effusions. Patient given duonebs, 40mg steroids, and 60mg IV Lasix. Troponins drawn, remained flat.  58 Y/O M with PMH of chronic HFrEF EF 35%, Mechanical AV, Mechanical Mitral Valve 2009 for valve regurge , HTN, HLD, Hep C, asthma. Patient saw Dr Guzman and Dr CLARISSE amaya in 2012 but hasn't been following with cardiologist. Stated that he has been feeling SOB since January with NYHA class II to III symptoms, can walk for 2 blocks before getting SOB, patient has been complaining of dry cough, 4 pillows orthopnea now prefer to sleep in the semisitting " "position, leg swelling over the past 2 month. Today patient was getting more short of breath decided to come to the ED.  Denied chest pain, denied ever having cardiac catheterization or cardiac stress test.  EKG: NSR, Old RBBB with RVH     Echo:    1 - Severe left atrial enlargement.     2 - Eccentric hypertrophy.     3 - Mildly to moderately depressed left ventricular systolic function (EF 40%).     4 - Right ventricular enlargement with low normal to mildly depressed systolic function.     5 - Mechanical aortic valve prosthesis 21 mm, effective prosthetic valve area is 1.0 cm2.     6 - Mechanical mitral valve prosthesis 27 mm , effective orifice area 2.9 cm2.     7 - Pulmonary hypertension. The estimated PA systolic pressure is 44 mmHg.       HPI:   Patient has  Been doing well since his discharge and compliant will all the cardiac meds.   No chest pain, Orthopnea, PND of heart failure symptoms.   Patient dry weight is 70 kg and today it is 72 kg similar to discharge.       Patient Active Problem List   Diagnosis    Status post heart valve replacement with mechanical valve    HTN (hypertension), benign    Tobacco abuse    CHF (NYHA class III, ACC/AHA stage C)    Hyperlipidemia    Anticoagulated on Coumadin    H/O hepatitis C    CKD (chronic kidney disease) stage 2, GFR 60-89 ml/min    Asymptomatic cholelithiasis    Proteinuria    Right thyroid nodule    Paroxysmal atrial fibrillation    Hepatic cirrhosis due to chronic hepatitis C infection    Asthmatic bronchitis    Erectile dysfunction    Hypotension    Acute bilateral thoracic back pain    BPH with urinary obstruction    Acute on chronic congestive heart failure    Acute on chronic respiratory failure with hypoxia     /74 (BP Location: Left arm, Patient Position: Sitting, BP Method: Large (Automatic))   Pulse 86   Ht 5' 9" (1.753 m)   Wt 72.3 kg (159 lb 6.3 oz)   BMI 23.54 kg/m²   Body mass index is 23.54 kg/m².  Estimated " Creatinine Clearance: 67.8 mL/min (based on SCr of 1.1 mg/dL).    Lab Results   Component Value Date     01/16/2018    K 4.0 01/16/2018     01/16/2018    CO2 29 01/16/2018    BUN 23 01/16/2018    CREATININE 1.1 01/16/2018     01/16/2018    HGBA1C 5.3 12/26/2017    MG 2.0 01/16/2018    AST 34 12/26/2017    ALT 35 12/26/2017    ALBUMIN 3.7 12/26/2017    PROT 8.4 12/26/2017    BILITOT 0.7 12/26/2017    WBC 9.20 12/26/2017    HGB 13.8 (L) 12/26/2017    HCT 41.2 12/26/2017    MCV 97 12/26/2017     12/26/2017    INR 2.7 01/10/2018    INR 3.8 (H) 12/14/2009    PSA 0.73 04/03/2014    TSH 1.068 03/27/2013    CHOL 178 12/25/2017    HDL 69 12/25/2017    LDLCALC 98.8 12/25/2017    TRIG 51 12/25/2017       Current Outpatient Prescriptions   Medication Sig    albuterol-ipratropium 2.5mg-0.5mg/3mL (DUO-NEB) 0.5 mg-3 mg(2.5 mg base)/3 mL nebulizer solution TAKE 3ML BY NEBULIZATION EVERY 6 HOURS AS NEEDED FOR WHEEZING.    carvedilol (COREG) 25 MG tablet TAKE 1 TABLET(25 MG) BY MOUTH TWICE DAILY WITH MEALS    furosemide (LASIX) 80 MG tablet Take 1 tablet (80 mg total) by mouth once daily.    ipratropium-albuterol (COMBIVENT RESPIMAT)  mcg/actuation inhaler Inhale 1 puff into the lungs every 4 (four) hours as needed for Wheezing.    isosorbide mononitrate (IMDUR) 30 MG 24 hr tablet TAKE 1 TABLET(30 MG) BY MOUTH EVERY DAY    metOLazone (ZAROXOLYN) 2.5 MG tablet Take 1 tablet (2.5 mg total) by mouth daily as needed. TAKE 1 TABLET(2.5 MG) BY MOUTH DAILY AS NEEDED    potassium chloride SA (K-DUR,KLOR-CON) 20 MEQ tablet Take 2 tablets (40 mEq total) by mouth once daily. Take 40mEQ daily. (Patient taking differently: Take by mouth once daily. 2 tablets on Mon, Wed, Fri 1 table all other days)    warfarin (COUMADIN) 5 MG tablet Take 1.5 tablets (7.5 mg total) by mouth Daily. Current Dose ( 10 mg on Sun, Wed, Fri; 7.5 mg all other days) or as directed by coumadin clinic.    lisinopril  (PRINIVIL,ZESTRIL) 20 MG tablet Take 1 tablet (20 mg total) by mouth once daily.     No current facility-administered medications for this visit.        Review of Systems   Constitution: Positive for malaise/fatigue (some fatigue). Negative for chills, fever, weakness, weight gain and weight loss.   Cardiovascular: Positive for dyspnea on exertion. Negative for chest pain, irregular heartbeat, near-syncope, orthopnea, palpitations, paroxysmal nocturnal dyspnea and syncope. Leg swelling: sometimes.   Respiratory: Positive for cough, shortness of breath, sputum production and wheezing.    Hematologic/Lymphatic: Does not bruise/bleed easily.   Musculoskeletal: Negative for arthritis, joint pain and stiffness.   Gastrointestinal: Positive for constipation. Negative for hematochezia and melena.   Genitourinary: Negative for hematuria.   Neurological: Negative for brief paralysis, focal weakness and seizures.       Objective:   Physical Exam   Constitutional: He is oriented to person, place, and time. He appears well-developed and well-nourished. No distress.   HENT:   Head: Normocephalic and atraumatic.   Nose: Nose normal.   Mouth/Throat: No oropharyngeal exudate.   Eyes: Conjunctivae and EOM are normal. Pupils are equal, round, and reactive to light. Right eye exhibits no discharge. Left eye exhibits no discharge. No scleral icterus.   Neck: Normal range of motion. Neck supple. No JVD present. No tracheal deviation present. No thyromegaly present.   Cardiovascular: Normal rate, regular rhythm, normal heart sounds and intact distal pulses.  Exam reveals no gallop and no friction rub.    No murmur heard.  Mechanical click of the mitral and aortic valve   Pulmonary/Chest: Effort normal and breath sounds normal. No stridor. No respiratory distress. He has no wheezes. He has no rales. He exhibits no tenderness.   Abdominal: Soft. Bowel sounds are normal. He exhibits no distension and no mass. There is no tenderness.    Musculoskeletal: He exhibits no edema or tenderness.   Lymphadenopathy:     He has no cervical adenopathy.   Neurological: He is alert and oriented to person, place, and time. He displays normal reflexes. No cranial nerve deficit. He exhibits normal muscle tone. Coordination normal.   Skin: Skin is warm. No rash noted. He is not diaphoretic. No erythema. No pallor.   Psychiatric: He has a normal mood and affect. His behavior is normal. Judgment and thought content normal.       Assessment:     1. Status post heart valve replacement with mechanical valve    2. Paroxysmal atrial fibrillation    3. Hyperlipidemia, unspecified hyperlipidemia type    4. HTN (hypertension), benign    5. Tobacco abuse    6. Chronic hepatitis C without hepatic coma    7. Chronic obstructive pulmonary disease, unspecified COPD type    8. CKD (chronic kidney disease) stage 2, GFR 60-89 ml/min    9. Chronic systolic congestive heart failure        Plan:   Paul was seen today for status post heart valve replacement with mechanical valve.    Diagnoses and all orders for this visit:    Chronic systolic congestive heart failure  -     Basic metabolic panel; Future  -     Brain natriuretic peptide; Future    Status post heart valve replacement with mechanical valve    Paroxysmal atrial fibrillation    Hyperlipidemia, unspecified hyperlipidemia type    HTN (hypertension), benign    Tobacco abuse    Chronic hepatitis C without hepatic coma    Chronic obstructive pulmonary disease, unspecified COPD type    CKD (chronic kidney disease) stage 2, GFR 60-89 ml/min    Other orders  -     lisinopril (PRINIVIL,ZESTRIL) 20 MG tablet; Take 1 tablet (20 mg total) by mouth once daily.      .   No change in diuretic dose for now, will reevaluate BNP and Cxray (patient appears euvolemic by exam). Continue all meds including anticoagulation  All meds reviewed and are appropriate  Counseled on importance of heart healthy diet low in saturated and trans fat and  salt as well gradually starting a regular aerobic exercise regimen with goal of 30min 5x/week. Recommend BP diary. Call if systolic BP > 140 mmHg on checking repeatedly  Patient is compliant with his medications.

## 2018-01-19 NOTE — PROGRESS NOTES
"Subjective:   Patient ID:  Paul Villalobos Sr. is a 62 y.o. male who presents for follow-up of Status post heart valve replacement with mechanical valve (5 months fu)  PLEASE SEE THE SECOND NOTE    HPI:     Patient Active Problem List   Diagnosis    Status post heart valve replacement with mechanical valve    HTN (hypertension), benign    Tobacco abuse    CHF (NYHA class III, ACC/AHA stage C)    Hyperlipidemia    Anticoagulated on Coumadin    H/O hepatitis C    CKD (chronic kidney disease) stage 2, GFR 60-89 ml/min    Asymptomatic cholelithiasis    Proteinuria    Right thyroid nodule    Paroxysmal atrial fibrillation    Hepatic cirrhosis due to chronic hepatitis C infection    Asthmatic bronchitis    Erectile dysfunction    Hypotension    Acute bilateral thoracic back pain    BPH with urinary obstruction    Acute on chronic congestive heart failure    Acute on chronic respiratory failure with hypoxia     /74 (BP Location: Left arm, Patient Position: Sitting, BP Method: Large (Automatic))   Pulse 86   Ht 5' 9" (1.753 m)   Wt 72.3 kg (159 lb 6.3 oz)   BMI 23.54 kg/m²   Body mass index is 23.54 kg/m².  Estimated Creatinine Clearance: 67.8 mL/min (based on SCr of 1.1 mg/dL).    Lab Results   Component Value Date     01/16/2018    K 4.0 01/16/2018     01/16/2018    CO2 29 01/16/2018    BUN 23 01/16/2018    CREATININE 1.1 01/16/2018     01/16/2018    HGBA1C 5.3 12/26/2017    MG 2.0 01/16/2018    AST 34 12/26/2017    ALT 35 12/26/2017    ALBUMIN 3.7 12/26/2017    PROT 8.4 12/26/2017    BILITOT 0.7 12/26/2017    WBC 9.20 12/26/2017    HGB 13.8 (L) 12/26/2017    HCT 41.2 12/26/2017    MCV 97 12/26/2017     12/26/2017    INR 2.7 01/10/2018    INR 3.8 (H) 12/14/2009    PSA 0.73 04/03/2014    TSH 1.068 03/27/2013    CHOL 178 12/25/2017    HDL 69 12/25/2017    LDLCALC 98.8 12/25/2017    TRIG 51 12/25/2017       Current Outpatient Prescriptions   Medication Sig    " albuterol-ipratropium 2.5mg-0.5mg/3mL (DUO-NEB) 0.5 mg-3 mg(2.5 mg base)/3 mL nebulizer solution TAKE 3ML BY NEBULIZATION EVERY 6 HOURS AS NEEDED FOR WHEEZING.    carvedilol (COREG) 25 MG tablet TAKE 1 TABLET(25 MG) BY MOUTH TWICE DAILY WITH MEALS    furosemide (LASIX) 80 MG tablet Take 1 tablet (80 mg total) by mouth once daily.    ipratropium-albuterol (COMBIVENT RESPIMAT)  mcg/actuation inhaler Inhale 1 puff into the lungs every 4 (four) hours as needed for Wheezing.    isosorbide mononitrate (IMDUR) 30 MG 24 hr tablet TAKE 1 TABLET(30 MG) BY MOUTH EVERY DAY    lisinopril 10 MG tablet TAKE 1 TABLET(10 MG) BY MOUTH EVERY DAY    metOLazone (ZAROXOLYN) 2.5 MG tablet Take 1 tablet (2.5 mg total) by mouth daily as needed. TAKE 1 TABLET(2.5 MG) BY MOUTH DAILY AS NEEDED    potassium chloride SA (K-DUR,KLOR-CON) 20 MEQ tablet Take 2 tablets (40 mEq total) by mouth once daily. Take 40mEQ daily. (Patient taking differently: Take by mouth once daily. 2 tablets on Mon, Wed, Fri 1 table all other days)    warfarin (COUMADIN) 5 MG tablet Take 1.5 tablets (7.5 mg total) by mouth Daily. Current Dose ( 10 mg on Sun, Wed, Fri; 7.5 mg all other days) or as directed by coumadin clinic.     No current facility-administered medications for this visit.        ROS    Objective:   Physical Exam    Assessment:     No diagnosis found.    Plan:

## 2018-01-24 ENCOUNTER — ANTI-COAG VISIT (OUTPATIENT)
Dept: CARDIOLOGY | Facility: CLINIC | Age: 63
End: 2018-01-24

## 2018-01-24 DIAGNOSIS — Z95.2 STATUS POST HEART VALVE REPLACEMENT WITH MECHANICAL VALVE: ICD-10-CM

## 2018-01-24 DIAGNOSIS — Z79.01 ANTICOAGULATED ON COUMADIN: ICD-10-CM

## 2018-01-24 LAB — INR PPP: 2.7

## 2018-01-26 RX ORDER — FLUTICASONE PROPIONATE 50 MCG
SPRAY, SUSPENSION (ML) NASAL
Qty: 16 G | Refills: 11 | Status: SHIPPED | OUTPATIENT
Start: 2018-01-26

## 2018-01-30 ENCOUNTER — OFFICE VISIT (OUTPATIENT)
Dept: HEPATOLOGY | Facility: CLINIC | Age: 63
End: 2018-01-30
Payer: MEDICARE

## 2018-01-30 ENCOUNTER — LAB VISIT (OUTPATIENT)
Dept: LAB | Facility: HOSPITAL | Age: 63
End: 2018-01-30
Attending: INTERNAL MEDICINE
Payer: MEDICARE

## 2018-01-30 VITALS
OXYGEN SATURATION: 95 % | SYSTOLIC BLOOD PRESSURE: 166 MMHG | DIASTOLIC BLOOD PRESSURE: 77 MMHG | BODY MASS INDEX: 22.96 KG/M2 | RESPIRATION RATE: 18 BRPM | HEART RATE: 87 BPM | WEIGHT: 155 LBS | TEMPERATURE: 97 F | HEIGHT: 69 IN

## 2018-01-30 DIAGNOSIS — K74.69 OTHER CIRRHOSIS OF LIVER: Primary | ICD-10-CM

## 2018-01-30 DIAGNOSIS — Z86.19 H/O HEPATITIS: ICD-10-CM

## 2018-01-30 DIAGNOSIS — K74.60 HEPATIC CIRRHOSIS DUE TO CHRONIC HEPATITIS C INFECTION: ICD-10-CM

## 2018-01-30 DIAGNOSIS — K76.6 PORTAL HYPERTENSION: ICD-10-CM

## 2018-01-30 DIAGNOSIS — I50.22 CHRONIC SYSTOLIC CONGESTIVE HEART FAILURE: ICD-10-CM

## 2018-01-30 DIAGNOSIS — B18.2 HEPATIC CIRRHOSIS DUE TO CHRONIC HEPATITIS C INFECTION: ICD-10-CM

## 2018-01-30 LAB — BNP SERPL-MCNC: 1211 PG/ML

## 2018-01-30 PROCEDURE — 36415 COLL VENOUS BLD VENIPUNCTURE: CPT

## 2018-01-30 PROCEDURE — 99999 PR PBB SHADOW E&M-EST. PATIENT-LVL IV: CPT | Mod: PBBFAC,,, | Performed by: INTERNAL MEDICINE

## 2018-01-30 PROCEDURE — 99214 OFFICE O/P EST MOD 30 MIN: CPT | Mod: S$GLB,,, | Performed by: INTERNAL MEDICINE

## 2018-01-30 PROCEDURE — 83880 ASSAY OF NATRIURETIC PEPTIDE: CPT

## 2018-01-30 NOTE — PROGRESS NOTES
HEPATOLOGY FOLLOW UP    Paul Villalobos Sr. is here for follow up of cirrhosis and a history of Hepatitis C    HPI  I last saw Paul Villalobos Sr. in 2015. He had a fibrosure test and abdominal ultrasound that suggested cirrhosis of the liver.  His platelets were slightly low at 145. His TBil and albumin were normal and his INR was elevated from coumadin.  He was treated with Harvoni and developed an SVR.  Last HCV VL checked 05/16.     He has not had an EGD for many years. MELD 24 but is on coumadin. Creat 1.7 but more recent creat 1.1. Thus, meld really is lower.    MELD-Na score: 24 at 12/27/2017 12:00 AM  MELD score: 24 at 12/27/2017 12:00 AM  Calculated from:  Serum Creatinine: 1.7 mg/dL at 12/26/2017  1:14 PM  Serum Sodium: 141 mmol/L (Rounded to 137) at 12/26/2017  1:14 PM  Total Bilirubin: 0.7 mg/dL (Rounded to 1) at 12/26/2017  4:26 AM  INR(ratio): 3.1 at 12/27/2017 12:00 AM  Age: 61 years    Outpatient Encounter Prescriptions as of 1/30/2018   Medication Sig Dispense Refill    albuterol-ipratropium 2.5mg-0.5mg/3mL (DUO-NEB) 0.5 mg-3 mg(2.5 mg base)/3 mL nebulizer solution TAKE 3ML BY NEBULIZATION EVERY 6 HOURS AS NEEDED FOR WHEEZING. 360 mL 11    carvedilol (COREG) 25 MG tablet TAKE 1 TABLET(25 MG) BY MOUTH TWICE DAILY WITH MEALS 180 tablet 11    fluticasone (FLONASE) 50 mcg/actuation nasal spray SPRAY TWICE IN EACH NOSTRIL EVERY DAY 16 g 11    furosemide (LASIX) 80 MG tablet Take 1 tablet (80 mg total) by mouth once daily. 30 tablet 11    ipratropium-albuterol (COMBIVENT RESPIMAT)  mcg/actuation inhaler Inhale 1 puff into the lungs every 4 (four) hours as needed for Wheezing. 4 g 11    isosorbide mononitrate (IMDUR) 30 MG 24 hr tablet TAKE 1 TABLET(30 MG) BY MOUTH EVERY DAY 90 tablet 11    lisinopril (PRINIVIL,ZESTRIL) 20 MG tablet Take 1 tablet (20 mg total) by mouth once daily. 90 tablet 3    metOLazone (ZAROXOLYN) 2.5 MG tablet Take 1 tablet (2.5 mg total) by mouth daily as needed. TAKE 1  TABLET(2.5 MG) BY MOUTH DAILY AS NEEDED 30 tablet 6    potassium chloride SA (K-DUR,KLOR-CON) 20 MEQ tablet Take 2 tablets (40 mEq total) by mouth once daily. Take 40mEQ daily. (Patient taking differently: Take by mouth once daily. 2 tablets on Mon, Wed, Fri 1 table all other days) 45 tablet 5    warfarin (COUMADIN) 5 MG tablet Take 1.5 tablets (7.5 mg total) by mouth Daily. Current Dose ( 10 mg on Sun, Wed, Fri; 7.5 mg all other days) or as directed by coumadin clinic. 150 tablet 3    [] predniSONE (DELTASONE) 20 MG tablet Take 2 tablets (40 mg total) by mouth once daily. 4 tablet 0    [DISCONTINUED] furosemide (LASIX) 80 MG tablet TAKE 1 TABLET BY MOUTH ON  AND FRIDAY AND 1/2 TABLET BY MOUTH ON ALL OTHER DAYS 60 tablet 3    [DISCONTINUED] lisinopril 10 MG tablet TAKE 1 TABLET(10 MG) BY MOUTH EVERY DAY 90 tablet 11    [DISCONTINUED] metOLazone (ZAROXOLYN) 2.5 MG tablet TAKE 1 TABLET(2.5 MG) BY MOUTH DAILY AS NEEDED 30 tablet 6    [DISCONTINUED] potassium chloride SA (K-DUR,KLOR-CON) 20 MEQ tablet Take 1 tablet (20 mEq total) by mouth once daily. Take (2) Potassium tablets =40 mg on days you take Metolazone 45 tablet 5    furosemide tablet 80 mg       metOLazone tablet 2.5 mg       [] potassium chloride SA CR tablet 80 mEq       [DISCONTINUED] albuterol-ipratropium 2.5mg-0.5mg/3mL nebulizer solution 3 mL       [DISCONTINUED] amoxicillin-clavulanate 875-125mg per tablet 1 tablet       [DISCONTINUED] amoxicillin-clavulanate 875-125mg per tablet 1 tablet       [DISCONTINUED] dextrose 50% injection 12.5 g       [DISCONTINUED] dextrose 50% injection 25 g       [DISCONTINUED] fluticasone-vilanterol 200-25 mcg/dose diskus inhaler 1 puff       [DISCONTINUED] furosemide injection 60 mg       [DISCONTINUED] glucagon (human recombinant) injection 1 mg       [DISCONTINUED] glucose chewable tablet 16 g       [DISCONTINUED] glucose chewable tablet 24 g       [DISCONTINUED]  moxifloxacin tablet 400 mg       [DISCONTINUED] predniSONE tablet 40 mg       [DISCONTINUED] sodium chloride 0.9% flush 5 mL       [DISCONTINUED] warfarin (COUMADIN) tablet 7.5 mg        No facility-administered encounter medications on file as of 1/30/2018.      No Known Allergies  Past Medical History:   Diagnosis Date    Anemia     Asthma     CHF (congestive heart failure)     Chronic bronchitis     Cirrhosis 1/19/2015    Hepatitis C     Hyperlipidemia     Hypertension     Lung disease     Pulmonary hypertension        Review of Systems   Constitutional: Negative for fatigue, fever and unexpected weight change.   HENT: Negative.    Respiratory: Negative for cough and shortness of breath.    Cardiovascular: Negative for chest pain, palpitations and leg swelling.   Gastrointestinal: Negative for abdominal distention and abdominal pain.   Genitourinary: Negative.    Musculoskeletal: Negative.    Neurological: Negative.    Psychiatric/Behavioral: Negative.      Vitals:    01/30/18 0839   BP: (!) 166/77   Pulse: 87   Resp: 18   Temp: 96.7 °F (35.9 °C)       Physical Exam   Constitutional: He is oriented to person, place, and time. He appears well-developed and well-nourished.   HENT:   Head: Normocephalic and atraumatic.   Eyes: Conjunctivae are normal. Pupils are equal, round, and reactive to light. No scleral icterus.   Neck: Normal range of motion. Neck supple. No thyromegaly present.   Cardiovascular: Normal rate, regular rhythm and normal heart sounds.    Pulmonary/Chest: Effort normal and breath sounds normal. He has no wheezes.   Abdominal: Soft. Bowel sounds are normal. He exhibits no distension and no mass. There is no tenderness.   Musculoskeletal: Normal range of motion. He exhibits no edema.   Neurological: He is alert and oriented to person, place, and time.   Skin: Skin is warm and dry. No rash noted.   Psychiatric: He has a normal mood and affect.       Lab Results   Component Value Date      2018    BUN 23 2018    CREATININE 1.1 2018    CALCIUM 9.7 2018     2018    K 4.0 2018     2018    PROT 8.4 2017    CO2 29 2018    ANIONGAP 8 2018    WBC 9.20 2017    RBC 4.24 (L) 2017    HGB 13.8 (L) 2017    HCT 41.2 2017    MCV 97 2017    MCH 32.5 (H) 2017    MCHC 33.5 2017     Lab Results   Component Value Date    RDW 13.4 2017     2017    MPV 11.1 2017    GRAN 6.4 2017    GRAN 69.0 2017    LYMPH 1.6 2017    LYMPH 17.6 (L) 2017    MONO 1.1 (H) 2017    MONO 12.0 2017    EOSINOPHIL 0.9 2017    BASOPHIL 0.4 2017    EOS 0.1 2017    BASO 0.04 2017    APTT 35.6 (H) 2017    GROUPTRH A POS 2017    GROUPTRH A POS 2010    ALETHEA Negative 2013     (H) 2017    CHOL 178 2017    TRIG 51 2017    HDL 69 2017    CHOLHDL 38.8 2017    TOTALCHOLEST 2.6 2017    ALBUMIN 3.7 2017    BILIDIR 0.3 2009    AST 34 2017    ALT 35 2017    ALKPHOS 86 2017    MG 2.0 2018    LABPROT 42.7 (H) 2017    INR 2.7 2018    INR 3.8 (H) 2009    PSA 0.73 2014     Assessment and Plan:    Paul ROBLEDO Wilfredo Hu. is a 62 y.o. male withHepatitis C  The previous noninvasive w/u strongly suggested cirrhosis ( u/s, fibrosure and platelet count). I am recommendin) Hepatitis C. Now cured. Recheck HCV VL now- if negative no need for further testing/monitoring.  2) cirrhosis- he needs HCC screening with an u/s and afp every 6 mos . This is overdue. I would check lfts every 6 mos to monitor for liver dysfunction.  3) portal hypertension with thrombocytopenia. I recommend an egd to r/o varices. The patient has deferred this procedure in the past. I do think this important since he is on coumadin. He can proceed without stopping the coumadin.  4)  colorectal ca screening. He is overdue for this. He has again deferred colonoscopy. He understands that by not doing this he is giving up the chance to prevent/identify a colon cancer.    Return 6 months    Return .

## 2018-02-07 ENCOUNTER — ANTI-COAG VISIT (OUTPATIENT)
Dept: CARDIOLOGY | Facility: CLINIC | Age: 63
End: 2018-02-07

## 2018-02-07 DIAGNOSIS — Z95.2 STATUS POST HEART VALVE REPLACEMENT WITH MECHANICAL VALVE: ICD-10-CM

## 2018-02-07 DIAGNOSIS — Z79.01 ANTICOAGULATED ON COUMADIN: ICD-10-CM

## 2018-02-07 LAB — INR PPP: 3.9

## 2018-02-09 ENCOUNTER — HOSPITAL ENCOUNTER (OUTPATIENT)
Dept: RADIOLOGY | Facility: HOSPITAL | Age: 63
Discharge: HOME | End: 2018-02-09
Attending: INTERNAL MEDICINE
Payer: MEDICARE

## 2018-02-09 DIAGNOSIS — K74.69 OTHER CIRRHOSIS OF LIVER: ICD-10-CM

## 2018-02-09 PROCEDURE — 76700 US EXAM ABDOM COMPLETE: CPT | Mod: TC

## 2018-02-09 PROCEDURE — 76700 US EXAM ABDOM COMPLETE: CPT | Mod: 26,,, | Performed by: RADIOLOGY

## 2018-02-20 ENCOUNTER — OFFICE VISIT (OUTPATIENT)
Dept: INTERNAL MEDICINE | Facility: CLINIC | Age: 63
End: 2018-02-20
Payer: MEDICARE

## 2018-02-20 VITALS
BODY MASS INDEX: 23.74 KG/M2 | WEIGHT: 160.25 LBS | OXYGEN SATURATION: 97 % | SYSTOLIC BLOOD PRESSURE: 150 MMHG | HEART RATE: 97 BPM | DIASTOLIC BLOOD PRESSURE: 80 MMHG | HEIGHT: 69 IN

## 2018-02-20 DIAGNOSIS — I50.9 CHF (NYHA CLASS III, ACC/AHA STAGE C): ICD-10-CM

## 2018-02-20 DIAGNOSIS — Z72.0 TOBACCO ABUSE: ICD-10-CM

## 2018-02-20 DIAGNOSIS — J44.9 CHRONIC OBSTRUCTIVE PULMONARY DISEASE, UNSPECIFIED COPD TYPE: Primary | ICD-10-CM

## 2018-02-20 DIAGNOSIS — I10 HTN (HYPERTENSION), BENIGN: ICD-10-CM

## 2018-02-20 PROBLEM — J96.21 ACUTE ON CHRONIC RESPIRATORY FAILURE WITH HYPOXIA: Status: RESOLVED | Noted: 2017-12-25 | Resolved: 2018-02-20

## 2018-02-20 PROCEDURE — 99214 OFFICE O/P EST MOD 30 MIN: CPT | Mod: S$GLB,,, | Performed by: INTERNAL MEDICINE

## 2018-02-20 PROCEDURE — 99999 PR PBB SHADOW E&M-EST. PATIENT-LVL III: CPT | Mod: PBBFAC,,, | Performed by: INTERNAL MEDICINE

## 2018-02-20 PROCEDURE — 3008F BODY MASS INDEX DOCD: CPT | Mod: S$GLB,,, | Performed by: INTERNAL MEDICINE

## 2018-02-20 RX ORDER — POLYETHYLENE GLYCOL 3350 17 G/17G
POWDER, FOR SOLUTION ORAL
COMMUNITY
Start: 2018-02-12 | End: 2019-05-16

## 2018-02-20 NOTE — MEDICAL/APP STUDENT
Subjective:       Patient ID: Paul Villalobos Sr. is a 62 y.o. male.    Chief Complaint: Follow-up    Patient complaining of diarrhea that started Friday. Has had constipation in the past for which he uses glycolax PRN. Friday was last time he took glycolax and was ok over the weekend but had an episode of diarrhea last night where he couldn't make it to the bathroom. Said he has experienced problems like this in the past with glycolax. Doesn't associate it with any specific foods. No previous diagnosis of IBS or IBD. No N/V, weight loss, abdominal pain. Nothing makes it better or worse. No blood in diarrhea.    Has had recent labs for increased PT/INR (on warfarin) and mild anemia with borderline high MCV and low platelets          Review of Systems   Constitutional: Negative for fatigue and fever.   HENT: Positive for nosebleeds. Negative for congestion, postnasal drip and rhinorrhea.    Eyes: Negative for photophobia and visual disturbance.   Respiratory: Positive for shortness of breath.    Cardiovascular: Negative for chest pain and leg swelling.   Gastrointestinal: Positive for constipation and diarrhea.   Endocrine: Negative for polydipsia and polyphagia.   Genitourinary: Negative for difficulty urinating, dysuria and frequency.   Musculoskeletal: Negative for arthralgias and myalgias.   Neurological: Negative for dizziness and headaches.   Hematological: Bruises/bleeds easily.   Psychiatric/Behavioral: Negative for agitation and behavioral problems.       Objective:      Physical Exam   Constitutional: He is oriented to person, place, and time. He appears well-developed and well-nourished.   HENT:   Head: Normocephalic and atraumatic.   Eyes: EOM are normal. Pupils are equal, round, and reactive to light. No scleral icterus.   Cardiovascular: Normal rate.    Irregular irregular rhythm   Pulmonary/Chest: Effort normal.   Bilateral crackles bases   Abdominal: Soft.   Enlarged, tender hepatomegaly   Neurological:  He is alert and oriented to person, place, and time.   Skin: Skin is warm and dry.   Psychiatric: He has a normal mood and affect.       Assessment:       No diagnosis found.    Plan:       Here for f/u    Diarrhea  - patient advised that he could take OTC metamucil since miralax has been causing him trouble. Try taking half a cap full at first    HTN  -  Continue current regimen. Patient did not take meds this morning. Told his wife to try to take his pressure regularly and we would see him back in 4 weeks to check his pressure.     Nosebleeds  - has been getting occasional nosebleeds. This is common for someone on warfarin. Told to try saline nose spray to keep his nose moist. Can get this OTC.     Health maintenance  - no interventions needed at this visit.    Health Maintenance       Date Due Completion Date    TETANUS VACCINE 12/29/1971 ---    Zoster Vaccine 12/29/2013 ---    Fecal Occult Blood Test (FOBT)/FitKit 09/29/2018 9/29/2017    Pneumococcal PPSV23 (Medium Risk) (2) 12/29/2018 3/27/2013    Lipid Panel 12/25/2022 12/25/2017           RTC 6 months if everything going ok    Cleve Paul

## 2018-02-20 NOTE — PROGRESS NOTES
Subjective:       Patient ID: Paul Villalobos Sr. is a 62 y.o. male.    Chief Complaint: Follow-up    He has chronic bowel issues, goes btn constipation and loose stools.  mirilax full capful daily seems too strong, causing loose bowels. Has never tried metamucil.    Breathing stable. Missed med today, which accounts for shiva pressures.    Claims to be away from smoking.          Review of Systems   Constitutional: Negative for activity change, appetite change, chills, fatigue, fever and unexpected weight change.   HENT: Negative for congestion and voice change.    Respiratory: Positive for shortness of breath (stable though). Negative for cough, chest tightness and wheezing.    Cardiovascular: Negative for chest pain, palpitations and leg swelling.   Gastrointestinal: Positive for constipation and diarrhea. Negative for abdominal distention, abdominal pain, nausea and vomiting.   Genitourinary: Negative for decreased urine volume, difficulty urinating, dysuria and hematuria.   Musculoskeletal: Negative for neck pain and neck stiffness.   Skin: Negative for rash and wound.   Neurological: Negative for tremors, syncope and weakness.   Hematological: Negative for adenopathy. Does not bruise/bleed easily.   Psychiatric/Behavioral: Negative for dysphoric mood.       Objective:      Physical Exam   Constitutional: He is oriented to person, place, and time. He appears well-developed and well-nourished. No distress.   HENT:   Head: Normocephalic and atraumatic.   Eyes: No scleral icterus.   Neck: Normal range of motion. No thyromegaly present.   Cardiovascular: Normal rate.  Exam reveals no gallop and no friction rub.    Murmur heard.  Irregularly irregular, mechanical systolic murmur     Pulmonary/Chest: Effort normal. No respiratory distress. He has wheezes (mild). He has no rales.   Abdominal: Soft. Bowel sounds are normal. He exhibits no distension and no mass. There is tenderness (mild RUQ tenderness). There is no rebound  and no guarding. No hernia.   Soft, normal bowel sounds.     Musculoskeletal: Normal range of motion. He exhibits no edema.   Lymphadenopathy:     He has no cervical adenopathy.   Neurological: He is alert and oriented to person, place, and time.   Skin: No lesion noted.   Psychiatric: He has a normal mood and affect. Thought content normal.       Assessment:       1. Chronic obstructive pulmonary disease, unspecified COPD type        Plan:       Paul was seen today for follow-up.    Diagnoses and all orders for this visit:    Here for f/u.    CHF symptoms are stable, euvolemic today.    Intermittent constipation/diarrhea --  Patient Instructions   Metamucil half of serving with water daily.      occ nose bleeding on coumadin:  Nasal salt water spray, ocean spray is an example        Chronic obstructive pulmonary disease, unspecified COPD type  Has RFs combivent, still smoking, but working on it.    elevated BP: requested that pt check his BP twice per week at [facility]/other pharmacy, record the numbers, and call back if routinely >130/85, reiterated need for weight loss and salt restriction  4 week LPN recheck      Health Maintenance       Date Due Completion Date    TETANUS VACCINE 12/29/1971 ---    Zoster Vaccine 12/29/2013 ---    Fecal Occult Blood Test (FOBT)/FitKit 09/29/2018 9/29/2017    Pneumococcal PPSV23 (Medium Risk) (2) 12/29/2018 3/27/2013    Lipid Panel 12/25/2022 12/25/2017          Follow-up in about 6 months (around 8/20/2018).

## 2018-02-21 ENCOUNTER — ANTI-COAG VISIT (OUTPATIENT)
Dept: CARDIOLOGY | Facility: CLINIC | Age: 63
End: 2018-02-21

## 2018-02-21 DIAGNOSIS — Z95.2 STATUS POST HEART VALVE REPLACEMENT WITH MECHANICAL VALVE: ICD-10-CM

## 2018-02-21 DIAGNOSIS — Z79.01 ANTICOAGULATED ON COUMADIN: ICD-10-CM

## 2018-02-21 LAB — INR PPP: 4.6

## 2018-02-28 ENCOUNTER — ANTI-COAG VISIT (OUTPATIENT)
Dept: CARDIOLOGY | Facility: CLINIC | Age: 63
End: 2018-02-28
Payer: MEDICARE

## 2018-02-28 ENCOUNTER — TELEPHONE (OUTPATIENT)
Dept: INTERNAL MEDICINE | Facility: CLINIC | Age: 63
End: 2018-02-28

## 2018-02-28 DIAGNOSIS — Z95.2 STATUS POST HEART VALVE REPLACEMENT WITH MECHANICAL VALVE: ICD-10-CM

## 2018-02-28 DIAGNOSIS — Z79.01 ANTICOAGULATED ON COUMADIN: ICD-10-CM

## 2018-02-28 LAB — INR PPP: 3.4

## 2018-02-28 PROCEDURE — G0250 MD INR TEST REVIE INTER MGMT: HCPCS | Mod: S$GLB,,, | Performed by: PHARMACIST

## 2018-02-28 NOTE — TELEPHONE ENCOUNTER
Spoke to patient and states that over the weekend started with cough, yellow mucus, not sure if running fever (did not take temp) loosing voice---patient states that he is taking antibiotics and cough syrup that Dr. Concepcion called in--patient did not know the name of the medications----pls advise

## 2018-02-28 NOTE — TELEPHONE ENCOUNTER
Hi, I do not think that I sent in antibiotics when I saw him last week.  Please ask whether he is feeling more short of breath.  Please offer him an appt with me urgent for tomorrow/Thursday open urgent slot.  Thank you, Tye Concepcion

## 2018-02-28 NOTE — TELEPHONE ENCOUNTER
----- Message from Evens Borrego sent at 2/27/2018  5:54 PM CST -----  Contact: Pt  Pt would like to be called back regarding a current cough and discomfort.    Pt can be reached at [phone].    Thank

## 2018-02-28 NOTE — PROGRESS NOTES
Verbal result called in by patient who reports she called result to [facility] but was advised to call result to coumadin clinic.

## 2018-03-01 ENCOUNTER — OFFICE VISIT (OUTPATIENT)
Dept: INTERNAL MEDICINE | Facility: CLINIC | Age: 63
End: 2018-03-01
Payer: MEDICARE

## 2018-03-01 VITALS
DIASTOLIC BLOOD PRESSURE: 70 MMHG | HEIGHT: 69 IN | HEART RATE: 81 BPM | BODY MASS INDEX: 23.15 KG/M2 | TEMPERATURE: 98 F | OXYGEN SATURATION: 93 % | WEIGHT: 156.31 LBS | SYSTOLIC BLOOD PRESSURE: 130 MMHG

## 2018-03-01 DIAGNOSIS — Z79.01 ANTICOAGULATED ON COUMADIN: ICD-10-CM

## 2018-03-01 DIAGNOSIS — J44.1 COPD EXACERBATION: Primary | ICD-10-CM

## 2018-03-01 PROCEDURE — 99999 PR PBB SHADOW E&M-EST. PATIENT-LVL III: CPT | Mod: PBBFAC,,, | Performed by: INTERNAL MEDICINE

## 2018-03-01 PROCEDURE — 3078F DIAST BP <80 MM HG: CPT | Mod: S$GLB,,, | Performed by: INTERNAL MEDICINE

## 2018-03-01 PROCEDURE — 99213 OFFICE O/P EST LOW 20 MIN: CPT | Mod: S$GLB,,, | Performed by: INTERNAL MEDICINE

## 2018-03-01 PROCEDURE — 3075F SYST BP GE 130 - 139MM HG: CPT | Mod: S$GLB,,, | Performed by: INTERNAL MEDICINE

## 2018-03-01 RX ORDER — AMOXICILLIN AND CLAVULANATE POTASSIUM 875; 125 MG/1; MG/1
1 TABLET, FILM COATED ORAL 2 TIMES DAILY
Qty: 14 TABLET | Refills: 0 | Status: SHIPPED | OUTPATIENT
Start: 2018-03-01 | End: 2018-03-08

## 2018-03-01 RX ORDER — LISINOPRIL 10 MG/1
TABLET ORAL
Refills: 11 | COMMUNITY
Start: 2018-02-23 | End: 2018-04-02 | Stop reason: DRUGHIGH

## 2018-03-01 NOTE — PATIENT INSTRUCTIONS
"Take an over the counter "super" probiotic with multiple bacteria strains daily while on antibiotic and continue for 7 days after the antibiotic has completed. Examples include:  [employer] Super Probiotic Digestive Support Capsules  Nature Made Digestive Probiotics Advanced    Metamucil half of serving with water daily when you have constipation.     Nasal salt water spray, ocean spray is an example    "

## 2018-03-01 NOTE — PROGRESS NOTES
Subjective:       Patient ID: Paul Villalobos Sr. is a 62 y.o. male.    Chief Complaint: URI    Here for purulent cough, muscle chest pains with coughing. Feeling sick as well.  No fluid build up or leg edema.      Review of Systems   Constitutional: Negative for activity change, appetite change, chills, fatigue, fever and unexpected weight change.   HENT: Negative for congestion, postnasal drip, rhinorrhea and voice change.    Respiratory: Positive for cough and shortness of breath (stable though). Negative for chest tightness and wheezing.    Cardiovascular: Negative for chest pain, palpitations and leg swelling.   Gastrointestinal: Positive for constipation and diarrhea. Negative for abdominal distention, abdominal pain, nausea and vomiting.   Genitourinary: Negative for decreased urine volume, difficulty urinating, dysuria and hematuria.   Musculoskeletal: Negative for neck pain and neck stiffness.   Skin: Negative for rash and wound.   Neurological: Negative for tremors, syncope and weakness.   Hematological: Negative for adenopathy. Does not bruise/bleed easily.   Psychiatric/Behavioral: Negative for dysphoric mood.       Objective:      Physical Exam   Constitutional: He is oriented to person, place, and time. He appears well-developed and well-nourished. No distress.   HENT:   Head: Normocephalic and atraumatic.   Eyes: No scleral icterus.   Neck: Normal range of motion. No thyromegaly present.   Cardiovascular: Normal rate.  Exam reveals no gallop and no friction rub.    Murmur heard.  Irregularly irregular, mechanical systolic murmur     Pulmonary/Chest: Effort normal. No respiratory distress. He has wheezes (moderate and air flow decreased). He has no rales.   Abdominal: Soft. Bowel sounds are normal. He exhibits no distension and no mass. There is tenderness (mild RUQ tenderness). There is no rebound and no guarding. No hernia.   Soft, normal bowel sounds.     Musculoskeletal: Normal range of motion. He  "exhibits no edema.   Lymphadenopathy:     He has no cervical adenopathy.   Neurological: He is alert and oriented to person, place, and time.   Skin: No lesion noted.   Psychiatric: He has a normal mood and affect. Thought content normal.       Assessment:       1. COPD exacerbation    2. Anticoagulated on Coumadin        Plan:       Paul was seen today for uri.    Diagnoses and all orders for this visit:    COPD exacerbation  -     amoxicillin-clavulanate 875-125mg (AUGMENTIN) 875-125 mg per tablet; Take 1 tablet by mouth 2 (two) times daily.  Close watch on INR.  Explained that if not better in 1-2 weeks, pt should rtc/call PCP      Take an over the counter "super" probiotic with multiple bacteria strains daily while on antibiotic and continue for 7 days after the antibiotic has completed. Examples include:  [employer] Super Probiotic Digestive Support Capsules  Nature Made Digestive Probiotics Advanced        Anticoagulated on Coumadin        Health Maintenance       Date Due Completion Date    TETANUS VACCINE 12/29/1971 ---    Zoster Vaccine 12/29/2013 ---    Fecal Occult Blood Test (FOBT)/FitKit 09/29/2018 9/29/2017    Pneumococcal PPSV23 (Medium Risk) (2) 12/29/2018 3/27/2013    Lipid Panel 12/25/2022 12/25/2017          Follow-up in about 3 months (around 6/1/2018).       "

## 2018-03-07 LAB — INR PPP: 2.8

## 2018-03-08 ENCOUNTER — ANTI-COAG VISIT (OUTPATIENT)
Dept: CARDIOLOGY | Facility: CLINIC | Age: 63
End: 2018-03-08

## 2018-03-08 DIAGNOSIS — Z95.2 STATUS POST HEART VALVE REPLACEMENT WITH MECHANICAL VALVE: ICD-10-CM

## 2018-03-08 DIAGNOSIS — Z79.01 ANTICOAGULATED ON COUMADIN: ICD-10-CM

## 2018-03-14 ENCOUNTER — ANTI-COAG VISIT (OUTPATIENT)
Dept: CARDIOLOGY | Facility: CLINIC | Age: 63
End: 2018-03-14

## 2018-03-14 DIAGNOSIS — Z95.2 STATUS POST HEART VALVE REPLACEMENT WITH MECHANICAL VALVE: ICD-10-CM

## 2018-03-14 DIAGNOSIS — Z79.01 ANTICOAGULATED ON COUMADIN: ICD-10-CM

## 2018-03-14 LAB — INR PPP: 2.3

## 2018-03-21 ENCOUNTER — ANTI-COAG VISIT (OUTPATIENT)
Dept: CARDIOLOGY | Facility: CLINIC | Age: 63
End: 2018-03-21

## 2018-03-21 DIAGNOSIS — Z95.2 STATUS POST HEART VALVE REPLACEMENT WITH MECHANICAL VALVE: ICD-10-CM

## 2018-03-21 DIAGNOSIS — Z79.01 ANTICOAGULATED ON COUMADIN: ICD-10-CM

## 2018-03-21 LAB — INR PPP: 2.2

## 2018-03-23 ENCOUNTER — NURSE TRIAGE (OUTPATIENT)
Dept: ADMINISTRATIVE | Facility: CLINIC | Age: 63
End: 2018-03-23

## 2018-03-23 NOTE — TELEPHONE ENCOUNTER
Reason for Disposition   Passing pure blood or large blood clots (i.e., larger than a dime or grape)    Protocols used: ST URINE - BLOOD IN-A-OH

## 2018-03-25 ENCOUNTER — HOSPITAL ENCOUNTER (EMERGENCY)
Facility: HOSPITAL | Age: 63
Discharge: HOME | End: 2018-03-25
Attending: FAMILY MEDICINE
Payer: MEDICARE

## 2018-03-25 VITALS
WEIGHT: 152.13 LBS | BODY MASS INDEX: 24.45 KG/M2 | HEIGHT: 66 IN | HEART RATE: 60 BPM | OXYGEN SATURATION: 98 % | SYSTOLIC BLOOD PRESSURE: 134 MMHG | DIASTOLIC BLOOD PRESSURE: 64 MMHG | RESPIRATION RATE: 18 BRPM | TEMPERATURE: 98 F

## 2018-03-25 DIAGNOSIS — R05.9 COUGH: ICD-10-CM

## 2018-03-25 DIAGNOSIS — R06.02 SHORTNESS OF BREATH: ICD-10-CM

## 2018-03-25 DIAGNOSIS — N39.0 URINARY TRACT INFECTION WITH HEMATURIA, SITE UNSPECIFIED: Primary | ICD-10-CM

## 2018-03-25 DIAGNOSIS — R31.9 URINARY TRACT INFECTION WITH HEMATURIA, SITE UNSPECIFIED: Primary | ICD-10-CM

## 2018-03-25 DIAGNOSIS — J40 BRONCHITIS: ICD-10-CM

## 2018-03-25 LAB
ALBUMIN SERPL BCP-MCNC: 3.6 G/DL
ALP SERPL-CCNC: 74 U/L
ALT SERPL W/O P-5'-P-CCNC: 39 U/L
ANION GAP SERPL CALC-SCNC: 9 MMOL/L
AST SERPL-CCNC: 37 U/L
BACTERIA #/AREA URNS AUTO: ABNORMAL /HPF
BASOPHILS # BLD AUTO: 0.03 K/UL
BASOPHILS NFR BLD: 0.5 %
BILIRUB SERPL-MCNC: 1.1 MG/DL
BILIRUB UR QL STRIP: NEGATIVE
BNP SERPL-MCNC: 1222 PG/ML
BUN SERPL-MCNC: 25 MG/DL
CALCIUM SERPL-MCNC: 9.7 MG/DL
CHLORIDE SERPL-SCNC: 102 MMOL/L
CLARITY UR REFRACT.AUTO: ABNORMAL
CO2 SERPL-SCNC: 30 MMOL/L
COLOR UR AUTO: ABNORMAL
CREAT SERPL-MCNC: 1.2 MG/DL
DIFFERENTIAL METHOD: ABNORMAL
EOSINOPHIL # BLD AUTO: 0.1 K/UL
EOSINOPHIL NFR BLD: 2.2 %
ERYTHROCYTE [DISTWIDTH] IN BLOOD BY AUTOMATED COUNT: 13.8 %
EST. GFR  (AFRICAN AMERICAN): >60 ML/MIN/1.73 M^2
EST. GFR  (NON AFRICAN AMERICAN): >60 ML/MIN/1.73 M^2
GLUCOSE SERPL-MCNC: 76 MG/DL
GLUCOSE UR QL STRIP: NEGATIVE
HCT VFR BLD AUTO: 37 %
HGB BLD-MCNC: 12.1 G/DL
HGB UR QL STRIP: ABNORMAL
HYALINE CASTS UR QL AUTO: 0 /LPF
IMM GRANULOCYTES # BLD AUTO: 0.02 K/UL
IMM GRANULOCYTES NFR BLD AUTO: 0.3 %
INR PPP: 2.2
KETONES UR QL STRIP: NEGATIVE
LEUKOCYTE ESTERASE UR QL STRIP: ABNORMAL
LYMPHOCYTES # BLD AUTO: 0.9 K/UL
LYMPHOCYTES NFR BLD: 15 %
MCH RBC QN AUTO: 32.7 PG
MCHC RBC AUTO-ENTMCNC: 32.7 G/DL
MCV RBC AUTO: 100 FL
MICROSCOPIC COMMENT: ABNORMAL
MONOCYTES # BLD AUTO: 1 K/UL
MONOCYTES NFR BLD: 16.4 %
NEUTROPHILS # BLD AUTO: 3.8 K/UL
NEUTROPHILS NFR BLD: 65.6 %
NITRITE UR QL STRIP: NEGATIVE
NRBC BLD-RTO: 0 /100 WBC
PH UR STRIP: 5 [PH] (ref 5–8)
PLATELET # BLD AUTO: 120 K/UL
PMV BLD AUTO: 11.5 FL
POTASSIUM SERPL-SCNC: 3.5 MMOL/L
PROT SERPL-MCNC: 7.7 G/DL
PROT UR QL STRIP: ABNORMAL
PROTHROMBIN TIME: 21.4 SEC
RBC # BLD AUTO: 3.7 M/UL
RBC #/AREA URNS AUTO: 19 /HPF (ref 0–4)
SODIUM SERPL-SCNC: 141 MMOL/L
SP GR UR STRIP: 1.02 (ref 1–1.03)
SQUAMOUS #/AREA URNS AUTO: 1 /HPF
TROPONIN I SERPL DL<=0.01 NG/ML-MCNC: 0.05 NG/ML
URN SPEC COLLECT METH UR: ABNORMAL
UROBILINOGEN UR STRIP-ACNC: 2 EU/DL
WBC # BLD AUTO: 5.8 K/UL
WBC #/AREA URNS AUTO: >100 /HPF (ref 0–5)
WBC CLUMPS UR QL AUTO: ABNORMAL

## 2018-03-25 PROCEDURE — 87186 SC STD MICRODIL/AGAR DIL: CPT

## 2018-03-25 PROCEDURE — 99285 EMERGENCY DEPT VISIT HI MDM: CPT | Mod: ,,, | Performed by: FAMILY MEDICINE

## 2018-03-25 PROCEDURE — 96374 THER/PROPH/DIAG INJ IV PUSH: CPT

## 2018-03-25 PROCEDURE — 84484 ASSAY OF TROPONIN QUANT: CPT

## 2018-03-25 PROCEDURE — 93005 ELECTROCARDIOGRAM TRACING: CPT

## 2018-03-25 PROCEDURE — 83880 ASSAY OF NATRIURETIC PEPTIDE: CPT

## 2018-03-25 PROCEDURE — 63600175 PHARM REV CODE 636 W HCPCS: Performed by: PHYSICIAN ASSISTANT

## 2018-03-25 PROCEDURE — 96375 TX/PRO/DX INJ NEW DRUG ADDON: CPT

## 2018-03-25 PROCEDURE — 93010 ELECTROCARDIOGRAM REPORT: CPT | Mod: ,,, | Performed by: INTERNAL MEDICINE

## 2018-03-25 PROCEDURE — 85610 PROTHROMBIN TIME: CPT

## 2018-03-25 PROCEDURE — 87086 URINE CULTURE/COLONY COUNT: CPT

## 2018-03-25 PROCEDURE — 99284 EMERGENCY DEPT VISIT MOD MDM: CPT | Mod: 25

## 2018-03-25 PROCEDURE — 85025 COMPLETE CBC W/AUTO DIFF WBC: CPT

## 2018-03-25 PROCEDURE — 87077 CULTURE AEROBIC IDENTIFY: CPT

## 2018-03-25 PROCEDURE — 87088 URINE BACTERIA CULTURE: CPT

## 2018-03-25 PROCEDURE — 81001 URINALYSIS AUTO W/SCOPE: CPT

## 2018-03-25 PROCEDURE — 80053 COMPREHEN METABOLIC PANEL: CPT

## 2018-03-25 RX ORDER — CEFTRIAXONE 1 G/1
1 INJECTION, POWDER, FOR SOLUTION INTRAMUSCULAR; INTRAVENOUS
Status: COMPLETED | OUTPATIENT
Start: 2018-03-25 | End: 2018-03-25

## 2018-03-25 RX ORDER — FUROSEMIDE 10 MG/ML
80 INJECTION INTRAMUSCULAR; INTRAVENOUS
Status: COMPLETED | OUTPATIENT
Start: 2018-03-25 | End: 2018-03-25

## 2018-03-25 RX ORDER — DOXYCYCLINE 100 MG/1
100 CAPSULE ORAL 2 TIMES DAILY
Qty: 20 CAPSULE | Refills: 0 | Status: SHIPPED | OUTPATIENT
Start: 2018-03-25 | End: 2018-04-04

## 2018-03-25 RX ADMIN — FUROSEMIDE 80 MG: 10 INJECTION, SOLUTION INTRAMUSCULAR; INTRAVENOUS at 06:03

## 2018-03-25 RX ADMIN — CEFTRIAXONE SODIUM 1 G: 1 INJECTION, POWDER, FOR SOLUTION INTRAMUSCULAR; INTRAVENOUS at 06:03

## 2018-03-25 NOTE — ED NOTES
Pt arrives for evaluation of blood in urine x 2 days with pain and burning on urination x 1 week - also complains of cough productive of white sputum x 1 week - minimal end inspiratory wheeze noted to left lobes only

## 2018-03-25 NOTE — ED PROVIDER NOTES
Encounter Date: 3/25/2018       History     Chief Complaint   Patient presents with    Hematuria     on coumadin    Cough     wet cough     62-year-old male with medical comorbidities significant for CHF with EF 30%, mechanical heart valve in place on chronic anticoagulation, COPD (uses home O2 nightly), HTN, HLD, hep C, pulmonary hypertension, pulmonary hypertension presents to the ED with complaints of hematuria and cough.  Patient reports dysuria over the past week.  He noticed darker color to his urine for the past couple of days.  He denies back or flank pain, abdominal pain, nausea, vomiting.  Patient also reports a productive cough over the past 3 weeks.  Sputum is white with occasional yellow color.  He is requiring his MDI and nebulized breathing treatments more frequently to help his cough, but denies any worsening shortness of breath. He does endorse subjective fever and chills 2 nights ago.   Denies chest pain, leg swelling. Pt still smokes 0.5ppd. Pt also admits that he does not take his full dose of Lasix bc of frequent urination and concerns about dehydration.            Review of patient's allergies indicates:  No Known Allergies  Past Medical History:   Diagnosis Date    Anemia     Asthma     CHF (congestive heart failure)     Chronic bronchitis     Cirrhosis 1/19/2015    Hepatitis C     Hyperlipidemia     Hypertension     Lung disease     Portal hypertension 1/30/2018    Pulmonary hypertension      Past Surgical History:   Procedure Laterality Date    CARDIAC VALVE REPLACEMENT      AVR WITH MVr 2007    HERNIA REPAIR  2006    questionable mesh, right inguinal, unbilical    LUNG DECORTICATION      right thoracotomy 2010    LUNG DECORTICATION  2010    open heart surgery      redo AVR and MVR 2009     Family History   Problem Relation Age of Onset    Hypertension Mother     Hypertension Father     Hypertension Brother     Heart disease Brother     Heart attack Brother      Hypertension Sister      Social History   Substance Use Topics    Smoking status: Current Every Day Smoker     Packs/day: 0.50     Years: 48.00     Types: Cigarettes    Smokeless tobacco: Never Used      Comment: 5 a day    Alcohol use 0.0 oz/week      Comment: seldom     Review of Systems   Constitutional: Positive for chills and fever. Negative for appetite change.   HENT: Negative for sore throat.    Respiratory: Positive for cough. Negative for shortness of breath.    Cardiovascular: Negative for chest pain.   Gastrointestinal: Negative for abdominal pain, nausea and vomiting.   Genitourinary: Positive for dysuria and hematuria. Negative for flank pain.   Musculoskeletal: Negative for back pain.   Skin: Negative for rash.   Neurological: Negative for weakness.   Psychiatric/Behavioral: Negative for confusion.       Physical Exam     Initial Vitals [03/25/18 1527]   BP Pulse Resp Temp SpO2   (!) 120/59 82 18 98.2 °F (36.8 °C) 95 %      MAP       79.33         Physical Exam    Nursing note and vitals reviewed.  Constitutional: He appears well-developed and well-nourished.  Non-toxic appearance. He does not appear ill. No distress.   HENT:   Head: Normocephalic and atraumatic.   Neck: Normal range of motion. Neck supple. No JVD present.   Cardiovascular: Normal rate and regular rhythm. Exam reveals no gallop, no distant heart sounds and no friction rub.    Murmur heard.  1+ edema to BLE. Click noted of mechanical valve.    Pulmonary/Chest: Effort normal. No accessory muscle usage. No tachypnea. No respiratory distress. He has decreased breath sounds. He has no wheezes. He has no rhonchi. He has no rales.   Abdominal: He exhibits no distension. There is no tenderness. There is no CVA tenderness.   Musculoskeletal: Normal range of motion.   Neurological: He is alert and oriented to person, place, and time.   Skin: Skin is warm and dry. No rash noted. No pallor.   Psychiatric: He has a normal mood and affect. His  behavior is normal. Judgment and thought content normal.         ED Course   Procedures  Labs Reviewed   URINALYSIS, REFLEX TO URINE CULTURE - Abnormal; Notable for the following:        Result Value    Appearance, UA Cloudy (*)     Protein, UA 2+ (*)     Occult Blood UA 3+ (*)     Leukocytes, UA 3+ (*)     All other components within normal limits    Narrative:     Preferred Collection Type->Urine, Clean Catch   URINALYSIS MICROSCOPIC - Abnormal; Notable for the following:     RBC, UA 19 (*)     WBC, UA >100 (*)     WBC Clumps, UA Occasional (*)     Bacteria, UA Many (*)     All other components within normal limits    Narrative:     Preferred Collection Type->Urine, Clean Catch   CBC W/ AUTO DIFFERENTIAL - Abnormal; Notable for the following:     RBC 3.70 (*)     Hemoglobin 12.1 (*)     Hematocrit 37.0 (*)      (*)     MCH 32.7 (*)     Platelets 120 (*)     Lymph # 0.9 (*)     Lymph% 15.0 (*)     Mono% 16.4 (*)     All other components within normal limits   PROTIME-INR - Abnormal; Notable for the following:     Prothrombin Time 21.4 (*)     INR 2.2 (*)     All other components within normal limits   CULTURE, URINE   COMPREHENSIVE METABOLIC PANEL   TROPONIN I   B-TYPE NATRIURETIC PEPTIDE             Medical Decision Making:   History:   Old Medical Records: I decided to obtain old medical records.  Differential Diagnosis:   My differential diagnosis includes but is not limited to: UTI, pyelonephritis, renal stone, malignancy, CHF exacerbation, COPD exacerbation, bronchitis, pneumonia  Clinical Tests:   Lab Tests: Ordered and Reviewed  Radiological Study: Ordered and Reviewed  Medical Tests: Ordered and Reviewed       APC / Resident Notes:   63 yo M with CHF, COPD presents with hematuria as well as cough. Vitals WNL. Afebrile. NAD. Lung sounds are somewhat decreased, no wheezes appreciated. Abdomen soft, nontender. No CVA tenderness. 1+ peripheral edema.     UA significant for infection.          Attending  Attestation:     Physician Attestation Statement for NP/PA:       Other NP/PA Attestation Additions:      Medical Decision Making: Patient's BNP is elevated but consistent with prior readings.  His chest x-ray is also unchanged.  He does not appear to be in full wanted failure.  He is episodic about taking his Lasix due to fear over dehydration or drink, urinate too frequently..  He is given a full dose of Lasix today and encouraged to take his Lasix per his doctor's instructions.  His UTI will be managed with antibiotics.  He is stable for discharge and PCP clinic follow-up.    Attending Attestation:      Physician Attestation Statement for NP/PA:   I discussed this assessment and plan of this patient with the NP/PA, but I did not personally examine the patient. The face to face encounter was performed by the NP/PA.                    Clinical Impression:   The primary encounter diagnosis was Urinary tract infection with hematuria, site unspecified. Diagnoses of Shortness of breath and Cough were also pertinent to this visit.                           Sridhar Bird MD  03/26/18 1100

## 2018-03-25 NOTE — DISCHARGE INSTRUCTIONS
Take your full dose of Lasix as prescribed. Follow up with your primary care doctor or cardiologist in 2 days for reevaluation.  Return to the ER immediately for any new or concerning symptoms.

## 2018-03-26 DIAGNOSIS — I50.22 CHRONIC SYSTOLIC HEART FAILURE: Primary | ICD-10-CM

## 2018-03-27 LAB — BACTERIA UR CULT: NORMAL

## 2018-03-27 NOTE — PROGRESS NOTES
The pt is starting a course of doxy, which can cause his INR to rise.  However, his last two INRs have been slightly subtherapeutic.  Therefore, I am not currently adjusting his warfarin dose, but I will monitor closely with a repeat level tomorrow, as planned.

## 2018-03-28 ENCOUNTER — ANTI-COAG VISIT (OUTPATIENT)
Dept: CARDIOLOGY | Facility: CLINIC | Age: 63
End: 2018-03-28

## 2018-03-28 DIAGNOSIS — Z79.01 ANTICOAGULATED ON COUMADIN: ICD-10-CM

## 2018-03-28 DIAGNOSIS — Z95.2 STATUS POST HEART VALVE REPLACEMENT WITH MECHANICAL VALVE: ICD-10-CM

## 2018-03-28 LAB — INR PPP: 2.8

## 2018-03-28 NOTE — PROGRESS NOTES
The pt started a course of doxycycline 100mg BID on 3/25.  It appears that is is not causing a DDI with his warfarin.  Dose per calendar.

## 2018-04-02 ENCOUNTER — LAB VISIT (OUTPATIENT)
Dept: LAB | Facility: HOSPITAL | Age: 63
End: 2018-04-02
Payer: MEDICARE

## 2018-04-02 ENCOUNTER — OFFICE VISIT (OUTPATIENT)
Dept: TRANSPLANT | Facility: CLINIC | Age: 63
End: 2018-04-02
Attending: INTERNAL MEDICINE
Payer: MEDICARE

## 2018-04-02 VITALS
WEIGHT: 155.19 LBS | BODY MASS INDEX: 22.98 KG/M2 | SYSTOLIC BLOOD PRESSURE: 104 MMHG | HEART RATE: 84 BPM | HEIGHT: 69 IN | DIASTOLIC BLOOD PRESSURE: 62 MMHG

## 2018-04-02 DIAGNOSIS — I50.22 CHRONIC SYSTOLIC HEART FAILURE: ICD-10-CM

## 2018-04-02 DIAGNOSIS — I50.43 ACUTE ON CHRONIC COMBINED SYSTOLIC AND DIASTOLIC CONGESTIVE HEART FAILURE: ICD-10-CM

## 2018-04-02 DIAGNOSIS — B18.2 HEPATIC CIRRHOSIS DUE TO CHRONIC HEPATITIS C INFECTION: ICD-10-CM

## 2018-04-02 DIAGNOSIS — J44.9 CHRONIC OBSTRUCTIVE PULMONARY DISEASE, UNSPECIFIED COPD TYPE: ICD-10-CM

## 2018-04-02 DIAGNOSIS — Z79.01 ANTICOAGULATED ON COUMADIN: ICD-10-CM

## 2018-04-02 DIAGNOSIS — I10 HYPERTENSION, UNSPECIFIED TYPE: ICD-10-CM

## 2018-04-02 DIAGNOSIS — K76.6 PORTAL HYPERTENSION: ICD-10-CM

## 2018-04-02 DIAGNOSIS — I48.0 PAROXYSMAL ATRIAL FIBRILLATION: ICD-10-CM

## 2018-04-02 DIAGNOSIS — I50.22 CHRONIC SYSTOLIC CHF (CONGESTIVE HEART FAILURE), NYHA CLASS 2: Primary | ICD-10-CM

## 2018-04-02 DIAGNOSIS — K74.60 HEPATIC CIRRHOSIS DUE TO CHRONIC HEPATITIS C INFECTION: ICD-10-CM

## 2018-04-02 LAB
ANION GAP SERPL CALC-SCNC: 8 MMOL/L
BNP SERPL-MCNC: 1081 PG/ML
BUN SERPL-MCNC: 30 MG/DL
CALCIUM SERPL-MCNC: 9.5 MG/DL
CHLORIDE SERPL-SCNC: 103 MMOL/L
CO2 SERPL-SCNC: 30 MMOL/L
CREAT SERPL-MCNC: 1.5 MG/DL
EST. GFR  (AFRICAN AMERICAN): 56.1 ML/MIN/1.73 M^2
EST. GFR  (NON AFRICAN AMERICAN): 48.5 ML/MIN/1.73 M^2
GLUCOSE SERPL-MCNC: 79 MG/DL
MAGNESIUM SERPL-MCNC: 1.9 MG/DL
POTASSIUM SERPL-SCNC: 3.9 MMOL/L
SODIUM SERPL-SCNC: 141 MMOL/L

## 2018-04-02 PROCEDURE — 83880 ASSAY OF NATRIURETIC PEPTIDE: CPT

## 2018-04-02 PROCEDURE — 80048 BASIC METABOLIC PNL TOTAL CA: CPT

## 2018-04-02 PROCEDURE — 99999 PR PBB SHADOW E&M-EST. PATIENT-LVL III: CPT | Mod: PBBFAC,,, | Performed by: PHYSICIAN ASSISTANT

## 2018-04-02 PROCEDURE — 99214 OFFICE O/P EST MOD 30 MIN: CPT | Mod: S$GLB,,, | Performed by: PHYSICIAN ASSISTANT

## 2018-04-02 PROCEDURE — 3074F SYST BP LT 130 MM HG: CPT | Mod: CPTII,S$GLB,, | Performed by: PHYSICIAN ASSISTANT

## 2018-04-02 PROCEDURE — 3078F DIAST BP <80 MM HG: CPT | Mod: CPTII,S$GLB,, | Performed by: PHYSICIAN ASSISTANT

## 2018-04-02 PROCEDURE — 83735 ASSAY OF MAGNESIUM: CPT

## 2018-04-02 PROCEDURE — 36415 COLL VENOUS BLD VENIPUNCTURE: CPT

## 2018-04-03 NOTE — PROGRESS NOTES
Subjective:       HPI:  Mr. Villalobos is a 62 y.o. year old Black or  male who has presents after ER visit for UTI.  HE has PMH of chronic HFrEF EF 35%, Mechanical AV, Mechanical Mitral Valve 2009 for valve regurge , HTN, HLD, Hep C, asthma. Patient saw Dr Guzman and Dr CLARISSE amaya in 2012 but hasn't been following with cardiologist. Seen by general cardiology in January of this year and was felt to be doing well. Last week he presented to the ED with compklaints of hematuria, was placed on po doxy for 10 days (2 days remaining). Was told to follow up in HTS clinic when his BNP was found to be elevated. Today he reports baseline SOB, no PND, orthopnea.  Followed by hepatology for Hep C history and cirrhosis. Continues on ACE, BB and lasix 80 mg daily. He does dose lasix in the evening if his weight increases or he feels SOB. He has not done this in weeks. Echo last year with LVEF 55%.      Past Medical History:   Diagnosis Date    Anemia     Asthma     CHF (congestive heart failure)     Chronic bronchitis     Cirrhosis 1/19/2015    Hepatitis C     Hyperlipidemia     Hypertension     Lung disease     Portal hypertension 1/30/2018    Pulmonary hypertension      Past Surgical History:   Procedure Laterality Date    CARDIAC VALVE REPLACEMENT      AVR WITH MVr 2007    HERNIA REPAIR  2006    questionable mesh, right inguinal, unbilical    LUNG DECORTICATION      right thoracotomy 2010    LUNG DECORTICATION  2010    open heart surgery      redo AVR and MVR 2009       Family History   Problem Relation Age of Onset    Hypertension Mother     Hypertension Father     Hypertension Brother     Heart disease Brother     Heart attack Brother     Hypertension Sister        Review of Systems   Constitution: Negative for chills, decreased appetite, weakness, malaise/fatigue and weight gain.   HENT: Negative for congestion.    Eyes: Negative for blurred vision.   Cardiovascular: Negative for chest pain,  "dyspnea on exertion, orthopnea and paroxysmal nocturnal dyspnea.   Respiratory: Positive for cough and shortness of breath. Negative for hemoptysis and snoring.    Musculoskeletal: Negative for arthritis and back pain.   Gastrointestinal: Negative for nausea and vomiting.       Objective:   Blood pressure 104/62, pulse 84, height 5' 9" (1.753 m), weight 70.4 kg (155 lb 3.3 oz).body mass index is 22.92 kg/m².    Physical Exam  Constitutional: He is oriented to person, place, and time. He appears well-developed and well-nourished. No distress.   HENT:   Head: Normocephalic and atraumatic.   Nose: Nose normal.   Mouth/Throat: No oropharyngeal exudate.   Eyes: Conjunctivae and EOM are normal. Pupils are equal, round, and reactive to light. Right eye exhibits no discharge. Left eye exhibits no discharge. No scleral icterus.   Neck: Normal range of motion. Neck supple. JVD (10cm H2O) present. No tracheal deviation present. No thyromegaly present.   Cardiovascular: Normal rate, regular rhythm, normal heart sounds and intact distal pulses.  Exam reveals no gallop and no friction rub.    No murmur heard.  Mechanical click of the mitral and aortic valve   Pulmonary/Chest: Effort normal and breath sounds normal. No stridor. No respiratory distress. He has no wheezes. He has no rales. He exhibits no tenderness.   Abdominal: Soft. Bowel sounds are normal. He exhibits no distension and no mass. There is no tenderness.   Musculoskeletal: He exhibits no edema or tenderness.   Lymphadenopathy:     He has no cervical adenopathy.   Neurological: He is alert and oriented to person, place, and time. He displays normal reflexes. No cranial nerve deficit. He exhibits normal muscle tone. Coordination normal.   Skin: Skin is warm. No rash noted. He is not diaphoretic. No erythema. No pallor.   Psychiatric: He has a normal mood and affect. His behavior is normal. Judgment and thought content normal.         Labs:      Chemistry      "   Component Value Date/Time     04/02/2018 1453    K 3.9 04/02/2018 1453     04/02/2018 1453    CO2 30 (H) 04/02/2018 1453    BUN 30 (H) 04/02/2018 1453    CREATININE 1.5 (H) 04/02/2018 1453    GLU 79 04/02/2018 1453        Component Value Date/Time    CALCIUM 9.5 04/02/2018 1453    ALKPHOS 74 03/25/2018 1800    AST 37 03/25/2018 1800    ALT 39 03/25/2018 1800    BILITOT 1.1 (H) 03/25/2018 1800    ESTGFRAFRICA 56.1 (A) 04/02/2018 1453    EGFRNONAA 48.5 (A) 04/02/2018 1453          Magnesium   Date Value Ref Range Status   04/02/2018 1.9 1.6 - 2.6 mg/dL Final     Lab Results   Component Value Date    WBC 5.80 03/25/2018    HGB 12.1 (L) 03/25/2018    HCT 37.0 (L) 03/25/2018     (H) 03/25/2018     (L) 03/25/2018     BNP   Date Value Ref Range Status   04/02/2018 1,081 (H) 0 - 99 pg/mL Final     Comment:     Values of less than 100 pg/ml are consistent with non-CHF populations.   03/25/2018 1,222 (H) 0 - 99 pg/mL Final     Comment:     Values of less than 100 pg/ml are consistent with non-CHF populations.   01/30/2018 1,211 (H) 0 - 99 pg/mL Final     Comment:     Values of less than 100 pg/ml are consistent with non-CHF populations.     No results found for this or any previous visit.    Labs were reviewed with the patient.    Assessment:      1. Chronic systolic CHF (congestive heart failure), NYHA class 2    2. Hypertension, unspecified type    3. Acute on chronic combined systolic and diastolic congestive heart failure    4. Anticoagulated on Coumadin    5. Chronic obstructive pulmonary disease, unspecified COPD type    6. Hepatic cirrhosis due to chronic hepatitis C infection    7. Paroxysmal atrial fibrillation    8. Portal hypertension        Plan:   Will have patient take lasix 80 mg QAM and 40 mg QPM for three days then return to 80 mg daily  OK for patient to be followed by cardiology clinic and hepatology clinic   Patient is now NYHA II ACC stage D  Recommend 2 gram sodium restriction  and 1500cc fluid restriction.  Encourage physical activity with graded exercise program.  Requested patient to weigh themselves daily, and to notify us if their weight increases by more than 3 lbs in 1 day or 5 lbs in 1 week.         Aslhey Negro PA

## 2018-04-04 ENCOUNTER — ANTI-COAG VISIT (OUTPATIENT)
Dept: CARDIOLOGY | Facility: CLINIC | Age: 63
End: 2018-04-04
Payer: MEDICARE

## 2018-04-04 DIAGNOSIS — Z79.01 ANTICOAGULATED ON COUMADIN: ICD-10-CM

## 2018-04-04 DIAGNOSIS — Z95.2 STATUS POST HEART VALVE REPLACEMENT WITH MECHANICAL VALVE: ICD-10-CM

## 2018-04-04 LAB — INR PPP: 2.7

## 2018-04-04 PROCEDURE — G0250 MD INR TEST REVIE INTER MGMT: HCPCS | Mod: S$GLB,,, | Performed by: PHARMACIST

## 2018-04-16 ENCOUNTER — OFFICE VISIT (OUTPATIENT)
Dept: INTERNAL MEDICINE | Facility: CLINIC | Age: 63
End: 2018-04-16
Payer: MEDICARE

## 2018-04-16 ENCOUNTER — HOSPITAL ENCOUNTER (EMERGENCY)
Facility: HOSPITAL | Age: 63
Discharge: HOME | End: 2018-04-16
Attending: EMERGENCY MEDICINE
Payer: MEDICARE

## 2018-04-16 VITALS
HEIGHT: 69 IN | TEMPERATURE: 98 F | HEART RATE: 82 BPM | OXYGEN SATURATION: 98 % | BODY MASS INDEX: 22.96 KG/M2 | DIASTOLIC BLOOD PRESSURE: 66 MMHG | SYSTOLIC BLOOD PRESSURE: 124 MMHG | RESPIRATION RATE: 16 BRPM | WEIGHT: 155 LBS

## 2018-04-16 VITALS
BODY MASS INDEX: 23.51 KG/M2 | HEART RATE: 68 BPM | SYSTOLIC BLOOD PRESSURE: 118 MMHG | WEIGHT: 158.75 LBS | HEIGHT: 69 IN | DIASTOLIC BLOOD PRESSURE: 76 MMHG

## 2018-04-16 DIAGNOSIS — R31.9 URINARY TRACT INFECTION WITH HEMATURIA, SITE UNSPECIFIED: Primary | ICD-10-CM

## 2018-04-16 DIAGNOSIS — R36.1 BLOOD IN SEMEN: ICD-10-CM

## 2018-04-16 DIAGNOSIS — N39.0 URINARY TRACT INFECTION WITH HEMATURIA, SITE UNSPECIFIED: Primary | ICD-10-CM

## 2018-04-16 LAB
ALBUMIN SERPL BCP-MCNC: 3.7 G/DL
ALP SERPL-CCNC: 59 U/L
ALT SERPL W/O P-5'-P-CCNC: 22 U/L
ANION GAP SERPL CALC-SCNC: 7 MMOL/L
AST SERPL-CCNC: 30 U/L
BACTERIA #/AREA URNS AUTO: ABNORMAL /HPF
BASOPHILS # BLD AUTO: 0.02 K/UL
BASOPHILS NFR BLD: 0.3 %
BILIRUB SERPL-MCNC: 0.7 MG/DL
BILIRUB UR QL STRIP: NEGATIVE
BUN SERPL-MCNC: 22 MG/DL
CALCIUM SERPL-MCNC: 9.8 MG/DL
CAOX CRY UR QL COMP ASSIST: ABNORMAL
CHLORIDE SERPL-SCNC: 103 MMOL/L
CLARITY UR REFRACT.AUTO: ABNORMAL
CO2 SERPL-SCNC: 29 MMOL/L
COLOR UR AUTO: ABNORMAL
CREAT SERPL-MCNC: 1.1 MG/DL
DIFFERENTIAL METHOD: ABNORMAL
EOSINOPHIL # BLD AUTO: 0.2 K/UL
EOSINOPHIL NFR BLD: 2.4 %
ERYTHROCYTE [DISTWIDTH] IN BLOOD BY AUTOMATED COUNT: 14 %
EST. GFR  (AFRICAN AMERICAN): >60 ML/MIN/1.73 M^2
EST. GFR  (NON AFRICAN AMERICAN): >60 ML/MIN/1.73 M^2
GLUCOSE SERPL-MCNC: 120 MG/DL
GLUCOSE UR QL STRIP: NEGATIVE
HCT VFR BLD AUTO: 38.6 %
HGB BLD-MCNC: 12.7 G/DL
HGB UR QL STRIP: ABNORMAL
HYALINE CASTS UR QL AUTO: 0 /LPF
INR PPP: 2.3
KETONES UR QL STRIP: NEGATIVE
LEUKOCYTE ESTERASE UR QL STRIP: ABNORMAL
LYMPHOCYTES # BLD AUTO: 1.4 K/UL
LYMPHOCYTES NFR BLD: 21.9 %
MCH RBC QN AUTO: 32.6 PG
MCHC RBC AUTO-ENTMCNC: 32.9 G/DL
MCV RBC AUTO: 99 FL
MICROSCOPIC COMMENT: ABNORMAL
MONOCYTES # BLD AUTO: 0.7 K/UL
MONOCYTES NFR BLD: 11.9 %
NEUTROPHILS # BLD AUTO: 3.9 K/UL
NEUTROPHILS NFR BLD: 63.2 %
NITRITE UR QL STRIP: POSITIVE
PH UR STRIP: 5 [PH] (ref 5–8)
PLATELET # BLD AUTO: 143 K/UL
PMV BLD AUTO: 11.3 FL
POTASSIUM SERPL-SCNC: 3.7 MMOL/L
PROT SERPL-MCNC: 7.5 G/DL
PROT UR QL STRIP: ABNORMAL
PROTHROMBIN TIME: 24.1 SEC
RBC # BLD AUTO: 3.9 M/UL
RBC #/AREA URNS AUTO: 28 /HPF (ref 0–4)
SODIUM SERPL-SCNC: 139 MMOL/L
SP GR UR STRIP: 1.02 (ref 1–1.03)
SQUAMOUS #/AREA URNS AUTO: 2 /HPF
URN SPEC COLLECT METH UR: ABNORMAL
UROBILINOGEN UR STRIP-ACNC: 4 EU/DL
WBC # BLD AUTO: 6.16 K/UL
WBC #/AREA URNS AUTO: 70 /HPF (ref 0–5)

## 2018-04-16 PROCEDURE — 85025 COMPLETE CBC W/AUTO DIFF WBC: CPT

## 2018-04-16 PROCEDURE — 99213 OFFICE O/P EST LOW 20 MIN: CPT | Mod: S$GLB,,, | Performed by: INTERNAL MEDICINE

## 2018-04-16 PROCEDURE — 87088 URINE BACTERIA CULTURE: CPT

## 2018-04-16 PROCEDURE — 87186 SC STD MICRODIL/AGAR DIL: CPT

## 2018-04-16 PROCEDURE — 81001 URINALYSIS AUTO W/SCOPE: CPT

## 2018-04-16 PROCEDURE — 80053 COMPREHEN METABOLIC PANEL: CPT

## 2018-04-16 PROCEDURE — 87086 URINE CULTURE/COLONY COUNT: CPT

## 2018-04-16 PROCEDURE — 25000003 PHARM REV CODE 250: Performed by: EMERGENCY MEDICINE

## 2018-04-16 PROCEDURE — 87077 CULTURE AEROBIC IDENTIFY: CPT

## 2018-04-16 PROCEDURE — 99283 EMERGENCY DEPT VISIT LOW MDM: CPT

## 2018-04-16 PROCEDURE — 85610 PROTHROMBIN TIME: CPT

## 2018-04-16 PROCEDURE — 3078F DIAST BP <80 MM HG: CPT | Mod: CPTII,S$GLB,, | Performed by: INTERNAL MEDICINE

## 2018-04-16 PROCEDURE — 99999 PR PBB SHADOW E&M-EST. PATIENT-LVL III: CPT | Mod: PBBFAC,,, | Performed by: INTERNAL MEDICINE

## 2018-04-16 PROCEDURE — 3074F SYST BP LT 130 MM HG: CPT | Mod: CPTII,S$GLB,, | Performed by: INTERNAL MEDICINE

## 2018-04-16 RX ORDER — AMOXICILLIN AND CLAVULANATE POTASSIUM 875; 125 MG/1; MG/1
1 TABLET, FILM COATED ORAL
Status: COMPLETED | OUTPATIENT
Start: 2018-04-16 | End: 2018-04-16

## 2018-04-16 RX ORDER — AMOXICILLIN AND CLAVULANATE POTASSIUM 875; 125 MG/1; MG/1
1 TABLET, FILM COATED ORAL 2 TIMES DAILY
Qty: 20 TABLET | Refills: 0 | Status: SHIPPED | OUTPATIENT
Start: 2018-04-16 | End: 2018-04-26

## 2018-04-16 RX ADMIN — AMOXICILLIN AND CLAVULANATE POTASSIUM 1 TABLET: 875; 125 TABLET, FILM COATED ORAL at 11:04

## 2018-04-16 NOTE — PROGRESS NOTES
Subjective:       Patient ID: Paul Villalobos Sr. is a 62 y.o. male.    Chief Complaint: Exposure to STD (Blood during ejaculation )    Yesterday he noticed blood in ejaculate. Has never had this issue before.  Denies blood in urine, but a few wks ago had UTI and blood in urine at that time. These symptoms went away with abx treatment. Ucx showed enterobacter.  No other new symptoms.    Still smoking.      Review of Systems   Constitutional: Negative for activity change, appetite change, chills, fatigue, fever and unexpected weight change.   HENT: Negative for congestion, postnasal drip, rhinorrhea and voice change.    Respiratory: Positive for cough and shortness of breath (stable though). Negative for chest tightness and wheezing.    Cardiovascular: Negative for chest pain, palpitations and leg swelling.   Gastrointestinal: Positive for constipation and diarrhea. Negative for abdominal distention, abdominal pain, nausea and vomiting.   Genitourinary: Negative for decreased urine volume, difficulty urinating, dysuria, flank pain, frequency, hematuria, scrotal swelling and testicular pain.   Musculoskeletal: Negative for neck pain and neck stiffness.   Skin: Negative for rash and wound.   Neurological: Negative for tremors, syncope and weakness.   Hematological: Negative for adenopathy. Does not bruise/bleed easily.   Psychiatric/Behavioral: Negative for dysphoric mood.       Objective:      Physical Exam   Constitutional: He appears well-developed and well-nourished. No distress.   Abdominal: Soft. Bowel sounds are normal.   Genitourinary:   Genitourinary Comments: genital: scrotum nl, no testicular enlargement, penis no rash, no inguinal lad, nl femoral pulses     Skin: He is not diaphoretic.       Assessment:       1. Urinary tract infection with hematuria, site unspecified    2. Blood in semen        Plan:       Paul was seen today for exposure to std.    Diagnoses and all orders for this visit:    Urinary tract  infection with hematuria, site unspecified  -     Urinalysis  -     Urine culture  -     Urinalysis Microscopic    Blood in semen  -     Urinalysis  -     Urine culture  -     Urinalysis Microscopic    Lengthy discussion re importance of quitting smoking    Health Maintenance       Date Due Completion Date    TETANUS VACCINE 12/29/1971 ---    Zoster Vaccine 12/29/2013 ---    Fecal Occult Blood Test (FOBT)/[facility] 09/29/2018 9/29/2017    Pneumococcal PPSV23 (Medium Risk) (2) 12/29/2018 3/27/2013    Lipid Panel 12/25/2022 12/25/2017          No Follow-up on file.

## 2018-04-17 NOTE — ED PROVIDER NOTES
Encounter Date: 4/16/2018    SCRIBE #1 NOTE: I, Nohemi Aguirre, am scribing for, and in the presence of,  Shaan Grewal MD. I have scribed the following portions of the note - Other sections scribed: HPI, ROS.       History     Chief Complaint   Patient presents with    Hematuria     Pt states he has blood and clots in urine x2 days, saw PCP yesterday and was told to come to ED. c/o bladder pain     CC: Hematuria    HPI: 62 year old male smoker with CHF on Coumadin, HTN, asthma, HLD, and hx of lung disease presents to the ED c/o hematuria since last night. Pt reports having R groin pain that began last night followed by dysuria and episodes of urinating blood with clots. Pain is 9/10. Pt was evaluated by PCP this morning for similar complaints and returned to the ED because the bleeding has not subsided. Prior to yesterday, pt reports he was evaluated on 3/25/18 for hematuria and was discharged home with a 10-day treatment with doxycycline. Pt states symptoms were resolved after finishing the course of antibiotics. Pt denies any known bladder or prostate problems. Pt's last INR level was 2.7. No further complaints reported. Pt denies fever, chills, chest pain, SOB, fatigue, light-headedness, dizziness, abdominal pain, flank pain, and any other associated symptoms. No hx of kidney stones. No alleviating factors.      The history is provided by the patient. No  was used.     Review of patient's allergies indicates:  No Known Allergies  Past Medical History:   Diagnosis Date    Anemia     Asthma     CHF (congestive heart failure)     Chronic bronchitis     Cirrhosis 1/19/2015    Hepatitis C     Hyperlipidemia     Hypertension     Lung disease     Portal hypertension 1/30/2018    Pulmonary hypertension      Past Surgical History:   Procedure Laterality Date    CARDIAC VALVE REPLACEMENT      AVR WITH MVr 2007    HERNIA REPAIR  2006    questionable mesh, right inguinal, unbilical     LUNG DECORTICATION      right thoracotomy 2010    LUNG DECORTICATION  2010    open heart surgery      redo AVR and MVR 2009     Family History   Problem Relation Age of Onset    Hypertension Mother     Hypertension Father     Hypertension Brother     Heart disease Brother     Heart attack Brother     Hypertension Sister      Social History   Substance Use Topics    Smoking status: Current Every Day Smoker     Packs/day: 0.50     Years: 48.00     Types: Cigarettes    Smokeless tobacco: Never Used      Comment: 5 a day    Alcohol use 0.0 oz/week      Comment: seldom     Review of Systems   Constitutional: Negative for chills, diaphoresis, fatigue and fever.   HENT: Negative for ear pain and sore throat.    Eyes: Negative for pain and visual disturbance.   Respiratory: Negative for cough and shortness of breath.    Cardiovascular: Negative for chest pain.   Gastrointestinal: Negative for abdominal pain, diarrhea, nausea and vomiting.   Genitourinary: Positive for dysuria and hematuria. Negative for flank pain.        (+) R groin pain   Musculoskeletal: Negative for back pain.   Skin: Negative for rash.   Neurological: Negative for dizziness, weakness, light-headedness, numbness and headaches.   Hematological: Bruises/bleeds easily (on Coumadin).       Physical Exam     Initial Vitals [04/16/18 2146]   BP Pulse Resp Temp SpO2   (!) 151/70 80 16 97.7 °F (36.5 °C) 97 %      MAP       97         Physical Exam    ED Course   Procedures  Labs Reviewed   CBC W/ AUTO DIFFERENTIAL - Abnormal; Notable for the following:        Result Value    RBC 3.90 (*)     Hemoglobin 12.7 (*)     Hematocrit 38.6 (*)     MCV 99 (*)     MCH 32.6 (*)     Platelets 143 (*)     All other components within normal limits   COMPREHENSIVE METABOLIC PANEL - Abnormal; Notable for the following:     Glucose 120 (*)     Anion Gap 7 (*)     All other components within normal limits   PROTIME-INR - Abnormal; Notable for the following:      Prothrombin Time 24.1 (*)     INR 2.3 (*)     All other components within normal limits        UA from this morning shows infection. Lab work normal. INR 2.3. Patient is on coumadin. No urinary retention per bladder scan. Will treat with augmentin. CUlture previously ordered and pending.                 Scribe Attestation:   Scribe #1: I performed the above scribed service and the documentation accurately describes the services I performed. I attest to the accuracy of the note.    Attending Attestation:           Physician Attestation for Scribe:  Physician Attestation Statement for Scribe #1: I, Shaan Grewal MD, reviewed documentation, as scribed by Nohemi Aguirre in my presence, and it is both accurate and complete.                    Clinical Impression:   The encounter diagnosis was Urinary tract infection with hematuria, site unspecified.                           Shaan Grewal MD  05/13/18 [id number]

## 2018-04-18 ENCOUNTER — ANTI-COAG VISIT (OUTPATIENT)
Dept: CARDIOLOGY | Facility: CLINIC | Age: 63
End: 2018-04-18

## 2018-04-18 ENCOUNTER — PES CALL (OUTPATIENT)
Dept: ADMINISTRATIVE | Facility: CLINIC | Age: 63
End: 2018-04-18

## 2018-04-18 DIAGNOSIS — Z79.01 ANTICOAGULATED ON COUMADIN: ICD-10-CM

## 2018-04-18 DIAGNOSIS — Z95.2 STATUS POST HEART VALVE REPLACEMENT WITH MECHANICAL VALVE: ICD-10-CM

## 2018-04-18 LAB
BACTERIA UR CULT: NORMAL
INR PPP: 2.3

## 2018-04-19 ENCOUNTER — TELEPHONE (OUTPATIENT)
Dept: INTERNAL MEDICINE | Facility: CLINIC | Age: 63
End: 2018-04-19

## 2018-04-19 DIAGNOSIS — N39.0 RECURRENT UTI: Primary | ICD-10-CM

## 2018-04-19 NOTE — TELEPHONE ENCOUNTER
Please call patient and let him know that urine culture shows that the bacteria growing in his urine may not be susceptible to the antibiotic he was given in the ED.  Please see how he is feeling and if symptoms have resolved.

## 2018-04-19 NOTE — TELEPHONE ENCOUNTER
Spoke with patient regarding recent urine culture and susceptibility.  Does not show that bacteria is susceptible to Augmentin, however patient is feeling great and is no longer having any urinary symptoms.  Told him to let us know if dysuria or hematuria returns.  He is concerned that he had these recent infections and is asking to see Urology.  Referral placed - please schedule.

## 2018-05-02 ENCOUNTER — LAB VISIT (OUTPATIENT)
Dept: LAB | Facility: HOSPITAL | Age: 63
End: 2018-05-02
Payer: MEDICARE

## 2018-05-02 ENCOUNTER — OFFICE VISIT (OUTPATIENT)
Dept: UROLOGY | Facility: CLINIC | Age: 63
End: 2018-05-02
Payer: MEDICARE

## 2018-05-02 VITALS
WEIGHT: 155 LBS | RESPIRATION RATE: 15 BRPM | SYSTOLIC BLOOD PRESSURE: 106 MMHG | DIASTOLIC BLOOD PRESSURE: 65 MMHG | HEIGHT: 69 IN | BODY MASS INDEX: 22.96 KG/M2 | HEART RATE: 83 BPM

## 2018-05-02 DIAGNOSIS — N39.0 RECURRENT UTI: ICD-10-CM

## 2018-05-02 DIAGNOSIS — N13.8 BPH WITH URINARY OBSTRUCTION: ICD-10-CM

## 2018-05-02 DIAGNOSIS — R31.0 GROSS HEMATURIA: ICD-10-CM

## 2018-05-02 DIAGNOSIS — N52.01 ERECTILE DYSFUNCTION DUE TO ARTERIAL INSUFFICIENCY: ICD-10-CM

## 2018-05-02 DIAGNOSIS — R31.0 GROSS HEMATURIA: Primary | ICD-10-CM

## 2018-05-02 DIAGNOSIS — N40.1 BPH WITH URINARY OBSTRUCTION: ICD-10-CM

## 2018-05-02 LAB
BILIRUB SERPL-MCNC: NORMAL MG/DL
BLOOD URINE, POC: 50
COLOR, POC UA: NORMAL
GLUCOSE UR QL STRIP: NORMAL
INR PPP: 1.9
KETONES UR QL STRIP: NORMAL
LEUKOCYTE ESTERASE URINE, POC: NORMAL
NITRITE, POC UA: NORMAL
PH, POC UA: NORMAL
PROTEIN, POC: NORMAL
SPECIFIC GRAVITY, POC UA: NORMAL
UROBILINOGEN, POC UA: NORMAL

## 2018-05-02 PROCEDURE — 99214 OFFICE O/P EST MOD 30 MIN: CPT | Mod: 25,S$GLB,, | Performed by: NURSE PRACTITIONER

## 2018-05-02 PROCEDURE — 88112 CYTOPATH CELL ENHANCE TECH: CPT | Mod: 26,,, | Performed by: PATHOLOGY

## 2018-05-02 PROCEDURE — 87086 URINE CULTURE/COLONY COUNT: CPT

## 2018-05-02 PROCEDURE — 99999 PR PBB SHADOW E&M-EST. PATIENT-LVL V: CPT | Mod: PBBFAC,,, | Performed by: NURSE PRACTITIONER

## 2018-05-02 PROCEDURE — 88112 CYTOPATH CELL ENHANCE TECH: CPT | Performed by: PATHOLOGY

## 2018-05-02 PROCEDURE — 3074F SYST BP LT 130 MM HG: CPT | Mod: CPTII,S$GLB,, | Performed by: NURSE PRACTITIONER

## 2018-05-02 PROCEDURE — 87186 SC STD MICRODIL/AGAR DIL: CPT

## 2018-05-02 PROCEDURE — 3078F DIAST BP <80 MM HG: CPT | Mod: CPTII,S$GLB,, | Performed by: NURSE PRACTITIONER

## 2018-05-02 PROCEDURE — 81002 URINALYSIS NONAUTO W/O SCOPE: CPT | Mod: S$GLB,,, | Performed by: NURSE PRACTITIONER

## 2018-05-02 PROCEDURE — 87077 CULTURE AEROBIC IDENTIFY: CPT

## 2018-05-02 PROCEDURE — 87088 URINE BACTERIA CULTURE: CPT

## 2018-05-02 RX ORDER — CIPROFLOXACIN 250 MG/1
500 TABLET, FILM COATED ORAL ONCE
Status: CANCELLED | OUTPATIENT
Start: 2018-05-02 | End: 2018-05-02

## 2018-05-02 RX ORDER — LIDOCAINE HYDROCHLORIDE 20 MG/ML
JELLY TOPICAL ONCE
Status: CANCELLED | OUTPATIENT
Start: 2018-05-02 | End: 2018-05-02

## 2018-05-02 NOTE — PATIENT INSTRUCTIONS
BPH (Enlarged Prostate)  The prostate is a gland at the base of the bladder. As some men get older, the prostate may get bigger in size. This problem is called benign prostatic hyperplasia (BPH). BPH puts pressure on the urethra. This is the tube that carries urine from the bladder to the penis. It may interfere with the flow of urine. It may also keep the bladder from emptying fully.    Symptoms of BPH include trouble starting urination and feeling as though the bladder isnt emptying all the way. It also includes a weak urine stream, dribbling and leaking of urine, and frequent and urgent urination (especially at night). BPH can increase the risk of urinary infections. It can also block off urine flow completely. If this occurs, a thin tube (catheter) may be passed into the bladder to help drain urine.  If symptoms are mild, no treatment may be needed right now. If symptoms are more severe, treatment is likely needed. The goal of treatment is to improve urine flow and reduce symptoms. Treatments can include medicine and procedures. Your healthcare provider will discuss treatment options with you as needed.  Home care  The following guidelines will help you care for yourself at home:  · Urinate as soon as you feel the urge. Don't try to hold your urine.  · Don't limit your fluid intake during the day. Drink 6 to 8 glasses of water or liquids a day. This prevents bacteria from building up in the bladder.  · Avoid drinking fluids after dinner to help reduce urination during the night.  · Avoid medicines that can worsen your symptoms. These include certain cold and allergy medicines and antidepressants. Diuretics used for high blood pressure can also worsen symptoms. Talk to your doctor about the medicines you take. Other choices may work better for you.  Prostate cancer screening  BPH does not increase the risk of prostate cancer. But because prostate cancer is a common cancer in men, screening is sometimes  recommended. This may help detect the cancer in its early stages when treatment is most effective. Factors that can increase the risk of prostate cancer include being -American or having a father or brother who had prostate cancer. A high-fat diet may also increase the risk of prostate cancer. Talk to your healthcare provider to see whether you should be screened for prostate cancer.  Follow-up care  Follow up with your healthcare provider, or as advised  To learn more, go to:  · National Kidney & Urologic Diseases Information Clearinghouse  kidney.niddk.nih.gov, 863.826.9512  When to seek medical advice  Call your healthcare provider right away if any of these occur:  · Fever of 100.4°F (38.0°C) or higher, or as advised  · Unable to pass urine for 8 hours  · Increasing pressure or pain in your bladder (lower abdomen)  · Blood in the urine  · Increasing low back pain, not related to injury  · Symptoms of urinary infection (increased urge to urinate, burning when passing urine, foul-smelling urine)  Date Last Reviewed: 7/1/2016 © 2000-2017 [employer]. [address] [place] [address]. All rights reserved. This information is not intended as a substitute for professional medical care. Always follow your healthcare professional's instructions.        What is Hematuria?  Blood in your urine is a condition known as hematuria. Most of the time, the cause of hematuria is not serious. But, never ignore blood in the urine. Your doctor can evaluate you to find the cause of the bleeding and treat it, if needed.  Types of hematuria  · Gross hematuria means that the blood can be seen by the naked eye. The urine may look pinkish, brownish, or bright red.  · Microscopic hematuria means that the urine is clear, but blood cells can be seen when urine is looked at under a microscope or tested in a lab.  Both types of hematuria can have the same causes. Neither one is necessarily more serious than the  other. With either type, you may have other symptoms, such as pain, pressure, or burning when you urinate, abdominal pain, or back pain. Or, you may not have any other symptoms. No matter how much blood is found, the cause of the bleeding needs to be identified.  Finding the cause of hematuria  To evaluate your condition, your doctor will first confirm that blood is indeed present. Then other tests are done to pinpoint where the blood is coming from and why. Your doctor will decide which tests will best determine the cause of your hematuria. Some common tests are listed below.  · History and physical exam  · Lab tests may include urinalysis, a urine culture, a urine cytology, and various blood tests  · Cystoscopy  · Computed tomography (CT) or CT urography  · Magnetic resonance imaging (MRI) or MR urography  · Ultrasound of the kidney  · Kidney biopsy  Causes of hematuria include the very benign (exercised induced hematuria) to the very severe (cancer of the urinary system). A variety of treatments are available depending on the cause.  Date Last Reviewed: 12/2/2016 © 2000-2017 [employer]. [address] [place] [address]. All rights reserved. This information is not intended as a substitute for professional medical care. Always follow your healthcare professional's instructions.        Hematuria: Possible Causes     Many things can lead to blood in the urine (hematuria). The blood may be seen with the eye (macroscopic or gross hematuria). Or it may only be seen when the urine is looked at under a microscope (microscopic hematuria). Some of the most common causes of blood in the urine are listed below. Often, no cause for the blood can be found. This is called idiopathic hematuria.  · Kidney or bladder stones are collections of crystals. They form in the urine. Stones may be found anywhere in the urinary tract. But they form most often in the kidneys or bladder. In addition to blood in the  urine, they can cause severe pain.  · BPH stands for benign prostatic hyperplasia. It is enlargement of the prostate gland. It happens as men age. BPH often causes problems with urination. It sometimes causes blood in the urine.  · A urinary tract infection (UTI) is due to bacteria growing in the urinary tract. It can cause blood in the urine. Other symptoms include burning or pain with urination. You may need to urinate often or urgently. You may also have a fever.  · Damage to the urinary tract may cause blood in the urine. This damage may be due to a blow or accident. It may also result from the use of a urinary catheter. Very hard exercise may sometimes irritate the urinary tract and cause bleeding.  · Cancer may occur anywhere in the urinary tract. A tumor may sometimes cause no symptoms other than bleeding.     Other possible causes of bleeding include:  · Prostatitis (infection of the prostate gland)  · Taking anticoagulants  · Blockage in the urinary tract  · Disease or inflammation of the kidney  · Cystic diseases of the kidneys  · Sickle cell anemia  · Vigorous exercise  · Endometriosis  Date Last Reviewed: 12/1/2016 © 2000-2017 [employer]. [address] [place] [address]. All rights reserved. This information is not intended as a substitute for professional medical care. Always follow your healthcare professional's instructions.        Cystoscopy    Cystoscopy is a procedure that lets your doctor look directly inside your urethra and bladder. It can be used to:  · Help diagnose a problem with your urethra, bladder, or kidneys.  · Take a sample (biopsy) of bladder or urethral tissue.  · Treat certain problems (such as removing kidney stones).  · Place a stent to bypass an obstruction.  · Take special X-rays of the kidneys.  Based on the findings, your doctor may recommend other tests or treatments.  What is a cystoscope?  A cystoscope is a telescope-like instrument that contains  lenses and fiberoptics (small glass wires that make bright light). The cystoscope may be straight and rigid, or flexible to bend around curves in the urethra. The doctor may look directly into the cystoscope, or project the image onto a monitor.  Getting ready  · Ask your doctor if you should stop taking any medicines before the procedure.  · Ask whether you should avoid eating or drinking anything after midnight before the procedure.  · Follow any other instructions your doctor gives you.  Tell your doctor before the exam if you:  · Take any medicines, such as aspirin or blood thinners  · Have allergies to any medicines  · Are pregnant   The procedure  Cystoscopy is done in the doctors office, surgery center, or hospital. The doctor and a nurse are present during the procedure. It takes only a few minutes, longer if a biopsy, X-ray, or treatment needs to be done.  During the procedure:  · You lie on an exam table on your back, knees bent and legs apart. You are covered with a drape.  · Your urethra and the area around it are washed. Anesthetic jelly may be applied to numb the urethra. Other pain medicine is usually not needed. In some cases, you may be offered a mild sedative to help you relax. If a more extensive procedure is to be done, such as a biopsy or kidney stone removal, general anesthesia may be needed.  · The cystoscope is inserted. A sterile fluid is put into the bladder to expand it. You may feel pressure from this fluid.  · When the procedure is done, the cystoscope is removed.  After the procedure  If you had a sedative, general anesthesia, or spinal anesthesia, you must have someone drive you home. Once youre home:  · Drink plenty of fluids.  · You may have burning or light bleeding when you urinate--this is normal.  · Medicines may be prescribed to ease any discomfort or prevent infection. Take these as directed.  · Call your doctor if you have heavy bleeding or blood clots, burning that lasts  more than a day, a fever over 100°F  (38° C), or trouble urinating.  Date Last Reviewed: 1/1/2017 © 2000-2017 [employer]. [address] [place] [address]. All rights reserved. This information is not intended as a substitute for professional medical care. Always follow your healthcare professional's instructions.        Understanding Erectile Dysfunction    Erectile dysfunction (ED) is a problem getting an erection firm enough or keeping it long enough for intercourse. The problem can happen to any man at any age. But health problems that can lead to ED become more common as a man ages. Up to half of men over age 40 experience ED at some point.  Causes of ED  ED can have many causes. Most are physical. Some are emotional issues. Often, a combination of causes is involved. Causes of ED may include:  · Medical conditions such as diabetes or depression  · Smoking tobacco or marijuana  · Drinking too much alcohol  · Side effects of medications  · Injury to nerves or blood vessels  · Emotional issues such as stress or relationship problems  ED can be treated  Prescription medications for ED are available. They help many men who try them. Depending upon the cause of the ED, though, medications may not be enough. In these cases, other treatment options are available. These include erectile aids and surgery. Your health care provider can tell you more about the treatment that is right for you. And new treatments for ED are being studied. No matter what the treatment you decide on, stay in touch with your doctor. If your symptoms persist, he or she may be able to adjust your current treatment or try something new.  Date Last Reviewed: 1/1/2017 © 2000-2017 [employer]. [address] [place] [address]. All rights reserved. This information is not intended as a substitute for professional medical care. Always follow your healthcare professional's instructions.        Penile  Self-Injection: Notes and Precautions     Man and healthcare provider sitting across from one another, talking.   Penile self-injection is a simple technique that may improve your sex life. Some men even find that self-injection leads to an increase in natural erections. If you have questions or concerns about self-injection or erectile dysfunction (ED), talk with your healthcare provider. The information on this sheet will help you get the best results.  Notes about penile self-injection  · You may feel a mild burning during injection. This is OK. But if you feel pressure or severe pain, stop the injection. There may be a problem with the injection site.  · Only inject the medicine on the side of your penis. It may not work if injected elsewhere.  · To prevent scarring, inject in a different spot each time.  · Dont use this treatment if you have a bleeding disorder or any risk of infection.  · Get medical help right away if your erection lasts longer than 3 to 4 hours.  Work with your healthcare provider  Ask how often you can safely repeat injections, as well as any other questions you have. You and your healthcare provider will talk about follow-up exams and how to get supplies. If the medicine doesnt work or stops working over time, tell your healthcare provider.     When to call your healthcare provider  Call your healthcare provider right away if any of these occur:  · An erection that lasts longer than 3 to 4  hours  · Bleeding or bruising  · Severe pain  · Scarring or curvature of the penis   Date Last Reviewed: 1/1/2017 © 2000-2017 [employer]. [address] [place] [address]. All rights reserved. This information is not intended as a substitute for professional medical care. Always follow your healthcare professional's instructions.        Penile Self-Injection Procedure  Self-injection is a good option if you have erectile dysfunction (ED). You insert a tiny needle into your  penis and inject a medicine. This helps your penis get hard and stay that way long enough for sex. And sex and orgasm will feel as good as always. You may be nervous about doing self-injection at first. But with practice, it will get easier. Your healthcare provider will show you how to do self-injection the first time.  Talk to your doctor about any medicines you take and any medical problems you have.      Preparing for injection  · Wash your hands well with soap and water.  · Prepare the medicine (if needed).  · Sit or  a comfortable position in a warm, well-lit room. If you need to, sit or  front of a mirror.  · Find an injection site on one side of your penis, in a place with no visible veins. (Dont inject into the top, bottom, or head of the penis.)  · Clean the injection site with an alcohol swab. Grasp the head of your penis firmly with your thumb and forefinger (dont just pinch the skin). Stretch the penis so the skin on the shaft is taut.  Injecting the medicine  · Rest your penis against your inner thigh and pull it gently toward your knee. Dont twist or rotate it. This way youll be sure to inject the medicine into the spot you chose and cleaned before.  · Hold the syringe between your thumb and fingers, like youre holding a pen. Rest your forearm on your thigh for support.  · Insert the needle at a 90° angle (perpendicular) to the shaft. Do this quickly to reduce discomfort. (The needle should go in easily. If it doesnt, stop right away.)  · Move your thumb to the plunger. Press down to inject the medicine, counting to 5.  · Remove the needle and dispose of it safely.  Gaining an erection  · Apply pressure to the injection site for a few minutes. This prevents swelling and bruising and helps spread the medicine.   · Stand up. This may help your erection develop. Foreplay often helps, too.  · Your penis should become firm within 10 to 20 minutes. The erection will last long enough  for sex, and maybe longer.  When to seek medical care  An erection that lasts longer than 3 to 4  hours  · Bleeding or bruising  · Severe pain  · Scarring or curvature of the penis   Date Last Reviewed: 1/7/2017 © 2000-2017 [employer]. [address] [place] [address], [place] [address]. All rights reserved. This information is not intended as a substitute for professional medical care. Always follow your healthcare professional's instructions.

## 2018-05-03 ENCOUNTER — ANTI-COAG VISIT (OUTPATIENT)
Dept: CARDIOLOGY | Facility: CLINIC | Age: 63
End: 2018-05-03

## 2018-05-03 DIAGNOSIS — Z79.01 ANTICOAGULATED ON COUMADIN: ICD-10-CM

## 2018-05-03 DIAGNOSIS — Z95.2 STATUS POST HEART VALVE REPLACEMENT WITH MECHANICAL VALVE: ICD-10-CM

## 2018-05-05 LAB — BACTERIA UR CULT: NORMAL

## 2018-05-09 ENCOUNTER — ANTI-COAG VISIT (OUTPATIENT)
Dept: CARDIOLOGY | Facility: CLINIC | Age: 63
End: 2018-05-09

## 2018-05-09 DIAGNOSIS — Z79.01 ANTICOAGULATED ON COUMADIN: ICD-10-CM

## 2018-05-09 DIAGNOSIS — Z95.2 STATUS POST HEART VALVE REPLACEMENT WITH MECHANICAL VALVE: ICD-10-CM

## 2018-05-09 LAB — INR PPP: 2.9

## 2018-05-14 ENCOUNTER — CLINICAL SUPPORT (OUTPATIENT)
Dept: CARDIOLOGY | Facility: CLINIC | Age: 63
End: 2018-05-14
Attending: INTERNAL MEDICINE
Payer: MEDICARE

## 2018-05-14 ENCOUNTER — HOSPITAL ENCOUNTER (OUTPATIENT)
Dept: RADIOLOGY | Facility: HOSPITAL | Age: 63
Discharge: HOME | End: 2018-05-14
Attending: NURSE PRACTITIONER
Payer: MEDICARE

## 2018-05-14 ENCOUNTER — OFFICE VISIT (OUTPATIENT)
Dept: TRANSPLANT | Facility: CLINIC | Age: 63
End: 2018-05-14
Payer: MEDICARE

## 2018-05-14 VITALS
BODY MASS INDEX: 23.54 KG/M2 | SYSTOLIC BLOOD PRESSURE: 138 MMHG | HEART RATE: 71 BPM | HEIGHT: 69 IN | DIASTOLIC BLOOD PRESSURE: 83 MMHG | WEIGHT: 158.94 LBS

## 2018-05-14 DIAGNOSIS — N39.0 RECURRENT UTI: ICD-10-CM

## 2018-05-14 DIAGNOSIS — B18.2 HEPATIC CIRRHOSIS DUE TO CHRONIC HEPATITIS C INFECTION: ICD-10-CM

## 2018-05-14 DIAGNOSIS — K74.60 HEPATIC CIRRHOSIS DUE TO CHRONIC HEPATITIS C INFECTION: ICD-10-CM

## 2018-05-14 DIAGNOSIS — N52.01 ERECTILE DYSFUNCTION DUE TO ARTERIAL INSUFFICIENCY: ICD-10-CM

## 2018-05-14 DIAGNOSIS — N40.1 BPH WITH URINARY OBSTRUCTION: ICD-10-CM

## 2018-05-14 DIAGNOSIS — Z79.01 ANTICOAGULATED ON COUMADIN: ICD-10-CM

## 2018-05-14 DIAGNOSIS — Z72.0 TOBACCO ABUSE: ICD-10-CM

## 2018-05-14 DIAGNOSIS — J44.9 CHRONIC OBSTRUCTIVE PULMONARY DISEASE, UNSPECIFIED COPD TYPE: ICD-10-CM

## 2018-05-14 DIAGNOSIS — K76.6 PORTAL HYPERTENSION: ICD-10-CM

## 2018-05-14 DIAGNOSIS — E78.5 HYPERLIPIDEMIA, UNSPECIFIED HYPERLIPIDEMIA TYPE: ICD-10-CM

## 2018-05-14 DIAGNOSIS — R31.0 GROSS HEMATURIA: ICD-10-CM

## 2018-05-14 DIAGNOSIS — I10 HTN (HYPERTENSION), BENIGN: ICD-10-CM

## 2018-05-14 DIAGNOSIS — Z95.2 STATUS POST HEART VALVE REPLACEMENT WITH MECHANICAL VALVE: ICD-10-CM

## 2018-05-14 DIAGNOSIS — I50.9 CHF (NYHA CLASS III, ACC/AHA STAGE C): ICD-10-CM

## 2018-05-14 DIAGNOSIS — I50.22 CHRONIC SYSTOLIC (CONGESTIVE) HEART FAILURE: Primary | ICD-10-CM

## 2018-05-14 DIAGNOSIS — N13.8 BPH WITH URINARY OBSTRUCTION: ICD-10-CM

## 2018-05-14 LAB
ESTIMATED PA SYSTOLIC PRESSURE: 58.69
RETIRED EF AND QEF - SEE NOTES: 25 (ref 55–65)
TRICUSPID VALVE REGURGITATION: ABNORMAL

## 2018-05-14 PROCEDURE — 74178 CT ABD&PLV WO CNTR FLWD CNTR: CPT | Mod: TC

## 2018-05-14 PROCEDURE — 3075F SYST BP GE 130 - 139MM HG: CPT | Mod: CPTII,S$GLB,, | Performed by: INTERNAL MEDICINE

## 2018-05-14 PROCEDURE — 74178 CT ABD&PLV WO CNTR FLWD CNTR: CPT | Mod: 26,,, | Performed by: RADIOLOGY

## 2018-05-14 PROCEDURE — 3008F BODY MASS INDEX DOCD: CPT | Mod: CPTII,S$GLB,, | Performed by: INTERNAL MEDICINE

## 2018-05-14 PROCEDURE — 99999 PR PBB SHADOW E&M-EST. PATIENT-LVL III: CPT | Mod: PBBFAC,,, | Performed by: INTERNAL MEDICINE

## 2018-05-14 PROCEDURE — 99214 OFFICE O/P EST MOD 30 MIN: CPT | Mod: S$GLB,,, | Performed by: INTERNAL MEDICINE

## 2018-05-14 PROCEDURE — 3079F DIAST BP 80-89 MM HG: CPT | Mod: CPTII,S$GLB,, | Performed by: INTERNAL MEDICINE

## 2018-05-14 PROCEDURE — 93306 TTE W/DOPPLER COMPLETE: CPT | Mod: S$GLB,,, | Performed by: INTERNAL MEDICINE

## 2018-05-14 PROCEDURE — 25500020 PHARM REV CODE 255: Performed by: NURSE PRACTITIONER

## 2018-05-14 RX ORDER — HYDRALAZINE HYDROCHLORIDE 25 MG/1
25 TABLET, FILM COATED ORAL EVERY 12 HOURS
Qty: 60 TABLET | Refills: 11 | Status: SHIPPED | OUTPATIENT
Start: 2018-05-14 | End: 2018-06-14

## 2018-05-14 RX ORDER — LISINOPRIL 10 MG/1
10 TABLET ORAL 2 TIMES DAILY
Qty: 180 TABLET | Refills: 3 | Status: SHIPPED | OUTPATIENT
Start: 2018-05-14 | End: 2018-06-22 | Stop reason: SDUPTHER

## 2018-05-14 RX ADMIN — IOHEXOL 125 ML: 350 INJECTION, SOLUTION INTRAVENOUS at 11:05

## 2018-05-14 NOTE — PATIENT INSTRUCTIONS
Check your weights every morning after getting out of bed and urinating. If your weight goes up 3# overnight or 5# in one week take 80 mg lasix in the am instead of 40mg.  If you do that two days in a row and the fluid doesn't come off, call us    Heart failure nurses [phone]    Keep salt intake to under 2000 mg sodium, fluids to under 2 L (64 oz)    Quit smoking!    The echo of your heart shows the heart is getting weaker- normally the heart squeezes 55-60% of the blood with each beat, yours squeezes 25-30%. It more important now more than ever that your take your heart failure medicines every day-  i'm adding a medicine to the regimen that may also help may your hear stronger. If after three months of medicine the heart isn't stronger I will refer you for a defibrillator    Start hydralazine 25mg twice a day.     Change lisinopril to 10 mg (half tablet) TWICE a day    Continue imdur 30mg ONCE a day, and coreg 25mg TWICE a day    Continue lasix 40mg TWICE a day      Low-Salt Diet  This diet removes foods that are high in salt. It also limits the amount of salt you use when cooking. It is most often used for people with high blood pressure, edema (fluid retention), and kidney, liver, or heart disease.  Table salt contains the mineral sodium. Your body needs sodium to work normally. But too much sodium can make your health problems worse. Your healthcare provider is recommending a low-salt (also called low-sodium) diet for you. Your total daily allowance of salt is 1,500 to 2,300 milligrams (mg). It is less than 1 teaspoon of table salt. This means you can have only about 500 to 700 mg of sodium at each meal. People with certain health problems should limit salt intake to the lower end of the recommended range.    When you cook, dont add much salt. If you can cook without using salt, even better. Dont add salt to your food at the table.  When shopping, read food labels. Salt is often called sodium on the  label. Choose foods that are salt-free, low salt, or very low salt. Note that foods with reduced salt may not lower your salt intake enough.    Beans, potatoes, and pasta  Ok: Dry beans, split peas, lentils, potatoes, rice, macaroni, pasta, spaghetti without added salt  Avoid: Potato chips, tortilla chips, and similar products  Breads and cereals  Ok: Low-sodium breads, rolls, cereals, and cakes; low-salt crackers, matzo crackers  Avoid: Salted crackers, pretzels, popcorn, [place] toast, pancakes, muffins  Dairy  Ok: Milk, chocolate milk, hot chocolate mix, low-salt cheeses, and yogurt  Avoid: Processed cheese and cheese spreads; Roquefort, Camembert, and cottage cheese; buttermilk, instant breakfast drink  Desserts  Ok: Ice cream, frozen yogurt, juice bars, gelatin, cookies and pies, sugar, honey, jelly, hard candy  Avoid: Most pies, cakes and cookies prepared or processed with salt; instant pudding  Drinks  Ok: Tea, coffee, fizzy (carbonated) drinks, juices  Avoid: Flavored coffees, electrolyte replacement drinks, sports drinks  Meats  Ok: All fresh meat, fish, poultry, low-salt tuna, eggs, egg substitute  Avoid: Smoked, pickled, brine-cured, or salted meats and fish. This includes stacy, chipped beef, corned beef, hot dogs, deli meats, ham, kosher meats, salt pork, sausage, canned tuna, salted codfish, smoked salmon, herring, sardines, or anchovies.  Seasonings and spices  Ok: Most seasonings are okay. Good substitutes for salt include: fresh herb blends, hot sauce, lemon, garlic, [place], vinegar, dry mustard, parsley, cilantro, horseradish, tomato paste, regular margarine, mayonnaise, unsalted butter, cream cheese, vegetable oil, cream, low-salt salad dressing and gravy.  Avoid: Regular ketchup, relishes, pickles, soy sauce, teriyaki sauce, Worcestershire sauce, BBQ sauce, tartar sauce, meat tenderizer, chili sauce, regular gravy, regular salad dressing, salted butter  Soups  Ok: Low-salt soups and broths made  with allowed foods  Avoid: Bouillon cubes, soups with smoked or salted meats, regular soup and broth  Vegetables  Ok: Most vegetables are okay; also low-salt tomato and vegetable juices  Avoid: Sauerkraut and other brine-soaked vegetables; pickles and other pickled vegetables; tomato juice, olives  Date Last Reviewed: 8/1/2016  © 4148-7173 [employer]. [address] [place] [address], [place] [address]. All rights reserved. This information is not intended as a substitute for professional medical care. Always follow your healthcare professional's instructions.

## 2018-05-14 NOTE — PROGRESS NOTES
Subjective:       HPI:  Mr. Villalobos is a 62 y.o. year old Black or  male who chronic HFrEF EF 35%, Mechanical AV, Mechanical Mitral Valve 2009 for valve regurge , HTN, HLD, Hep C, asthma. Patient saw Dr Guzman and Dr CLARISSE amaya in 2012 but hasn't been following with cardiologist. Seen by general cardiology in January of this year and was felt to be doing well. Had ER visit for UTI end of March and Was told to follow up in HTS clinic when his BNP was found to be elevated. Followed by hepatology for Hep C history and cirrhosis. Continues on ACE, BB and lasix 80 mg daily.     Today His wife says he gets winded when he does things, though he says its not- in the last year and a half, he's had more ER visits and has needed more breathing treatments and O2. His wife says he does what he wants to do and not what his doctors recommend- he will walk from the car to the house and will be OOB-  Was not taking his lasix and other meds regularly (except he says he was taking the coumadin regularly) only recently started taking it and still doesn't take it every day - may take a 40mg, and then the next day will take 80mg- does not like to sit inside all day urinating- he decides what dose he's going to take based on his breathing- his wife says when his breathing and cough gets really bad he'll take 80mg daily that week. Says the bp meds knock him down.  Does not follow his wts though he has a scale at home. Says he only took his coreg this am and only has been reliably taking his lisinopril/imdur for the last 2 mo.     She says he needs to stop smoking and he wont quit- he says he tried twice, went into the program and put forth a big effort- went so far as to get electric cigarettes. Has a productive cough     TTE today    1 - Eccentric hypertrophy.     2 - Severely depressed left ventricular systolic function (EF 25-30%).     3 - Right ventricular enlargement with moderately depressed systolic function.     4 -  "Biatrial enlargement.     5 - Mild tricuspid regurgitation.     6 - Mechanical aortic and mitral valve prostheses.     7 - Pulmonary hypertension. The estimated PA systolic pressure is 59 mmHg.     8 - Intermediate central venous pressure.       CXR 3/25/18  . Findings suggesting pulmonary edema, clinical correlation for superimposed infection as warranted.  No large focal consolidation at this time  The lungs are symmetrically expanded bilaterally with coarse interstitial attenuation and patchy increased parenchymal attenuation,     Past Medical History:   Diagnosis Date    Anemia     Asthma     CHF (congestive heart failure)     Chronic bronchitis     Cirrhosis 1/19/2015    Hepatitis C     Hyperlipidemia     Hypertension     Lung disease     Portal hypertension 1/30/2018    Pulmonary hypertension      Past Surgical History:   Procedure Laterality Date    CARDIAC VALVE REPLACEMENT      AVR WITH MVr 2007    HERNIA REPAIR  2006    questionable mesh, right inguinal, unbilical    LUNG DECORTICATION      right thoracotomy 2010    LUNG DECORTICATION  2010    open heart surgery      redo AVR and MVR 2009       Family History   Problem Relation Age of Onset    Hypertension Mother     Hypertension Father     Hypertension Brother     Heart disease Brother     Heart attack Brother     Hypertension Sister        Review of Systems   Constitution: Negative for chills, decreased appetite, weakness, malaise/fatigue and weight gain.   HENT: Negative for congestion.    Eyes: Negative for blurred vision.   Cardiovascular: Negative for chest pain, dyspnea on exertion, orthopnea and paroxysmal nocturnal dyspnea.   Respiratory: Positive for cough and shortness of breath. Negative for hemoptysis and snoring.    Musculoskeletal: Negative for arthritis and back pain.   Gastrointestinal: Negative for nausea and vomiting.       Objective:   Blood pressure 138/83, pulse 71, height 5' 9" (1.753 m), weight 72.1 kg (158 lb " 15.2 oz).body mass index is 23.47 kg/m².    Physical Exam  Constitutional: He is oriented to person, place, and time. He appears well-developed and well-nourished. No distress.   HENT:   Head: Normocephalic and atraumatic.   Nose: Nose normal.   Mouth/Throat: No oropharyngeal exudate.   Eyes: Conjunctivae and EOM are normal. Pupils are equal, round, and reactive to light. Right eye exhibits no discharge. Left eye exhibits no discharge. No scleral icterus.   Neck: Normal range of motion. Neck supple. JVD (10cm H2O) present. No tracheal deviation present. No thyromegaly present.   Cardiovascular: Normal rate, regular rhythm, normal heart sounds and intact distal pulses.  Exam reveals no gallop and no friction rub.    No murmur heard.  Mechanical click of the mitral and aortic valve   Pulmonary/Chest: Effort normal and breath sounds normal. No stridor. No respiratory distress. He has no wheezes. He has no rales. He exhibits no tenderness.   Abdominal: Soft. Bowel sounds are normal. He exhibits no distension and no mass. There is no tenderness.   Musculoskeletal: He exhibits no edema or tenderness.   Lymphadenopathy:     He has no cervical adenopathy.   Neurological: He is alert and oriented to person, place, and time. He displays normal reflexes. No cranial nerve deficit. He exhibits normal muscle tone. Coordination normal.   Skin: Skin is warm. No rash noted. He is not diaphoretic. No erythema. No pallor.   Psychiatric: He has a normal mood and affect. His behavior is normal. Judgment and thought content normal.         Labs:      Chemistry        Component Value Date/Time     04/16/2018 2222    K 3.7 04/16/2018 2222     04/16/2018 2222    CO2 29 04/16/2018 2222    BUN 22 04/16/2018 2222    CREATININE 1.1 04/16/2018 2222     (H) 04/16/2018 2222        Component Value Date/Time    CALCIUM 9.8 04/16/2018 2222    ALKPHOS 59 04/16/2018 2222    AST 30 04/16/2018 2222    ALT 22 04/16/2018 2222    BILITOT  0.7 04/16/2018 2222    ESTGFRAFRICA >60 04/16/2018 2222    EGFRNONAA >60 04/16/2018 2222          Magnesium   Date Value Ref Range Status   04/02/2018 1.9 1.6 - 2.6 mg/dL Final     Lab Results   Component Value Date    WBC 6.16 04/16/2018    HGB 12.7 (L) 04/16/2018    HCT 38.6 (L) 04/16/2018    MCV 99 (H) 04/16/2018     (L) 04/16/2018     BNP   Date Value Ref Range Status   04/02/2018 1,081 (H) 0 - 99 pg/mL Final     Comment:     Values of less than 100 pg/ml are consistent with non-CHF populations.   03/25/2018 1,222 (H) 0 - 99 pg/mL Final     Comment:     Values of less than 100 pg/ml are consistent with non-CHF populations.   01/30/2018 1,211 (H) 0 - 99 pg/mL Final     Comment:     Values of less than 100 pg/ml are consistent with non-CHF populations.     No results found for this or any previous visit.    Labs were reviewed with the patient.    Assessment:      1. Chronic systolic (congestive) heart failure- with worsening systolic dysfxn on todays echo- has been noncompliant with meds and after discussion with his wife NC with diet as well.  He describes NYHA II sx, though his wife describes FC III- euvolemic one xam, though most recent BNPs are all elevated   2. Anticoagulated on Coumadin    3. Chronic obstructive pulmonary disease, unspecified COPD type    4. Hepatic cirrhosis due to chronic hepatitis C infection    5. HTN (hypertension), benign    6. Hyperlipidemia, unspecified hyperlipidemia type    7. Portal hypertension    8. Status post heart valve replacement with mechanical valve    9. Tobacco abuse        Plan:     Would like to see him on optimal GDMT and then reassess EF with one more echo before referring for ICD for primary prevention SCD->  Start hydralazine 25mg twice a day.     Change lisinopril to 10 mg (half tablet) TWICE a day so that his BP does not become an issue    Continue imdur 30mg ONCE a day, and coreg 25mg TWICE a day    Continue lasix 40mg TWICE a day    Stressed the  importance of taking these meds every day on schedule to reduce risk of dying from HF, reduce hosp and improve sx    Check weights every morning after getting out of bed and urinating. If weight goes up 3# overnight or 5# in one week pt to take 80 mg lasix in am instead of 40mg and call us if fluid doesn't come off.      Patient is now NYHA II-III ACC stage D    Recommend 2 gram sodium restriction and 1500cc fluid restriction.  Encourage physical activity with graded exercise program.  Requested patient to weigh themselves daily, and to notify us if their weight increases by more than 3 lbs in 1 day or 5 lbs in 1 week.       Ultimately would like to see pt followed in general cardiology clinic but would like to see him optimized first. F/u 3 mo with labs and echo as above    Mayela Paez MD

## 2018-05-18 ENCOUNTER — TELEPHONE (OUTPATIENT)
Dept: UROLOGY | Facility: CLINIC | Age: 63
End: 2018-05-18

## 2018-05-18 DIAGNOSIS — R82.71 BACTERIA IN URINE: Primary | ICD-10-CM

## 2018-05-18 DIAGNOSIS — R31.0 GROSS HEMATURIA: ICD-10-CM

## 2018-05-18 RX ORDER — NITROFURANTOIN 25; 75 MG/1; MG/1
100 CAPSULE ORAL 2 TIMES DAILY
Qty: 16 CAPSULE | Refills: 0 | Status: SHIPPED | OUTPATIENT
Start: 2018-05-18 | End: 2018-05-26

## 2018-05-18 NOTE — PROGRESS NOTES
Seen in clinic for gross hematuria.  Scheduled for cysto 05/25 but urine cx (+)  ENTEROBACTER AEROGENES >100,000 cfu/ml  He denied dysuria/n/v/fever  He has normal Cr but he also taking Coumadin.  Macrobid has intermediate coverage but ok to take with coumadin  He risk for hematuria

## 2018-05-23 ENCOUNTER — ANTI-COAG VISIT (OUTPATIENT)
Dept: CARDIOLOGY | Facility: CLINIC | Age: 63
End: 2018-05-23
Payer: MEDICARE

## 2018-05-23 DIAGNOSIS — Z95.2 STATUS POST HEART VALVE REPLACEMENT WITH MECHANICAL VALVE: ICD-10-CM

## 2018-05-23 DIAGNOSIS — Z79.01 ANTICOAGULATED ON COUMADIN: ICD-10-CM

## 2018-05-23 LAB — INR PPP: 3.1

## 2018-05-23 PROCEDURE — G0250 MD INR TEST REVIE INTER MGMT: HCPCS | Mod: S$GLB,,, | Performed by: PHARMACIST

## 2018-05-23 NOTE — PROGRESS NOTES
Nitrofurantoin /100mg) 1 tablet twice daily. Pt said he has not eaten any greens in 2 weeks. Pt confirmed correct dosage of warfarin.

## 2018-05-25 ENCOUNTER — HOSPITAL ENCOUNTER (OUTPATIENT)
Dept: UROLOGY | Facility: HOSPITAL | Age: 63
Discharge: HOME | End: 2018-05-25
Attending: UROLOGY
Payer: MEDICARE

## 2018-05-25 VITALS
BODY MASS INDEX: 22.3 KG/M2 | HEART RATE: 71 BPM | TEMPERATURE: 98 F | DIASTOLIC BLOOD PRESSURE: 73 MMHG | WEIGHT: 150.56 LBS | RESPIRATION RATE: 18 BRPM | SYSTOLIC BLOOD PRESSURE: 123 MMHG | HEIGHT: 69 IN

## 2018-05-25 DIAGNOSIS — N39.0 RECURRENT UTI: ICD-10-CM

## 2018-05-25 DIAGNOSIS — R31.0 GROSS HEMATURIA: ICD-10-CM

## 2018-05-25 PROCEDURE — 52000 CYSTOURETHROSCOPY: CPT

## 2018-05-25 PROCEDURE — 52000 CYSTOURETHROSCOPY: CPT | Mod: ,,, | Performed by: UROLOGY

## 2018-05-25 RX ORDER — LIDOCAINE HYDROCHLORIDE 20 MG/ML
JELLY TOPICAL ONCE
Status: COMPLETED | OUTPATIENT
Start: 2018-05-25 | End: 2018-05-25

## 2018-05-25 RX ORDER — CIPROFLOXACIN 500 MG/1
500 TABLET ORAL ONCE
Status: COMPLETED | OUTPATIENT
Start: 2018-05-25 | End: 2018-05-25

## 2018-05-25 RX ADMIN — LIDOCAINE HYDROCHLORIDE: 20 JELLY TOPICAL at 11:05

## 2018-05-25 RX ADMIN — CIPROFLOXACIN 500 MG: 500 TABLET ORAL at 11:05

## 2018-05-25 NOTE — PATIENT INSTRUCTIONS
What to Expect After a Cystoscopy  For the next 24-48 hours, you may feel a mild burning when you urinate. This burning is normal and expected. Drink plenty of water to dilute the urine to help relieve the burning sensation. You may also see a small amount of blood in your urine after the procedure.    Unless you are already taking antibiotics, you may be given an antibiotic after the test to prevent infection.    Signs and Symptoms to Report  Call the [facility] at [phone] if you develop any of the following:  · Fever of 101 degrees or higher  · Chills or persistent bleeding  · Inability to urinate

## 2018-05-25 NOTE — H&P (VIEW-ONLY)
Subjective:       Patient ID: Paul Villalobos Sr. is a 62 y.o. male.    Chief Complaint: Recurrent UTI (UTI x 2 - finished abx last Monday) and Hematuria    Paul Villalobos Sr. is a 62 y.o. male with multiple medical problems including CHF and cirrhosis history of Hepatitis C.  He was last seen in clinic with Dr. Caicedo 09/27/2017 for what sounds like balanitis and ED..  This resolved by using his wife's anti-yeast cream.    He requests circumcision.    No difficulty retracting his foreskin and not felt to be a surgical candidate.   He also c/o ED.  He has an active Rx for NTG.  This has been present for > 1 year.  He refused prostate cancer screening at that visit.           PSA                      0.73                04/03/2014             He is here today due to an episode of gross hematuria and recurrent uti's.  He was seen in ER 02/20/2017 & 03/26/2018 for gross hematuria and UTI  He does admit to dysuria with these episodes.  FOS is slow to start in the AM but once starts urinates all day due to taking Lasix.      02/20/2017 Urine Culture ESCHERICHIA COLI ESBL >100,000 cfu/ml   03/25/2018 Urine Culture ENTEROBACTER AEROGENES >100,000 cfu/ml  04/16/2018 Urine Culture ENTEROBACTER AEROGENES >100,000 cfu/ml           Past Medical History:  No date: Anemia  No date: Asthma  No date: CHF (congestive heart failure)  No date: Chronic bronchitis  1/19/2015: Cirrhosis  No date: Hepatitis C  No date: Hyperlipidemia  No date: Hypertension  No date: Lung disease  1/30/2018: Portal hypertension  No date: Pulmonary hypertension    Past Surgical History:  No date: CARDIAC VALVE REPLACEMENT      Comment: AVR WITH MVr 2007 2006: HERNIA REPAIR      Comment: questionable mesh, right inguinal, unbilical  No date: LUNG DECORTICATION      Comment: right thoracotomy 2010 2010: LUNG DECORTICATION  No date: open heart surgery      Comment: redo AVR and MVR 2009    Review of patient's family history indicates:    Hypertension                    Mother                    Hypertension                   Father                    Hypertension                   Brother                   Heart disease                  Brother                   Heart attack                   Brother                   Hypertension                   Sister                      Social History    Marital status:              Spouse name:                       Years of education:                 Number of children:               Occupational History    None on file    Social History Main Topics    Smoking status: Current Every Day Smoker                                                     Packs/day: 0.50      Years: 48.00          Types: Cigarettes    Smokeless tobacco: Never Used                        Comment: 5 a day    Alcohol use: Yes           0.0 oz/week       Comment: seldom    Drug use: No              Sexual activity: Not on file          Other Topics            Concern    None on file    Social History Narrative    , 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        Allergies:  Patient has no known allergies.    Medications:  Current Outpatient Prescriptions:   albuterol-ipratropium 2.5mg-0.5mg/3mL (DUO-NEB) 0.5 mg-3 mg(2.5 mg base)/3 mL nebulizer solution, TAKE 3ML BY NEBULIZATION EVERY 6 HOURS AS NEEDED FOR WHEEZING., Disp: 360 mL, Rfl: 11  carvedilol (COREG) 25 MG tablet, TAKE 1 TABLET(25 MG) BY MOUTH TWICE DAILY WITH MEALS, Disp: 180 tablet, Rfl: 11  fluticasone (FLONASE) 50 mcg/actuation nasal spray, SPRAY TWICE IN EACH NOSTRIL EVERY DAY, Disp: 16 g, Rfl: 11  furosemide (LASIX) 80 MG tablet, Take 1 tablet (80 mg total) by mouth once daily., Disp: 30 tablet, Rfl: 11  ipratropium-albuterol (COMBIVENT RESPIMAT)  mcg/actuation inhaler, Inhale 1 puff into the lungs every 4 (four) hours as needed for Wheezing., Disp: 4 g, Rfl: 11  isosorbide mononitrate (IMDUR) 30 MG 24 hr tablet, TAKE 1 TABLET(30 MG) BY MOUTH EVERY DAY, Disp: 90  tablet, Rfl: 11  lisinopril (PRINIVIL,ZESTRIL) 20 MG tablet, Take 1 tablet (20 mg total) by mouth once daily., Disp: 90 tablet, Rfl: 3  metOLazone (ZAROXOLYN) 2.5 MG tablet, Take 1 tablet (2.5 mg total) by mouth daily as needed. TAKE 1 TABLET(2.5 MG) BY MOUTH DAILY AS NEEDED, Disp: 30 tablet, Rfl: 6  polyethylene glycol (GLYCOLAX) 17 gram/dose powder, , Disp: , Rfl:   potassium chloride SA (K-DUR,KLOR-CON) 20 MEQ tablet, Take 2 tablets (40 mEq total) by mouth once daily. Take 40mEQ daily. (Patient taking differently: Take 40 mEq by mouth once daily. ), Disp: 45 tablet, Rfl: 5  warfarin (COUMADIN) 5 MG tablet, Take 1.5 tablets (7.5 mg total) by mouth Daily. Current Dose ( 10 mg on Sun, Wed, Fri; 7.5 mg all other days) or as directed by coumadin clinic., Disp: 150 tablet, Rfl: 3                                                 Review of Systems   Constitutional: Negative for activity change, appetite change, chills and fever.   HENT: Negative for facial swelling and trouble swallowing.    Eyes: Negative for visual disturbance.   Respiratory: Negative for chest tightness and shortness of breath.    Cardiovascular: Negative for chest pain and palpitations.   Gastrointestinal: Negative.  Negative for abdominal pain, constipation, diarrhea, nausea and vomiting.   Genitourinary: Positive for frequency, hematuria and nocturia. Negative for difficulty urinating, dysuria, flank pain, penile pain, penile swelling, scrotal swelling and testicular pain.        Today no issues with dysuria or hematuria.       Musculoskeletal: Negative for back pain, gait problem, myalgias and neck stiffness.   Skin: Negative for rash.   Neurological: Negative for dizziness and speech difficulty.   Hematological: Does not bruise/bleed easily.   Psychiatric/Behavioral: Negative for behavioral problems.       Objective:      Physical Exam   Nursing note and vitals reviewed.  Constitutional: He is oriented to person, place, and time. He appears  well-developed and well-nourished.  Non-toxic appearance. He does not have a sickly appearance.   Urine dipped (-) for infection; trace blood.     HENT:   Head: Normocephalic and atraumatic.   Right Ear: External ear normal.   Left Ear: External ear normal.   Nose: Nose normal.   Mouth/Throat: Mucous membranes are normal.   Eyes: Conjunctivae and lids are normal. No scleral icterus.   Neck: Trachea normal, normal range of motion and full passive range of motion without pain. Neck supple. No JVD present. No tracheal deviation present.   Cardiovascular: Normal rate, S1 normal and S2 normal.    Pulmonary/Chest: Effort normal. No respiratory distress. He exhibits no tenderness.   Abdominal: Soft. Normal appearance and bowel sounds are normal. There is no hepatosplenomegaly. There is no tenderness. There is no CVA tenderness.   Genitourinary: Rectum normal, testes normal and penis normal. Rectal exam shows no external hemorrhoid, no mass and no tenderness. Prostate is enlarged. Prostate is not tender. Right testis shows no mass, no swelling and no tenderness. Left testis shows no mass, no swelling and no tenderness. Uncircumcised. No phimosis, paraphimosis, hypospadias, penile erythema or penile tenderness. No discharge found.       Musculoskeletal: Normal range of motion.   Lymphadenopathy: No inguinal adenopathy noted on the right or left side.   Neurological: He is alert and oriented to person, place, and time. He has normal strength.   Skin: Skin is warm, dry and intact.     Psychiatric: He has a normal mood and affect. His behavior is normal. Judgment and thought content normal.       Assessment:       1. Gross hematuria    2. BPH with urinary obstruction    3. Erectile dysfunction due to arterial insufficiency    4. Recurrent UTI        Plan:         I spent 30 minutes with the patient of which more than half was spent in direct consultation with the patient in regards to our treatment and plan.    Education and  recommendations of today's plan of care including home remedies.  We discussed the possible contributory factors for his hematuria/uti/BPH with LUTS  He agreed to cystoscopy (I explained in detail the process).  CTU   Urine was sent to lab for cytology.  Also discussed ED and contributory factors.  Once hematuria/recurrent UTI workup complete then we going to work on ED; discussed ICI therapy; educational materials given.  Update PSA.

## 2018-05-25 NOTE — PROCEDURES
Date: 05/25/2018     Surgeon: Sascha    Assistant: None    Procedure performed: cystoscopy    Blood loss: None    Specimen: None    Procedure in detail: Using standard sterile technique, the flexible cystoscope was assembled and passed into the patient's bladder.  Cystoscopic evaluation of the bladder revealed no abnormalities.  The estimated prostatic length was 4.5 cm.

## 2018-06-06 LAB — INR PPP: 3

## 2018-06-07 ENCOUNTER — ANTI-COAG VISIT (OUTPATIENT)
Dept: CARDIOLOGY | Facility: CLINIC | Age: 63
End: 2018-06-07

## 2018-06-07 DIAGNOSIS — Z95.2 STATUS POST HEART VALVE REPLACEMENT WITH MECHANICAL VALVE: ICD-10-CM

## 2018-06-07 DIAGNOSIS — Z79.01 ANTICOAGULATED ON COUMADIN: ICD-10-CM

## 2018-06-13 ENCOUNTER — ANTI-COAG VISIT (OUTPATIENT)
Dept: CARDIOLOGY | Facility: CLINIC | Age: 63
End: 2018-06-13

## 2018-06-13 DIAGNOSIS — Z79.01 ANTICOAGULATED ON COUMADIN: ICD-10-CM

## 2018-06-13 DIAGNOSIS — Z95.2 STATUS POST HEART VALVE REPLACEMENT WITH MECHANICAL VALVE: ICD-10-CM

## 2018-06-13 LAB — INR PPP: 4.7

## 2018-06-13 NOTE — PROGRESS NOTES
""Pt questioned confirmed taking correct dose. Reports not eating any green leafy veggies this week. No other changes (new meds, alcohol intake, extra dose, etc)" Will hold then resume prior warfarin regimen. Monitor for bleeding. Advise clinic if no longer wishes to eat greens 2-3 times/week.  "

## 2018-06-13 NOTE — PROGRESS NOTES
Ale with [facility] called in a verbal result dated today 6/13/18 as: INR -4.7, hard copy to be faxed

## 2018-06-14 ENCOUNTER — OFFICE VISIT (OUTPATIENT)
Dept: INTERNAL MEDICINE | Facility: CLINIC | Age: 63
End: 2018-06-14
Payer: MEDICARE

## 2018-06-14 ENCOUNTER — TELEPHONE (OUTPATIENT)
Dept: INTERNAL MEDICINE | Facility: CLINIC | Age: 63
End: 2018-06-14

## 2018-06-14 VITALS
HEIGHT: 69 IN | SYSTOLIC BLOOD PRESSURE: 70 MMHG | OXYGEN SATURATION: 93 % | HEART RATE: 83 BPM | BODY MASS INDEX: 22.85 KG/M2 | DIASTOLIC BLOOD PRESSURE: 40 MMHG | WEIGHT: 154.31 LBS

## 2018-06-14 DIAGNOSIS — N39.0 URINARY TRACT INFECTION WITH HEMATURIA, SITE UNSPECIFIED: Primary | ICD-10-CM

## 2018-06-14 DIAGNOSIS — R31.9 URINARY TRACT INFECTION WITH HEMATURIA, SITE UNSPECIFIED: Primary | ICD-10-CM

## 2018-06-14 LAB
BACTERIA #/AREA URNS AUTO: ABNORMAL /HPF
BILIRUB UR QL STRIP: NEGATIVE
CLARITY UR REFRACT.AUTO: ABNORMAL
COLOR UR AUTO: ABNORMAL
GLUCOSE UR QL STRIP: NEGATIVE
HGB UR QL STRIP: ABNORMAL
KETONES UR QL STRIP: NEGATIVE
LEUKOCYTE ESTERASE UR QL STRIP: ABNORMAL
MICROSCOPIC COMMENT: ABNORMAL
NITRITE UR QL STRIP: POSITIVE
PH UR STRIP: 5 [PH] (ref 5–8)
PROT UR QL STRIP: NEGATIVE
RBC #/AREA URNS AUTO: 3 /HPF (ref 0–4)
SP GR UR STRIP: 1.02 (ref 1–1.03)
URN SPEC COLLECT METH UR: ABNORMAL
UROBILINOGEN UR STRIP-ACNC: 2 EU/DL
WBC #/AREA URNS AUTO: 8 /HPF (ref 0–5)

## 2018-06-14 PROCEDURE — 87186 SC STD MICRODIL/AGAR DIL: CPT

## 2018-06-14 PROCEDURE — 3074F SYST BP LT 130 MM HG: CPT | Mod: CPTII,S$GLB,, | Performed by: INTERNAL MEDICINE

## 2018-06-14 PROCEDURE — 99214 OFFICE O/P EST MOD 30 MIN: CPT | Mod: S$GLB,,, | Performed by: INTERNAL MEDICINE

## 2018-06-14 PROCEDURE — 3008F BODY MASS INDEX DOCD: CPT | Mod: CPTII,S$GLB,, | Performed by: INTERNAL MEDICINE

## 2018-06-14 PROCEDURE — 87086 URINE CULTURE/COLONY COUNT: CPT

## 2018-06-14 PROCEDURE — 3078F DIAST BP <80 MM HG: CPT | Mod: CPTII,S$GLB,, | Performed by: INTERNAL MEDICINE

## 2018-06-14 PROCEDURE — 81001 URINALYSIS AUTO W/SCOPE: CPT

## 2018-06-14 PROCEDURE — 87088 URINE BACTERIA CULTURE: CPT

## 2018-06-14 PROCEDURE — 87077 CULTURE AEROBIC IDENTIFY: CPT

## 2018-06-14 PROCEDURE — 99999 PR PBB SHADOW E&M-EST. PATIENT-LVL III: CPT | Mod: PBBFAC,,, | Performed by: INTERNAL MEDICINE

## 2018-06-14 NOTE — PROGRESS NOTES
Subjective:       Patient ID: Paul Villalobos Sr. is a 62 y.o. male.    Chief Complaint: Follow-up    Cough not btr, sob chronic and stable.    No blood in urine any longer, cysto not remarkable. No dysuria, no current urinary symptoms.    Taking meds as rxed, recently hydral 25mg bid started. Pressure v low today, asymptomatic. Denies fatigue or symptoms of light headed/presyncope.      Review of Systems   Constitutional: Negative for activity change, appetite change, chills, fatigue, fever and unexpected weight change.   HENT: Negative for congestion, postnasal drip, rhinorrhea and voice change.    Respiratory: Positive for cough and shortness of breath (stable though). Negative for chest tightness and wheezing.    Cardiovascular: Negative for chest pain, palpitations and leg swelling.   Gastrointestinal: Negative for abdominal distention, abdominal pain, anal bleeding, blood in stool, nausea and vomiting.   Genitourinary: Negative for decreased urine volume, difficulty urinating, dysuria, flank pain, frequency, hematuria, scrotal swelling and testicular pain.   Musculoskeletal: Negative for neck pain and neck stiffness.   Skin: Negative for rash and wound.   Neurological: Negative for tremors, syncope and weakness.   Hematological: Negative for adenopathy. Does not bruise/bleed easily.   Psychiatric/Behavioral: Negative for dysphoric mood.       Objective:      Physical Exam   Constitutional: He is oriented to person, place, and time. He appears well-developed and well-nourished. No distress.   HENT:   Head: Normocephalic and atraumatic.   Eyes: No scleral icterus.   Neck: Normal range of motion. No thyromegaly present.   Cardiovascular: Normal rate.  Exam reveals no gallop and no friction rub.    Murmur heard.  Irregularly irregular, mechanical systolic murmur     Pulmonary/Chest: Effort normal. No respiratory distress. He has wheezes (moderate and air flow decreased). He has no rales.   Abdominal: Soft. Bowel  sounds are normal. He exhibits no distension and no mass. There is no tenderness (mild RUQ tenderness). There is no rebound and no guarding. No hernia.   Musculoskeletal: Normal range of motion. He exhibits no edema.   Lymphadenopathy:     He has no cervical adenopathy.   Neurological: He is alert and oriented to person, place, and time.   Skin: No lesion noted.   Psychiatric: He has a normal mood and affect. Thought content normal.       Assessment:       1. Urinary tract infection with hematuria, site unspecified        Plan:       Paul was seen today for follow-up.    Diagnoses and all orders for this visit:    Although asymptomatic I am concerned about how low his pressure is since starting hydralazine, I advised that he stop the med. I will contact Dr. Paez.  1 week LPN pressure check      Urinary tract infection with hematuria, site unspecified  -     Urinalysis  -     Urine culture  -     Urinalysis Microscopic  Important to ensure UTI resolved especially with heart valve surgery in past    Continued counseling re smoking cessation (particularly in the car)      Health Maintenance       Date Due Completion Date    TETANUS VACCINE 12/29/1971 ---    Zoster Vaccine 12/29/2013 ---    Influenza Vaccine 08/01/2018 10/2/2017    Fecal Occult Blood Test (FOBT)/[employer] 09/29/2018 9/29/2017    Pneumococcal PPSV23 (Medium Risk) (2) 12/29/2018 3/27/2013    Lipid Panel 12/25/2022 12/25/2017          Follow-up in about 3 months (around 9/14/2018) for 1 week LPN pressure check, see me in 3 months.

## 2018-06-14 NOTE — Clinical Note
Roshan Geiger he is hypotensive today, which I assume is from hydralazine. I advised that he stop it altogether. He will see a nurse back in 1 week, maybe he will do btr on 10mg bid? Let me know if you have any questions. Tye Concepcion

## 2018-06-16 LAB — BACTERIA UR CULT: NORMAL

## 2018-06-18 ENCOUNTER — TELEPHONE (OUTPATIENT)
Dept: INTERNAL MEDICINE | Facility: CLINIC | Age: 63
End: 2018-06-18

## 2018-06-18 DIAGNOSIS — N39.0 URINARY TRACT INFECTION WITHOUT HEMATURIA, SITE UNSPECIFIED: Primary | ICD-10-CM

## 2018-06-18 RX ORDER — SULFAMETHOXAZOLE AND TRIMETHOPRIM 800; 160 MG/1; MG/1
1 TABLET ORAL 2 TIMES DAILY
Qty: 20 TABLET | Refills: 0 | Status: SHIPPED | OUTPATIENT
Start: 2018-06-18 | End: 2018-06-28

## 2018-06-18 NOTE — TELEPHONE ENCOUNTER
Hi, please call him -- his urine culture from last week shows that he still has a UTI, I would like him to take Bactrim antibiotic twice per day for 10 days, rx sent in.  Let me know if patient has any questions.  Thank you, Tye Concepcion

## 2018-06-18 NOTE — TELEPHONE ENCOUNTER
Spoke to patient and informed. Patient expressed understanding and all questions were answered if any.

## 2018-06-18 NOTE — TELEPHONE ENCOUNTER
----- Message from Maria Del Carmen Alcantar sent at 6/18/2018  2:23 PM CDT -----  Contact: Patient [phone]  Returned Call    Who left the message: Xochitl Piña MA    When did the practice call: 06/18/2018 1:20pm    Please advise.    Thank You     01-Feb-2018 19:24 02-Feb-2018 04:28

## 2018-06-19 NOTE — PROGRESS NOTES
Pt called 6/19/18 to inform us that he will start today (BActrim DS) 1 tablet twice daily for 10 days. Please advise.

## 2018-06-21 ENCOUNTER — ANTI-COAG VISIT (OUTPATIENT)
Dept: CARDIOLOGY | Facility: CLINIC | Age: 63
End: 2018-06-21

## 2018-06-21 DIAGNOSIS — Z79.01 ANTICOAGULATED ON COUMADIN: ICD-10-CM

## 2018-06-21 DIAGNOSIS — Z95.2 STATUS POST HEART VALVE REPLACEMENT WITH MECHANICAL VALVE: ICD-10-CM

## 2018-06-21 LAB — INR PPP: 2.9

## 2018-06-22 ENCOUNTER — TELEPHONE (OUTPATIENT)
Dept: INTERNAL MEDICINE | Facility: CLINIC | Age: 63
End: 2018-06-22

## 2018-06-22 ENCOUNTER — CLINICAL SUPPORT (OUTPATIENT)
Dept: INTERNAL MEDICINE | Facility: CLINIC | Age: 63
End: 2018-06-22
Payer: MEDICARE

## 2018-06-22 DIAGNOSIS — R80.9 PROTEINURIA, UNSPECIFIED TYPE: ICD-10-CM

## 2018-06-22 DIAGNOSIS — B18.2 CHRONIC HEPATITIS C WITHOUT HEPATIC COMA: ICD-10-CM

## 2018-06-22 DIAGNOSIS — E04.1 RIGHT THYROID NODULE: ICD-10-CM

## 2018-06-22 DIAGNOSIS — I10 HTN (HYPERTENSION), BENIGN: ICD-10-CM

## 2018-06-22 DIAGNOSIS — N18.2 CKD (CHRONIC KIDNEY DISEASE) STAGE 2, GFR 60-89 ML/MIN: ICD-10-CM

## 2018-06-22 DIAGNOSIS — Z72.0 TOBACCO ABUSE: ICD-10-CM

## 2018-06-22 DIAGNOSIS — E78.5 HYPERLIPIDEMIA, UNSPECIFIED HYPERLIPIDEMIA TYPE: ICD-10-CM

## 2018-06-22 DIAGNOSIS — J44.9 CHRONIC OBSTRUCTIVE PULMONARY DISEASE, UNSPECIFIED COPD TYPE: ICD-10-CM

## 2018-06-22 DIAGNOSIS — Z95.2 STATUS POST HEART VALVE REPLACEMENT WITH MECHANICAL VALVE: ICD-10-CM

## 2018-06-22 DIAGNOSIS — I50.22 CHRONIC SYSTOLIC CONGESTIVE HEART FAILURE, NYHA CLASS 3: ICD-10-CM

## 2018-06-22 DIAGNOSIS — I48.0 PAROXYSMAL ATRIAL FIBRILLATION: ICD-10-CM

## 2018-06-22 PROCEDURE — 99999 PR PBB SHADOW E&M-EST. PATIENT-LVL II: CPT | Mod: PBBFAC,,,

## 2018-06-22 RX ORDER — LISINOPRIL 10 MG/1
10 TABLET ORAL DAILY
Qty: 90 TABLET | Refills: 11 | Status: CANCELLED | OUTPATIENT
Start: 2018-06-22

## 2018-06-22 RX ORDER — CARVEDILOL 12.5 MG/1
12.5 TABLET ORAL 2 TIMES DAILY
Qty: 180 TABLET | Refills: 11 | Status: SHIPPED | OUTPATIENT
Start: 2018-06-22 | End: 2018-09-06

## 2018-06-22 RX ORDER — LISINOPRIL 10 MG/1
10 TABLET ORAL DAILY
Qty: 90 TABLET | Refills: 3 | Status: SHIPPED | OUTPATIENT
Start: 2018-06-22 | End: 2019-01-18 | Stop reason: SDUPTHER

## 2018-06-22 RX ORDER — CARVEDILOL 12.5 MG/1
12.5 TABLET ORAL 2 TIMES DAILY
Qty: 180 TABLET | Refills: 11 | Status: CANCELLED | OUTPATIENT
Start: 2018-06-22

## 2018-06-22 NOTE — PROGRESS NOTES
Pt is here for b/p check. B/p at the office is 129/85, pulse 83. Pt states that he hasn't been taking his b/p medications at all since he last seen dr Concepcion. Pt brings with him b/p readings for the past 3 days: 134/70, 138/72, 119/69. Dr Davis notified, went to see pt and adjusted his b/p medications.

## 2018-06-22 NOTE — PATIENT INSTRUCTIONS
It is OK to stay off/stop:  Imdur and hydralazine.    Please take:  Lisinopril 10mg once daily    Carvedilol 12.5mg (lower dose) twice daily

## 2018-06-22 NOTE — TELEPHONE ENCOUNTER
Pt is here for b/p check. B/p at the office is 129/85, pulse 83. Pt states that he hasn't been taking his b/p medications at all since he last seen dr Concepcion. Pt brings with him b/p readings for the past 3 days: 134/70, 138/72, 119/69. Dr Davis notified, went to see pt and adjusted his b/p medications.   Dr Concepcion, I don't think medications went through [facility], please, resend.

## 2018-06-25 ENCOUNTER — ANTI-COAG VISIT (OUTPATIENT)
Dept: CARDIOLOGY | Facility: CLINIC | Age: 63
End: 2018-06-25
Payer: MEDICARE

## 2018-06-25 DIAGNOSIS — Z95.2 STATUS POST HEART VALVE REPLACEMENT WITH MECHANICAL VALVE: ICD-10-CM

## 2018-06-25 DIAGNOSIS — Z79.01 ANTICOAGULATED ON COUMADIN: ICD-10-CM

## 2018-06-25 LAB — INR PPP: 4.6

## 2018-06-25 PROCEDURE — G0250 MD INR TEST REVIE INTER MGMT: HCPCS | Mod: S$GLB,,,

## 2018-06-26 ENCOUNTER — OFFICE VISIT (OUTPATIENT)
Dept: UROLOGY | Facility: CLINIC | Age: 63
End: 2018-06-26
Payer: MEDICARE

## 2018-06-26 VITALS
HEIGHT: 69 IN | SYSTOLIC BLOOD PRESSURE: 106 MMHG | BODY MASS INDEX: 22.27 KG/M2 | DIASTOLIC BLOOD PRESSURE: 64 MMHG | HEART RATE: 78 BPM | WEIGHT: 150.38 LBS

## 2018-06-26 DIAGNOSIS — N40.1 BPH WITH URINARY OBSTRUCTION: Primary | ICD-10-CM

## 2018-06-26 DIAGNOSIS — N13.8 BPH WITH URINARY OBSTRUCTION: Primary | ICD-10-CM

## 2018-06-26 DIAGNOSIS — N52.01 ERECTILE DYSFUNCTION DUE TO ARTERIAL INSUFFICIENCY: ICD-10-CM

## 2018-06-26 DIAGNOSIS — N39.0 RECURRENT UTI: ICD-10-CM

## 2018-06-26 LAB
BILIRUB SERPL-MCNC: ABNORMAL MG/DL
BLOOD URINE, POC: 250
COLOR, POC UA: ABNORMAL
GLUCOSE UR QL STRIP: ABNORMAL
KETONES UR QL STRIP: ABNORMAL
LEUKOCYTE ESTERASE URINE, POC: ABNORMAL
NITRITE, POC UA: ABNORMAL
PH, POC UA: 5
POC RESIDUAL URINE VOLUME: 0 ML (ref 0–100)
PROTEIN, POC: ABNORMAL
SPECIFIC GRAVITY, POC UA: 1.02
UROBILINOGEN, POC UA: ABNORMAL

## 2018-06-26 PROCEDURE — 3078F DIAST BP <80 MM HG: CPT | Mod: CPTII,S$GLB,, | Performed by: NURSE PRACTITIONER

## 2018-06-26 PROCEDURE — 81002 URINALYSIS NONAUTO W/O SCOPE: CPT | Mod: S$GLB,,, | Performed by: NURSE PRACTITIONER

## 2018-06-26 PROCEDURE — 3008F BODY MASS INDEX DOCD: CPT | Mod: CPTII,S$GLB,, | Performed by: NURSE PRACTITIONER

## 2018-06-26 PROCEDURE — 51798 US URINE CAPACITY MEASURE: CPT | Mod: S$GLB,,, | Performed by: NURSE PRACTITIONER

## 2018-06-26 PROCEDURE — 3074F SYST BP LT 130 MM HG: CPT | Mod: CPTII,S$GLB,, | Performed by: NURSE PRACTITIONER

## 2018-06-26 PROCEDURE — 99214 OFFICE O/P EST MOD 30 MIN: CPT | Mod: 25,S$GLB,, | Performed by: NURSE PRACTITIONER

## 2018-06-26 PROCEDURE — 99999 PR PBB SHADOW E&M-EST. PATIENT-LVL IV: CPT | Mod: PBBFAC,,, | Performed by: NURSE PRACTITIONER

## 2018-06-26 RX ORDER — FINASTERIDE 5 MG/1
5 TABLET, FILM COATED ORAL DAILY
Qty: 90 TABLET | Refills: 3 | Status: SHIPPED | OUTPATIENT
Start: 2018-06-26 | End: 2019-01-18

## 2018-06-26 NOTE — PATIENT INSTRUCTIONS
BPH (Enlarged Prostate)  The prostate is a gland at the base of the bladder. As some men get older, the prostate may get bigger in size. This problem is called benign prostatic hyperplasia (BPH). BPH puts pressure on the urethra. This is the tube that carries urine from the bladder to the penis. It may interfere with the flow of urine. It may also keep the bladder from emptying fully.    Symptoms of BPH include trouble starting urination and feeling as though the bladder isnt emptying all the way. It also includes a weak urine stream, dribbling and leaking of urine, and frequent and urgent urination (especially at night). BPH can increase the risk of urinary infections. It can also block off urine flow completely. If this occurs, a thin tube (catheter) may be passed into the bladder to help drain urine.  If symptoms are mild, no treatment may be needed right now. If symptoms are more severe, treatment is likely needed. The goal of treatment is to improve urine flow and reduce symptoms. Treatments can include medicine and procedures. Your healthcare provider will discuss treatment options with you as needed.  Home care  The following guidelines will help you care for yourself at home:  · Urinate as soon as you feel the urge. Don't try to hold your urine.  · Don't limit your fluid intake during the day. Drink 6 to 8 glasses of water or liquids a day. This prevents bacteria from building up in the bladder.  · Avoid drinking fluids after dinner to help reduce urination during the night.  · Avoid medicines that can worsen your symptoms. These include certain cold and allergy medicines and antidepressants. Diuretics used for high blood pressure can also worsen symptoms. Talk to your doctor about the medicines you take. Other choices may work better for you.  Prostate cancer screening  BPH does not increase the risk of prostate cancer. But because prostate cancer is a common cancer in men, screening is sometimes  recommended. This may help detect the cancer in its early stages when treatment is most effective. Factors that can increase the risk of prostate cancer include being -American or having a father or brother who had prostate cancer. A high-fat diet may also increase the risk of prostate cancer. Talk to your healthcare provider to see whether you should be screened for prostate cancer.  Follow-up care  Follow up with your healthcare provider, or as advised  To learn more, go to:  · National Kidney & Urologic Diseases Information Clearinghouse  kidney.niddk.nih.gov, 976.419.7735  When to seek medical advice  Call your healthcare provider right away if any of these occur:  · Fever of 100.4°F (38.0°C) or higher, or as advised  · Unable to pass urine for 8 hours  · Increasing pressure or pain in your bladder (lower abdomen)  · Blood in the urine  · Increasing low back pain, not related to injury  · Symptoms of urinary infection (increased urge to urinate, burning when passing urine, foul-smelling urine)  Date Last Reviewed: 7/1/2016 © 2000-2017 [employer]. [address] [place] [address]. All rights reserved. This information is not intended as a substitute for professional medical care. Always follow your healthcare professional's instructions.        Bladder Infection, Male (Adult)    You have a bladder infection.  Urine is normally free of bacteria. But bacteria can get into the urinary tract from the skin around the rectum or it may travel in the blood from elsewhere in the body.  This is called a urinary tract infection (UTI). An infection can occur anywhere in the urinary tract. It could be in a kidney (pyelonephritis)or in the bladder (cystitis) and urethra (urethritis). The urethra is the tube that drains the urine from the bladder through the tip of the penis.  The most common place for a UTI is in the bladder. This is called a bladder infection. Most bladder infections are easily  treated. They are not serious unless the infection spreads up to the kidney.  The terms bladder infection, UTI, and cystitis are often used to describe the same thing, but they arent always the same. Cystitis is an inflammation of the bladder. The most common cause of cystitis is an infection.   Keep in mind:  · Infections in the urine are called UTIs.  · Cystitis is usually caused by a UTI.  · Not all UTIs and cases of cystitis are bladder infections.  · Bladder infections are the most common type of cystitis.  Symptoms of a bladder infection  The infection causes inflammation in the urethra and bladder. This inflammation causes many of the symptoms. The most common symptoms of a bladder infection are:  · Pain or burning when urinating  · Having to go more often than usual  · Feeling like you need to go right away  · Only a small amount comes out  · Blood in urine  · Discomfort in your belly (abdomen), usually in the lower abdomen, above the pubic bone  · Cloudy, strong, or bad smelling urine  · Unable to urinate (retention)  · Urinary incontinence  · Fever  · Loss of appetite  Older adults may also feel confused.  Causes of a bladder infection  Bladder infections are not contagious. You can't get one from someone else, from a toilet seat, or from sharing a bath.  The most common cause of bladder infections is bacteria from the bowels. The bacteria get onto the skin around the opening of the urethra. From there they can get into the urine and travel up to the bladder. This causes inflammation and an infection. This usually happens because of:  · An enlarged prostate  · Poor cleaning of the genitals  · Procedures that put a tube in your bladder, like a Schwarz catheter  · Bowel incontinence  · Older age  · Not emptying your bladder (The urine stays there, giving the bacteria a chance to grow.)  · Dehydration (This allows urine to stay in the bladder longer.)  · Constipation (This can cause the bowels to push on the  bladder or urethra and keep the bladder from emptying.)  Treatment  Bladder infections are treated with antibiotics. They usually clear up quickly without complications. Treatment helps prevent a more serious kidney infection.  Medicines  Medicines can help in the treatment of a bladder infection:  · You may have been given phenazopyridine to ease burning when you urinate. It will cause your urine to be bright orange. It can stain clothing.  · You may have been prescribed antibiotics. Take this medicine until you have finished it, even if you feel better. Taking all of the medicine will make sure the infection has cleared.  You can use acetaminophen or ibuprofen for pain, fever, or discomfort, unless another medicine was prescribed. You can also alternate them, or use both together. They work differently and are a different class of medicines, so taking them together is not an overdose. If you have chronic liver or kidney disease, talk with your healthcare provider before using these medicines. Also talk with your provider if youve had a stomach ulcer or GI bleeding or are taking blood thinner medicines.  Home care  Here are some guidelines to help you care for yourself at home:  · Drink plenty of fluids, unless your healthcare provider told you not to. Fluids will prevent dehydration and flush out your bladder.  · Use good personal hygiene. Wipe from front to back after using the toilet, and clean your penis regularly. If you arent circumcised, retract the foreskin when cleaning.  · Urinate more frequently, and dont try to hold it in for long periods of time, if possible.  · Wear loose-fitting clothes and cotton underwear. Avoid tight-fitting pants. This helps keep you clean and dry.  · Change your diet to prevent constipation. This means eating more fresh foods and more fiber, and less junk and fatty foods.  · Avoid sex until your symptoms are gone.  · Avoid caffeine, alcohol, and spicy foods. These can  irritate the bladder.  Follow-up care  Follow up with your healthcare provider, or as advised if all symptoms have not cleared up within 5 days. It is important to keep your follow-up appointment. You can talk with your provider to see if you need more tests of the urinary tract. This is especially important if you have infections that keep coming back.  If a culture was done, you will be told if your treatment needs to be changed. If directed, you can call to find out the results.  If X-rays were taken, you will be told of any findings that may affect your care.  Call 911  Call 911 if any of these occur:  · Trouble breathing  · Difficulty waking up  · Feeling confused  · Fainting or loss of consciousness  · Rapid heart rate  When to seek medical advice  Call your healthcare provider right away if any of these occur:  · Fever of 100.4ºF (38ºC) or higher, or as directed by your healthcare provider  · Your symptoms dont improve after 2 days of treatment  · Back or abdominal pain that gets worse  · Repeated vomiting, or you arent able to keep medicine down  · Weakness or dizziness  Date Last Reviewed: 10/1/2016  © 0615-9983 [employer]. [address] [place] [address]. All rights reserved. This information is not intended as a substitute for professional medical care. Always follow your healthcare professional's instructions.        Understanding Erectile Dysfunction    Erectile dysfunction (ED) is a problem getting an erection firm enough or keeping it long enough for intercourse. The problem can happen to any man at any age. But health problems that can lead to ED become more common as a man ages. Up to half of men over age 40 experience ED at some point.  Causes of ED  ED can have many causes. Most are physical. Some are emotional issues. Often, a combination of causes is involved. Causes of ED may include:  · Medical conditions such as diabetes or depression  · Smoking tobacco or  marijuana  · Drinking too much alcohol  · Side effects of medications  · Injury to nerves or blood vessels  · Emotional issues such as stress or relationship problems  ED can be treated  Prescription medications for ED are available. They help many men who try them. Depending upon the cause of the ED, though, medications may not be enough. In these cases, other treatment options are available. These include erectile aids and surgery. Your health care provider can tell you more about the treatment that is right for you. And new treatments for ED are being studied. No matter what the treatment you decide on, stay in touch with your doctor. If your symptoms persist, he or she may be able to adjust your current treatment or try something new.  Date Last Reviewed: 1/1/2017 © 2000-2017 [employer]. [address] [place] [address], [place] [address]. All rights reserved. This information is not intended as a substitute for professional medical care. Always follow your healthcare professional's instructions.        Penile Self-Injection: Notes and Precautions     Man and healthcare provider sitting across from one another, talking.   Penile self-injection is a simple technique that may improve your sex life. Some men even find that self-injection leads to an increase in natural erections. If you have questions or concerns about self-injection or erectile dysfunction (ED), talk with your healthcare provider. The information on this sheet will help you get the best results.  Notes about penile self-injection  · You may feel a mild burning during injection. This is OK. But if you feel pressure or severe pain, stop the injection. There may be a problem with the injection site.  · Only inject the medicine on the side of your penis. It may not work if injected elsewhere.  · To prevent scarring, inject in a different spot each time.  · Dont use this treatment if you have a bleeding disorder or any risk of infection.  · Get  medical help right away if your erection lasts longer than 3 to 4 hours.  Work with your healthcare provider  Ask how often you can safely repeat injections, as well as any other questions you have. You and your healthcare provider will talk about follow-up exams and how to get supplies. If the medicine doesnt work or stops working over time, tell your healthcare provider.     When to call your healthcare provider  Call your healthcare provider right away if any of these occur:  · An erection that lasts longer than 3 to 4  hours  · Bleeding or bruising  · Severe pain  · Scarring or curvature of the penis   Date Last Reviewed: 1/1/2017 © 2000-2017 [employer]. [address] [place] [address], [place] [address]. All rights reserved. This information is not intended as a substitute for professional medical care. Always follow your healthcare professional's instructions.        Penile Self-Injection Procedure  Self-injection is a good option if you have erectile dysfunction (ED). You insert a tiny needle into your penis and inject a medicine. This helps your penis get hard and stay that way long enough for sex. And sex and orgasm will feel as good as always. You may be nervous about doing self-injection at first. But with practice, it will get easier. Your healthcare provider will show you how to do self-injection the first time.  Talk to your doctor about any medicines you take and any medical problems you have.      Preparing for injection  · Wash your hands well with soap and water.  · Prepare the medicine (if needed).  · Sit or  a comfortable position in a warm, well-lit room. If you need to, sit or  front of a mirror.  · Find an injection site on one side of your penis, in a place with no visible veins. (Dont inject into the top, bottom, or head of the penis.)  · Clean the injection site with an alcohol swab. Grasp the head of your penis firmly with your thumb and forefinger (dont just  pinch the skin). Stretch the penis so the skin on the shaft is taut.  Injecting the medicine  · Rest your penis against your inner thigh and pull it gently toward your knee. Dont twist or rotate it. This way youll be sure to inject the medicine into the spot you chose and cleaned before.  · Hold the syringe between your thumb and fingers, like youre holding a pen. Rest your forearm on your thigh for support.  · Insert the needle at a 90° angle (perpendicular) to the shaft. Do this quickly to reduce discomfort. (The needle should go in easily. If it doesnt, stop right away.)  · Move your thumb to the plunger. Press down to inject the medicine, counting to 5.  · Remove the needle and dispose of it safely.  Gaining an erection  · Apply pressure to the injection site for a few minutes. This prevents swelling and bruising and helps spread the medicine.   · Stand up. This may help your erection develop. Foreplay often helps, too.  · Your penis should become firm within 10 to 20 minutes. The erection will last long enough for sex, and maybe longer.  When to seek medical care  An erection that lasts longer than 3 to 4  hours  · Bleeding or bruising  · Severe pain  · Scarring or curvature of the penis   Date Last Reviewed: 1/7/2017 © 2000-2017 [employer]. [address] [place] [address], [place] [address]. All rights reserved. This information is not intended as a substitute for professional medical care. Always follow your healthcare professional's instructions.

## 2018-06-26 NOTE — PROGRESS NOTES
Subjective:       Patient ID: Paul Villalobos Sr. is a 62 y.o. male.    Chief Complaint: Follow-up    Paul Villalobos Sr. is a 62 y.o. male with multiple medical problems including CHF and cirrhosis history of Hepatitis C.  He was seen in clinic with Dr. Caicedo 09/27/2017 for what sounds like balanitis and ED..  This resolved by using his wife's anti-yeast cream.    He requests circumcision.    No difficulty retracting his foreskin and not felt to be a surgical candidate.   He also c/o ED.  He has an active Rx for NTG.  This has been present for > 1 year.  He refused prostate cancer screening at that visit.           PSA                      0.73                04/03/2014             He was last seen in clinic 05/02/2018 due to an episode of gross hematuria and recurrent uti's.  He was seen in ER 02/20/2017 & 03/26/2018 for gross hematuria and UTI  He does admit to dysuria with these episodes.  FOS is slow to start in the AM but once starts urinates all day due to taking Lasix.    05/25/2018 (-) hematuria workup (cysto with Dr. Caicedo.    Here today for one month f/u and discuss recurrent uti/ED.  He currently taking Bactrim DS bid 2/2 to uti found by PCP.  FOS good; Lasix keeps him going, but currently no pain or dysuria; no hematuria seen.  He reports taking a Viagra recently but did not work for him.      06/14/2018 Urine Culture ENTEROBACTER AEROGENES >100,000 cfu/ml   04/02/2018 Urine Culture ENTEROBACTER AEROGENES >100,000 cfu/ml  02/20/2017 Urine Culture ESCHERICHIA COLI ESBL >100,000 cfu/ml   03/25/2018 Urine Culture ENTEROBACTER AEROGENES >100,000 cfu/ml  04/16/2018 Urine Culture ENTEROBACTER AEROGENES >100,000 cfu/ml           Past Medical History:  No date: Anemia  No date: Asthma  No date: CHF (congestive heart failure)  No date: Chronic bronchitis  1/19/2015: Cirrhosis  No date: Hepatitis C  No date: Hyperlipidemia  No date: Hypertension  No date: Lung disease  1/30/2018: Portal hypertension  No date:  Pulmonary hypertension    Past Surgical History:  No date: CARDIAC VALVE REPLACEMENT      Comment: AVR WITH MVr 2007 2006: HERNIA REPAIR      Comment: questionable mesh, right inguinal, unbilical  No date: LUNG DECORTICATION      Comment: right thoracotomy 2010 2010: LUNG DECORTICATION  No date: open heart surgery      Comment: redo AVR and MVR 2009    Review of patient's family history indicates:    Hypertension                   Mother                    Hypertension                   Father                    Hypertension                   Brother                   Heart disease                  Brother                   Heart attack                   Brother                   Hypertension                   Sister                      Social History    Marital status:              Spouse name:                       Years of education:                 Number of children:               Occupational History    None on file    Social History Main Topics    Smoking status: Current Every Day Smoker                                                     Packs/day: 0.50      Years: 48.00          Types: Cigarettes    Smokeless tobacco: Never Used                        Comment: 5 a day    Alcohol use: Yes           0.0 oz/week       Comment: seldom    Drug use: No              Sexual activity: Not on file          Other Topics            Concern    None on file    Social History Narrative    , 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        Allergies:  Patient has no known allergies.    Medications:  Current Outpatient Prescriptions:   albuterol-ipratropium 2.5mg-0.5mg/3mL (DUO-NEB) 0.5 mg-3 mg(2.5 mg base)/3 mL nebulizer solution, TAKE 3ML BY NEBULIZATION EVERY 6 HOURS AS NEEDED FOR WHEEZING., Disp: 360 mL, Rfl: 11  carvedilol (COREG) 25 MG tablet, TAKE 1 TABLET(25 MG) BY MOUTH TWICE DAILY WITH MEALS, Disp: 180 tablet, Rfl: 11  fluticasone (FLONASE) 50 mcg/actuation nasal spray, SPRAY TWICE  IN EACH NOSTRIL EVERY DAY, Disp: 16 g, Rfl: 11  furosemide (LASIX) 80 MG tablet, Take 1 tablet (80 mg total) by mouth once daily., Disp: 30 tablet, Rfl: 11  ipratropium-albuterol (COMBIVENT RESPIMAT)  mcg/actuation inhaler, Inhale 1 puff into the lungs every 4 (four) hours as needed for Wheezing., Disp: 4 g, Rfl: 11  isosorbide mononitrate (IMDUR) 30 MG 24 hr tablet, TAKE 1 TABLET(30 MG) BY MOUTH EVERY DAY, Disp: 90 tablet, Rfl: 11  lisinopril (PRINIVIL,ZESTRIL) 20 MG tablet, Take 1 tablet (20 mg total) by mouth once daily., Disp: 90 tablet, Rfl: 3  metOLazone (ZAROXOLYN) 2.5 MG tablet, Take 1 tablet (2.5 mg total) by mouth daily as needed. TAKE 1 TABLET(2.5 MG) BY MOUTH DAILY AS NEEDED, Disp: 30 tablet, Rfl: 6  polyethylene glycol (GLYCOLAX) 17 gram/dose powder, , Disp: , Rfl:   potassium chloride SA (K-DUR,KLOR-CON) 20 MEQ tablet, Take 2 tablets (40 mEq total) by mouth once daily. Take 40mEQ daily. (Patient taking differently: Take 40 mEq by mouth once daily. ), Disp: 45 tablet, Rfl: 5  warfarin (COUMADIN) 5 MG tablet, Take 1.5 tablets (7.5 mg total) by mouth Daily. Current Dose ( 10 mg on Sun, Wed, Fri; 7.5 mg all other days) or as directed by coumadin clinic., Disp: 150 tablet, Rfl: 3                                                 Review of Systems   Constitutional: Negative for activity change, appetite change, chills and fever.   HENT: Negative for facial swelling and trouble swallowing.    Eyes: Negative for visual disturbance.   Respiratory: Negative for chest tightness and shortness of breath.    Cardiovascular: Negative for chest pain and palpitations.   Gastrointestinal: Negative.  Negative for abdominal pain, constipation, diarrhea, nausea and vomiting.   Genitourinary: Positive for frequency and nocturia. Negative for difficulty urinating, dysuria, flank pain, hematuria, penile pain, penile swelling, scrotal swelling and testicular pain.   Musculoskeletal: Negative for back pain, gait  problem, myalgias and neck stiffness.   Skin: Negative for rash.   Neurological: Negative for dizziness and speech difficulty.   Hematological: Does not bruise/bleed easily.   Psychiatric/Behavioral: Negative for behavioral problems.       Objective:      Physical Exam   Nursing note and vitals reviewed.  Constitutional: He is oriented to person, place, and time. Vital signs are normal. He appears well-developed and well-nourished. He is active and cooperative.  Non-toxic appearance. He does not have a sickly appearance.   Urine dipped clear of infection  PVR in the office today by the nurse was 0.     HENT:   Head: Normocephalic and atraumatic.   Right Ear: External ear normal.   Left Ear: External ear normal.   Nose: Nose normal.   Mouth/Throat: Mucous membranes are normal.   Eyes: Conjunctivae and lids are normal. No scleral icterus.   Neck: Trachea normal, normal range of motion and full passive range of motion without pain. Neck supple. No JVD present. No tracheal deviation present.   Cardiovascular: Normal rate, S1 normal and S2 normal.    Pulmonary/Chest: Effort normal. No respiratory distress. He exhibits no tenderness.   Abdominal: Soft. Normal appearance and bowel sounds are normal. There is no hepatosplenomegaly. There is no tenderness. There is no CVA tenderness.   Genitourinary: Testes normal and penis normal.   Musculoskeletal: Normal range of motion.   Neurological: He is alert and oriented to person, place, and time. He has normal strength.   Skin: Skin is warm, dry and intact.     Psychiatric: He has a normal mood and affect. His behavior is normal. Judgment and thought content normal.       Assessment:       1. BPH with urinary obstruction    2. Recurrent UTI    3. Erectile dysfunction due to arterial insufficiency        Plan:         I spent 30 minutes with the patient of which more than half was spent in direct consultation with the patient in regards to our treatment and plan.    Education and  recommendations of today's plan of care including home remedies.  We discussed his recurrent uti; we discussed prostate usual culprit with utis but he refuses to discuss surgical management  We discuss medical management. Finasteride 5 mg daily to reduce prostate and help prevent infections.  We discussed his ED and contributory factors.  Discussed oral agents are very risky with active NTG Rx.   Reviewed 2nd line therapy; especially ICI. The going to think about it.  Educational materials on ICI Therapy given; discussed comes from compounding pharmacy.  1st dose given in the office.  Need update psa once infection cleared.  Call when decides on ED tx; did recall 6 months since do not want him to get lost.

## 2018-06-27 ENCOUNTER — ANTI-COAG VISIT (OUTPATIENT)
Dept: CARDIOLOGY | Facility: CLINIC | Age: 63
End: 2018-06-27

## 2018-06-27 DIAGNOSIS — Z79.01 ANTICOAGULATED ON COUMADIN: ICD-10-CM

## 2018-06-27 DIAGNOSIS — Z95.2 STATUS POST HEART VALVE REPLACEMENT WITH MECHANICAL VALVE: ICD-10-CM

## 2018-06-27 LAB — INR PPP: 2.9

## 2018-07-05 ENCOUNTER — ANTI-COAG VISIT (OUTPATIENT)
Dept: CARDIOLOGY | Facility: CLINIC | Age: 63
End: 2018-07-05

## 2018-07-05 DIAGNOSIS — Z79.01 ANTICOAGULATED ON COUMADIN: ICD-10-CM

## 2018-07-05 DIAGNOSIS — Z95.2 STATUS POST HEART VALVE REPLACEMENT WITH MECHANICAL VALVE: ICD-10-CM

## 2018-07-05 LAB — INR PPP: 2.2

## 2018-07-11 ENCOUNTER — ANTI-COAG VISIT (OUTPATIENT)
Dept: CARDIOLOGY | Facility: CLINIC | Age: 63
End: 2018-07-11

## 2018-07-11 DIAGNOSIS — Z79.01 ANTICOAGULATED ON COUMADIN: ICD-10-CM

## 2018-07-11 DIAGNOSIS — Z95.2 STATUS POST HEART VALVE REPLACEMENT WITH MECHANICAL VALVE: ICD-10-CM

## 2018-07-11 LAB — INR PPP: 2.5

## 2018-07-25 ENCOUNTER — ANTI-COAG VISIT (OUTPATIENT)
Dept: CARDIOLOGY | Facility: CLINIC | Age: 63
End: 2018-07-25

## 2018-07-25 DIAGNOSIS — Z79.01 ANTICOAGULATED ON COUMADIN: ICD-10-CM

## 2018-07-25 DIAGNOSIS — Z95.2 STATUS POST HEART VALVE REPLACEMENT WITH MECHANICAL VALVE: ICD-10-CM

## 2018-07-25 LAB — INR PPP: 1.8

## 2018-08-06 DIAGNOSIS — K74.69 OTHER CIRRHOSIS OF LIVER: Primary | ICD-10-CM

## 2018-08-06 DIAGNOSIS — J44.9 CHRONIC OBSTRUCTIVE PULMONARY DISEASE, UNSPECIFIED COPD TYPE: ICD-10-CM

## 2018-08-07 RX ORDER — IPRATROPIUM BROMIDE AND ALBUTEROL 20; 100 UG/1; UG/1
SPRAY, METERED RESPIRATORY (INHALATION)
Qty: 4 G | Refills: 11 | Status: SHIPPED | OUTPATIENT
Start: 2018-08-07 | End: 2019-07-22 | Stop reason: SDUPTHER

## 2018-08-08 ENCOUNTER — ANTI-COAG VISIT (OUTPATIENT)
Dept: CARDIOLOGY | Facility: CLINIC | Age: 63
End: 2018-08-08
Payer: MEDICARE

## 2018-08-08 DIAGNOSIS — Z95.2 STATUS POST HEART VALVE REPLACEMENT WITH MECHANICAL VALVE: ICD-10-CM

## 2018-08-08 DIAGNOSIS — Z79.01 ANTICOAGULATED ON COUMADIN: ICD-10-CM

## 2018-08-08 LAB — INR PPP: 1.9

## 2018-08-08 PROCEDURE — 99211 OFF/OP EST MAY X REQ PHY/QHP: CPT | Mod: S$GLB,,, | Performed by: INTERNAL MEDICINE

## 2018-08-22 ENCOUNTER — ANTI-COAG VISIT (OUTPATIENT)
Dept: CARDIOLOGY | Facility: CLINIC | Age: 63
End: 2018-08-22

## 2018-08-22 DIAGNOSIS — Z95.2 STATUS POST HEART VALVE REPLACEMENT WITH MECHANICAL VALVE: ICD-10-CM

## 2018-08-22 DIAGNOSIS — Z79.01 ANTICOAGULATED ON COUMADIN: ICD-10-CM

## 2018-08-22 LAB — INR PPP: 2.8

## 2018-08-30 DIAGNOSIS — I48.0 PAROXYSMAL ATRIAL FIBRILLATION: ICD-10-CM

## 2018-08-30 DIAGNOSIS — Z72.0 TOBACCO ABUSE: ICD-10-CM

## 2018-08-30 DIAGNOSIS — J44.9 CHRONIC OBSTRUCTIVE PULMONARY DISEASE, UNSPECIFIED COPD TYPE: ICD-10-CM

## 2018-08-30 DIAGNOSIS — N18.2 CKD (CHRONIC KIDNEY DISEASE) STAGE 2, GFR 60-89 ML/MIN: ICD-10-CM

## 2018-08-30 DIAGNOSIS — I50.22 CHRONIC SYSTOLIC CONGESTIVE HEART FAILURE: ICD-10-CM

## 2018-08-30 DIAGNOSIS — Z95.2 STATUS POST HEART VALVE REPLACEMENT WITH MECHANICAL VALVE: ICD-10-CM

## 2018-08-30 DIAGNOSIS — B18.2 CHRONIC HEPATITIS C WITHOUT HEPATIC COMA: ICD-10-CM

## 2018-08-30 DIAGNOSIS — I10 HTN (HYPERTENSION), BENIGN: ICD-10-CM

## 2018-08-30 RX ORDER — METOLAZONE 2.5 MG/1
TABLET ORAL
Qty: 30 TABLET | Refills: 0 | Status: SHIPPED | OUTPATIENT
Start: 2018-08-30 | End: 2019-01-18

## 2018-09-05 ENCOUNTER — TELEPHONE (OUTPATIENT)
Dept: HEPATOLOGY | Facility: CLINIC | Age: 63
End: 2018-09-05

## 2018-09-05 ENCOUNTER — ANTI-COAG VISIT (OUTPATIENT)
Dept: CARDIOLOGY | Facility: CLINIC | Age: 63
End: 2018-09-05

## 2018-09-05 DIAGNOSIS — Z79.01 ANTICOAGULATED ON COUMADIN: ICD-10-CM

## 2018-09-05 DIAGNOSIS — Z95.2 STATUS POST HEART VALVE REPLACEMENT WITH MECHANICAL VALVE: ICD-10-CM

## 2018-09-05 LAB — INR PPP: 2.2

## 2018-09-05 NOTE — TELEPHONE ENCOUNTER
Returned pt's call. Would like to discuss need for EGD in person. Due for f/u with me. Please schedule him to see me before 09/19/18

## 2018-09-06 ENCOUNTER — OFFICE VISIT (OUTPATIENT)
Dept: HEPATOLOGY | Facility: CLINIC | Age: 63
End: 2018-09-06
Attending: INTERNAL MEDICINE
Payer: MEDICARE

## 2018-09-06 VITALS
OXYGEN SATURATION: 96 % | WEIGHT: 157.19 LBS | TEMPERATURE: 98 F | HEIGHT: 69 IN | DIASTOLIC BLOOD PRESSURE: 66 MMHG | HEART RATE: 66 BPM | RESPIRATION RATE: 16 BRPM | BODY MASS INDEX: 23.28 KG/M2 | SYSTOLIC BLOOD PRESSURE: 113 MMHG

## 2018-09-06 DIAGNOSIS — K73.9 CHRONIC HEPATITIS: ICD-10-CM

## 2018-09-06 DIAGNOSIS — Z95.2 STATUS POST HEART VALVE REPLACEMENT WITH MECHANICAL VALVE: ICD-10-CM

## 2018-09-06 DIAGNOSIS — K76.6 PORTAL HYPERTENSION: ICD-10-CM

## 2018-09-06 DIAGNOSIS — B18.2 CHRONIC HEPATITIS C WITHOUT HEPATIC COMA: ICD-10-CM

## 2018-09-06 DIAGNOSIS — K74.60 HEPATIC CIRRHOSIS DUE TO CHRONIC HEPATITIS C INFECTION: ICD-10-CM

## 2018-09-06 DIAGNOSIS — Z86.19 H/O HEPATITIS: Primary | ICD-10-CM

## 2018-09-06 DIAGNOSIS — Z12.11 SCREENING FOR COLORECTAL CANCER: ICD-10-CM

## 2018-09-06 DIAGNOSIS — Z79.01 ANTICOAGULATED ON COUMADIN: ICD-10-CM

## 2018-09-06 DIAGNOSIS — B18.2 HEPATIC CIRRHOSIS DUE TO CHRONIC HEPATITIS C INFECTION: ICD-10-CM

## 2018-09-06 DIAGNOSIS — Z12.12 SCREENING FOR COLORECTAL CANCER: ICD-10-CM

## 2018-09-06 PROCEDURE — 3008F BODY MASS INDEX DOCD: CPT | Mod: CPTII,S$GLB,, | Performed by: INTERNAL MEDICINE

## 2018-09-06 PROCEDURE — 99214 OFFICE O/P EST MOD 30 MIN: CPT | Mod: S$GLB,,, | Performed by: INTERNAL MEDICINE

## 2018-09-06 PROCEDURE — 3074F SYST BP LT 130 MM HG: CPT | Mod: CPTII,S$GLB,, | Performed by: INTERNAL MEDICINE

## 2018-09-06 PROCEDURE — 3078F DIAST BP <80 MM HG: CPT | Mod: CPTII,S$GLB,, | Performed by: INTERNAL MEDICINE

## 2018-09-06 NOTE — Clinical Note
Please call endoscopy scheduling and have them add colonsocopy to upcoming EGD on the 19th. Also please call the coumadin clinic and let them know he needs to hold coumadin for the procedures and will need to start lovenox

## 2018-09-06 NOTE — PATIENT INSTRUCTIONS
1. EGD and colonoscopy. Will need to go on lovenox and hold coumadin. I will call coumadin clinic. Ms Buck [phone]  2. Blood tests and ultrasound  Return 6 months  Esophageal Varices     With esophageal varices, blood vessels in the esophagus become abnormally enlarged. They may then burst (rupture) and bleed.   Esophageal varices are enlarged veins at the lower end of the esophagus. The esophagus is the tube that carries food from your mouth to your stomach. Varices most often occur because of problems with blood flow in the liver caused by chronic liver disease. Normally, a blood vessel called the portal vein carries blood from the digestive organs to the liver. But with liver disease, blood flow can be blocked due to scarring of the liver. This increases the blood pressure in the portal vein (a condition known as portal hypertension). Blood then backs up in nearby veins in the esophagus and stomach, causing varices. Varices are a serious and deadly problem. Treatment is needed to prevent them from bursting (rupturing) and bleeding. If bleeding occurs, it can be fatal.  Symptoms of esophageal varices  Symptoms do not occur unless the varices are bleeding. This is an emergency problem. If you have any of the following symptoms, get medical attention right away:  · Vomiting blood  · Black, tarry, or bloody stools  · Feeling lightheaded, or fainting (loss of consciousness)  Diagnosing esophageal \varices  Youll likely be checked for varices if you have liver disease or other health problems that can cause them. Your healthcare provider will ask about your symptoms and health history. Youll also be examined. Tests are then done to confirm the problem. Tests can include:  · Upper endoscopy. This is done to see inside the upper digestive tract. During the test, an endoscope is used. This is a thin, flexible tube with a tiny camera on the end. Its inserted through your mouth. Its then guided down through your  esophagus, stomach, and first part of your small intestine. This allows the provider to check for varices and find any bleeding.  · Imaging tests. These provide pictures of the liver or blood flow in the liver. They allow the provider to check for enlarged veins around the liver and assess the risk of bleeding. Common imaging tests done include ultrasound and CT scans.  Treating esophageal varices  The goal of treatment is to reduce the risk of bleeding or to control bleeding. Treatment can include 1 or more of the following:  · Medicines. These may be prescribed to lower the blood pressure inside the enlarged veins. This reduces the risk of bleeding. Beta-blockers are the most common medicine used.  · Endoscopic therapy. These are treatments for enlarged or bleeding veins that are done using an endoscope. With ligation, small rubber bands are placed around the veins to close them off and stop any bleeding. With sclerotherapy, a blood-clotting medicine is injected into the veins to cause scarring and shrink them.  · Balloon tamponade. A tube with a balloon is guided down into your esophagus and stomach. The balloon is then filled with air. This puts pressure on enlarged or bleeding veins to control bleeding. This is a short-term (temporary) way to control bleeding until other treatments are available.   · Surgery. This may be done to place a tubelike device (stent) in the liver. The stent helps redirect blood flow in the liver to lower the blood pressure in enlarged veins. Sometimes, the enlarged veins may be connected to other nearby veins to redirect blood flow. In severe cases, a liver transplant may be needed. For this surgery, a diseased liver is replaced with a healthy liver from another person.   Follow-up  Regular visits with your provider are needed to check for bleeding of the varices. If bleeding occurs, it is likely to occur again. More treatments will then be needed in the future. Once endoscopic  therapy (banding) is performed, regular follow-up endoscopic scans with banding are done to completely get rid of the varices. If you are given medicines to take by mouth, be sure to take them as directed. Work closely with your provider to manage your condition. Know when to seek emergency care.  Date Last Reviewed: 7/1/2016  © 3838-9823 [employer]. [address] [place] [address]. All rights reserved. This information is not intended as a substitute for professional medical care. Always follow your healthcare professional's instructions.        Upper GI Endoscopy     During endoscopy, a long, flexible tube is used to view the inside of your upper GI tract.      Upper GI endoscopy allows your healthcare provider to look directly into the beginning of your gastrointestinal (GI) tract. The esophagus, stomach, and duodenum (the first part of the small intestine) make up the upper GI tract.   Before the exam  Follow these and any other instructions you are given before your endoscopy. If you dont follow the healthcare providers instructions carefully, the test may need to be canceled or done over:  · Don't eat or drink anything after midnight the night before your exam. If your exam is in the afternoon, drink only clear liquids in the morning. Don't eat or drink anything for 8 hours before the exam. In some cases, you may be able to take medicines with sips of water until 2 hours before the procedure. Speak with your healthcare provider about this.   · Bring your X-rays and any other test results you have.  · Because you will be sedated, arrange for an adult to drive you home after the exam.  · Tell your healthcare provider before the exam if you are taking any medicines or have any medical problems.  The procedure  Here is what to expect:  · You will lie on the endoscopy table. Usually patients lie on the left side.  · You will be monitored and given oxygen.  · Your throat may be numbed with  a spray or gargle. You are given medicine through an intravenous (IV) line that will help you relax and remain comfortable. You may be awake or asleep during the procedure.  · The healthcare provider will put the endoscope in your mouth and down your esophagus. It is thinner than most pieces of food that you swallow. It will not affect your breathing. The medicine helps keep you from gagging.  · Air is put into your GI tract to expand it. It can make you burp.  · During the procedure, the healthcare provider can take biopsies (tissue samples), remove abnormalities, such as polyps, or treat abnormalities through a variety of devices placed through the endoscope. You will not feel this.   · The endoscope carries images of your upper GI tract to a video screen. If you are awake, you may be able to look at the images.  · After the procedure is done, you will rest for a time. An adult must drive you home.  When to call your healthcare provider  Contact your healthcare provider if you have:  · Black or tarry stools, or blood in your stool  · Fever  · Pain in your belly that does not go away  · Nausea and vomiting, or vomiting blood   Date Last Reviewed: 7/1/2016  © 7556-6847 [employer]. [address] [place] [address], [place] [address]. All rights reserved. This information is not intended as a substitute for professional medical care. Always follow your healthcare professional's instructions.

## 2018-09-06 NOTE — PROGRESS NOTES
HEPATOLOGY FOLLOW UP    Pual Villalobos Sr. is here for follow up of cirrhosis and a history of Cirrhosis    HPI  Paul Villalobos Sr. had in the past a fibrosure test and abdominal ultrasound that suggested cirrhosis of the liver.  His platelets were slightly low at 145. His TBil and albumin were normal and his INR was elevated from coumadin.  He was treated with Harvoni and developed an SVR.  Last HCV VL checked 02/18 and was negative.     He has not had an EGD for many years. MELD 17 but is on coumadin.  Thus, true meld really is lower. The patient denies any symptoms of decompensated cirrhosis, including no ascites or edema, cognitive problems that would suggest hepatic encephalopathy, or GI bleeding from varices.    MELD-Na score: 17 at 4/18/2018 12:00 AM  MELD score: 17 at 4/18/2018 12:00 AM  Calculated from:  Serum Creatinine: 1.1 mg/dL at 4/16/2018 10:22 PM  Serum Sodium: 139 mmol/L (Rounded to 137 mmol/L) at 4/16/2018 10:22 PM  Total Bilirubin: 0.7 mg/dL (Rounded to 1 mg/dL) at 4/16/2018 10:22 PM  INR(ratio): 2.3 at 4/18/2018 12:00 AM  Age: 62 years    Outpatient Encounter Medications as of 9/6/2018   Medication Sig Dispense Refill    albuterol-ipratropium 2.5mg-0.5mg/3mL (DUO-NEB) 0.5 mg-3 mg(2.5 mg base)/3 mL nebulizer solution TAKE 3ML BY NEBULIZATION EVERY 6 HOURS AS NEEDED FOR WHEEZING. 360 mL 11    COMBIVENT RESPIMAT  mcg/actuation inhaler INHALE 1 PUFF INTO THE LUNGS EVERY 4 HOURS AS NEEDED FOR WHEEZING 4 g 11    finasteride (PROSCAR) 5 mg tablet Take 1 tablet (5 mg total) by mouth once daily. 90 tablet 3    fluticasone (FLONASE) 50 mcg/actuation nasal spray SPRAY TWICE IN EACH NOSTRIL EVERY DAY 16 g 11    furosemide (LASIX) 80 MG tablet Take 1 tablet (80 mg total) by mouth once daily. 30 tablet 11    lisinopril 10 MG tablet Take 1 tablet (10 mg total) by mouth once daily. 90 tablet 3    metOLazone (ZAROXOLYN) 2.5 MG tablet TAKE 1 TABLET(2.5 MG) BY MOUTH DAILY AS NEEDED 30 tablet 0     polyethylene glycol (GLYCOLAX) 17 gram/dose powder       potassium chloride SA (K-DUR,KLOR-CON) 20 MEQ tablet Take 2 tablets (40 mEq total) by mouth once daily. Take 40mEQ daily. (Patient taking differently: Take 40 mEq by mouth once daily. ) 45 tablet 5    warfarin (COUMADIN) 5 MG tablet Take 1.5 tablets (7.5 mg total) by mouth Daily. Current Dose ( 10 mg on Sun, Wed, Fri; 7.5 mg all other days) or as directed by coumadin clinic. 150 tablet 3    [DISCONTINUED] carvedilol (COREG) 12.5 MG tablet Take 1 tablet (12.5 mg total) by mouth 2 (two) times daily. 180 tablet 11    [DISCONTINUED] metOLazone (ZAROXOLYN) 2.5 MG tablet Take 1 tablet (2.5 mg total) by mouth daily as needed. TAKE 1 TABLET(2.5 MG) BY MOUTH DAILY AS NEEDED 30 tablet 6     No facility-administered encounter medications on file as of 9/6/2018.      No Known Allergies  Past Medical History:   Diagnosis Date    Anemia     Asthma     CHF (congestive heart failure)     Chronic bronchitis     Cirrhosis 1/19/2015    Hepatitis C     Hyperlipidemia     Hypertension     Lung disease     Portal hypertension 1/30/2018    Pulmonary hypertension        Review of Systems   Constitutional: Negative for fatigue, fever and unexpected weight change.   HENT: Negative.    Respiratory: Negative for cough and shortness of breath.    Cardiovascular: Negative for chest pain, palpitations and leg swelling.   Gastrointestinal: Negative for abdominal distention and abdominal pain.   Genitourinary: Negative.    Musculoskeletal: Negative.    Neurological: Negative.    Psychiatric/Behavioral: Negative.      Vitals:    09/06/18 1535   BP: 113/66   Pulse: 66   Resp: 16   Temp: 98.4 °F (36.9 °C)       Physical Exam   Constitutional: He is oriented to person, place, and time. He appears well-developed and well-nourished.   HENT:   Head: Normocephalic and atraumatic.   Eyes: Conjunctivae are normal. Pupils are equal, round, and reactive to light. No scleral icterus.    Neck: Normal range of motion. Neck supple. No thyromegaly present.   Cardiovascular: Normal rate, regular rhythm and normal heart sounds.   Pulmonary/Chest: Effort normal and breath sounds normal. He has no wheezes.   Abdominal: Soft. Bowel sounds are normal. He exhibits no distension and no mass. There is no tenderness.   Musculoskeletal: Normal range of motion. He exhibits no edema.   Neurological: He is alert and oriented to person, place, and time.   Skin: Skin is warm and dry. No rash noted.   Psychiatric: He has a normal mood and affect.       Lab Results   Component Value Date     (H) 04/16/2018    BUN 22 04/16/2018    CREATININE 1.1 04/16/2018    CALCIUM 9.8 04/16/2018     04/16/2018    K 3.7 04/16/2018     04/16/2018    PROT 7.5 04/16/2018    CO2 29 04/16/2018    ANIONGAP 7 (L) 04/16/2018    WBC 6.16 04/16/2018    RBC 3.90 (L) 04/16/2018    HGB 12.7 (L) 04/16/2018    HCT 38.6 (L) 04/16/2018    MCV 99 (H) 04/16/2018    MCH 32.6 (H) 04/16/2018    MCHC 32.9 04/16/2018     Lab Results   Component Value Date    RDW 14.0 04/16/2018     (L) 04/16/2018    MPV 11.3 04/16/2018    GRAN 3.9 04/16/2018    GRAN 63.2 04/16/2018    LYMPH 1.4 04/16/2018    LYMPH 21.9 04/16/2018    MONO 0.7 04/16/2018    MONO 11.9 04/16/2018    EOSINOPHIL 2.4 04/16/2018    BASOPHIL 0.3 04/16/2018    EOS 0.2 04/16/2018    BASO 0.02 04/16/2018    APTT 35.6 (H) 12/25/2017    GROUPTRH A POS 08/08/2017    GROUPTRH A POS 06/04/2010    ALETHEA Negative 05/17/2013    BNP 1,081 (H) 04/02/2018    CHOL 178 12/25/2017    TRIG 51 12/25/2017    HDL 69 12/25/2017    CHOLHDL 38.8 12/25/2017    TOTALCHOLEST 2.6 12/25/2017    ALBUMIN 3.7 04/16/2018    BILIDIR 0.3 07/28/2009    AST 30 04/16/2018    ALT 22 04/16/2018    ALKPHOS 59 04/16/2018    MG 1.9 04/02/2018    LABPROT 24.1 (H) 04/16/2018    INR 2.2 09/05/2018    INR 3.8 (H) 12/14/2009    PSA 0.73 04/03/2014     Assessment and Plan:    Paul Villalobos Sr. is a 62 y.o. male  withCirrhosis  The previous noninvasive w/u strongly suggested cirrhosis ( u/s, fibrosure and platelet count). I am recommendin) Hepatitis C. Now cured. No need for further testing/monitoring.  2) cirrhosis- he needs HCC screening with an u/s and afp every 6 mos . This is overdue. I would check lfts every 6 mos to monitor for liver dysfunction. Needs meld labs every 6 months.  3) portal hypertension with thrombocytopenia. I recommend an egd to r/o varices. The patient has deferred this procedure in the past. I do think this important since he is on coumadin. He is now agreeable to proceeding  4) colorectal ca screening. He is overdue for this. He is agreeable to proceeding. Will check with coumadin clinica about arranging lovenox bridge since will need to hold coumadn for the procedures.    Return 6 months    Return . .

## 2018-09-07 ENCOUNTER — HOSPITAL ENCOUNTER (OUTPATIENT)
Dept: RADIOLOGY | Facility: OTHER | Age: 63
Discharge: HOME | End: 2018-09-07
Attending: INTERNAL MEDICINE
Payer: MEDICARE

## 2018-09-07 ENCOUNTER — TELEPHONE (OUTPATIENT)
Dept: HEPATOLOGY | Facility: CLINIC | Age: 63
End: 2018-09-07

## 2018-09-07 DIAGNOSIS — Z79.01 ANTICOAGULATED ON COUMADIN: Primary | ICD-10-CM

## 2018-09-07 DIAGNOSIS — K76.6 PORTAL HYPERTENSION: ICD-10-CM

## 2018-09-07 DIAGNOSIS — Z12.11 SPECIAL SCREENING FOR MALIGNANT NEOPLASMS, COLON: Primary | ICD-10-CM

## 2018-09-07 PROCEDURE — 76700 US EXAM ABDOM COMPLETE: CPT | Mod: 26,,, | Performed by: RADIOLOGY

## 2018-09-07 PROCEDURE — 76700 US EXAM ABDOM COMPLETE: CPT | Mod: TC

## 2018-09-07 RX ORDER — POLYETHYLENE GLYCOL 3350, SODIUM SULFATE ANHYDROUS, SODIUM BICARBONATE, SODIUM CHLORIDE, POTASSIUM CHLORIDE 236; 22.74; 6.74; 5.86; 2.97 G/4L; G/4L; G/4L; G/4L; G/4L
4 POWDER, FOR SOLUTION ORAL ONCE
Qty: 4000 ML | Refills: 0 | Status: SHIPPED | OUTPATIENT
Start: 2018-09-07 | End: 2018-09-07

## 2018-09-07 NOTE — PROGRESS NOTES
Per call from Jailyn [phone]  Dr Mcfadden/Liver and Hepatitis specialist requesting patient hold warfarin prior to endoscopy and colonoscopy.  Procedure not scheduled as of yet.  Advised Jailyn we need procedure schedule date.   Also requesting patient to start Lovenox    Appears procedure scheduled for 9/18

## 2018-09-07 NOTE — TELEPHONE ENCOUNTER
----- Message from Haritha Mcfadden MD sent at 9/6/2018 11:39 PM CDT -----  Please call endoscopy scheduling and have them add colonsocopy to upcoming EGD on the 19th. Also please call the coumadin clinic and let them know he needs to hold coumadin for the procedures and will need to start lovenox

## 2018-09-10 NOTE — PROGRESS NOTES
We received a message from Brynn Gamez requesting assistance with arranging Lovenox bridge.  Per note from Dr Guerin on 9/6 from office visit plan of care is to hold Coumadin with use of Lovenox 3 days prior to EGD/colonoscopy scheduled for 10/12 at 0800.   I called endoscopy to contact Brynn Gamez to confirm date of procedure and Mindy Seipel returned my call to state EGD/colonscopy is to be on 10/12/18.  There is no explanation for why there is a 9/18 date in Epic for same procedure other then date was changed and old date never cancelled.    Patient does home meter monitoring every 2 weeks on Wednesday.  Last INR was 9/5 2.2 (range 2.5 -3.0)

## 2018-09-11 NOTE — PROGRESS NOTES
I spoke with patient today and instructed him to do his next home meter test on 9/12 Wednesday as he needs to get back to his routine self meter test on Wednesdays every two weeks, scheduled a meter follow up appointment for 9/14 and to bring his meter and all supplies to self test, and advised him we are planning to hold coumadin with use of Lovenox 3-5 days prior to EGD/colonoscopy scheduled for 10/12 with us contacting him with plan of care as we get closer to procedure date which patient verbalized understanding.    Patient states he has used Lovenox injections before and can given them to himself.  Creatine clearance of 57.4 ml/min used creatine result of 1.3 with 71.3 kg as current weight in chart  - per evidence base medicine consult calculation of Creatinine Clearance per IBW 57.4 ml/min

## 2018-09-12 ENCOUNTER — TELEPHONE (OUTPATIENT)
Dept: ENDOSCOPY | Facility: HOSPITAL | Age: 63
End: 2018-09-12

## 2018-09-12 ENCOUNTER — ANTI-COAG VISIT (OUTPATIENT)
Dept: CARDIOLOGY | Facility: CLINIC | Age: 63
End: 2018-09-12

## 2018-09-12 DIAGNOSIS — Z79.01 ANTICOAGULATED ON COUMADIN: ICD-10-CM

## 2018-09-12 DIAGNOSIS — Z95.2 STATUS POST HEART VALVE REPLACEMENT WITH MECHANICAL VALVE: ICD-10-CM

## 2018-09-12 LAB — INR PPP: 1.9

## 2018-09-12 NOTE — TELEPHONE ENCOUNTER
Paul Villalobos .   Male, 62 y.o., 1955  Weight:   71.3 kg (157 lb 3 oz)  Phone:   [phone] (Mobile)  PCP:   Tye Concepcion MD  Language:   English  Need Interp:   No  Allergies:   No Known Allergies  Health Maintenance:   Due  I  Smoking Cessation Trust Member  Primary Ins.:   SAMANTHA  MRN:   [id number]  Pt Comm Pref:   Patient Portal, Mail  Patient Portal:   Active  Next Appt:   09/14/2018  My Sticky Note:     Specialty Comments:       Message   Received: Yesterday   Message Contents   ANGUS Hernandez RN   Caller: Xuan GRECO              This is to let you know the patient will hold coumadin 3-5 days with lovenox bridge prior to EGD/colonoscopy procedure.  Patient does home meter self testing so as we get closer to the procedure date we will be in contact with the patient to advise him on the plan of care. I spoke to the patient today to notify him of this plan.    Previous Messages      ----- Message -----   From: Qing Carter, Jooj   Sent: 9/10/2018  10:43 AM   To: Peggy Talbot RN         ----- Message -----   From: Brynn Gamez RN   Sent: 9/7/2018   3:54 PM   To: [facility] Coumad Provider     Mr. Villalobos is scheduled for an EGD/Colon on 10/12/18 at 0800 am. Please contact him on holding Coumadin for the procedure. Labs ordered per hepatology. Please let me know coumadin hold plan.EC

## 2018-09-12 NOTE — PROGRESS NOTES
Patient increased his vitamin K intake causing his low INR. He will take a boosted dose today and re-challenge this dose until follow-up.

## 2018-09-14 ENCOUNTER — OFFICE VISIT (OUTPATIENT)
Dept: INTERNAL MEDICINE | Facility: CLINIC | Age: 63
End: 2018-09-14
Payer: MEDICARE

## 2018-09-14 ENCOUNTER — TELEPHONE (OUTPATIENT)
Dept: INTERNAL MEDICINE | Facility: CLINIC | Age: 63
End: 2018-09-14

## 2018-09-14 ENCOUNTER — ANTI-COAG VISIT (OUTPATIENT)
Dept: CARDIOLOGY | Facility: CLINIC | Age: 63
End: 2018-09-14
Payer: MEDICARE

## 2018-09-14 VITALS
WEIGHT: 154.56 LBS | BODY MASS INDEX: 22.89 KG/M2 | HEART RATE: 77 BPM | OXYGEN SATURATION: 96 % | SYSTOLIC BLOOD PRESSURE: 130 MMHG | HEIGHT: 69 IN | DIASTOLIC BLOOD PRESSURE: 82 MMHG

## 2018-09-14 DIAGNOSIS — Z95.2 STATUS POST HEART VALVE REPLACEMENT WITH MECHANICAL VALVE: ICD-10-CM

## 2018-09-14 DIAGNOSIS — Z79.01 LONG TERM (CURRENT) USE OF ANTICOAGULANTS: Primary | ICD-10-CM

## 2018-09-14 DIAGNOSIS — F17.200 SMOKER: ICD-10-CM

## 2018-09-14 DIAGNOSIS — Z79.01 ANTICOAGULATED ON COUMADIN: ICD-10-CM

## 2018-09-14 DIAGNOSIS — Z12.11 COLON CANCER SCREENING: ICD-10-CM

## 2018-09-14 DIAGNOSIS — N39.0 URINARY TRACT INFECTION WITHOUT HEMATURIA, SITE UNSPECIFIED: Primary | ICD-10-CM

## 2018-09-14 DIAGNOSIS — R05.9 COUGH: ICD-10-CM

## 2018-09-14 LAB — INR PPP: 3.4

## 2018-09-14 PROCEDURE — 3079F DIAST BP 80-89 MM HG: CPT | Mod: CPTII,,, | Performed by: INTERNAL MEDICINE

## 2018-09-14 PROCEDURE — 87186 SC STD MICRODIL/AGAR DIL: CPT

## 2018-09-14 PROCEDURE — 87086 URINE CULTURE/COLONY COUNT: CPT

## 2018-09-14 PROCEDURE — 99214 OFFICE O/P EST MOD 30 MIN: CPT | Mod: S$PBB,,, | Performed by: INTERNAL MEDICINE

## 2018-09-14 PROCEDURE — 3075F SYST BP GE 130 - 139MM HG: CPT | Mod: CPTII,,, | Performed by: INTERNAL MEDICINE

## 2018-09-14 PROCEDURE — 99999 PR PBB SHADOW E&M-EST. PATIENT-LVL III: CPT | Mod: PBBFAC,,, | Performed by: INTERNAL MEDICINE

## 2018-09-14 PROCEDURE — 3008F BODY MASS INDEX DOCD: CPT | Mod: CPTII,,, | Performed by: INTERNAL MEDICINE

## 2018-09-14 PROCEDURE — 99211 OFF/OP EST MAY X REQ PHY/QHP: CPT | Mod: S$PBB,,, | Performed by: INTERNAL MEDICINE

## 2018-09-14 PROCEDURE — 87088 URINE BACTERIA CULTURE: CPT

## 2018-09-14 PROCEDURE — 99213 OFFICE O/P EST LOW 20 MIN: CPT | Mod: PBBFAC | Performed by: INTERNAL MEDICINE

## 2018-09-14 PROCEDURE — 87077 CULTURE AEROBIC IDENTIFY: CPT

## 2018-09-14 RX ORDER — CODEINE PHOSPHATE AND GUAIFENESIN 10; 100 MG/5ML; MG/5ML
10 SOLUTION ORAL EVERY 8 HOURS PRN
Qty: 236 ML | Refills: 1 | Status: SHIPPED | OUTPATIENT
Start: 2018-09-14 | End: 2019-03-12 | Stop reason: SDUPTHER

## 2018-09-14 NOTE — PROGRESS NOTES
Subjective:       Patient ID: Paul Villalobos Sr. is a 62 y.o. male.    Chief Complaint: Follow-up and Hip Pain    Patient is here for followup for chronic conditions.    mirilax too strong, causes loose stools, but alternative of constipation is very difficult for him as well.            Review of Systems   Constitutional: Negative for activity change, appetite change, chills, fatigue, fever and unexpected weight change.   HENT: Negative for congestion, postnasal drip, rhinorrhea and voice change.    Respiratory: Positive for cough and shortness of breath (stable though). Negative for chest tightness and wheezing.    Cardiovascular: Negative for chest pain, palpitations and leg swelling.   Gastrointestinal: Negative for abdominal distention, abdominal pain, anal bleeding, blood in stool, nausea and vomiting.   Genitourinary: Negative for decreased urine volume, difficulty urinating, flank pain, frequency, hematuria, scrotal swelling and testicular pain.   Musculoskeletal: Negative for neck pain and neck stiffness.   Skin: Negative for rash and wound.   Neurological: Negative for tremors, syncope and weakness.   Hematological: Negative for adenopathy. Does not bruise/bleed easily.   Psychiatric/Behavioral: Negative for dysphoric mood.       Objective:      Physical Exam   Constitutional: He is oriented to person, place, and time. He appears well-developed and well-nourished. No distress.   HENT:   Head: Normocephalic and atraumatic.   Eyes: No scleral icterus.   Neck: Normal range of motion. No thyromegaly present.   Cardiovascular: Normal rate. Exam reveals no gallop and no friction rub.   Murmur heard.  Irregularly irregular, mechanical systolic murmur     Pulmonary/Chest: Effort normal. No respiratory distress. He has wheezes (moderate and air flow decreased). He has no rales.   Abdominal: Soft. Bowel sounds are normal. He exhibits no distension and no mass. There is no tenderness. There is no rebound and no  guarding. No hernia.   Musculoskeletal: Normal range of motion. He exhibits no edema.   Lymphadenopathy:     He has no cervical adenopathy.   Neurological: He is alert and oriented to person, place, and time.   Skin: No lesion noted.   Psychiatric: He has a normal mood and affect. Thought content normal.       Assessment:       1. Urinary tract infection without hematuria, site unspecified    2. Cough    3. Colon cancer screening    4. Smoker        Plan:       Paul was seen today for follow-up and hip pain.    Diagnoses and all orders for this visit:    Urinary tract infection without hematuria, site unspecified  -     Urine culture  Since he has heart valve replacement important to ensure UTI cleared. Has had full urology workup with CT and cysto. Recurrent UTIs presumably from just BPH, will confirm with urology team.    Cough  -     guaifenesin-codeine 100-10 mg/5 ml (TUSSI-ORGANIDIN NR)  mg/5 mL syrup; Take 10 mLs by mouth every 8 (eight) hours as needed for Cough.    Colon cancer screening  -     Fecal Immunochemical Test (iFOBT); Future    Smoker  -     Ambulatory referral to Smoking Cessation Program  [address]91: Smoking and tobacco cessation counseling visit; intensive, greater than 10 minutes        Health Maintenance       Date Due Completion Date    TETANUS VACCINE 12/29/1971 ---    Zoster Vaccine 12/29/2013 ---    Fecal Occult Blood Test (FOBT)/[employer] 09/29/2018 9/29/2017    Pneumococcal PPSV23 (Medium Risk) (2) 12/29/2018 3/27/2013    Lipid Panel 12/25/2022 12/25/2017          Follow-up in about 4 months (around 1/14/2019).    Future Appointments   Date Time Provider Department Center   9/24/2018  9:00 AM Ashwini Casas, RRT [facility]   10/12/2018  7:15 AM LAB, APPOINTMENT [facility] LAB [facility]

## 2018-09-14 NOTE — Clinical Note
Hi, I am Maria M Paul ROBLEDO Wilfredo Sanchez's pcp. He has had recurrent UTI, which worry me especially since he has an artificial heart valve. Does he need any further workup from a urologic standpoint for the recurrent infections? Do you have a sense of the cause?Thank you, Tye Concepcion

## 2018-09-14 NOTE — TELEPHONE ENCOUNTER
----- Message from Tye Concepcion MD sent at 9/14/2018 10:25 AM CDT -----  Please call [facility] and tell them to stop filling hydralazine

## 2018-09-14 NOTE — Clinical Note
Hi, please call him for Follow-up in about 4 months (around 1/14/2019), did not get schedule last appt.Thank you, Tye Concepcion

## 2018-09-17 LAB — BACTERIA UR CULT: NORMAL

## 2018-09-18 ENCOUNTER — LAB VISIT (OUTPATIENT)
Dept: LAB | Facility: HOSPITAL | Age: 63
End: 2018-09-18
Attending: INTERNAL MEDICINE
Payer: MEDICARE

## 2018-09-18 ENCOUNTER — TELEPHONE (OUTPATIENT)
Dept: INTERNAL MEDICINE | Facility: CLINIC | Age: 63
End: 2018-09-18

## 2018-09-18 DIAGNOSIS — N39.0 RECURRENT UTI: Primary | ICD-10-CM

## 2018-09-18 DIAGNOSIS — Z12.11 COLON CANCER SCREENING: ICD-10-CM

## 2018-09-18 LAB — HEMOCCULT STL QL IA: NEGATIVE

## 2018-09-18 PROCEDURE — 82274 ASSAY TEST FOR BLOOD FECAL: CPT

## 2018-09-18 NOTE — PROGRESS NOTES
Spoke with pt scheduled pt appt a letter will be mailed out as a reminder for pt appt Date and time

## 2018-09-18 NOTE — TELEPHONE ENCOUNTER
Hi, please call him -- his urine culture shows a bacteria.  I recommend one more urine test to make sure he definitely has an infection as opposed to contamination.    Here is the home order, please have him come in to do it (all 3 urine tests). Ask him if he is having any blood in the urine and whether he is having any painful urination.    Please remind him that he needs to clean the penile area before doing the repeat urine test.    Let me know if patient has any questions.  Thank you, Tye Concepcion

## 2018-09-18 NOTE — TELEPHONE ENCOUNTER
Spoke with pt he understood that he has to repeat urine studies pt will come to office for repeat urine once he receives his stool results just in case he has to repeat stool test

## 2018-09-21 ENCOUNTER — TELEPHONE (OUTPATIENT)
Dept: UROLOGY | Facility: CLINIC | Age: 63
End: 2018-09-21

## 2018-09-21 NOTE — TELEPHONE ENCOUNTER
----- Message from Tye Concepcion MD sent at 9/18/2018 11:49 AM CDT -----  Hi, I am Mr. Paul Villalobos Sr.'s pcp. He has had recurrent UTI, which worry me especially since he has an artificial heart valve. Does he need any further workup from a urologic standpoint for the recurrent infections? Do you have a sense of the cause?  Thank you, Tye Concepcion

## 2018-09-21 NOTE — TELEPHONE ENCOUNTER
I spoke with Mr. Villalobos. Today he voices no urinary complaints.  Discussed the recurrent uti and risks especially with his mechanical heart valve and the concerns of his PCP, Dr. Concepcion.  Discussed with him we have him on Finasteride for medical management of prostate; His PVR was 0 last visit.   Had normal cysto with Dr. Caicedo 05/25/2018.  We also discussed surgical management with TURP/REZUM.   The concern with surgery is if he surgical candidate due to medical history. CHF, thrombocytopenia (Plts 130) and on coumadin.     He said he does not want any type surgery  Continue finasteride; will have him f/u sooner.

## 2018-09-24 ENCOUNTER — CLINICAL SUPPORT (OUTPATIENT)
Dept: SMOKING CESSATION | Facility: CLINIC | Age: 63
End: 2018-09-24
Payer: COMMERCIAL

## 2018-09-24 DIAGNOSIS — F17.200 NICOTINE DEPENDENCE: Primary | ICD-10-CM

## 2018-09-24 PROCEDURE — 99404 PREV MED CNSL INDIV APPRX 60: CPT | Mod: S$GLB,,,

## 2018-09-24 PROCEDURE — 99999 PR PBB SHADOW E&M-EST. PATIENT-LVL I: CPT | Mod: PBBFAC,,,

## 2018-09-24 RX ORDER — IBUPROFEN 200 MG
1 TABLET ORAL DAILY
Qty: 14 PATCH | Refills: 0 | Status: SHIPPED | OUTPATIENT
Start: 2018-09-24 | End: 2018-10-08 | Stop reason: SDUPTHER

## 2018-09-24 RX ORDER — DM/P-EPHED/ACETAMINOPH/DOXYLAM 30-7.5/3
2 LIQUID (ML) ORAL
Qty: 72 LOZENGE | Refills: 0 | Status: SHIPPED | OUTPATIENT
Start: 2018-09-24 | End: 2018-12-10 | Stop reason: SDUPTHER

## 2018-09-24 NOTE — Clinical Note
Pt currently smoke 10-12 cigarettes per day and will begin bi weekly tobacco cessation individual sessions. Pt agreed to take prescribed tobacco cessation medication regimen of 14 mg nicotine patches and 2 mg nicotine lozenges (PRN). Prescribed medications will be monitored by CTTS and managed by physician. Pt is scheduled to follow up with CTTS on 10/8/2018.

## 2018-09-26 ENCOUNTER — ANTI-COAG VISIT (OUTPATIENT)
Dept: CARDIOLOGY | Facility: CLINIC | Age: 63
End: 2018-09-26
Payer: MEDICARE

## 2018-09-26 DIAGNOSIS — Z95.2 STATUS POST HEART VALVE REPLACEMENT WITH MECHANICAL VALVE: ICD-10-CM

## 2018-09-26 DIAGNOSIS — Z79.01 ANTICOAGULATED ON COUMADIN: ICD-10-CM

## 2018-09-26 LAB — INR PPP: 2.4

## 2018-09-26 PROCEDURE — G0250 MD INR TEST REVIE INTER MGMT: HCPCS | Mod: ,,, | Performed by: INTERNAL MEDICINE

## 2018-09-26 NOTE — TELEPHONE ENCOUNTER
Hi, please call him -- his stool test from last week was normal, no microscopic blood seen.    Again, I recommend he come in for a repeat urine test -- I ordered the 3 urine tests on 9/18. He would come in and the urine tests would be collected as a home collect.     Let me know if patient has any questions.  Thank you, Tye Concepcion

## 2018-09-27 ENCOUNTER — TELEPHONE (OUTPATIENT)
Dept: INTERNAL MEDICINE | Facility: CLINIC | Age: 63
End: 2018-09-27

## 2018-09-27 ENCOUNTER — LAB VISIT (OUTPATIENT)
Dept: LAB | Facility: HOSPITAL | Age: 63
End: 2018-09-27
Attending: INTERNAL MEDICINE
Payer: MEDICARE

## 2018-09-27 DIAGNOSIS — N39.0 RECURRENT UTI: ICD-10-CM

## 2018-09-27 LAB
BACTERIA #/AREA URNS AUTO: ABNORMAL /HPF
BILIRUB UR QL STRIP: NEGATIVE
CLARITY UR REFRACT.AUTO: CLEAR
COLOR UR AUTO: YELLOW
GLUCOSE UR QL STRIP: NEGATIVE
HGB UR QL STRIP: ABNORMAL
HYALINE CASTS UR QL AUTO: 0 /LPF
KETONES UR QL STRIP: NEGATIVE
LEUKOCYTE ESTERASE UR QL STRIP: NEGATIVE
MICROSCOPIC COMMENT: ABNORMAL
NITRITE UR QL STRIP: NEGATIVE
PH UR STRIP: 7 [PH] (ref 5–8)
PROT UR QL STRIP: ABNORMAL
RBC #/AREA URNS AUTO: 2 /HPF (ref 0–4)
SP GR UR STRIP: 1.02 (ref 1–1.03)
SQUAMOUS #/AREA URNS AUTO: 0 /HPF
URN SPEC COLLECT METH UR: ABNORMAL
UROBILINOGEN UR STRIP-ACNC: 4 EU/DL
WBC #/AREA URNS AUTO: 1 /HPF (ref 0–5)

## 2018-09-27 PROCEDURE — 87186 SC STD MICRODIL/AGAR DIL: CPT

## 2018-09-27 PROCEDURE — 87086 URINE CULTURE/COLONY COUNT: CPT

## 2018-09-27 PROCEDURE — 87077 CULTURE AEROBIC IDENTIFY: CPT

## 2018-09-27 PROCEDURE — 87088 URINE BACTERIA CULTURE: CPT

## 2018-09-27 PROCEDURE — 81001 URINALYSIS AUTO W/SCOPE: CPT

## 2018-09-27 NOTE — TELEPHONE ENCOUNTER
I found the requisitions, I had to go into the chart and look under labs. Usually I go to orders but it wasn't listed there.

## 2018-09-28 ENCOUNTER — ANTI-COAG VISIT (OUTPATIENT)
Dept: CARDIOLOGY | Facility: CLINIC | Age: 63
End: 2018-09-28

## 2018-09-28 DIAGNOSIS — Z95.2 STATUS POST HEART VALVE REPLACEMENT WITH MECHANICAL VALVE: ICD-10-CM

## 2018-09-28 DIAGNOSIS — Z79.01 ANTICOAGULATED ON COUMADIN: ICD-10-CM

## 2018-09-28 LAB — INR PPP: 2.6

## 2018-09-30 LAB — BACTERIA UR CULT: NORMAL

## 2018-10-04 ENCOUNTER — TELEPHONE (OUTPATIENT)
Dept: INTERNAL MEDICINE | Facility: CLINIC | Age: 63
End: 2018-10-04

## 2018-10-04 NOTE — TELEPHONE ENCOUNTER
I called him and discussed urine cx + for bacteria, he clearly denies any UTI symptoms and no blood in urine as well. We agreed to hold on abx (especially since UA w/o pyuria). He agrees to discuss with Steffi Mcneil possible prostate surgery.    Cc Steffi Mcneil and Dr Caicedo

## 2018-10-08 ENCOUNTER — CLINICAL SUPPORT (OUTPATIENT)
Dept: SMOKING CESSATION | Facility: CLINIC | Age: 63
End: 2018-10-08
Payer: COMMERCIAL

## 2018-10-08 ENCOUNTER — ANTI-COAG VISIT (OUTPATIENT)
Dept: CARDIOLOGY | Facility: CLINIC | Age: 63
End: 2018-10-08

## 2018-10-08 DIAGNOSIS — Z95.2 STATUS POST HEART VALVE REPLACEMENT WITH MECHANICAL VALVE: ICD-10-CM

## 2018-10-08 DIAGNOSIS — F17.200 NICOTINE DEPENDENCE: Primary | ICD-10-CM

## 2018-10-08 DIAGNOSIS — Z79.01 ANTICOAGULATED ON COUMADIN: Primary | ICD-10-CM

## 2018-10-08 LAB — INR PPP: 1.9

## 2018-10-08 PROCEDURE — 99403 PREV MED CNSL INDIV APPRX 45: CPT | Mod: S$GLB,,,

## 2018-10-08 PROCEDURE — 99999 PR PBB SHADOW E&M-EST. PATIENT-LVL II: CPT | Mod: PBBFAC,,,

## 2018-10-08 RX ORDER — ENOXAPARIN SODIUM 100 MG/ML
80 INJECTION SUBCUTANEOUS EVERY 12 HOURS PRN
Qty: 20 SYRINGE | Refills: 0 | Status: SHIPPED | OUTPATIENT
Start: 2018-10-08 | End: 2018-10-08 | Stop reason: DRUGHIGH

## 2018-10-08 RX ORDER — IBUPROFEN 200 MG
1 TABLET ORAL DAILY
Qty: 14 PATCH | Refills: 0 | Status: SHIPPED | OUTPATIENT
Start: 2018-10-08 | End: 2018-12-10 | Stop reason: SDUPTHER

## 2018-10-08 RX ORDER — ENOXAPARIN SODIUM 100 MG/ML
100 INJECTION SUBCUTANEOUS
Qty: 10 SYRINGE | Refills: 0 | Status: SHIPPED | OUTPATIENT
Start: 2018-10-08 | End: 2019-01-18

## 2018-10-08 NOTE — PROGRESS NOTES
Rx needed PA, changed to 1.5mg/kg dose = 100mg q24h. This dose was covered and able to start today. Patient advised to start today. Instructions given on his VM with new dose plan of once daily lovenox as he requested. We will f/u post procedure.

## 2018-10-08 NOTE — Clinical Note
Patient seen in clinic today, he states having only bought 3 packs of cigarettes since his last visit on 9/24/18. The patient remains on the prescribed tobacco cessation medication regimen of 14 mg patch without any negative side effects at this time. He states not liking or using the lozenges. Refill sent to pharmacy. Discussed and reviewed strategies, cues, and triggers. Introduced the negative impact of tobacco on health, the health advantages of discontinuing the use of tobacco, time line improved health changes after a quit, withdrawal issues to expect from nicotine and habit, and ways to achieve the goal of a quit. Patient is progressing well, he will continue with individual sessions and medication monitoring by CTTS. Prescribed medication management will be by physician.

## 2018-10-08 NOTE — PROGRESS NOTES
Individual Follow-Up Form    10/8/2018    Quit Date: TBD      Clinical Status of Patient: Outpatient    Length of Service: 45 minutes    Continuing Medication: yes  Patches    Other Medications:      Target Symptoms: Withdrawal and medication side effects. The following were  rated moderate (3) to severe (4) on TCRS:  · Moderate (3): none  · Severe (4): none    Comments: Patient seen in clinic today, he states having only bought 3 packs of cigarettes since his last visit on 9/24/18. The patient remains on the prescribed tobacco cessation medication regimen of 14 mg patch without any negative side effects at this time. He states not liking or using the lozenges. Refill sent to pharmacy. Discussed and reviewed strategies, cues, and triggers. Introduced the negative impact of tobacco on health, the health advantages of discontinuing the use of tobacco, time line improved health changes after a quit, withdrawal issues to expect from nicotine and habit, and ways to achieve the goal of a quit. Patient is progressing well, he will continue with individual sessions and medication monitoring by CTTS. Prescribed medication management will be by physician.            Diagnosis: F17.200    Next Visit: 2 weeks

## 2018-10-08 NOTE — PROGRESS NOTES
Patient has done lovenox in the past patient reports weight today as 155 lbs (70.5kg). Will round dose to nearest syringe size ~ 80mg q12h. Rx sent to his [facility] pharmacy. Called patient with instuctions. He requested instructions be left on VM. He is not able to use his [facility]. Left voicemail with instructions on lovenox and coumadin dosing with our contact info to call with any questions or problems.

## 2018-10-11 ENCOUNTER — TELEPHONE (OUTPATIENT)
Dept: GASTROENTEROLOGY | Facility: CLINIC | Age: 63
End: 2018-10-11

## 2018-10-11 NOTE — TELEPHONE ENCOUNTER
----- Message from Catie Nick sent at 10/11/2018  7:22 AM CDT -----  Contact: Pt  Pt called to speak to the nurse regarding his SX tomorrow 10/12/2018 and would like a call back this morning asap to discuss his prep.    Pt can be reached at [phone].    Thanks

## 2018-10-11 NOTE — TELEPHONE ENCOUNTER
Received message to call patient.  Attempted contact. No answer-left direct line phone number to return call.

## 2018-10-11 NOTE — TELEPHONE ENCOUNTER
----- Message from Sophia Cohen sent at 10/11/2018  8:54 AM CDT -----  Contact: Self  Pt is calling to speak with Staff because he wants to know when he is to drink the solution for the procedure?  Pt says he called earlier today and requests Staff make contact with him.    He can be reached at [phone].    Thank you.

## 2018-10-12 ENCOUNTER — ANESTHESIA (OUTPATIENT)
Dept: ENDOSCOPY | Facility: HOSPITAL | Age: 63
End: 2018-10-12
Payer: MEDICARE

## 2018-10-12 ENCOUNTER — OFFICE VISIT (OUTPATIENT)
Dept: UROLOGY | Facility: CLINIC | Age: 63
End: 2018-10-12
Payer: MEDICARE

## 2018-10-12 ENCOUNTER — ANESTHESIA EVENT (OUTPATIENT)
Dept: ENDOSCOPY | Facility: HOSPITAL | Age: 63
End: 2018-10-12
Payer: MEDICARE

## 2018-10-12 ENCOUNTER — HOSPITAL ENCOUNTER (OUTPATIENT)
Facility: HOSPITAL | Age: 63
Discharge: HOME | End: 2018-10-12
Attending: INTERNAL MEDICINE | Admitting: INTERNAL MEDICINE
Payer: MEDICARE

## 2018-10-12 VITALS
TEMPERATURE: 98 F | SYSTOLIC BLOOD PRESSURE: 177 MMHG | HEART RATE: 64 BPM | HEIGHT: 69 IN | DIASTOLIC BLOOD PRESSURE: 74 MMHG | WEIGHT: 155 LBS | OXYGEN SATURATION: 97 % | RESPIRATION RATE: 20 BRPM | BODY MASS INDEX: 22.96 KG/M2

## 2018-10-12 VITALS
DIASTOLIC BLOOD PRESSURE: 76 MMHG | WEIGHT: 158.75 LBS | SYSTOLIC BLOOD PRESSURE: 136 MMHG | HEART RATE: 77 BPM | BODY MASS INDEX: 23.44 KG/M2

## 2018-10-12 DIAGNOSIS — N52.01 ERECTILE DYSFUNCTION DUE TO ARTERIAL INSUFFICIENCY: ICD-10-CM

## 2018-10-12 DIAGNOSIS — K76.6 PORTAL HYPERTENSION: Primary | ICD-10-CM

## 2018-10-12 DIAGNOSIS — N40.1 BPH WITH URINARY OBSTRUCTION: Primary | ICD-10-CM

## 2018-10-12 DIAGNOSIS — N39.0 RECURRENT UTI: ICD-10-CM

## 2018-10-12 DIAGNOSIS — N13.8 BPH WITH URINARY OBSTRUCTION: Primary | ICD-10-CM

## 2018-10-12 LAB
BILIRUB SERPL-MCNC: ABNORMAL MG/DL
BLOOD URINE, POC: 250
COLOR, POC UA: YELLOW
GLUCOSE UR QL STRIP: ABNORMAL
KETONES UR QL STRIP: ABNORMAL
LEUKOCYTE ESTERASE URINE, POC: ABNORMAL
NITRITE, POC UA: ABNORMAL
PH, POC UA: 5
POC RESIDUAL URINE VOLUME: 7 ML (ref 0–100)
PROTEIN, POC: 100
SPECIFIC GRAVITY, POC UA: 1.01
UROBILINOGEN, POC UA: ABNORMAL

## 2018-10-12 PROCEDURE — 3078F DIAST BP <80 MM HG: CPT | Mod: CPTII,,, | Performed by: NURSE PRACTITIONER

## 2018-10-12 PROCEDURE — 63600175 PHARM REV CODE 636 W HCPCS: Performed by: NURSE ANESTHETIST, CERTIFIED REGISTERED

## 2018-10-12 PROCEDURE — 51798 US URINE CAPACITY MEASURE: CPT | Mod: PBBFAC | Performed by: NURSE PRACTITIONER

## 2018-10-12 PROCEDURE — G0121 COLON CA SCRN NOT HI RSK IND: HCPCS | Performed by: INTERNAL MEDICINE

## 2018-10-12 PROCEDURE — 99214 OFFICE O/P EST MOD 30 MIN: CPT | Mod: PBBFAC | Performed by: NURSE PRACTITIONER

## 2018-10-12 PROCEDURE — 43235 EGD DIAGNOSTIC BRUSH WASH: CPT | Performed by: INTERNAL MEDICINE

## 2018-10-12 PROCEDURE — D9220A PRA ANESTHESIA: Mod: ,,, | Performed by: ANESTHESIOLOGY

## 2018-10-12 PROCEDURE — 99214 OFFICE O/P EST MOD 30 MIN: CPT | Mod: S$PBB,,, | Performed by: NURSE PRACTITIONER

## 2018-10-12 PROCEDURE — 3075F SYST BP GE 130 - 139MM HG: CPT | Mod: CPTII,,, | Performed by: NURSE PRACTITIONER

## 2018-10-12 PROCEDURE — 25000003 PHARM REV CODE 250: Performed by: NURSE ANESTHETIST, CERTIFIED REGISTERED

## 2018-10-12 PROCEDURE — 43235 EGD DIAGNOSTIC BRUSH WASH: CPT | Mod: 51,,, | Performed by: INTERNAL MEDICINE

## 2018-10-12 PROCEDURE — 3008F BODY MASS INDEX DOCD: CPT | Mod: CPTII,,, | Performed by: NURSE PRACTITIONER

## 2018-10-12 PROCEDURE — 37000008 HC ANESTHESIA 1ST 15 MINUTES: Performed by: INTERNAL MEDICINE

## 2018-10-12 PROCEDURE — 99999 PR PBB SHADOW E&M-EST. PATIENT-LVL IV: CPT | Mod: PBBFAC,,, | Performed by: NURSE PRACTITIONER

## 2018-10-12 PROCEDURE — 37000009 HC ANESTHESIA EA ADD 15 MINS: Performed by: INTERNAL MEDICINE

## 2018-10-12 PROCEDURE — 25000003 PHARM REV CODE 250: Performed by: INTERNAL MEDICINE

## 2018-10-12 PROCEDURE — G0121 COLON CA SCRN NOT HI RSK IND: HCPCS | Mod: ,,, | Performed by: INTERNAL MEDICINE

## 2018-10-12 PROCEDURE — 81002 URINALYSIS NONAUTO W/O SCOPE: CPT | Mod: PBBFAC | Performed by: NURSE PRACTITIONER

## 2018-10-12 RX ORDER — PROPOFOL 10 MG/ML
VIAL (ML) INTRAVENOUS CONTINUOUS PRN
Status: DISCONTINUED | OUTPATIENT
Start: 2018-10-12 | End: 2018-10-12

## 2018-10-12 RX ORDER — SODIUM CHLORIDE 0.9 % (FLUSH) 0.9 %
3 SYRINGE (ML) INJECTION
Status: DISCONTINUED | OUTPATIENT
Start: 2018-10-12 | End: 2018-10-12 | Stop reason: HOSPADM

## 2018-10-12 RX ORDER — ETOMIDATE 2 MG/ML
INJECTION INTRAVENOUS
Status: DISCONTINUED | OUTPATIENT
Start: 2018-10-12 | End: 2018-10-12

## 2018-10-12 RX ORDER — TAMSULOSIN HYDROCHLORIDE 0.4 MG/1
0.4 CAPSULE ORAL NIGHTLY
Qty: 90 CAPSULE | Refills: 3 | Status: SHIPPED | OUTPATIENT
Start: 2018-10-12 | End: 2019-01-18

## 2018-10-12 RX ORDER — LIDOCAINE HCL/PF 100 MG/5ML
SYRINGE (ML) INTRAVENOUS
Status: DISCONTINUED | OUTPATIENT
Start: 2018-10-12 | End: 2018-10-12

## 2018-10-12 RX ORDER — SODIUM CHLORIDE 9 MG/ML
INJECTION, SOLUTION INTRAVENOUS CONTINUOUS
Status: DISCONTINUED | OUTPATIENT
Start: 2018-10-12 | End: 2018-10-12 | Stop reason: HOSPADM

## 2018-10-12 RX ADMIN — ETOMIDATE 6 MG: 2 INJECTION, SOLUTION INTRAVENOUS at 08:10

## 2018-10-12 RX ADMIN — SODIUM CHLORIDE: 0.9 INJECTION, SOLUTION INTRAVENOUS at 08:10

## 2018-10-12 RX ADMIN — PROPOFOL 150 MCG/KG/MIN: 10 INJECTION, EMULSION INTRAVENOUS at 08:10

## 2018-10-12 RX ADMIN — LIDOCAINE HYDROCHLORIDE 80 MG: 20 INJECTION, SOLUTION INTRAVENOUS at 08:10

## 2018-10-12 RX ADMIN — ETOMIDATE 8 MG: 2 INJECTION, SOLUTION INTRAVENOUS at 08:10

## 2018-10-12 NOTE — TRANSFER OF CARE
"Anesthesia Transfer of Care Note    Patient: Paul Villalobos Sr.    Procedure(s) Performed: Procedure(s) (LRB):  EGD (ESOPHAGOGASTRODUODENOSCOPY) (Left)  COLONOSCOPY (Left)    Patient location: PACU    Anesthesia Type: general    Transport from OR: Transported from OR on 2-3 L/min O2 by NC with adequate spontaneous ventilation    Post pain: adequate analgesia    Post assessment: no apparent anesthetic complications    Post vital signs: stable    Level of consciousness: awake    Nausea/Vomiting: no nausea/vomiting    Complications: none    Transfer of care protocol was followed      Last vitals:   Visit Vitals  BP (!) 157/86 (BP Location: Left arm, Patient Position: Lying)   Pulse (!) 53   Temp 36.8 °C (98.2 °F) (Oral)   Resp 20   Ht 5' 9" (1.753 m)   Wt 70.3 kg (155 lb)   SpO2 (!) 94%   BMI 22.89 kg/m²     "

## 2018-10-12 NOTE — PLAN OF CARE
Problem: Patient Care Overview  Goal: Plan of Care Review  Outcome: Outcome(s) achieved Date Met: 10/12/18  Awake and alert. VSS. Denies pain or nausea. Tolerating liquids well. DC instructions given to patient and family and they verbalize understanding.

## 2018-10-12 NOTE — ANESTHESIA PREPROCEDURE EVALUATION
10/12/2018  Paul Villalobos Sr. is a 62 y.o., male with portal HTN for EGD/colonoscopy.     Pre-operative evaluation for Procedure(s) (LRB):  EGD (ESOPHAGOGASTRODUODENOSCOPY) (Left)  COLONOSCOPY (Left)    Paul Villalobos Sr. is a 62 y.o. male     Patient Active Problem List   Diagnosis    Status post heart valve replacement with mechanical valve    HTN (hypertension), benign    Tobacco abuse    Chronic systolic (congestive) heart failure    Hyperlipidemia    Anticoagulated on Coumadin    H/O hepatitis C    CKD (chronic kidney disease) stage 2, GFR 60-89 ml/min    Asymptomatic cholelithiasis    Proteinuria    Right thyroid nodule    Paroxysmal atrial fibrillation    Hepatic cirrhosis due to chronic hepatitis C infection    Asthmatic bronchitis    Erectile dysfunction    Hypotension    Acute bilateral thoracic back pain    BPH with urinary obstruction    Portal hypertension    Chronic obstructive pulmonary disease       Review of patient's allergies indicates:  No Known Allergies    No current facility-administered medications on file prior to encounter.      Current Outpatient Medications on File Prior to Encounter   Medication Sig Dispense Refill    COMBIVENT RESPIMAT  mcg/actuation inhaler INHALE 1 PUFF INTO THE LUNGS EVERY 4 HOURS AS NEEDED FOR WHEEZING 4 g 11    fluticasone (FLONASE) 50 mcg/actuation nasal spray SPRAY TWICE IN EACH NOSTRIL EVERY DAY 16 g 11    furosemide (LASIX) 80 MG tablet Take 1 tablet (80 mg total) by mouth once daily. 30 tablet 11    lisinopril 10 MG tablet Take 1 tablet (10 mg total) by mouth once daily. 90 tablet 3    potassium chloride SA (K-DUR,KLOR-CON) 20 MEQ tablet Take 2 tablets (40 mEq total) by mouth once daily. Take 40mEQ daily. (Patient taking differently: Take 40 mEq by mouth once daily. ) 45 tablet 5    albuterol-ipratropium 2.5mg-0.5mg/3mL  (DUO-NEB) 0.5 mg-3 mg(2.5 mg base)/3 mL nebulizer solution TAKE 3ML BY NEBULIZATION EVERY 6 HOURS AS NEEDED FOR WHEEZING. 360 mL 11    finasteride (PROSCAR) 5 mg tablet Take 1 tablet (5 mg total) by mouth once daily. 90 tablet 3    metOLazone (ZAROXOLYN) 2.5 MG tablet TAKE 1 TABLET(2.5 MG) BY MOUTH DAILY AS NEEDED 30 tablet 0    polyethylene glycol (GLYCOLAX) 17 gram/dose powder       warfarin (COUMADIN) 5 MG tablet Take 1.5 tablets (7.5 mg total) by mouth Daily. Current Dose ( 10 mg on Sun, Wed, Fri; 7.5 mg all other days) or as directed by coumadin clinic. 150 tablet 3     Past Medical History:   Diagnosis Date    Anemia     Asthma     CHF (congestive heart failure)     Chronic bronchitis     Cirrhosis 1/19/2015    Hepatitis C     Hyperlipidemia     Hypertension     Lung disease     Portal hypertension 1/30/2018    Pulmonary hypertension        Past Surgical History:   Procedure Laterality Date    CARDIAC VALVE REPLACEMENT      AVR WITH MVr 2007    HERNIA REPAIR  2006    questionable mesh, right inguinal, unbilical    LUNG DECORTICATION      right thoracotomy 2010    LUNG DECORTICATION  2010    open heart surgery      redo AVR and MVR 2009       Social History     Socioeconomic History    Marital status:      Spouse name: Not on file    Number of children: Not on file    Years of education: Not on file    Highest education level: Not on file   Social Needs    Financial resource strain: Not on file    Food insecurity - worry: Not on file    Food insecurity - inability: Not on file    Transportation needs - medical: Not on file    Transportation needs - non-medical: Not on file   Occupational History    Not on file   Tobacco Use    Smoking status: Current Every Day Smoker     Packs/day: 0.50     Years: 48.00     Pack years: 24.00     Types: Cigarettes    Smokeless tobacco: Never Used    Tobacco comment: 5 a day   Substance and Sexual Activity    Alcohol use: Yes      "Alcohol/week: 0.0 oz     Comment: seldom    Drug use: No    Sexual activity: Not on file   Other Topics Concern    Not on file   Social History Narrative    , 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         CBC:   Recent Labs      10/12/18   0720   WBC  4.41   RBC  3.91*   HGB  13.3*   HCT  39.8*   PLT  125*   MCV  102*   MCH  34.0*   MCHC  33.4     INR  Recent Labs      10/12/18   0720   INR  1.2           Diagnostic Studies:    2D Echo:  Results for orders placed or performed in visit on 05/14/18   2D echo with color flow doppler   Result Value Ref Range    EF 25 (A) 55 - 65    Est. PA Systolic Pressure 58.69 (A)     Tricuspid Valve Regurgitation MILD      Last 3 sets of Vitals    Vitals - 1 value per visit 9/6/2018 9/14/2018 10/12/2018   SYSTOLIC 113 130 157   DIASTOLIC 66 82 86   PULSE 66 77 53   TEMPERATURE 98.4 - 98.2   RESPIRATIONS 16 - 20   SPO2 96 96 94   Weight (lb) 157.19 154.54 155   Weight (kg) 71.3 70.1 70.308   HEIGHT 5' 9" 5' 9" 5' 9"   BODY MASS INDEX 23.21 22.82 22.89   VISIT REPORT - - -   Pain Score  - 0 -   Some recent data might be hidden         Anesthesia Evaluation    I have reviewed the Patient Summary Reports.    I have reviewed the Nursing Notes.   I have reviewed the Medications.     Review of Systems  Anesthesia Hx:  No problems with previous Anesthesia  History of prior surgery of interest to airway management or planning: heart surgery. Previous anesthesia: General, MAC Denies Family Hx of Anesthesia complications.   Denies Personal Hx of Anesthesia complications.   Social:  Smoker    Hematology/Oncology:     Oncology Normal    -- Anemia:   EENT/Dental:EENT/Dental Normal   Cardiovascular:   Exercise tolerance: poor Hypertension, well controlled Valvular problems/Murmurs (prior AVR/MVR 2007 with redo 2009) Dysrhythmias (pAfib) CHF (EF 25-35%, RV mod depressed function) pHTN with PASP 59 on most recent TTE   Pulmonary:   COPD Asthma Breathing feels baseline. " Chronic LEIVA.    Renal/:   Chronic Renal Disease, CRI    Hepatic/GI:   Liver Disease, Hepatitis, C Cirrhosis, portal HTN   Neurological:  Neurology Normal        Physical Exam  General:  Chronically ill-appearing male in NAD    Airway/Jaw/Neck:  Airway Findings: Mouth Opening: Normal Tongue: Normal  General Airway Assessment: Adult  Mallampati: I  TM Distance: Normal, at least 6 cm  Jaw/Neck Findings:  Micrognathia: Negative Neck ROM: Normal ROM  Neck Findings:      Dental:  Dental Findings: Edentulous   Chest/Lungs:  Chest/Lungs Findings: Clear to auscultation, Normal Respiratory Rate     Heart/Vascular:  Heart Findings: Rate: Normal  Rhythm: Regular Rhythm  Sounds: Normal  Heart Murmur  Other Heart Findings: No LE edema     Abdomen:  Abdomen Findings:  Normal     Musculoskeletal:  Musculoskeletal Findings:    Skin:  Skin Findings:     Mental Status:  Mental Status Findings:  Alert and Oriented, Cooperative         Anesthesia Plan  Type of Anesthesia, risks & benefits discussed:  Anesthesia Type:  general, MAC  Patient's Preference:   Intra-op Monitoring Plan: standard ASA monitors  Intra-op Monitoring Plan Comments:   Post Op Pain Control Plan: multimodal analgesia, IV/PO Opioids PRN and per primary service following discharge from PACU  Post Op Pain Control Plan Comments:   Induction:   IV  Beta Blocker:  Patient is not currently on a Beta-Blocker (No further documentation required).       Informed Consent: Patient understands risks and agrees with Anesthesia plan.  Questions answered. Anesthesia consent signed with patient.  ASA Score: 4     Day of Surgery Review of History & Physical:    H&P update referred to the surgeon.         Ready For Surgery From Anesthesia Perspective.

## 2018-10-12 NOTE — H&P
Short Stay Endoscopy History and Physical    PCP - Tye Concepcion MD    Procedure - EGD/Colonoscopy  Sedation: GA  ASA - per anesthesia  Mallampati - per anesthesia  History of Anesthesia problems - no  Family history Anesthesia problems -  no     HPI:  This is a 62 y.o. male here for evaluation of : Cirrhosis of liver, Screening for CRC    Reflux - no  Dysphagia - no  Abdominal pain - no  Diarrhea - no    ROS:  Constitutional: No fevers, chills, No weight loss  ENT: No allergies  CV: No chest pain  Pulm: No cough, No shortness of breath  Ophtho: No vision changes  GI: see HPI  Medical History:  has a past medical history of Anemia, Asthma, CHF (congestive heart failure), Chronic bronchitis, Cirrhosis (1/19/2015), Hepatitis C, Hyperlipidemia, Hypertension, Lung disease, Portal hypertension (1/30/2018), and Pulmonary hypertension.    Surgical History:  has a past surgical history that includes open heart surgery; Lung decortication; Hernia repair (2006); Cardiac valve replacement; and Lung decortication (2010).    Family History: family history includes Heart attack in his brother; Heart disease in his brother; Hypertension in his brother, father, mother, and sister.. Otherwise no colon cancer, inflammatory bowel disease, or GI malignancies.    Social History:  reports that he has been smoking cigarettes.  He has a 24.00 pack-year smoking history. he has never used smokeless tobacco. He reports that he drinks alcohol. He reports that he does not use drugs.    Review of patient's allergies indicates:  No Known Allergies    Medications:   Medications Prior to Admission   Medication Sig Dispense Refill Last Dose    albuterol-ipratropium 2.5mg-0.5mg/3mL (DUO-NEB) 0.5 mg-3 mg(2.5 mg base)/3 mL nebulizer solution TAKE 3ML BY NEBULIZATION EVERY 6 HOURS AS NEEDED FOR WHEEZING. 360 mL 11 Taking    COMBIVENT RESPIMAT  mcg/actuation inhaler INHALE 1 PUFF INTO THE LUNGS EVERY 4 HOURS AS NEEDED FOR WHEEZING 4 g 11 Taking     enoxaparin (LOVENOX) 100 mg/mL Syrg Inject 1 mL (100 mg total) into the skin every 24 hours as needed (as directed by coumadin clinic). 10 Syringe 0     finasteride (PROSCAR) 5 mg tablet Take 1 tablet (5 mg total) by mouth once daily. 90 tablet 3 Taking    fluticasone (FLONASE) 50 mcg/actuation nasal spray SPRAY TWICE IN EACH NOSTRIL EVERY DAY 16 g 11 Taking    furosemide (LASIX) 80 MG tablet Take 1 tablet (80 mg total) by mouth once daily. 30 tablet 11 Taking    lisinopril 10 MG tablet Take 1 tablet (10 mg total) by mouth once daily. 90 tablet 3 Taking    metOLazone (ZAROXOLYN) 2.5 MG tablet TAKE 1 TABLET(2.5 MG) BY MOUTH DAILY AS NEEDED 30 tablet 0 Taking    nicotine (NICODERM CQ) 14 mg/24 hr Place 1 patch onto the skin once daily. 14 patch 0     nicotine polacrilex 2 MG Lozg Take 1 lozenge (2 mg total) by mouth as needed (1-2 per hour in the place of a cigarette). 72 lozenge 0 Not Taking    polyethylene glycol (GLYCOLAX) 17 gram/dose powder    Not Taking    potassium chloride SA (K-DUR,KLOR-CON) 20 MEQ tablet Take 2 tablets (40 mEq total) by mouth once daily. Take 40mEQ daily. (Patient taking differently: Take 40 mEq by mouth once daily. ) 45 tablet 5 Taking    warfarin (COUMADIN) 5 MG tablet Take 1.5 tablets (7.5 mg total) by mouth Daily. Current Dose ( 10 mg on Sun, Wed, Fri; 7.5 mg all other days) or as directed by coumadin clinic. 150 tablet 3 Taking       Objective Findings:    Vital Signs: Per nursing notes.    Physical Exam:  General Appearance: Well appearing in no acute distress  Head:   Normocephalic, without obvious abnormality  Eyes:    No scleral icterus  Airway: Open  Neck: No restriction in mobility  Lungs: CTA bilaterally in anterior and posterior fields, no wheezes, no crackles.  Heart:  Regular rate and rhythm, S1, S2 normal, no murmurs heard  Abdomen: Soft, non tender, non distended      Labs:  Lab Results   Component Value Date    WBC 3.34 (L) 09/07/2018    HGB 13.8 (L)  09/07/2018    HCT 42.9 09/07/2018     (L) 09/07/2018    CHOL 178 12/25/2017    TRIG 51 12/25/2017    HDL 69 12/25/2017    ALT 26 09/07/2018    AST 33 09/07/2018     09/07/2018    K 3.9 09/07/2018    CL 97 09/07/2018    CREATININE 1.3 09/07/2018    BUN 33 (H) 09/07/2018    CO2 34 (H) 09/07/2018    TSH 1.068 03/27/2013    PSA 0.73 04/03/2014    INR 1.9 10/05/2018    HGBA1C 5.3 12/26/2017         I have explained the risks and benefits of endoscopy procedures to the patient including but not limited to bleeding, perforation, infection, and death.    Thank you so much for allowing me to participate in the care of Paul VENKAT Villalobos Maria M Clarke MD

## 2018-10-12 NOTE — PROGRESS NOTES
Subjective:       Patient ID: Paul Villalobos Sr. is a 62 y.o. male.    Chief Complaint: Urinary Tract Infection and Benign Prostatic Hypertrophy    Paul Villalobos Sr. is a 62 y.o. male with multiple medical problems including CHF and cirrhosis history of Hepatitis C.  He was seen in clinic with Dr. Caicedo 09/27/2017 for what sounds like balanitis and ED..  This resolved by using his wife's anti-yeast cream.    He requests circumcision.    No difficulty retracting his foreskin and not felt to be a surgical candidate.   He also c/o ED.  He has an active Rx for NTG.  This has been present for > 1 year.                PSA                      0.73                04/03/2014             He was last seen in clinic 05/02/2018 due to an episode of gross hematuria and recurrent uti's.  He was seen in ER 02/20/2017 & 03/26/2018 for gross hematuria and UTI  He does admit to dysuria with these episodes.  FOS is slow to start in the AM but once starts urinates all day due to taking Lasix.    05/25/2018 (-) hematuria workup (cysto with Dr. Caicedo.    Last clinic seen 06/26/2018  Here today to discuss his recurrent uti's and risks with having AVR & MVR.  FOS good; Lasix keeps him going, but currently no pain or dysuria; no hematuria seen.  He reports taking a Viagra recently but did not work for him.  He not wanting any surgical procedure.  He currently taking Finasteride to help.    09/27/2018 Urine Culture: ENTEROBACTER AEROGENES>100,000 cfu/ml   09/14/2018 Urine Culture: ENTEROBACTER AEROGENES>100,000 cfu/ml  06/14/2018 Urine Culture ENTEROBACTER AEROGENES >100,000 cfu/ml   05/02/2018 Urine Culture: ENTEROBACTER AEROGENES>100,000 cfu/ml  04/16/2018 Urine Culture ENTEROBACTER AEROGENES >100,000 cfu/ml   03/25/2018 Urine Culture ENTEROBACTER AEROGENES >100,000 cfu/ml  02/20/2017 Urine Culture ESCHERICHIA COLI ESBL >100,000 cfu/ml                   Past Medical History:  No date: Anemia  No date: Asthma  No date: CHF (congestive heart  failure)  No date: Chronic bronchitis  1/19/2015: Cirrhosis  No date: Hepatitis C  No date: Hyperlipidemia  No date: Hypertension  No date: Lung disease  1/30/2018: Portal hypertension  No date: Pulmonary hypertension    Past Surgical History:  No date: CARDIAC VALVE REPLACEMENT      Comment: AVR WITH MVr 2007 2006: HERNIA REPAIR      Comment: questionable mesh, right inguinal, unbilical  No date: LUNG DECORTICATION      Comment: right thoracotomy 2010 2010: LUNG DECORTICATION  No date: open heart surgery      Comment: redo AVR and MVR 2009    Review of patient's family history indicates:    Hypertension                   Mother                    Hypertension                   Father                    Hypertension                   Brother                   Heart disease                  Brother                   Heart attack                   Brother                   Hypertension                   Sister                      Social History    Marital status:              Spouse name:                       Years of education:                 Number of children:               Occupational History    None on file    Social History Main Topics    Smoking status: Current Every Day Smoker                                                     Packs/day: 0.50      Years: 48.00          Types: Cigarettes    Smokeless tobacco: Never Used                        Comment: 5 a day    Alcohol use: Yes           0.0 oz/week       Comment: seldom    Drug use: No              Sexual activity: Not on file          Other Topics            Concern    None on file    Social History Narrative    , 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        Allergies:  Patient has no known allergies.    Medications:  Current Outpatient Prescriptions:   albuterol-ipratropium 2.5mg-0.5mg/3mL (DUO-NEB) 0.5 mg-3 mg(2.5 mg base)/3 mL nebulizer solution, TAKE 3ML BY NEBULIZATION EVERY 6 HOURS AS NEEDED FOR WHEEZING., Disp:  360 mL, Rfl: 11  carvedilol (COREG) 25 MG tablet, TAKE 1 TABLET(25 MG) BY MOUTH TWICE DAILY WITH MEALS, Disp: 180 tablet, Rfl: 11  fluticasone (FLONASE) 50 mcg/actuation nasal spray, SPRAY TWICE IN EACH NOSTRIL EVERY DAY, Disp: 16 g, Rfl: 11  furosemide (LASIX) 80 MG tablet, Take 1 tablet (80 mg total) by mouth once daily., Disp: 30 tablet, Rfl: 11  ipratropium-albuterol (COMBIVENT RESPIMAT)  mcg/actuation inhaler, Inhale 1 puff into the lungs every 4 (four) hours as needed for Wheezing., Disp: 4 g, Rfl: 11  isosorbide mononitrate (IMDUR) 30 MG 24 hr tablet, TAKE 1 TABLET(30 MG) BY MOUTH EVERY DAY, Disp: 90 tablet, Rfl: 11  lisinopril (PRINIVIL,ZESTRIL) 20 MG tablet, Take 1 tablet (20 mg total) by mouth once daily., Disp: 90 tablet, Rfl: 3  metOLazone (ZAROXOLYN) 2.5 MG tablet, Take 1 tablet (2.5 mg total) by mouth daily as needed. TAKE 1 TABLET(2.5 MG) BY MOUTH DAILY AS NEEDED, Disp: 30 tablet, Rfl: 6  polyethylene glycol (GLYCOLAX) 17 gram/dose powder, , Disp: , Rfl:   potassium chloride SA (K-DUR,KLOR-CON) 20 MEQ tablet, Take 2 tablets (40 mEq total) by mouth once daily. Take 40mEQ daily. (Patient taking differently: Take 40 mEq by mouth once daily. ), Disp: 45 tablet, Rfl: 5  warfarin (COUMADIN) 5 MG tablet, Take 1.5 tablets (7.5 mg total) by mouth Daily. Current Dose ( 10 mg on Sun, Wed, Fri; 7.5 mg all other days) or as directed by coumadin clinic., Disp: 150 tablet, Rfl: 3                                                 Review of Systems   Constitutional: Negative for activity change, appetite change, chills and fever.   HENT: Negative for facial swelling and trouble swallowing.    Eyes: Negative for visual disturbance.   Respiratory: Negative for chest tightness and shortness of breath.    Cardiovascular: Negative for chest pain and palpitations.   Gastrointestinal: Negative.  Negative for abdominal pain, constipation, diarrhea, nausea and vomiting.   Genitourinary: Positive for frequency.  Negative for difficulty urinating, dysuria, flank pain, hematuria, penile pain, penile swelling, scrotal swelling and testicular pain.        He is pleased with how he urinates.     Musculoskeletal: Negative for back pain, gait problem, myalgias and neck stiffness.   Skin: Negative for rash.   Neurological: Negative for dizziness and speech difficulty.   Hematological: Does not bruise/bleed easily.   Psychiatric/Behavioral: Negative for behavioral problems.       Objective:      Physical Exam   Nursing note and vitals reviewed.  Constitutional: He is oriented to person, place, and time. Vital signs are normal. He appears well-developed and well-nourished. He is active and cooperative.  Non-toxic appearance. He does not have a sickly appearance.   Urine dipped clear of infection in the office today.  No leuks or nitrites today.  PVR in the office today by the nurse was 7cc.       HENT:   Head: Normocephalic and atraumatic.   Right Ear: External ear normal.   Left Ear: External ear normal.   Nose: Nose normal.   Mouth/Throat: Mucous membranes are normal.   Eyes: Conjunctivae and lids are normal. No scleral icterus.   Neck: Trachea normal, normal range of motion and full passive range of motion without pain. Neck supple. No JVD present. No tracheal deviation present.   Cardiovascular: Normal rate, S1 normal and S2 normal.    Pulmonary/Chest: Effort normal. No respiratory distress. He exhibits no tenderness.   Abdominal: Soft. Normal appearance and bowel sounds are normal. There is no hepatosplenomegaly. There is no tenderness. There is no CVA tenderness.   Genitourinary: Testes normal and penis normal. Prostate is enlarged. No penile tenderness.   Musculoskeletal: Normal range of motion.   Neurological: He is alert and oriented to person, place, and time. He has normal strength.   Skin: Skin is warm, dry and intact.     Psychiatric: He has a normal mood and affect. His behavior is normal. Judgment and thought content  normal.       Assessment:       1. BPH with urinary obstruction    2. Recurrent UTI    3. Erectile dysfunction due to arterial insufficiency        Plan:         I spent 25 minutes with the patient of which more than half was spent in direct consultation with the patient in regards to our treatment and plan.    Education and recommendations of today's plan of care including home remedies.  We discussed the concerns and risk factors.  Contributory factors;   Continue the finasteride; add Flomax to improve flow to help prevent infection.  We discussed ED and contributory factors.  We discussed [facility]CI; names of compounding pharmacies given  F/u with ED treatment or worsening LUTS/UTI symptoms.

## 2018-10-12 NOTE — ANESTHESIA POSTPROCEDURE EVALUATION
"Anesthesia Post Evaluation    Patient: Paul Villalobos Sr.    Procedure(s) Performed: Procedure(s) (LRB):  EGD (ESOPHAGOGASTRODUODENOSCOPY) (Left)  COLONOSCOPY (Left)    Final Anesthesia Type: general  Patient location during evaluation: [facility]  Patient participation: Yes- Able to Participate  Level of consciousness: awake and alert  Post-procedure vital signs: reviewed and stable  Pain management: adequate  Airway patency: patent  PONV status at discharge: No PONV  Anesthetic complications: no      Cardiovascular status: blood pressure returned to baseline and hemodynamically stable  Respiratory status: spontaneous ventilation, unassisted and room air  Follow-up not needed.        Visit Vitals  BP (!) 177/74   Pulse 64   Temp 36.5 °C (97.7 °F) (Temporal)   Resp 20   Ht 5' 9" (1.753 m)   Wt 70.3 kg (155 lb)   SpO2 97%   BMI 22.89 kg/m²       Pain/Eric Score: Pain Assessment Performed: Yes (10/12/2018 10:45 AM)  Presence of Pain: denies (10/12/2018 10:45 AM)  Eric Score: 10 (10/12/2018 10:19 AM)        "

## 2018-10-12 NOTE — [employer] PATIENT INSTRUCTIONS
Discharge Summary/Instructions after an Endoscopic Procedure  Patient Name: Paul Villalobos  Patient MRN: [id number]  Patient YOB: 1955 Friday, October 12, 2018  Mook Clarke MD  RESTRICTIONS:  During your procedure today, you received medications for sedation.  These   medications may affect your judgment, balance and coordination.  Therefore,   for 24 hours, you have the following restrictions:   - DO NOT drive a car, operate machinery, make legal/financial decisions,   sign important papers or drink alcohol.    ACTIVITY:  Today: no heavy lifting, straining or running due to procedural   sedation/anesthesia.  The following day: return to full activity including work.  DIET:  Eat and drink normally unless instructed otherwise.     TREATMENT FOR COMMON SIDE EFFECTS:  - Mild abdominal pain, nausea, belching, bloating or excessive gas:  rest,   eat lightly and use a heating pad.  - Sore Throat: treat with throat lozenges and/or gargle with warm salt   water.  - Because air was used during the procedure, expelling large amounts of air   from your rectum or belching is normal.  - If a bowel prep was taken, you may not have a bowel movement for 1-3 days.    This is normal.  SYMPTOMS TO WATCH FOR AND REPORT TO YOUR PHYSICIAN:  1. Abdominal pain or bloating, other than gas cramps.  2. Chest pain.  3. Back pain.  4. Signs of infection such as: chills or fever occurring within 24 hours   after the procedure.  5. Rectal bleeding, which would show as bright red, maroon, or black stools.   (A tablespoon of blood from the rectum is not serious, especially if   hemorrhoids are present.)  6. Vomiting.  7. Weakness or dizziness.  GO DIRECTLY TO THE NEAREST EMERGENCY ROOM IF YOU HAVE ANY OF THE FOLLOWING:      Difficulty breathing              Chills and/or fever over 101 F   Persistent vomiting and/or vomiting blood   Severe abdominal pain   Severe chest pain   Black, tarry stools   Bleeding- more than one  tablespoon   Any other symptom or condition that you feel may need urgent attention  Your doctor recommends these additional instructions:  If any biopsies were taken, your doctors clinic will contact you in 1 to 2   weeks with any results.  - Patient has a contact number available for emergencies.  The signs and   symptoms of potential delayed complications were discussed with the   patient.  Return to normal activities tomorrow.  Written discharge   instructions were provided to the patient.   - Discharge patient to home.   - Resume previous diet.   - Continue present medications.   - Repeat colonoscopy in 10 years for screening purposes.   For questions, problems or results please call your physician - Mook Clarke MD at Work:  [phone].  [facility], EMERGENCY ROOM PHONE NUMBER: [phone]  IF A COMPLICATION OR EMERGENCY SITUATION ARISES AND YOU ARE UNABLE TO REACH   YOUR PHYSICIAN - GO DIRECTLY TO THE EMERGENCY ROOM.  Mook Clarke MD  10/12/2018 9:27:03 AM  This report has been verified and signed electronically.  [employer]

## 2018-10-12 NOTE — [employer]ATION PATIENT INSTRUCTIONS
Discharge Summary/Instructions after an Endoscopic Procedure  Patient Name: Paul Villalobos  Patient MRN: [id number]  Patient YOB: 1955 Friday, October 12, 2018  Mook Clarke MD  RESTRICTIONS:  During your procedure today, you received medications for sedation.  These   medications may affect your judgment, balance and coordination.  Therefore,   for 24 hours, you have the following restrictions:   - DO NOT drive a car, operate machinery, make legal/financial decisions,   sign important papers or drink alcohol.    ACTIVITY:  Today: no heavy lifting, straining or running due to procedural   sedation/anesthesia.  The following day: return to full activity including work.  DIET:  Eat and drink normally unless instructed otherwise.     TREATMENT FOR COMMON SIDE EFFECTS:  - Mild abdominal pain, nausea, belching, bloating or excessive gas:  rest,   eat lightly and use a heating pad.  - Sore Throat: treat with throat lozenges and/or gargle with warm salt   water.  - Because air was used during the procedure, expelling large amounts of air   from your rectum or belching is normal.  - If a bowel prep was taken, you may not have a bowel movement for 1-3 days.    This is normal.  SYMPTOMS TO WATCH FOR AND REPORT TO YOUR PHYSICIAN:  1. Abdominal pain or bloating, other than gas cramps.  2. Chest pain.  3. Back pain.  4. Signs of infection such as: chills or fever occurring within 24 hours   after the procedure.  5. Rectal bleeding, which would show as bright red, maroon, or black stools.   (A tablespoon of blood from the rectum is not serious, especially if   hemorrhoids are present.)  6. Vomiting.  7. Weakness or dizziness.  GO DIRECTLY TO THE NEAREST EMERGENCY ROOM IF YOU HAVE ANY OF THE FOLLOWING:      Difficulty breathing              Chills and/or fever over 101 F   Persistent vomiting and/or vomiting blood   Severe abdominal pain   Severe chest pain   Black, tarry stools   Bleeding- more than one  tablespoon   Any other symptom or condition that you feel may need urgent attention  Your doctor recommends these additional instructions:  If any biopsies were taken, your doctors clinic will contact you in 1 to 2   weeks with any results.  - Patient has a contact number available for emergencies.  The signs and   symptoms of potential delayed complications were discussed with the   patient.  Return to normal activities tomorrow.  Written discharge   instructions were provided to the patient.   - Discharge patient to home.   - Resume previous diet.   - Continue present medications.   - Repeat upper endoscopy in 3 years for screening purposes.   For questions, problems or results please call your physician - Mook Clarke MD at Work:  [phone].  [facility], EMERGENCY ROOM PHONE NUMBER: [phone]  IF A COMPLICATION OR EMERGENCY SITUATION ARISES AND YOU ARE UNABLE TO REACH   YOUR PHYSICIAN - GO DIRECTLY TO THE EMERGENCY ROOM.  Mook Clarke MD  10/12/2018 8:58:01 AM  This report has been verified and signed electronically.  [employer]

## 2018-10-12 NOTE — PATIENT INSTRUCTIONS
BPH (Enlarged Prostate)  The prostate is a gland at the base of the bladder. As some men get older, the prostate may get bigger in size. This problem is called benign prostatic hyperplasia (BPH). BPH puts pressure on the urethra. This is the tube that carries urine from the bladder to the penis. It may interfere with the flow of urine. It may also keep the bladder from emptying fully.    Symptoms of BPH include trouble starting urination and feeling as though the bladder isnt emptying all the way. It also includes a weak urine stream, dribbling and leaking of urine, and frequent and urgent urination (especially at night). BPH can increase the risk of urinary infections. It can also block off urine flow completely. If this occurs, a thin tube (catheter) may be passed into the bladder to help drain urine.  If symptoms are mild, no treatment may be needed right now. If symptoms are more severe, treatment is likely needed. The goal of treatment is to improve urine flow and reduce symptoms. Treatments can include medicine and procedures. Your healthcare provider will discuss treatment options with you as needed.  Home care  The following guidelines will help you care for yourself at home:  · Urinate as soon as you feel the urge. Don't try to hold your urine.  · Don't limit your fluid intake during the day. Drink 6 to 8 glasses of water or liquids a day. This prevents bacteria from building up in the bladder.  · Avoid drinking fluids after dinner to help reduce urination during the night.  · Avoid medicines that can worsen your symptoms. These include certain cold and allergy medicines and antidepressants. Diuretics used for high blood pressure can also worsen symptoms. Talk to your doctor about the medicines you take. Other choices may work better for you.  Prostate cancer screening  BPH does not increase the risk of prostate cancer. But because prostate cancer is a common cancer in men, screening is sometimes  recommended. This may help detect the cancer in its early stages when treatment is most effective. Factors that can increase the risk of prostate cancer include being -American or having a father or brother who had prostate cancer. A high-fat diet may also increase the risk of prostate cancer. Talk to your healthcare provider to see whether you should be screened for prostate cancer.  Follow-up care  Follow up with your healthcare provider, or as advised  To learn more, go to:  · National Kidney & Urologic Diseases Information Clearinghouse  kidney.niddk.nih.gov, 164.530.9748  When to seek medical advice  Call your healthcare provider right away if any of these occur:  · Fever of 100.4°F (38.0°C) or higher, or as advised  · Unable to pass urine for 8 hours  · Increasing pressure or pain in your bladder (lower abdomen)  · Blood in the urine  · Increasing low back pain, not related to injury  · Symptoms of urinary infection (increased urge to urinate, burning when passing urine, foul-smelling urine)  Date Last Reviewed: 7/1/2016 © 2000-2017 [employer]. [address] [place] [address]. All rights reserved. This information is not intended as a substitute for professional medical care. Always follow your healthcare professional's instructions.        Penile Self-Injection: Notes and Precautions     Man and healthcare provider sitting across from one another, talking.   Penile self-injection is a simple technique that may improve your sex life. Some men even find that self-injection leads to an increase in natural erections. If you have questions or concerns about self-injection or erectile dysfunction (ED), talk with your healthcare provider. The information on this sheet will help you get the best results.  Notes about penile self-injection  · You may feel a mild burning during injection. This is OK. But if you feel pressure or severe pain, stop the injection. There may be a problem  with the injection site.  · Only inject the medicine on the side of your penis. It may not work if injected elsewhere.  · To prevent scarring, inject in a different spot each time.  · Dont use this treatment if you have a bleeding disorder or any risk of infection.  · Get medical help right away if your erection lasts longer than 3 to 4 hours.  Work with your healthcare provider  Ask how often you can safely repeat injections, as well as any other questions you have. You and your healthcare provider will talk about follow-up exams and how to get supplies. If the medicine doesnt work or stops working over time, tell your healthcare provider.     When to call your healthcare provider  Call your healthcare provider right away if any of these occur:  · An erection that lasts longer than 3 to 4  hours  · Bleeding or bruising  · Severe pain  · Scarring or curvature of the penis   Date Last Reviewed: 1/1/2017  © 6642-4303 [employer]. [address] [place] [address]. All rights reserved. This information is not intended as a substitute for professional medical care. Always follow your healthcare professional's instructions.        Penile Self-Injection Procedure  Self-injection is a good option if you have erectile dysfunction (ED). You insert a tiny needle into your penis and inject a medicine. This helps your penis get hard and stay that way long enough for sex. And sex and orgasm will feel as good as always. You may be nervous about doing self-injection at first. But with practice, it will get easier. Your healthcare provider will show you how to do self-injection the first time.  Talk to your doctor about any medicines you take and any medical problems you have.      Preparing for injection  · Wash your hands well with soap and water.  · Prepare the medicine (if needed).  · Sit or  a comfortable position in a warm, well-lit room. If you need to, sit or  front of a  mirror.  · Find an injection site on one side of your penis, in a place with no visible veins. (Dont inject into the top, bottom, or head of the penis.)  · Clean the injection site with an alcohol swab. Grasp the head of your penis firmly with your thumb and forefinger (dont just pinch the skin). Stretch the penis so the skin on the shaft is taut.  Injecting the medicine  · Rest your penis against your inner thigh and pull it gently toward your knee. Dont twist or rotate it. This way youll be sure to inject the medicine into the spot you chose and cleaned before.  · Hold the syringe between your thumb and fingers, like youre holding a pen. Rest your forearm on your thigh for support.  · Insert the needle at a 90° angle (perpendicular) to the shaft. Do this quickly to reduce discomfort. (The needle should go in easily. If it doesnt, stop right away.)  · Move your thumb to the plunger. Press down to inject the medicine, counting to 5.  · Remove the needle and dispose of it safely.  Gaining an erection  · Apply pressure to the injection site for a few minutes. This prevents swelling and bruising and helps spread the medicine.   · Stand up. This may help your erection develop. Foreplay often helps, too.  · Your penis should become firm within 10 to 20 minutes. The erection will last long enough for sex, and maybe longer.  When to seek medical care  An erection that lasts longer than 3 to 4  hours  · Bleeding or bruising  · Severe pain  · Scarring or curvature of the penis   Date Last Reviewed: 1/7/2017 © 2000-2017 [employer]. [address] [place] [address], [place] [address]. All rights reserved. This information is not intended as a substitute for professional medical care. Always follow your healthcare professional's instructions.

## 2018-10-15 ENCOUNTER — ANTI-COAG VISIT (OUTPATIENT)
Dept: CARDIOLOGY | Facility: CLINIC | Age: 63
End: 2018-10-15

## 2018-10-15 DIAGNOSIS — Z95.2 STATUS POST HEART VALVE REPLACEMENT WITH MECHANICAL VALVE: ICD-10-CM

## 2018-10-15 DIAGNOSIS — Z79.01 ANTICOAGULATED ON COUMADIN: ICD-10-CM

## 2018-10-15 LAB — INR PPP: 1.7

## 2018-10-15 NOTE — PROGRESS NOTES
INR today is slightly subtherapeutic post coumadin interruption for EGD/colonoscopy.  Patient does confirm restarting coumadin as directed on 10/13 in conjuction with Lovenox.  His INR is responding very nicely post procedure.  Will resume his usual dose of coumadin at this time and recheck INR Wednesday with the intention of stopping lovenox at that time.

## 2018-10-17 ENCOUNTER — ANTI-COAG VISIT (OUTPATIENT)
Dept: CARDIOLOGY | Facility: CLINIC | Age: 63
End: 2018-10-17

## 2018-10-17 DIAGNOSIS — Z79.01 ANTICOAGULATED ON COUMADIN: ICD-10-CM

## 2018-10-17 DIAGNOSIS — Z95.2 STATUS POST HEART VALVE REPLACEMENT WITH MECHANICAL VALVE: ICD-10-CM

## 2018-10-17 LAB — INR PPP: 2.3

## 2018-10-22 ENCOUNTER — CLINICAL SUPPORT (OUTPATIENT)
Dept: SMOKING CESSATION | Facility: CLINIC | Age: 63
End: 2018-10-22
Payer: COMMERCIAL

## 2018-10-22 DIAGNOSIS — F17.200 NICOTINE DEPENDENCE: Primary | ICD-10-CM

## 2018-10-22 LAB — INR PPP: 2.6

## 2018-10-22 PROCEDURE — 99402 PREV MED CNSL INDIV APPRX 30: CPT | Mod: S$GLB,,,

## 2018-10-22 NOTE — PROGRESS NOTES
Individual Follow-Up Form    10/22/2018    Quit Date: TBD    Clinical Status of Patient: Outpatient    Length of Service: 30 minutes    Continuing Medication: no    Other Medications:      Target Symptoms: Withdrawal and medication side effects. The following were  rated moderate (3) to severe (4) on TCRS:  · Moderate (3): none  · Severe (4): none    Comments: Patient seen in clinic today to follow up, he states having smoked 2 packs of cigarettes in the last two weeks. Patient states he has not used the patches in the two weeks, but the toothpicks and cinnamon hard candies have helped. He states not having a good week due to dealing with some stressful situations. Patient states buying the cigarettes because bumming them is not an option. The patient denies any abnormal behavioral or mental changes at this time. Discussed and reviewed strategies, habitual behavior, stress, and high risk situations. Introduced stress with addition interventions, exercise, and the importance of rewarding oneself for accomplishments toward becoming tobacco free. The patient will continue to follow up with individual sessions and medication monitoring by CTTS. Prescribed medication management will be by physician.          Diagnosis: F17.200    Next Visit: 2 weeks

## 2018-10-22 NOTE — Clinical Note
Patient seen in clinic today to follow up, he states having smoked 2 packs of cigarettes in the last two weeks. Patient states he has not used the patches in the two weeks, but the toothpicks and cinnamon hard candies have helped. He states not having a good week due to dealing with some stressful situations. Patient states buying the cigarettes because bumming them is not an option.  Discussed and reviewed strategies, habitual behavior, stress, and high risk situations. Introduced stress with addition interventions, exercise, and the importance of rewarding oneself for accomplishments toward becoming tobacco free.

## 2018-10-23 ENCOUNTER — ANTI-COAG VISIT (OUTPATIENT)
Dept: CARDIOLOGY | Facility: CLINIC | Age: 63
End: 2018-10-23

## 2018-10-23 DIAGNOSIS — Z79.01 ANTICOAGULATED ON COUMADIN: ICD-10-CM

## 2018-10-23 DIAGNOSIS — Z95.2 STATUS POST HEART VALVE REPLACEMENT WITH MECHANICAL VALVE: ICD-10-CM

## 2018-10-23 NOTE — PROGRESS NOTES
Patient instructed to maintain and try to keep diet and alcohol intake consistent. Patient verbalized understanding.

## 2018-10-23 NOTE — PROGRESS NOTES
INR within range today. Since this is a fairly new maintenance dose and patient had recent procedure, will recheck INR in a week.

## 2018-10-31 ENCOUNTER — ANTI-COAG VISIT (OUTPATIENT)
Dept: CARDIOLOGY | Facility: CLINIC | Age: 63
End: 2018-10-31
Payer: MEDICARE

## 2018-10-31 DIAGNOSIS — Z79.01 ANTICOAGULATED ON COUMADIN: ICD-10-CM

## 2018-10-31 DIAGNOSIS — Z95.2 STATUS POST HEART VALVE REPLACEMENT WITH MECHANICAL VALVE: ICD-10-CM

## 2018-10-31 LAB — INR PPP: 2.6

## 2018-10-31 PROCEDURE — G0250 MD INR TEST REVIE INTER MGMT: HCPCS | Mod: ,,, | Performed by: INTERNAL MEDICINE

## 2018-11-05 ENCOUNTER — TELEPHONE (OUTPATIENT)
Dept: SMOKING CESSATION | Facility: CLINIC | Age: 63
End: 2018-11-05

## 2018-11-05 NOTE — TELEPHONE ENCOUNTER
Telephoned patient regarding missed tobacco cessation meeting.  He was unavailable. I left a detailed message with nature of call and contact information

## 2018-11-06 DIAGNOSIS — Z79.01 ANTICOAGULATED ON COUMADIN: ICD-10-CM

## 2018-11-06 DIAGNOSIS — Z95.2 STATUS POST HEART VALVE REPLACEMENT WITH MECHANICAL VALVE: ICD-10-CM

## 2018-11-07 RX ORDER — WARFARIN SODIUM 5 MG/1
TABLET ORAL
Qty: 150 TABLET | Refills: 0 | Status: SHIPPED | OUTPATIENT
Start: 2018-11-07 | End: 2019-02-01 | Stop reason: SDUPTHER

## 2018-11-12 ENCOUNTER — CLINICAL SUPPORT (OUTPATIENT)
Dept: SMOKING CESSATION | Facility: CLINIC | Age: 63
End: 2018-11-12
Payer: COMMERCIAL

## 2018-11-12 DIAGNOSIS — F17.210 MODERATE CIGARETTE SMOKER (10-19 PER DAY): Primary | ICD-10-CM

## 2018-11-12 PROCEDURE — 99407 BEHAV CHNG SMOKING > 10 MIN: CPT | Mod: S$GLB,,,

## 2018-11-12 PROCEDURE — 99999 PR PBB SHADOW E&M-EST. PATIENT-LVL I: CPT | Mod: PBBFAC,,,

## 2018-11-14 ENCOUNTER — LAB VISIT (OUTPATIENT)
Dept: LAB | Facility: HOSPITAL | Age: 63
End: 2018-11-14
Attending: INTERNAL MEDICINE
Payer: MEDICARE

## 2018-11-14 ENCOUNTER — ANTI-COAG VISIT (OUTPATIENT)
Dept: CARDIOLOGY | Facility: CLINIC | Age: 63
End: 2018-11-14

## 2018-11-14 DIAGNOSIS — Z79.01 ANTICOAGULATED ON COUMADIN: ICD-10-CM

## 2018-11-14 DIAGNOSIS — I48.0 PAROXYSMAL ATRIAL FIBRILLATION: ICD-10-CM

## 2018-11-14 DIAGNOSIS — E87.6 DIURETIC-INDUCED HYPOKALEMIA: ICD-10-CM

## 2018-11-14 DIAGNOSIS — T50.2X5A DIURETIC-INDUCED HYPOKALEMIA: ICD-10-CM

## 2018-11-14 DIAGNOSIS — I50.9 CHF (NYHA CLASS III, ACC/AHA STAGE C): ICD-10-CM

## 2018-11-14 DIAGNOSIS — I10 HTN (HYPERTENSION), BENIGN: ICD-10-CM

## 2018-11-14 DIAGNOSIS — Z95.2 STATUS POST HEART VALVE REPLACEMENT WITH MECHANICAL VALVE: ICD-10-CM

## 2018-11-14 LAB
ALBUMIN SERPL BCP-MCNC: 4 G/DL
ALP SERPL-CCNC: 63 U/L
ALT SERPL W/O P-5'-P-CCNC: 45 U/L
ANION GAP SERPL CALC-SCNC: 9 MMOL/L
AST SERPL-CCNC: 51 U/L
BILIRUB SERPL-MCNC: 1.3 MG/DL
BUN SERPL-MCNC: 24 MG/DL
CALCIUM SERPL-MCNC: 9.9 MG/DL
CHLORIDE SERPL-SCNC: 101 MMOL/L
CHOLEST SERPL-MCNC: 193 MG/DL
CHOLEST/HDLC SERPL: 3 {RATIO}
CO2 SERPL-SCNC: 32 MMOL/L
CREAT SERPL-MCNC: 1.2 MG/DL
EST. GFR  (AFRICAN AMERICAN): >60 ML/MIN/1.73 M^2
EST. GFR  (NON AFRICAN AMERICAN): >60 ML/MIN/1.73 M^2
GLUCOSE SERPL-MCNC: 141 MG/DL
HDLC SERPL-MCNC: 64 MG/DL
HDLC SERPL: 33.2 %
INR PPP: 3
LDLC SERPL CALC-MCNC: 117.6 MG/DL
NONHDLC SERPL-MCNC: 129 MG/DL
POTASSIUM SERPL-SCNC: 3.6 MMOL/L
PROT SERPL-MCNC: 7.9 G/DL
SODIUM SERPL-SCNC: 142 MMOL/L
TRIGL SERPL-MCNC: 57 MG/DL

## 2018-11-14 PROCEDURE — 80061 LIPID PANEL: CPT | Mod: HCWC

## 2018-11-14 PROCEDURE — 36415 COLL VENOUS BLD VENIPUNCTURE: CPT | Mod: HCWC

## 2018-11-14 PROCEDURE — 80053 COMPREHEN METABOLIC PANEL: CPT | Mod: HCWC

## 2018-11-14 NOTE — PROGRESS NOTES
Patient came to clinic today to have someone look at his strips for his home meter.  He has 2 boxes of strips that were manufactured prior to the recall and the date had not .  He had 2 boxes of stirps that were recalled lot numbers which he discarded of at this time.  Patient was given the FDA news release information on recalled strips as well as a list of the recalled Lot numbers.  He will go home and self test today and call in his result to coagucheck.  Hopefully he will get new strips in since he test every two weeks and will have enough with the two boxes of good strips he has in hand now.

## 2018-11-26 ENCOUNTER — CLINICAL SUPPORT (OUTPATIENT)
Dept: SMOKING CESSATION | Facility: CLINIC | Age: 63
End: 2018-11-26
Payer: COMMERCIAL

## 2018-11-26 DIAGNOSIS — F17.210 LIGHT CIGARETTE SMOKER (1-9 CIGS/DAY): Primary | ICD-10-CM

## 2018-11-26 PROCEDURE — 99999 PR PBB SHADOW E&M-EST. PATIENT-LVL I: CPT | Mod: PBBFAC,,,

## 2018-11-26 PROCEDURE — 99404 PREV MED CNSL INDIV APPRX 60: CPT | Mod: S$GLB,,,

## 2018-11-26 NOTE — PROGRESS NOTES
Individual Follow-Up Form    11/26/2018    Quit Date:     Clinical Status of Patient: Outpatient    Length of Service: 60 minutes    Continuing Medication: yes  Patches    Other Medications:      Target Symptoms: Withdrawal and medication side effects. The following were  rated moderate (3) to severe (4) on TCRS:  · Moderate (3): none  · Severe (4): none    Comments: Patient is smoking different amounts daily ranging from 5-10cpd.  We discussed managing habitual behavior, tobacco elimination strategies, reasons and timeline to quit.  Patient will set an amount of 6 cpd this week.  Patient remains on prescribed tobacco cessation medication regimen of 14 mg patch and 2 mg nicotine lozenge without any negative side effects at this time. The patient denies any abnormal behavioral or mental changes at this time. The patient will continue with individual therapy sessions and medication monitoring by CTTS. Prescribed medication management will be by physician.           Diagnosis: F17.210    Next Visit: 1 week

## 2018-12-03 NOTE — PROGRESS NOTES
Patient called to report that he forgot to test his INR 11/28/18, will test again this Wednesday -12/05/18

## 2018-12-05 ENCOUNTER — ANTI-COAG VISIT (OUTPATIENT)
Dept: CARDIOLOGY | Facility: CLINIC | Age: 63
End: 2018-12-05

## 2018-12-05 DIAGNOSIS — Z95.2 STATUS POST HEART VALVE REPLACEMENT WITH MECHANICAL VALVE: ICD-10-CM

## 2018-12-05 DIAGNOSIS — Z79.01 ANTICOAGULATED ON COUMADIN: ICD-10-CM

## 2018-12-05 LAB — INR PPP: 2

## 2018-12-10 ENCOUNTER — CLINICAL SUPPORT (OUTPATIENT)
Dept: SMOKING CESSATION | Facility: CLINIC | Age: 63
End: 2018-12-10
Payer: COMMERCIAL

## 2018-12-10 DIAGNOSIS — F17.200 NICOTINE DEPENDENCE: ICD-10-CM

## 2018-12-10 DIAGNOSIS — F17.210 MODERATE CIGARETTE SMOKER (10-19 PER DAY): Primary | ICD-10-CM

## 2018-12-10 PROCEDURE — 99999 PR PBB SHADOW E&M-EST. PATIENT-LVL I: CPT | Mod: PBBFAC,,,

## 2018-12-10 PROCEDURE — 99404 PREV MED CNSL INDIV APPRX 60: CPT | Mod: S$GLB,,,

## 2018-12-10 RX ORDER — IBUPROFEN 200 MG
1 TABLET ORAL DAILY
Qty: 14 PATCH | Refills: 0 | Status: SHIPPED | OUTPATIENT
Start: 2018-12-10 | End: 2019-08-21

## 2018-12-10 RX ORDER — DM/P-EPHED/ACETAMINOPH/DOXYLAM 30-7.5/3
2 LIQUID (ML) ORAL
Qty: 72 LOZENGE | Refills: 0 | Status: SHIPPED | OUTPATIENT
Start: 2018-12-10 | End: 2019-08-21

## 2018-12-10 NOTE — Clinical Note
Patient reports smoking 15 cpd.  He ran out of nicotine patches and will begin on 14 mg nicotine patches again this week.  He denies any negative side effects while in use.  Patient's wife joined session via video phone.  We discussed reasons, timeline and commitment to quit.  We reviewed how to build willpower.  We discussed health issues associated with tobacco use.  We set a goal of 10 or less cpd.  Patient is dedicated to quitting tobacco and is confident in his ability to quit.  The patient denies any abnormal behavioral or mental changes at this time. The patient will continue with group therapy sessions and medication monitoring by CTTS. Prescribed medication management will be by physician.

## 2018-12-10 NOTE — PROGRESS NOTES
Individual Follow-Up Form    12/10/2018    Quit Date:     Clinical Status of Patient: Outpatient    Length of Service: 60 minutes    Continuing Medication: yes  Chantix or Patches    Other Medications: nicotine lozenge     Target Symptoms: Withdrawal and medication side effects. The following were  rated moderate (3) to severe (4) on TCRS:  · Moderate (3): none  · Severe (4): none    Comments: Patient reports smoking 15 cpd.  He ran out of nicotine patches and will begin on 14 mg nicotine patches again this week.  He denies any negative side effects while in use.  Patient's wife joined session via video phone.  We discussed reasons, timeline and commitment to quit.  We reviewed how to build willpower.  We discussed health issues associated with tobacco use.  We set a goal of 10 or less cpd.  Patient is dedicated to quitting tobacco and is confident in his ability to quit.  The patient denies any abnormal behavioral or mental changes at this time. The patient will continue with group therapy sessions and medication monitoring by CTTS. Prescribed medication management will be by physician. completion of TCRS (Tobacco Cessation Rating Scale) reviewed strategies, cues, and triggers. Introduced the negative impact of tobacco on health, the health advantages of discontinuing the use of tobacco, time line improved health changes after a quit, withdrawal issues to expect from nicotine and habit, and ways to achieve the goal of a quit.        Diagnosis: F17.210    Next Visit: 1 week

## 2018-12-19 ENCOUNTER — ANTI-COAG VISIT (OUTPATIENT)
Dept: CARDIOLOGY | Facility: CLINIC | Age: 63
End: 2018-12-19

## 2018-12-19 DIAGNOSIS — Z79.01 ANTICOAGULATED ON COUMADIN: ICD-10-CM

## 2018-12-19 DIAGNOSIS — Z95.2 STATUS POST HEART VALVE REPLACEMENT WITH MECHANICAL VALVE: ICD-10-CM

## 2018-12-19 LAB — INR PPP: 2.9

## 2019-01-02 LAB — INR PPP: 1.8

## 2019-01-03 ENCOUNTER — ANTI-COAG VISIT (OUTPATIENT)
Dept: CARDIOLOGY | Facility: CLINIC | Age: 64
End: 2019-01-03
Payer: MEDICARE

## 2019-01-03 DIAGNOSIS — Z95.2 STATUS POST HEART VALVE REPLACEMENT WITH MECHANICAL VALVE: ICD-10-CM

## 2019-01-03 DIAGNOSIS — Z79.01 ANTICOAGULATED ON COUMADIN: ICD-10-CM

## 2019-01-03 PROCEDURE — G0250 MD INR TEST REVIE INTER MGMT: HCPCS | Mod: S$GLB,,, | Performed by: INTERNAL MEDICINE

## 2019-01-03 PROCEDURE — G0250 MD INR TEST REVIE INTER MGMT: HCPCS | Mod: S$GLB,,, | Performed by: PHARMACIST

## 2019-01-03 PROCEDURE — G0250 PR MD REVIEW INTERPRET OF TEST: ICD-10-PCS | Mod: S$GLB,,, | Performed by: INTERNAL MEDICINE

## 2019-01-03 PROCEDURE — G0250 PR MD REVIEW INTERPRET OF TEST: ICD-10-PCS | Mod: S$GLB,,, | Performed by: PHARMACIST

## 2019-01-03 NOTE — PROGRESS NOTES
INR drawn yesterday slightly low. Patient reports a missed dose and cabbage. He also self adjusted and took a higher dose than planned on 1/2. Will not make any further changes but will advise patient against self dosing.

## 2019-01-04 NOTE — PROGRESS NOTES
Subjective:       Patient ID: Pual Villalobos Sr. is a 62 y.o. male.    Chief Complaint: Recurrent UTI (UTI x 2 - finished abx last Monday) and Hematuria    Paul Villalobos Sr. is a 62 y.o. male with multiple medical problems including CHF and cirrhosis history of Hepatitis C.  He was last seen in clinic with Dr. Caicedo 09/27/2017 for what sounds like balanitis and ED..  This resolved by using his wife's anti-yeast cream.    He requests circumcision.    No difficulty retracting his foreskin and not felt to be a surgical candidate.   He also c/o ED.  He has an active Rx for NTG.  This has been present for > 1 year.  He refused prostate cancer screening at that visit.           PSA                      0.73                04/03/2014             He is here today due to an episode of gross hematuria and recurrent uti's.  He was seen in ER 02/20/2017 & 03/26/2018 for gross hematuria and UTI  He does admit to dysuria with these episodes.  FOS is slow to start in the AM but once starts urinates all day due to taking Lasix.      02/20/2017 Urine Culture ESCHERICHIA COLI ESBL >100,000 cfu/ml   03/25/2018 Urine Culture ENTEROBACTER AEROGENES >100,000 cfu/ml  04/16/2018 Urine Culture ENTEROBACTER AEROGENES >100,000 cfu/ml           Past Medical History:  No date: Anemia  No date: Asthma  No date: CHF (congestive heart failure)  No date: Chronic bronchitis  1/19/2015: Cirrhosis  No date: Hepatitis C  No date: Hyperlipidemia  No date: Hypertension  No date: Lung disease  1/30/2018: Portal hypertension  No date: Pulmonary hypertension    Past Surgical History:  No date: CARDIAC VALVE REPLACEMENT      Comment: AVR WITH MVr 2007 2006: HERNIA REPAIR      Comment: questionable mesh, right inguinal, unbilical  No date: LUNG DECORTICATION      Comment: right thoracotomy 2010 2010: LUNG DECORTICATION  No date: open heart surgery      Comment: redo AVR and MVR 2009    Review of patient's family history indicates:    Hypertension                    Mother                    Hypertension                   Father                    Hypertension                   Brother                   Heart disease                  Brother                   Heart attack                   Brother                   Hypertension                   Sister                      Social History    Marital status:              Spouse name:                       Years of education:                 Number of children:               Occupational History    None on file    Social History Main Topics    Smoking status: Current Every Day Smoker                                                     Packs/day: 0.50      Years: 48.00          Types: Cigarettes    Smokeless tobacco: Never Used                        Comment: 5 a day    Alcohol use: Yes           0.0 oz/week       Comment: seldom    Drug use: No              Sexual activity: Not on file          Other Topics            Concern    None on file    Social History Narrative    , 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        Allergies:  Patient has no known allergies.    Medications:  Current Outpatient Prescriptions:   albuterol-ipratropium 2.5mg-0.5mg/3mL (DUO-NEB) 0.5 mg-3 mg(2.5 mg base)/3 mL nebulizer solution, TAKE 3ML BY NEBULIZATION EVERY 6 HOURS AS NEEDED FOR WHEEZING., Disp: 360 mL, Rfl: 11  carvedilol (COREG) 25 MG tablet, TAKE 1 TABLET(25 MG) BY MOUTH TWICE DAILY WITH MEALS, Disp: 180 tablet, Rfl: 11  fluticasone (FLONASE) 50 mcg/actuation nasal spray, SPRAY TWICE IN EACH NOSTRIL EVERY DAY, Disp: 16 g, Rfl: 11  furosemide (LASIX) 80 MG tablet, Take 1 tablet (80 mg total) by mouth once daily., Disp: 30 tablet, Rfl: 11  ipratropium-albuterol (COMBIVENT RESPIMAT)  mcg/actuation inhaler, Inhale 1 puff into the lungs every 4 (four) hours as needed for Wheezing., Disp: 4 g, Rfl: 11  isosorbide mononitrate (IMDUR) 30 MG 24 hr tablet, TAKE 1 TABLET(30 MG) BY MOUTH EVERY DAY, Disp: 90  04-Jan-2019 03:16 tablet, Rfl: 11  lisinopril (PRINIVIL,ZESTRIL) 20 MG tablet, Take 1 tablet (20 mg total) by mouth once daily., Disp: 90 tablet, Rfl: 3  metOLazone (ZAROXOLYN) 2.5 MG tablet, Take 1 tablet (2.5 mg total) by mouth daily as needed. TAKE 1 TABLET(2.5 MG) BY MOUTH DAILY AS NEEDED, Disp: 30 tablet, Rfl: 6  polyethylene glycol (GLYCOLAX) 17 gram/dose powder, , Disp: , Rfl:   potassium chloride SA (K-DUR,KLOR-CON) 20 MEQ tablet, Take 2 tablets (40 mEq total) by mouth once daily. Take 40mEQ daily. (Patient taking differently: Take 40 mEq by mouth once daily. ), Disp: 45 tablet, Rfl: 5  warfarin (COUMADIN) 5 MG tablet, Take 1.5 tablets (7.5 mg total) by mouth Daily. Current Dose ( 10 mg on Sun, Wed, Fri; 7.5 mg all other days) or as directed by coumadin clinic., Disp: 150 tablet, Rfl: 3                                                 Review of Systems   Constitutional: Negative for activity change, appetite change, chills and fever.   HENT: Negative for facial swelling and trouble swallowing.    Eyes: Negative for visual disturbance.   Respiratory: Negative for chest tightness and shortness of breath.    Cardiovascular: Negative for chest pain and palpitations.   Gastrointestinal: Negative.  Negative for abdominal pain, constipation, diarrhea, nausea and vomiting.   Genitourinary: Positive for frequency, hematuria and nocturia. Negative for difficulty urinating, dysuria, flank pain, penile pain, penile swelling, scrotal swelling and testicular pain.        Today no issues with dysuria or hematuria.       Musculoskeletal: Negative for back pain, gait problem, myalgias and neck stiffness.   Skin: Negative for rash.   Neurological: Negative for dizziness and speech difficulty.   Hematological: Does not bruise/bleed easily.   Psychiatric/Behavioral: Negative for behavioral problems.       Objective:      Physical Exam   Nursing note and vitals reviewed.  Constitutional: He is oriented to person, place, and time. He appears  well-developed and well-nourished.  Non-toxic appearance. He does not have a sickly appearance.   Urine dipped (-) for infection; trace blood.     HENT:   Head: Normocephalic and atraumatic.   Right Ear: External ear normal.   Left Ear: External ear normal.   Nose: Nose normal.   Mouth/Throat: Mucous membranes are normal.   Eyes: Conjunctivae and lids are normal. No scleral icterus.   Neck: Trachea normal, normal range of motion and full passive range of motion without pain. Neck supple. No JVD present. No tracheal deviation present.   Cardiovascular: Normal rate, S1 normal and S2 normal.    Pulmonary/Chest: Effort normal. No respiratory distress. He exhibits no tenderness.   Abdominal: Soft. Normal appearance and bowel sounds are normal. There is no hepatosplenomegaly. There is no tenderness. There is no CVA tenderness.   Genitourinary: Rectum normal, testes normal and penis normal. Rectal exam shows no external hemorrhoid, no mass and no tenderness. Prostate is enlarged. Prostate is not tender. Right testis shows no mass, no swelling and no tenderness. Left testis shows no mass, no swelling and no tenderness. Uncircumcised. No phimosis, paraphimosis, hypospadias, penile erythema or penile tenderness. No discharge found.       Musculoskeletal: Normal range of motion.   Lymphadenopathy: No inguinal adenopathy noted on the right or left side.   Neurological: He is alert and oriented to person, place, and time. He has normal strength.   Skin: Skin is warm, dry and intact.     Psychiatric: He has a normal mood and affect. His behavior is normal. Judgment and thought content normal.       Assessment:       1. Gross hematuria    2. BPH with urinary obstruction    3. Erectile dysfunction due to arterial insufficiency    4. Recurrent UTI        Plan:         I spent 30 minutes with the patient of which more than half was spent in direct consultation with the patient in regards to our treatment and plan.    Education and  recommendations of today's plan of care including home remedies.  We discussed the possible contributory factors for his hematuria/uti/BPH with LUTS  He agreed to cystoscopy (I explained in detail the process).  CTU   Urine was sent to lab for cytology.  Also discussed ED and contributory factors.  Once hematuria/recurrent UTI workup complete then we going to work on ED; discussed ICI therapy; educational materials given.  Update PSA.

## 2019-01-16 ENCOUNTER — ANTI-COAG VISIT (OUTPATIENT)
Dept: CARDIOLOGY | Facility: CLINIC | Age: 64
End: 2019-01-16

## 2019-01-16 DIAGNOSIS — Z79.01 ANTICOAGULATED ON COUMADIN: ICD-10-CM

## 2019-01-16 DIAGNOSIS — Z95.2 STATUS POST HEART VALVE REPLACEMENT WITH MECHANICAL VALVE: ICD-10-CM

## 2019-01-16 LAB — INR PPP: 2.3

## 2019-01-16 NOTE — PROGRESS NOTES
Pt reports no self adjustments today, did not miss a dose, no new medications reported.  Will boost today as his INR has improved and he is near his goal.  Resume current dose after today.  He did eat cabbage over the weekend.

## 2019-01-18 ENCOUNTER — CLINICAL SUPPORT (OUTPATIENT)
Dept: INTERNAL MEDICINE | Facility: CLINIC | Age: 64
End: 2019-01-18
Payer: MEDICARE

## 2019-01-18 ENCOUNTER — OFFICE VISIT (OUTPATIENT)
Dept: INTERNAL MEDICINE | Facility: CLINIC | Age: 64
End: 2019-01-18
Payer: MEDICARE

## 2019-01-18 VITALS
WEIGHT: 153.69 LBS | SYSTOLIC BLOOD PRESSURE: 106 MMHG | BODY MASS INDEX: 24.7 KG/M2 | HEART RATE: 64 BPM | DIASTOLIC BLOOD PRESSURE: 68 MMHG | OXYGEN SATURATION: 97 % | HEIGHT: 66 IN

## 2019-01-18 DIAGNOSIS — I10 HTN (HYPERTENSION), BENIGN: ICD-10-CM

## 2019-01-18 DIAGNOSIS — Z95.2 STATUS POST HEART VALVE REPLACEMENT WITH MECHANICAL VALVE: ICD-10-CM

## 2019-01-18 DIAGNOSIS — N13.8 BPH WITH URINARY OBSTRUCTION: ICD-10-CM

## 2019-01-18 DIAGNOSIS — N40.1 BPH WITH URINARY OBSTRUCTION: ICD-10-CM

## 2019-01-18 DIAGNOSIS — Z72.0 TOBACCO ABUSE: ICD-10-CM

## 2019-01-18 DIAGNOSIS — B18.2 HEPATIC CIRRHOSIS DUE TO CHRONIC HEPATITIS C INFECTION: ICD-10-CM

## 2019-01-18 DIAGNOSIS — J44.9 CHRONIC OBSTRUCTIVE PULMONARY DISEASE, UNSPECIFIED COPD TYPE: ICD-10-CM

## 2019-01-18 DIAGNOSIS — N18.2 CKD (CHRONIC KIDNEY DISEASE) STAGE 2, GFR 60-89 ML/MIN: ICD-10-CM

## 2019-01-18 DIAGNOSIS — N39.0 RECURRENT UTI: ICD-10-CM

## 2019-01-18 DIAGNOSIS — I48.0 PAROXYSMAL ATRIAL FIBRILLATION: ICD-10-CM

## 2019-01-18 DIAGNOSIS — I50.22 CHRONIC SYSTOLIC CONGESTIVE HEART FAILURE: ICD-10-CM

## 2019-01-18 DIAGNOSIS — K76.6 PORTAL HYPERTENSION: ICD-10-CM

## 2019-01-18 DIAGNOSIS — Z23 NEED FOR VACCINATION WITH 13-POLYVALENT PNEUMOCOCCAL CONJUGATE VACCINE: Primary | ICD-10-CM

## 2019-01-18 DIAGNOSIS — K74.60 HEPATIC CIRRHOSIS DUE TO CHRONIC HEPATITIS C INFECTION: ICD-10-CM

## 2019-01-18 DIAGNOSIS — I50.22 CHRONIC SYSTOLIC CONGESTIVE HEART FAILURE, NYHA CLASS 3: ICD-10-CM

## 2019-01-18 DIAGNOSIS — B18.2 CHRONIC HEPATITIS C WITHOUT HEPATIC COMA: ICD-10-CM

## 2019-01-18 PROCEDURE — 3008F BODY MASS INDEX DOCD: CPT | Mod: HCWC,CPTII,S$GLB, | Performed by: INTERNAL MEDICINE

## 2019-01-18 PROCEDURE — 90670 PCV13 VACCINE IM: CPT | Mod: HCWC,S$GLB,, | Performed by: INTERNAL MEDICINE

## 2019-01-18 PROCEDURE — 3074F SYST BP LT 130 MM HG: CPT | Mod: HCWC,CPTII,S$GLB, | Performed by: INTERNAL MEDICINE

## 2019-01-18 PROCEDURE — G0009 ADMIN PNEUMOCOCCAL VACCINE: HCPCS | Mod: HCWC,S$GLB,, | Performed by: INTERNAL MEDICINE

## 2019-01-18 PROCEDURE — 90670 PNEUMOCOCCAL CONJUGATE VACCINE 13-VALENT LESS THAN 5YO & GREATER THAN: ICD-10-PCS | Mod: HCWC,S$GLB,, | Performed by: INTERNAL MEDICINE

## 2019-01-18 PROCEDURE — 1101F PR PT FALLS ASSESS DOC 0-1 FALLS W/OUT INJ PAST YR: ICD-10-PCS | Mod: HCWC,CPTII,S$GLB, | Performed by: INTERNAL MEDICINE

## 2019-01-18 PROCEDURE — 99999 PR PBB SHADOW E&M-EST. PATIENT-LVL III: CPT | Mod: PBBFAC,HCWC,, | Performed by: INTERNAL MEDICINE

## 2019-01-18 PROCEDURE — 99999 PR PBB SHADOW E&M-EST. PATIENT-LVL III: ICD-10-PCS | Mod: PBBFAC,HCWC,, | Performed by: INTERNAL MEDICINE

## 2019-01-18 PROCEDURE — 3078F DIAST BP <80 MM HG: CPT | Mod: HCWC,CPTII,S$GLB, | Performed by: INTERNAL MEDICINE

## 2019-01-18 PROCEDURE — 1101F PT FALLS ASSESS-DOCD LE1/YR: CPT | Mod: HCWC,CPTII,S$GLB, | Performed by: INTERNAL MEDICINE

## 2019-01-18 PROCEDURE — 99214 PR OFFICE/OUTPT VISIT, EST, LEVL IV, 30-39 MIN: ICD-10-PCS | Mod: HCWC,S$GLB,, | Performed by: INTERNAL MEDICINE

## 2019-01-18 PROCEDURE — 99214 OFFICE O/P EST MOD 30 MIN: CPT | Mod: HCWC,S$GLB,, | Performed by: INTERNAL MEDICINE

## 2019-01-18 PROCEDURE — 3078F PR MOST RECENT DIASTOLIC BLOOD PRESSURE < 80 MM HG: ICD-10-PCS | Mod: HCWC,CPTII,S$GLB, | Performed by: INTERNAL MEDICINE

## 2019-01-18 PROCEDURE — 3008F PR BODY MASS INDEX (BMI) DOCUMENTED: ICD-10-PCS | Mod: HCWC,CPTII,S$GLB, | Performed by: INTERNAL MEDICINE

## 2019-01-18 PROCEDURE — 3074F PR MOST RECENT SYSTOLIC BLOOD PRESSURE < 130 MM HG: ICD-10-PCS | Mod: HCWC,CPTII,S$GLB, | Performed by: INTERNAL MEDICINE

## 2019-01-18 PROCEDURE — G0009 PNEUMOCOCCAL CONJUGATE VACCINE 13-VALENT LESS THAN 5YO & GREATER THAN: ICD-10-PCS | Mod: HCWC,S$GLB,, | Performed by: INTERNAL MEDICINE

## 2019-01-18 RX ORDER — CARVEDILOL 12.5 MG/1
12.5 TABLET ORAL 2 TIMES DAILY WITH MEALS
Refills: 9 | COMMUNITY
Start: 2018-12-28 | End: 2019-09-12

## 2019-01-18 RX ORDER — TAMSULOSIN HYDROCHLORIDE 0.4 MG/1
0.4 CAPSULE ORAL NIGHTLY
Qty: 90 CAPSULE | Refills: 3
Start: 2019-01-18 | End: 2020-05-12

## 2019-01-18 RX ORDER — FINASTERIDE 5 MG/1
5 TABLET, FILM COATED ORAL DAILY
Qty: 90 TABLET | Refills: 3
Start: 2019-01-18 | End: 2020-05-12

## 2019-01-18 RX ORDER — METOLAZONE 2.5 MG/1
TABLET ORAL
Qty: 90 TABLET | Refills: 11 | Status: ON HOLD | OUTPATIENT
Start: 2019-01-18 | End: 2019-07-11 | Stop reason: HOSPADM

## 2019-01-18 RX ORDER — POTASSIUM CHLORIDE 20 MEQ/1
40 TABLET, EXTENDED RELEASE ORAL DAILY
Qty: 180 TABLET | Refills: 11 | Status: SHIPPED | OUTPATIENT
Start: 2019-01-18 | End: 2020-03-02

## 2019-01-18 RX ORDER — LISINOPRIL 10 MG/1
10 TABLET ORAL DAILY
Qty: 90 TABLET | Refills: 3 | Status: SHIPPED | OUTPATIENT
Start: 2019-01-18 | End: 2019-08-21

## 2019-01-18 RX ORDER — HYDRALAZINE HYDROCHLORIDE 25 MG/1
25 TABLET, FILM COATED ORAL 3 TIMES DAILY
COMMUNITY
End: 2019-01-18

## 2019-01-18 RX ORDER — MONTELUKAST SODIUM 10 MG/1
10 TABLET ORAL NIGHTLY
COMMUNITY
End: 2019-01-18

## 2019-01-23 DIAGNOSIS — I50.22 CHRONIC SYSTOLIC CONGESTIVE HEART FAILURE, NYHA CLASS 3: ICD-10-CM

## 2019-01-23 DIAGNOSIS — N18.2 CKD (CHRONIC KIDNEY DISEASE) STAGE 2, GFR 60-89 ML/MIN: ICD-10-CM

## 2019-01-23 RX ORDER — FUROSEMIDE 80 MG/1
TABLET ORAL
Qty: 30 TABLET | Refills: 11 | Status: ON HOLD | OUTPATIENT
Start: 2019-01-23 | End: 2019-07-11 | Stop reason: SDUPTHER

## 2019-01-24 NOTE — PROGRESS NOTES
Subjective:       Patient ID: Paul Villalobos Sr. is a 63 y.o. male.    Chief Complaint: Follow-up    Patient is here for followup for chronic conditions.    Doing well.    No current urinary symptoms.    Not sure which meds he should take daily. Takes diuretics prn. Has not had volume o/l symptoms of late.      Review of Systems   Constitutional: Negative for activity change, appetite change, chills, fatigue, fever and unexpected weight change.   HENT: Negative for congestion, postnasal drip, rhinorrhea and voice change.    Respiratory: Positive for cough and shortness of breath (stable though). Negative for chest tightness and wheezing.    Cardiovascular: Negative for chest pain, palpitations and leg swelling.   Gastrointestinal: Negative for abdominal distention, abdominal pain, anal bleeding, blood in stool, nausea and vomiting.   Genitourinary: Negative for decreased urine volume, difficulty urinating, flank pain, frequency, hematuria, scrotal swelling and testicular pain.   Musculoskeletal: Negative for neck pain and neck stiffness.   Skin: Negative for rash and wound.   Neurological: Negative for tremors, syncope and weakness.   Hematological: Negative for adenopathy. Does not bruise/bleed easily.   Psychiatric/Behavioral: Negative for dysphoric mood.       Objective:      Physical Exam   Constitutional: He is oriented to person, place, and time. He appears well-developed and well-nourished. No distress.   HENT:   Head: Normocephalic and atraumatic.   Eyes: No scleral icterus.   Neck: Normal range of motion. No thyromegaly present.   Cardiovascular: Normal rate. Exam reveals no gallop and no friction rub.   Murmur heard.  Irregularly irregular, mechanical systolic murmur     Pulmonary/Chest: Effort normal. No respiratory distress. He has wheezes (moderate and air flow decreased). He has no rales.   Abdominal: Soft. Bowel sounds are normal. He exhibits no distension and no mass. There is no tenderness. There is no  rebound and no guarding. No hernia.   Musculoskeletal: Normal range of motion. He exhibits no edema.   Lymphadenopathy:     He has no cervical adenopathy.   Neurological: He is alert and oriented to person, place, and time.   Skin: No lesion noted.   Psychiatric: He has a normal mood and affect. Thought content normal.       Assessment:       1. Need for vaccination with 13-polyvalent pneumococcal conjugate vaccine    2. BPH with urinary obstruction    3. Recurrent UTI    4. Chronic systolic congestive heart failure, NYHA class 3    5. Status post heart valve replacement with mechanical valve    6. Paroxysmal atrial fibrillation    7. HTN (hypertension), benign    8. Tobacco abuse    9. Chronic hepatitis C without hepatic coma    10. Chronic obstructive pulmonary disease, unspecified COPD type    11. CKD (chronic kidney disease) stage 2, GFR 60-89 ml/min    12. Chronic systolic congestive heart failure        Plan:       Paul was seen today for follow-up.    Diagnoses and all orders for this visit:    Need for vaccination with 13-polyvalent pneumococcal conjugate vaccine  -     (In Office Administered) Pneumococcal Conjugate Vaccine (13 Valent) (IM)    BPH with urinary obstruction  -     finasteride (PROSCAR) 5 mg tablet; Take 1 tablet (5 mg total) by mouth once daily.  -     tamsulosin (FLOMAX) 0.4 mg Cap; Take 1 capsule (0.4 mg total) by mouth every evening.    Recurrent UTI  -     finasteride (PROSCAR) 5 mg tablet; Take 1 tablet (5 mg total) by mouth once daily.  -     tamsulosin (FLOMAX) 0.4 mg Cap; Take 1 capsule (0.4 mg total) by mouth every evening.    Chronic systolic congestive heart failure, NYHA class 3  -     potassium chloride SA (K-DUR,KLOR-CON) 20 MEQ tablet; Take 2 tablets (40 mEq total) by mouth once daily.  -     lisinopril 10 MG tablet; Take 1 tablet (10 mg total) by mouth once daily.  euvolemic at this time    Status post heart valve replacement with mechanical valve  -     metOLazone (ZAROXOLYN)  2.5 MG tablet; Daily as needed for weight gain  Paroxysmal atrial fibrillation  -     metOLazone (ZAROXOLYN) 2.5 MG tablet; Daily as needed for weight gain  Good rate control    HTN (hypertension), benign  -     metOLazone (ZAROXOLYN) 2.5 MG tablet; Daily as needed for weight gain  -     lisinopril 10 MG tablet; Take 1 tablet (10 mg total) by mouth once daily.  Lengthy discussion re importance of ACEi    Tobacco abuse  96760: Smoking and tobacco cessation counseling visit; intensive, greater than 10 minutes  Still not ready to set QD  Chronic obstructive pulmonary disease, unspecified COPD type  -     metOLazone (ZAROXOLYN) 2.5 MG tablet; Daily as needed for weight gain  Smoking cessation    CKD (chronic kidney disease) stage 2, GFR 60-89 ml/min  -     metOLazone (ZAROXOLYN) 2.5 MG tablet; Daily as needed for weight gain  Has been stable    Chronic systolic congestive heart failure  -     metOLazone (ZAROXOLYN) 2.5 MG tablet; Daily as needed for weight gain  See above    Bowels improved with mirilax.    Portal HTN and Cirrhosis -- utd on egd, volume status is OK.        Health Maintenance       Date Due Completion Date    TETANUS VACCINE 12/29/1971 ---    Zoster Vaccine 12/29/2013 ---    Fecal Occult Blood Test (FOBT)/FitKit 09/18/2019 9/18/2018    Pneumococcal Vaccine (65+ Low/Medium Risk) (2 of 2 - PPSV23) 01/18/2020 1/18/2019    Lipid Panel 11/14/2023 11/14/2018          Follow-up in about 4 months (around 5/18/2019).    Future Appointments   Date Time Provider Department Center   5/13/2019  8:00 AM Tye Concepcion MD [facility]

## 2019-01-30 ENCOUNTER — ANTI-COAG VISIT (OUTPATIENT)
Dept: CARDIOLOGY | Facility: CLINIC | Age: 64
End: 2019-01-30

## 2019-01-30 DIAGNOSIS — Z95.2 STATUS POST HEART VALVE REPLACEMENT WITH MECHANICAL VALVE: ICD-10-CM

## 2019-01-30 DIAGNOSIS — Z79.01 ANTICOAGULATED ON COUMADIN: ICD-10-CM

## 2019-01-30 LAB — INR PPP: 2.6

## 2019-02-01 DIAGNOSIS — Z95.2 STATUS POST HEART VALVE REPLACEMENT WITH MECHANICAL VALVE: ICD-10-CM

## 2019-02-01 DIAGNOSIS — Z79.01 ANTICOAGULATED ON COUMADIN: ICD-10-CM

## 2019-02-02 RX ORDER — WARFARIN SODIUM 5 MG/1
TABLET ORAL
Qty: 150 TABLET | Refills: 0 | Status: SHIPPED | OUTPATIENT
Start: 2019-02-02 | End: 2019-03-26 | Stop reason: SDUPTHER

## 2019-02-10 ENCOUNTER — PATIENT MESSAGE (OUTPATIENT)
Dept: INTERNAL MEDICINE | Facility: CLINIC | Age: 64
End: 2019-02-10

## 2019-02-10 DIAGNOSIS — J44.9 CHRONIC OBSTRUCTIVE PULMONARY DISEASE, UNSPECIFIED COPD TYPE: ICD-10-CM

## 2019-02-10 DIAGNOSIS — J45.40 MODERATE PERSISTENT ASTHMATIC BRONCHITIS WITHOUT COMPLICATION: Primary | ICD-10-CM

## 2019-02-11 NOTE — TELEPHONE ENCOUNTER
Hi, please call him and ask which machine he is referring to, please let me know.  Thank you, Tye Concepcion

## 2019-02-11 NOTE — TELEPHONE ENCOUNTER
He sent a second message stating can you respond as soon as you can, I didn't want to send the same thing twice.

## 2019-02-12 ENCOUNTER — TELEPHONE (OUTPATIENT)
Dept: INTERNAL MEDICINE | Facility: CLINIC | Age: 64
End: 2019-02-12

## 2019-02-12 NOTE — TELEPHONE ENCOUNTER
----- Message from Melissa Price sent at 2/12/2019  2:40 PM CST -----  Contact: Pt Mobile/Home [phone]   Patient is calling in regards to wanting put in an order for a nebulizer. He said that the one he have is broken.     Patient's pharmacy: [id number] - [place], [place] - [address] AT [facility]  Phone# [phone],Fax# [phone]

## 2019-02-12 NOTE — TELEPHONE ENCOUNTER
Spoke with patient and advised that order was faxed yesterday to [facility] [place].

## 2019-02-13 ENCOUNTER — ANTI-COAG VISIT (OUTPATIENT)
Dept: CARDIOLOGY | Facility: CLINIC | Age: 64
End: 2019-02-13

## 2019-02-13 DIAGNOSIS — Z79.01 ANTICOAGULATED ON COUMADIN: ICD-10-CM

## 2019-02-13 DIAGNOSIS — Z95.2 STATUS POST HEART VALVE REPLACEMENT WITH MECHANICAL VALVE: ICD-10-CM

## 2019-02-13 LAB — INR PPP: 2

## 2019-02-13 NOTE — PROGRESS NOTES
INR below goal today but patient reports a missed dose last week. Will boost and resume maintenance dose. Will recheck INR in 1 week instead of 2 weeks.

## 2019-02-13 NOTE — PROGRESS NOTES
Patient advised to take 15mg 2/13/19 then resume regular regimen    Advised follow up 2/20/19    Patient verbalized understanding

## 2019-02-20 ENCOUNTER — ANTI-COAG VISIT (OUTPATIENT)
Dept: CARDIOLOGY | Facility: CLINIC | Age: 64
End: 2019-02-20
Payer: MEDICARE

## 2019-02-20 DIAGNOSIS — Z79.01 ANTICOAGULATED ON COUMADIN: ICD-10-CM

## 2019-02-20 DIAGNOSIS — Z95.2 STATUS POST HEART VALVE REPLACEMENT WITH MECHANICAL VALVE: ICD-10-CM

## 2019-02-20 LAB — INR PPP: 2.5

## 2019-02-20 PROCEDURE — G0250 PR MD REVIEW INTERPRET OF TEST: ICD-10-PCS | Mod: S$GLB,,, | Performed by: INTERNAL MEDICINE

## 2019-02-20 PROCEDURE — G0250 MD INR TEST REVIE INTER MGMT: HCPCS | Mod: S$GLB,,, | Performed by: INTERNAL MEDICINE

## 2019-03-06 ENCOUNTER — ANTI-COAG VISIT (OUTPATIENT)
Dept: CARDIOLOGY | Facility: CLINIC | Age: 64
End: 2019-03-06

## 2019-03-06 DIAGNOSIS — Z79.01 ANTICOAGULATED ON COUMADIN: ICD-10-CM

## 2019-03-06 DIAGNOSIS — Z95.2 STATUS POST HEART VALVE REPLACEMENT WITH MECHANICAL VALVE: ICD-10-CM

## 2019-03-06 LAB — INR PPP: 2.6

## 2019-03-07 ENCOUNTER — PATIENT MESSAGE (OUTPATIENT)
Dept: INTERNAL MEDICINE | Facility: CLINIC | Age: 64
End: 2019-03-07

## 2019-03-07 NOTE — TELEPHONE ENCOUNTER
Hi, please call him and ask specifically about what symptoms he is having.  Thank you, Tye Concepcion

## 2019-03-11 ENCOUNTER — TELEPHONE (OUTPATIENT)
Dept: INTERNAL MEDICINE | Facility: CLINIC | Age: 64
End: 2019-03-11

## 2019-03-11 DIAGNOSIS — R05.9 COUGH: ICD-10-CM

## 2019-03-11 NOTE — TELEPHONE ENCOUNTER
----- Message from Sandra Almendarez sent at 3/11/2019  1:47 PM CDT -----  Contact: [phone]  RX request - refill or new RX.  Is this a refill or new RX:  Refill   RX name and strength: cough syrup    Local pharmacy or mail order pharmacy:  [id number] - [place], [place] - [address] AT [facility]     [phone] (Phone)  [phone] (Fax)        Comments:  Please advise, thanks

## 2019-03-12 RX ORDER — CODEINE PHOSPHATE AND GUAIFENESIN 10; 100 MG/5ML; MG/5ML
10 SOLUTION ORAL EVERY 8 HOURS PRN
Qty: 236 ML | Refills: 1 | Status: SHIPPED | OUTPATIENT
Start: 2019-03-12 | End: 2020-03-06 | Stop reason: SDUPTHER

## 2019-03-12 NOTE — TELEPHONE ENCOUNTER
iesha Islas prescription electronically sent in, Tye Concepcion  Orders Placed This Encounter    guaifenesin-codeine 100-10 mg/5 ml (TUSSI-ORGANIDIN NR)  mg/5 mL syrup

## 2019-03-20 ENCOUNTER — ANTI-COAG VISIT (OUTPATIENT)
Dept: CARDIOLOGY | Facility: CLINIC | Age: 64
End: 2019-03-20

## 2019-03-20 DIAGNOSIS — Z95.2 STATUS POST HEART VALVE REPLACEMENT WITH MECHANICAL VALVE: ICD-10-CM

## 2019-03-20 DIAGNOSIS — Z79.01 ANTICOAGULATED ON COUMADIN: ICD-10-CM

## 2019-03-20 LAB — INR PPP: 2.8

## 2019-03-26 DIAGNOSIS — Z79.01 ANTICOAGULATED ON COUMADIN: ICD-10-CM

## 2019-03-26 DIAGNOSIS — Z95.2 STATUS POST HEART VALVE REPLACEMENT WITH MECHANICAL VALVE: ICD-10-CM

## 2019-03-26 RX ORDER — WARFARIN SODIUM 5 MG/1
TABLET ORAL
Qty: 150 TABLET | Refills: 0 | Status: SHIPPED | OUTPATIENT
Start: 2019-03-26 | End: 2019-07-16 | Stop reason: SDUPTHER

## 2019-04-01 DIAGNOSIS — J44.9 CHRONIC OBSTRUCTIVE PULMONARY DISEASE, UNSPECIFIED COPD TYPE: ICD-10-CM

## 2019-04-01 RX ORDER — IPRATROPIUM BROMIDE AND ALBUTEROL SULFATE 2.5; .5 MG/3ML; MG/3ML
SOLUTION RESPIRATORY (INHALATION)
Qty: 360 ML | Refills: 11 | Status: SHIPPED | OUTPATIENT
Start: 2019-04-01 | End: 2020-03-06 | Stop reason: SDUPTHER

## 2019-04-03 ENCOUNTER — ANTI-COAG VISIT (OUTPATIENT)
Dept: CARDIOLOGY | Facility: CLINIC | Age: 64
End: 2019-04-03

## 2019-04-03 DIAGNOSIS — Z95.2 STATUS POST HEART VALVE REPLACEMENT WITH MECHANICAL VALVE: ICD-10-CM

## 2019-04-03 DIAGNOSIS — Z79.01 ANTICOAGULATED ON COUMADIN: ICD-10-CM

## 2019-04-03 LAB — INR PPP: 2.4

## 2019-04-17 ENCOUNTER — ANTI-COAG VISIT (OUTPATIENT)
Dept: CARDIOLOGY | Facility: CLINIC | Age: 64
End: 2019-04-17
Payer: MEDICARE

## 2019-04-17 DIAGNOSIS — Z79.01 ANTICOAGULATED ON COUMADIN: ICD-10-CM

## 2019-04-17 DIAGNOSIS — Z95.2 STATUS POST HEART VALVE REPLACEMENT WITH MECHANICAL VALVE: ICD-10-CM

## 2019-04-17 LAB — INR PPP: 1.6

## 2019-04-17 PROCEDURE — 93793 PR ANTICOAGULANT MGMT FOR PT TAKING WARFARIN: ICD-10-PCS | Mod: S$GLB,,,

## 2019-04-17 PROCEDURE — 93793 ANTICOAG MGMT PT WARFARIN: CPT | Mod: S$GLB,,,

## 2019-04-17 NOTE — PROGRESS NOTES
INR not at goal - patient has been eating more vit K foods and recently missed a dose. Will bridge since patient reports he does have some Lovenox at home. Medications, chart, and patient findings reviewed. See calendar for adjustments to dose and follow up plan.

## 2019-04-17 NOTE — PROGRESS NOTES
Verbal result taken from [facility]_______. PT/INR _1.6______ Date drawn_04/17/19_______ Hardcopy to be faxed.

## 2019-04-17 NOTE — PROGRESS NOTES
Patient advised to take (Warfarin ( 15mg Wed/Thurs, 10mg Fri,except 7.5mg Sat/Sun). Lovenox 100mg Wed/Thurs/Fri). Patient verbalized understanding.

## 2019-04-22 ENCOUNTER — ANTI-COAG VISIT (OUTPATIENT)
Dept: CARDIOLOGY | Facility: CLINIC | Age: 64
End: 2019-04-22
Payer: MEDICARE

## 2019-04-22 DIAGNOSIS — Z95.2 STATUS POST HEART VALVE REPLACEMENT WITH MECHANICAL VALVE: ICD-10-CM

## 2019-04-22 DIAGNOSIS — Z79.01 ANTICOAGULATED ON COUMADIN: ICD-10-CM

## 2019-04-22 LAB — INR PPP: 2.7

## 2019-04-22 PROCEDURE — 93793 ANTICOAG MGMT PT WARFARIN: CPT | Mod: S$GLB,,,

## 2019-04-22 PROCEDURE — 93793 PR ANTICOAGULANT MGMT FOR PT TAKING WARFARIN: ICD-10-PCS | Mod: S$GLB,,,

## 2019-05-01 ENCOUNTER — ANTI-COAG VISIT (OUTPATIENT)
Dept: CARDIOLOGY | Facility: CLINIC | Age: 64
End: 2019-05-01
Payer: MEDICARE

## 2019-05-01 DIAGNOSIS — Z95.2 STATUS POST HEART VALVE REPLACEMENT WITH MECHANICAL VALVE: ICD-10-CM

## 2019-05-01 DIAGNOSIS — Z79.01 ANTICOAGULATED ON COUMADIN: ICD-10-CM

## 2019-05-01 LAB — INR PPP: 2.5

## 2019-05-01 PROCEDURE — 93793 PR ANTICOAGULANT MGMT FOR PT TAKING WARFARIN: ICD-10-PCS | Mod: S$GLB,,,

## 2019-05-01 PROCEDURE — 93793 ANTICOAG MGMT PT WARFARIN: CPT | Mod: S$GLB,,,

## 2019-05-06 ENCOUNTER — PATIENT OUTREACH (OUTPATIENT)
Dept: ADMINISTRATIVE | Facility: HOSPITAL | Age: 64
End: 2019-05-06

## 2019-05-06 NOTE — PROGRESS NOTES
Chart review completed. Immunizations reconciled, HM modifiers updated, HM duplicate entries deleted, care team updated, old orders deleted. Pt due for tdap & shingles vacc.

## 2019-05-13 ENCOUNTER — LAB VISIT (OUTPATIENT)
Dept: LAB | Facility: HOSPITAL | Age: 64
End: 2019-05-13
Attending: INTERNAL MEDICINE
Payer: MEDICARE

## 2019-05-13 ENCOUNTER — OFFICE VISIT (OUTPATIENT)
Dept: INTERNAL MEDICINE | Facility: CLINIC | Age: 64
End: 2019-05-13
Payer: MEDICARE

## 2019-05-13 VITALS
WEIGHT: 154.31 LBS | SYSTOLIC BLOOD PRESSURE: 130 MMHG | DIASTOLIC BLOOD PRESSURE: 70 MMHG | BODY MASS INDEX: 24.91 KG/M2

## 2019-05-13 DIAGNOSIS — J44.9 CHRONIC OBSTRUCTIVE PULMONARY DISEASE, UNSPECIFIED COPD TYPE: ICD-10-CM

## 2019-05-13 DIAGNOSIS — N18.2 CKD (CHRONIC KIDNEY DISEASE) STAGE 2, GFR 60-89 ML/MIN: ICD-10-CM

## 2019-05-13 DIAGNOSIS — B18.2 CHRONIC HEPATITIS C WITHOUT HEPATIC COMA: ICD-10-CM

## 2019-05-13 DIAGNOSIS — I50.22 CHRONIC SYSTOLIC CONGESTIVE HEART FAILURE: Primary | ICD-10-CM

## 2019-05-13 DIAGNOSIS — I50.22 CHRONIC SYSTOLIC CONGESTIVE HEART FAILURE: ICD-10-CM

## 2019-05-13 LAB
ALBUMIN SERPL BCP-MCNC: 4.2 G/DL (ref 3.5–5.2)
ALP SERPL-CCNC: 48 U/L (ref 55–135)
ALT SERPL W/O P-5'-P-CCNC: 22 U/L (ref 10–44)
ANION GAP SERPL CALC-SCNC: 8 MMOL/L (ref 8–16)
AST SERPL-CCNC: 29 U/L (ref 10–40)
BASOPHILS # BLD AUTO: 0.04 K/UL (ref 0–0.2)
BASOPHILS NFR BLD: 1 % (ref 0–1.9)
BILIRUB SERPL-MCNC: 1.2 MG/DL (ref 0.1–1)
BUN SERPL-MCNC: 23 MG/DL (ref 8–23)
CALCIUM SERPL-MCNC: 10.3 MG/DL (ref 8.7–10.5)
CHLORIDE SERPL-SCNC: 97 MMOL/L (ref 95–110)
CO2 SERPL-SCNC: 37 MMOL/L (ref 23–29)
CREAT SERPL-MCNC: 1.2 MG/DL (ref 0.5–1.4)
DIFFERENTIAL METHOD: ABNORMAL
EOSINOPHIL # BLD AUTO: 0.2 K/UL (ref 0–0.5)
EOSINOPHIL NFR BLD: 4.5 % (ref 0–8)
ERYTHROCYTE [DISTWIDTH] IN BLOOD BY AUTOMATED COUNT: 13.8 % (ref 11.5–14.5)
EST. GFR  (AFRICAN AMERICAN): >60 ML/MIN/1.73 M^2
EST. GFR  (NON AFRICAN AMERICAN): >60 ML/MIN/1.73 M^2
GLUCOSE SERPL-MCNC: 108 MG/DL (ref 70–110)
HCT VFR BLD AUTO: 44.7 % (ref 40–54)
HGB BLD-MCNC: 14.2 G/DL (ref 14–18)
LYMPHOCYTES # BLD AUTO: 1.1 K/UL (ref 1–4.8)
LYMPHOCYTES NFR BLD: 27.3 % (ref 18–48)
MCH RBC QN AUTO: 32.6 PG (ref 27–31)
MCHC RBC AUTO-ENTMCNC: 31.8 G/DL (ref 32–36)
MCV RBC AUTO: 103 FL (ref 82–98)
MONOCYTES # BLD AUTO: 0.7 K/UL (ref 0.3–1)
MONOCYTES NFR BLD: 16.8 % (ref 4–15)
NEUTROPHILS # BLD AUTO: 2 K/UL (ref 1.8–7.7)
NEUTROPHILS NFR BLD: 50.1 % (ref 38–73)
PLATELET # BLD AUTO: 148 K/UL (ref 150–350)
PMV BLD AUTO: 11.6 FL (ref 9.2–12.9)
POTASSIUM SERPL-SCNC: 3.6 MMOL/L (ref 3.5–5.1)
PROT SERPL-MCNC: 8.2 G/DL (ref 6–8.4)
RBC # BLD AUTO: 4.35 M/UL (ref 4.6–6.2)
SODIUM SERPL-SCNC: 142 MMOL/L (ref 136–145)
WBC # BLD AUTO: 4 K/UL (ref 3.9–12.7)

## 2019-05-13 PROCEDURE — 36415 COLL VENOUS BLD VENIPUNCTURE: CPT | Mod: HCWC

## 2019-05-13 PROCEDURE — 99999 PR PBB SHADOW E&M-EST. PATIENT-LVL III: CPT | Mod: PBBFAC,HCWC,, | Performed by: INTERNAL MEDICINE

## 2019-05-13 PROCEDURE — 99214 OFFICE O/P EST MOD 30 MIN: CPT | Mod: HCWC,S$GLB,, | Performed by: INTERNAL MEDICINE

## 2019-05-13 PROCEDURE — 80053 COMPREHEN METABOLIC PANEL: CPT | Mod: HCWC

## 2019-05-13 PROCEDURE — 1101F PR PT FALLS ASSESS DOC 0-1 FALLS W/OUT INJ PAST YR: ICD-10-PCS | Mod: HCWC,CPTII,S$GLB, | Performed by: INTERNAL MEDICINE

## 2019-05-13 PROCEDURE — 3075F PR MOST RECENT SYSTOLIC BLOOD PRESS GE 130-139MM HG: ICD-10-PCS | Mod: HCWC,CPTII,S$GLB, | Performed by: INTERNAL MEDICINE

## 2019-05-13 PROCEDURE — 3008F PR BODY MASS INDEX (BMI) DOCUMENTED: ICD-10-PCS | Mod: HCWC,CPTII,S$GLB, | Performed by: INTERNAL MEDICINE

## 2019-05-13 PROCEDURE — 1101F PT FALLS ASSESS-DOCD LE1/YR: CPT | Mod: HCWC,CPTII,S$GLB, | Performed by: INTERNAL MEDICINE

## 2019-05-13 PROCEDURE — 3078F DIAST BP <80 MM HG: CPT | Mod: HCWC,CPTII,S$GLB, | Performed by: INTERNAL MEDICINE

## 2019-05-13 PROCEDURE — 99999 PR PBB SHADOW E&M-EST. PATIENT-LVL III: ICD-10-PCS | Mod: PBBFAC,HCWC,, | Performed by: INTERNAL MEDICINE

## 2019-05-13 PROCEDURE — 3008F BODY MASS INDEX DOCD: CPT | Mod: HCWC,CPTII,S$GLB, | Performed by: INTERNAL MEDICINE

## 2019-05-13 PROCEDURE — 3078F PR MOST RECENT DIASTOLIC BLOOD PRESSURE < 80 MM HG: ICD-10-PCS | Mod: HCWC,CPTII,S$GLB, | Performed by: INTERNAL MEDICINE

## 2019-05-13 PROCEDURE — 3075F SYST BP GE 130 - 139MM HG: CPT | Mod: HCWC,CPTII,S$GLB, | Performed by: INTERNAL MEDICINE

## 2019-05-13 PROCEDURE — 85025 COMPLETE CBC W/AUTO DIFF WBC: CPT | Mod: HCWC

## 2019-05-13 PROCEDURE — 99214 PR OFFICE/OUTPT VISIT, EST, LEVL IV, 30-39 MIN: ICD-10-PCS | Mod: HCWC,S$GLB,, | Performed by: INTERNAL MEDICINE

## 2019-05-13 NOTE — PROGRESS NOTES
Subjective:       Patient ID: Paul Villalobos Sr. is a 63 y.o. male.    Chief Complaint: No chief complaint on file.    Patient is here for followup for chronic conditions.    No new complaints.    Urine flow is OK, bowels are OK, breathing is stable. No chest pains, no heart racing. No blood in stools.    Taking meds as rxed.    Still smoking.    Review of Systems   Constitutional: Negative for activity change, appetite change, chills, fatigue, fever and unexpected weight change.   HENT: Negative for congestion, postnasal drip, rhinorrhea and voice change.    Respiratory: Positive for cough and shortness of breath (stable though). Negative for chest tightness and wheezing.    Cardiovascular: Negative for chest pain, palpitations and leg swelling.   Gastrointestinal: Negative for abdominal distention, abdominal pain, anal bleeding, blood in stool, nausea and vomiting.   Genitourinary: Negative for decreased urine volume, difficulty urinating, flank pain, frequency, hematuria, scrotal swelling and testicular pain.   Musculoskeletal: Negative for neck pain and neck stiffness.   Skin: Negative for rash and wound.   Neurological: Negative for tremors, syncope and weakness.   Hematological: Negative for adenopathy. Does not bruise/bleed easily.   Psychiatric/Behavioral: Negative for dysphoric mood.       Objective:      Physical Exam   Constitutional: He is oriented to person, place, and time. He appears well-developed and well-nourished. No distress.   HENT:   Head: Normocephalic and atraumatic.   Eyes: No scleral icterus.   Neck: Normal range of motion. No thyromegaly present.   Cardiovascular: Normal rate. Exam reveals no gallop and no friction rub.   Murmur heard.  Irregularly irregular, mechanical systolic murmur     Pulmonary/Chest: Effort normal. No respiratory distress. He has wheezes (mild and air flow decreased). He has no rales.   Abdominal: Soft. Bowel sounds are normal. He exhibits no distension and no mass.  There is no tenderness. There is no rebound and no guarding. No hernia.   Musculoskeletal: Normal range of motion. He exhibits no edema.   Lymphadenopathy:     He has no cervical adenopathy.   Neurological: He is alert and oriented to person, place, and time.   Skin: No lesion noted.   Psychiatric: He has a normal mood and affect. Thought content normal.       Assessment:       1. Moderate persistent asthmatic bronchitis without complication    2. Chronic obstructive pulmonary disease, unspecified COPD type    3. Chronic hepatitis C without hepatic coma    4. CKD (chronic kidney disease) stage 2, GFR 60-89 ml/min    5. Need for vaccination with 13-polyvalent pneumococcal conjugate vaccine    6. Chronic systolic congestive heart failure        Plan:       Diagnoses and all orders for this visit:    Chronic obstructive pulmonary disease, unspecified COPD type  Stable. Not ready to quit    Chronic hepatitis C without hepatic coma  Should be due to see hep soon, check in next time    CKD (chronic kidney disease) stage 2, GFR 60-89 ml/min  Recheck today    Chronic systolic congestive heart failure  -     CBC auto differential; Future  -     Comprehensive metabolic panel; Future  euvolemic      Health Maintenance       Date Due Completion Date    TETANUS VACCINE 12/29/1971 ---    Shingles Vaccine (1 of 2) 12/29/2003 ---    Influenza Vaccine 08/01/2019 9/10/2018    Fecal Occult Blood Test (FOBT)/FitKit 09/18/2019 9/18/2018    Pneumococcal Vaccine (65+ Low/Medium Risk) (2 of 2 - PPSV23) 01/18/2020 1/18/2019    Lipid Panel 11/14/2023 11/14/2018      Declines tdap    Follow up in about 4 months (around 9/13/2019).    Future Appointments   Date Time Provider Department Center   9/9/2019  8:00 AM Tye Concepcion MD [facility]

## 2019-05-15 ENCOUNTER — ANTI-COAG VISIT (OUTPATIENT)
Dept: CARDIOLOGY | Facility: CLINIC | Age: 64
End: 2019-05-15
Payer: MEDICARE

## 2019-05-15 DIAGNOSIS — Z79.01 ANTICOAGULATED ON COUMADIN: ICD-10-CM

## 2019-05-15 DIAGNOSIS — Z95.2 STATUS POST HEART VALVE REPLACEMENT WITH MECHANICAL VALVE: ICD-10-CM

## 2019-05-15 LAB — INR PPP: 2.2

## 2019-05-15 PROCEDURE — 93793 ANTICOAG MGMT PT WARFARIN: CPT | Mod: S$GLB,,,

## 2019-05-15 PROCEDURE — 93793 PR ANTICOAGULANT MGMT FOR PT TAKING WARFARIN: ICD-10-PCS | Mod: S$GLB,,,

## 2019-05-15 NOTE — PROGRESS NOTES
Patient advise to take (WArfarin 12.5mg) today, then resume 7.5mg daily, except 10 mg Mon/Wed/FRi). Keep diet and alcohol intake consistent. Patient verbalized understanding.

## 2019-05-15 NOTE — PROGRESS NOTES
INR not at goal. Medications, chart, and patient findings reviewed. See calendar for adjustments to dose and follow up plan.  Pt findings: Pt reports increased greens for Mother's Day & denies any other changes. Will instruct pt to take 12.5mg 5/15 & resume maintenance dose.

## 2019-05-16 DIAGNOSIS — K59.00 CONSTIPATION, UNSPECIFIED CONSTIPATION TYPE: ICD-10-CM

## 2019-05-16 RX ORDER — POLYETHYLENE GLYCOL 3350 17 G/17G
POWDER, FOR SOLUTION ORAL
Qty: 510 G | Refills: 11 | Status: SHIPPED | OUTPATIENT
Start: 2019-05-16

## 2019-05-29 ENCOUNTER — ANTI-COAG VISIT (OUTPATIENT)
Dept: CARDIOLOGY | Facility: CLINIC | Age: 64
End: 2019-05-29
Payer: MEDICARE

## 2019-05-29 DIAGNOSIS — Z79.01 ANTICOAGULATED ON COUMADIN: ICD-10-CM

## 2019-05-29 DIAGNOSIS — Z95.2 STATUS POST HEART VALVE REPLACEMENT WITH MECHANICAL VALVE: ICD-10-CM

## 2019-05-29 LAB — INR PPP: 1.9

## 2019-05-29 PROCEDURE — 93793 PR ANTICOAGULANT MGMT FOR PT TAKING WARFARIN: ICD-10-PCS | Mod: S$GLB,,,

## 2019-05-29 PROCEDURE — 93793 ANTICOAG MGMT PT WARFARIN: CPT | Mod: S$GLB,,,

## 2019-05-29 NOTE — PROGRESS NOTES
INR not at goal. Medications, chart, and patient findings reviewed. See calendar for adjustments to dose and follow up plan.  Pt findings:  Pt reports no change in greens but may have had a little less than usual; no etoh in over 2 weeks and denies any other changes.  Pt was also subtherapeutic last week.  Will increase pt's dose and re-assess.

## 2019-05-29 NOTE — PROGRESS NOTES
Areli with [facility] services called in a verbal result dated today 5/29/19 as: INR -1.9, hard copy to be faxed

## 2019-06-10 ENCOUNTER — CLINICAL SUPPORT (OUTPATIENT)
Dept: SMOKING CESSATION | Facility: CLINIC | Age: 64
End: 2019-06-10
Payer: COMMERCIAL

## 2019-06-10 DIAGNOSIS — F17.200 NICOTINE DEPENDENCE: Primary | ICD-10-CM

## 2019-06-10 PROCEDURE — 99407 BEHAV CHNG SMOKING > 10 MIN: CPT | Mod: S$GLB,,,

## 2019-06-10 PROCEDURE — 99407 PR TOBACCO USE CESSATION INTENSIVE >10 MINUTES: ICD-10-PCS | Mod: S$GLB,,,

## 2019-06-10 NOTE — PROGRESS NOTES
Called pt to f/u on his 3 and 6 month smoking cessation quit status. Pt stated he is still smoking. Informed him he has benefits available and is able to rejoin. Pt not ready to make appointment. He will call back when ready. Informed him of benefit period, phone follow ups, and contact information. Pt stated when he was in the program, he wasn't able to quit and complete program because he went through a tragedy. He did state he did cut back though.Will complete smart form and will continue to follow up on quit episode #3.

## 2019-06-12 ENCOUNTER — ANTI-COAG VISIT (OUTPATIENT)
Dept: CARDIOLOGY | Facility: CLINIC | Age: 64
End: 2019-06-12
Payer: MEDICARE

## 2019-06-12 DIAGNOSIS — Z95.2 STATUS POST HEART VALVE REPLACEMENT WITH MECHANICAL VALVE: ICD-10-CM

## 2019-06-12 DIAGNOSIS — Z79.01 ANTICOAGULATED ON COUMADIN: ICD-10-CM

## 2019-06-12 LAB — INR PPP: 3.4

## 2019-06-12 PROCEDURE — 93793 PR ANTICOAGULANT MGMT FOR PT TAKING WARFARIN: ICD-10-PCS | Mod: S$GLB,,,

## 2019-06-12 PROCEDURE — 93793 ANTICOAG MGMT PT WARFARIN: CPT | Mod: S$GLB,,,

## 2019-06-16 DIAGNOSIS — N40.1 BPH WITH URINARY OBSTRUCTION: ICD-10-CM

## 2019-06-16 DIAGNOSIS — N39.0 RECURRENT UTI: ICD-10-CM

## 2019-06-16 DIAGNOSIS — N13.8 BPH WITH URINARY OBSTRUCTION: ICD-10-CM

## 2019-06-17 RX ORDER — FINASTERIDE 5 MG/1
TABLET, FILM COATED ORAL
Qty: 90 TABLET | Refills: 3 | Status: ON HOLD | OUTPATIENT
Start: 2019-06-17 | End: 2019-07-11 | Stop reason: HOSPADM

## 2019-06-26 ENCOUNTER — ANTI-COAG VISIT (OUTPATIENT)
Dept: CARDIOLOGY | Facility: CLINIC | Age: 64
End: 2019-06-26
Payer: MEDICARE

## 2019-06-26 DIAGNOSIS — Z79.01 ANTICOAGULATED ON COUMADIN: ICD-10-CM

## 2019-06-26 DIAGNOSIS — Z95.2 STATUS POST HEART VALVE REPLACEMENT WITH MECHANICAL VALVE: ICD-10-CM

## 2019-06-26 LAB — INR PPP: 3

## 2019-06-26 PROCEDURE — 93793 ANTICOAG MGMT PT WARFARIN: CPT | Mod: S$GLB,,,

## 2019-06-26 PROCEDURE — 93793 PR ANTICOAGULANT MGMT FOR PT TAKING WARFARIN: ICD-10-PCS | Mod: S$GLB,,,

## 2019-07-10 ENCOUNTER — ANTI-COAG VISIT (OUTPATIENT)
Dept: CARDIOLOGY | Facility: CLINIC | Age: 64
End: 2019-07-10
Payer: MEDICARE

## 2019-07-10 ENCOUNTER — HOSPITAL ENCOUNTER (OUTPATIENT)
Facility: HOSPITAL | Age: 64
Discharge: HOME | End: 2019-07-11
Attending: EMERGENCY MEDICINE | Admitting: HOSPITALIST
Payer: MEDICARE

## 2019-07-10 DIAGNOSIS — Z95.2 HISTORY OF MECHANICAL AORTIC VALVE REPLACEMENT: ICD-10-CM

## 2019-07-10 DIAGNOSIS — I50.23 ACUTE ON CHRONIC SYSTOLIC CONGESTIVE HEART FAILURE: Primary | ICD-10-CM

## 2019-07-10 DIAGNOSIS — Z79.01 ANTICOAGULATED ON COUMADIN: ICD-10-CM

## 2019-07-10 DIAGNOSIS — R07.9 CHEST PAIN: ICD-10-CM

## 2019-07-10 DIAGNOSIS — Z95.2 STATUS POST HEART VALVE REPLACEMENT WITH MECHANICAL VALVE: ICD-10-CM

## 2019-07-10 DIAGNOSIS — R06.02 SOB (SHORTNESS OF BREATH): ICD-10-CM

## 2019-07-10 DIAGNOSIS — N18.2 CKD (CHRONIC KIDNEY DISEASE) STAGE 2, GFR 60-89 ML/MIN: ICD-10-CM

## 2019-07-10 DIAGNOSIS — I50.813 ACUTE ON CHRONIC SYSTOLIC RIGHT HEART FAILURE: ICD-10-CM

## 2019-07-10 DIAGNOSIS — I27.20 PULMONARY HYPERTENSION: ICD-10-CM

## 2019-07-10 DIAGNOSIS — I50.20 SYSTOLIC HEART FAILURE: ICD-10-CM

## 2019-07-10 DIAGNOSIS — Z95.2 HISTORY OF MITRAL VALVE REPLACEMENT WITH MECHANICAL VALVE: ICD-10-CM

## 2019-07-10 DIAGNOSIS — I50.22 CHRONIC SYSTOLIC CONGESTIVE HEART FAILURE, NYHA CLASS 3: ICD-10-CM

## 2019-07-10 PROBLEM — D69.6 THROMBOCYTOPENIA: Status: ACTIVE | Noted: 2019-07-10

## 2019-07-10 PROBLEM — J44.1 COPD EXACERBATION: Status: ACTIVE | Noted: 2018-02-20

## 2019-07-10 LAB
ALBUMIN SERPL BCP-MCNC: 3.8 G/DL (ref 3.5–5.2)
ALP SERPL-CCNC: 48 U/L (ref 55–135)
ALT SERPL W/O P-5'-P-CCNC: 22 U/L (ref 10–44)
ANION GAP SERPL CALC-SCNC: 11 MMOL/L (ref 8–16)
AST SERPL-CCNC: 34 U/L (ref 10–40)
BASOPHILS # BLD AUTO: 0.06 K/UL (ref 0–0.2)
BASOPHILS NFR BLD: 1.5 % (ref 0–1.9)
BILIRUB SERPL-MCNC: 0.6 MG/DL (ref 0.1–1)
BNP SERPL-MCNC: 387 PG/ML (ref 0–99)
BUN SERPL-MCNC: 25 MG/DL (ref 8–23)
CALCIUM SERPL-MCNC: 10 MG/DL (ref 8.7–10.5)
CHLORIDE SERPL-SCNC: 95 MMOL/L (ref 95–110)
CO2 SERPL-SCNC: 33 MMOL/L (ref 23–29)
CREAT SERPL-MCNC: 1.3 MG/DL (ref 0.5–1.4)
DIFFERENTIAL METHOD: ABNORMAL
EOSINOPHIL # BLD AUTO: 0.2 K/UL (ref 0–0.5)
EOSINOPHIL NFR BLD: 4.7 % (ref 0–8)
ERYTHROCYTE [DISTWIDTH] IN BLOOD BY AUTOMATED COUNT: 13.3 % (ref 11.5–14.5)
EST. GFR  (AFRICAN AMERICAN): >60 ML/MIN/1.73 M^2
EST. GFR  (NON AFRICAN AMERICAN): 57.3 ML/MIN/1.73 M^2
GLUCOSE SERPL-MCNC: 103 MG/DL (ref 70–110)
HCT VFR BLD AUTO: 42 % (ref 40–54)
HGB BLD-MCNC: 13.6 G/DL (ref 14–18)
IMM GRANULOCYTES # BLD AUTO: 0.01 K/UL (ref 0–0.04)
IMM GRANULOCYTES NFR BLD AUTO: 0.2 % (ref 0–0.5)
INR PPP: 2.8
LYMPHOCYTES # BLD AUTO: 1.2 K/UL (ref 1–4.8)
LYMPHOCYTES NFR BLD: 29.7 % (ref 18–48)
MCH RBC QN AUTO: 32.8 PG (ref 27–31)
MCHC RBC AUTO-ENTMCNC: 32.4 G/DL (ref 32–36)
MCV RBC AUTO: 101 FL (ref 82–98)
MONOCYTES # BLD AUTO: 0.5 K/UL (ref 0.3–1)
MONOCYTES NFR BLD: 13.2 % (ref 4–15)
NEUTROPHILS # BLD AUTO: 2 K/UL (ref 1.8–7.7)
NEUTROPHILS NFR BLD: 50.7 % (ref 38–73)
NRBC BLD-RTO: 0 /100 WBC
PLATELET # BLD AUTO: 144 K/UL (ref 150–350)
PMV BLD AUTO: 11 FL (ref 9.2–12.9)
POTASSIUM SERPL-SCNC: 3.4 MMOL/L (ref 3.5–5.1)
PROT SERPL-MCNC: 7.6 G/DL (ref 6–8.4)
RBC # BLD AUTO: 4.15 M/UL (ref 4.6–6.2)
SODIUM SERPL-SCNC: 139 MMOL/L (ref 136–145)
TROPONIN I SERPL DL<=0.01 NG/ML-MCNC: 0.04 NG/ML (ref 0–0.03)
TROPONIN I SERPL DL<=0.01 NG/ML-MCNC: 0.04 NG/ML (ref 0–0.03)
WBC # BLD AUTO: 4.01 K/UL (ref 3.9–12.7)

## 2019-07-10 PROCEDURE — 99285 PR EMERGENCY DEPT VISIT,LEVEL V: ICD-10-PCS | Mod: ,,, | Performed by: EMERGENCY MEDICINE

## 2019-07-10 PROCEDURE — 84484 ASSAY OF TROPONIN QUANT: CPT | Mod: 91,HCWC

## 2019-07-10 PROCEDURE — 96374 THER/PROPH/DIAG INJ IV PUSH: CPT | Mod: HCWC

## 2019-07-10 PROCEDURE — 93793 PR ANTICOAGULANT MGMT FOR PT TAKING WARFARIN: ICD-10-PCS | Mod: S$GLB,,,

## 2019-07-10 PROCEDURE — 93005 ELECTROCARDIOGRAM TRACING: CPT | Mod: HCWC

## 2019-07-10 PROCEDURE — 93010 EKG 12-LEAD: ICD-10-PCS | Mod: HCWC,,, | Performed by: INTERNAL MEDICINE

## 2019-07-10 PROCEDURE — 85025 COMPLETE CBC W/AUTO DIFF WBC: CPT | Mod: HCWC

## 2019-07-10 PROCEDURE — 99220 PR INITIAL OBSERVATION CARE,LEVL III: CPT | Mod: HCWC,,, | Performed by: HOSPITALIST

## 2019-07-10 PROCEDURE — 99285 EMERGENCY DEPT VISIT HI MDM: CPT | Mod: ,,, | Performed by: EMERGENCY MEDICINE

## 2019-07-10 PROCEDURE — 93793 ANTICOAG MGMT PT WARFARIN: CPT | Mod: S$GLB,,,

## 2019-07-10 PROCEDURE — 63600175 PHARM REV CODE 636 W HCPCS: Mod: HCWC | Performed by: EMERGENCY MEDICINE

## 2019-07-10 PROCEDURE — 93010 ELECTROCARDIOGRAM REPORT: CPT | Mod: HCWC,,, | Performed by: INTERNAL MEDICINE

## 2019-07-10 PROCEDURE — 80053 COMPREHEN METABOLIC PANEL: CPT | Mod: HCWC

## 2019-07-10 PROCEDURE — G0378 HOSPITAL OBSERVATION PER HR: HCPCS | Mod: HCWC

## 2019-07-10 PROCEDURE — 99220 PR INITIAL OBSERVATION CARE,LEVL III: ICD-10-PCS | Mod: HCWC,,, | Performed by: HOSPITALIST

## 2019-07-10 PROCEDURE — 99285 EMERGENCY DEPT VISIT HI MDM: CPT | Mod: 25,HCWC

## 2019-07-10 PROCEDURE — 83880 ASSAY OF NATRIURETIC PEPTIDE: CPT | Mod: HCWC

## 2019-07-10 RX ORDER — TAMSULOSIN HYDROCHLORIDE 0.4 MG/1
0.4 CAPSULE ORAL NIGHTLY
Status: DISCONTINUED | OUTPATIENT
Start: 2019-07-11 | End: 2019-07-11 | Stop reason: HOSPADM

## 2019-07-10 RX ORDER — ACETAMINOPHEN 325 MG/1
650 TABLET ORAL EVERY 4 HOURS PRN
Status: DISCONTINUED | OUTPATIENT
Start: 2019-07-10 | End: 2019-07-11 | Stop reason: HOSPADM

## 2019-07-10 RX ORDER — CARVEDILOL 3.12 MG/1
3.12 TABLET ORAL 2 TIMES DAILY
Status: DISCONTINUED | OUTPATIENT
Start: 2019-07-11 | End: 2019-07-11

## 2019-07-10 RX ORDER — IBUPROFEN 200 MG
1 TABLET ORAL DAILY
Status: DISCONTINUED | OUTPATIENT
Start: 2019-07-11 | End: 2019-07-11 | Stop reason: HOSPADM

## 2019-07-10 RX ORDER — FLUTICASONE PROPIONATE 50 MCG
2 SPRAY, SUSPENSION (ML) NASAL DAILY
Status: DISCONTINUED | OUTPATIENT
Start: 2019-07-11 | End: 2019-07-11 | Stop reason: HOSPADM

## 2019-07-10 RX ORDER — IBUPROFEN 200 MG
16 TABLET ORAL
Status: DISCONTINUED | OUTPATIENT
Start: 2019-07-10 | End: 2019-07-11 | Stop reason: HOSPADM

## 2019-07-10 RX ORDER — LISINOPRIL 5 MG/1
10 TABLET ORAL DAILY
Status: DISCONTINUED | OUTPATIENT
Start: 2019-07-11 | End: 2019-07-11 | Stop reason: HOSPADM

## 2019-07-10 RX ORDER — PREDNISONE 20 MG/1
40 TABLET ORAL DAILY
Status: DISCONTINUED | OUTPATIENT
Start: 2019-07-11 | End: 2019-07-11 | Stop reason: HOSPADM

## 2019-07-10 RX ORDER — GLUCAGON 1 MG
1 KIT INJECTION
Status: DISCONTINUED | OUTPATIENT
Start: 2019-07-10 | End: 2019-07-11 | Stop reason: HOSPADM

## 2019-07-10 RX ORDER — IPRATROPIUM BROMIDE AND ALBUTEROL SULFATE 2.5; .5 MG/3ML; MG/3ML
3 SOLUTION RESPIRATORY (INHALATION)
Status: DISCONTINUED | OUTPATIENT
Start: 2019-07-11 | End: 2019-07-11 | Stop reason: HOSPADM

## 2019-07-10 RX ORDER — CARVEDILOL 12.5 MG/1
12.5 TABLET ORAL 2 TIMES DAILY
Status: DISCONTINUED | OUTPATIENT
Start: 2019-07-11 | End: 2019-07-10

## 2019-07-10 RX ORDER — SODIUM CHLORIDE 0.9 % (FLUSH) 0.9 %
10 SYRINGE (ML) INJECTION
Status: DISCONTINUED | OUTPATIENT
Start: 2019-07-10 | End: 2019-07-11 | Stop reason: HOSPADM

## 2019-07-10 RX ORDER — ONDANSETRON 8 MG/1
8 TABLET, ORALLY DISINTEGRATING ORAL EVERY 8 HOURS PRN
Status: DISCONTINUED | OUTPATIENT
Start: 2019-07-10 | End: 2019-07-11 | Stop reason: HOSPADM

## 2019-07-10 RX ORDER — FINASTERIDE 5 MG/1
5 TABLET, FILM COATED ORAL DAILY
Status: DISCONTINUED | OUTPATIENT
Start: 2019-07-11 | End: 2019-07-11 | Stop reason: HOSPADM

## 2019-07-10 RX ORDER — FUROSEMIDE 10 MG/ML
80 INJECTION INTRAMUSCULAR; INTRAVENOUS
Status: COMPLETED | OUTPATIENT
Start: 2019-07-10 | End: 2019-07-10

## 2019-07-10 RX ORDER — IPRATROPIUM BROMIDE AND ALBUTEROL SULFATE 2.5; .5 MG/3ML; MG/3ML
3 SOLUTION RESPIRATORY (INHALATION)
Status: DISCONTINUED | OUTPATIENT
Start: 2019-07-11 | End: 2019-07-10

## 2019-07-10 RX ORDER — FUROSEMIDE 10 MG/ML
80 INJECTION INTRAMUSCULAR; INTRAVENOUS 2 TIMES DAILY
Status: DISCONTINUED | OUTPATIENT
Start: 2019-07-11 | End: 2019-07-11 | Stop reason: HOSPADM

## 2019-07-10 RX ORDER — IBUPROFEN 200 MG
24 TABLET ORAL
Status: DISCONTINUED | OUTPATIENT
Start: 2019-07-10 | End: 2019-07-11 | Stop reason: HOSPADM

## 2019-07-10 RX ADMIN — FUROSEMIDE 80 MG: 10 INJECTION, SOLUTION INTRAMUSCULAR; INTRAVENOUS at 09:07

## 2019-07-10 NOTE — PROVIDER PROGRESS NOTES - EMERGENCY DEPT.
Encounter Date: 7/10/2019    ED Physician Progress Notes       SCRIBE NOTE: I, Monica Waterman, am scribing for, and in the presence of,  Dr. Terry.  Physician Statement: I, Dr. Terry, personally performed the services described in this documentation as scribed by Monica Waterman in my presence, and it is both accurate and complete.      EKG - STEMI Decision  Initial Reading: No STEMI present.

## 2019-07-11 VITALS
OXYGEN SATURATION: 94 % | RESPIRATION RATE: 18 BRPM | BODY MASS INDEX: 21.77 KG/M2 | SYSTOLIC BLOOD PRESSURE: 115 MMHG | DIASTOLIC BLOOD PRESSURE: 72 MMHG | HEART RATE: 78 BPM | WEIGHT: 147 LBS | TEMPERATURE: 98 F | HEIGHT: 69 IN

## 2019-07-11 LAB
ALBUMIN SERPL BCP-MCNC: 3.8 G/DL (ref 3.5–5.2)
ALP SERPL-CCNC: 48 U/L (ref 55–135)
ALT SERPL W/O P-5'-P-CCNC: 22 U/L (ref 10–44)
ANION GAP SERPL CALC-SCNC: 10 MMOL/L (ref 8–16)
ASCENDING AORTA: 3.4 CM
AST SERPL-CCNC: 31 U/L (ref 10–40)
AV MEAN GRADIENT: 23 MMHG
BASOPHILS # BLD AUTO: 0.03 K/UL (ref 0–0.2)
BASOPHILS NFR BLD: 0.6 % (ref 0–1.9)
BILIRUB SERPL-MCNC: 0.8 MG/DL (ref 0.1–1)
BSA FOR ECHO PROCEDURE: 1.86 M2
BUN SERPL-MCNC: 29 MG/DL (ref 8–23)
CALCIUM SERPL-MCNC: 10 MG/DL (ref 8.7–10.5)
CHLORIDE SERPL-SCNC: 94 MMOL/L (ref 95–110)
CO2 SERPL-SCNC: 34 MMOL/L (ref 23–29)
CREAT SERPL-MCNC: 1.2 MG/DL (ref 0.5–1.4)
DIFFERENTIAL METHOD: ABNORMAL
EOSINOPHIL # BLD AUTO: 0.1 K/UL (ref 0–0.5)
EOSINOPHIL NFR BLD: 1.6 % (ref 0–8)
ERYTHROCYTE [DISTWIDTH] IN BLOOD BY AUTOMATED COUNT: 13.2 % (ref 11.5–14.5)
EST. GFR  (AFRICAN AMERICAN): >60 ML/MIN/1.73 M^2
EST. GFR  (NON AFRICAN AMERICAN): >60 ML/MIN/1.73 M^2
ESTIMATED AVG GLUCOSE: 114 MG/DL (ref 68–131)
GLUCOSE SERPL-MCNC: 122 MG/DL (ref 70–110)
HBA1C MFR BLD HPLC: 5.6 % (ref 4–5.6)
HCT VFR BLD AUTO: 41.3 % (ref 40–54)
HGB BLD-MCNC: 13.4 G/DL (ref 14–18)
IMM GRANULOCYTES # BLD AUTO: 0.02 K/UL (ref 0–0.04)
IMM GRANULOCYTES NFR BLD AUTO: 0.4 % (ref 0–0.5)
INR PPP: 3.6 (ref 0.8–1.2)
LA MAJOR: 5.5 CM
LA MINOR: 5.5 CM
LA WIDTH: 4.4 CM
LEFT ATRIUM SIZE: 4.4 CM
LEFT ATRIUM VOLUME INDEX: 48.7 ML/M2
LEFT ATRIUM VOLUME: 90.51 CM3
LYMPHOCYTES # BLD AUTO: 0.6 K/UL (ref 1–4.8)
LYMPHOCYTES NFR BLD: 11.6 % (ref 18–48)
MAGNESIUM SERPL-MCNC: 1.8 MG/DL (ref 1.6–2.6)
MCH RBC QN AUTO: 32.8 PG (ref 27–31)
MCHC RBC AUTO-ENTMCNC: 32.4 G/DL (ref 32–36)
MCV RBC AUTO: 101 FL (ref 82–98)
MONOCYTES # BLD AUTO: 0.2 K/UL (ref 0.3–1)
MONOCYTES NFR BLD: 3.7 % (ref 4–15)
NEUTROPHILS # BLD AUTO: 4.2 K/UL (ref 1.8–7.7)
NEUTROPHILS NFR BLD: 82.1 % (ref 38–73)
NRBC BLD-RTO: 0 /100 WBC
PHOSPHATE SERPL-MCNC: 2.8 MG/DL (ref 2.7–4.5)
PISA TR MAX VEL: 3.3 M/S
PLATELET # BLD AUTO: 143 K/UL (ref 150–350)
PMV BLD AUTO: 11.4 FL (ref 9.2–12.9)
POTASSIUM SERPL-SCNC: 3.2 MMOL/L (ref 3.5–5.1)
PROT SERPL-MCNC: 7.4 G/DL (ref 6–8.4)
PROTHROMBIN TIME: 35.3 SEC (ref 9–12.5)
RA MAJOR: 5.1 CM
RA PRESSURE: 8 MMHG
RA WIDTH: 3.9 CM
RBC # BLD AUTO: 4.08 M/UL (ref 4.6–6.2)
SODIUM SERPL-SCNC: 138 MMOL/L (ref 136–145)
TR MAX PG: 44 MMHG
TROPONIN I SERPL DL<=0.01 NG/ML-MCNC: 0.05 NG/ML (ref 0–0.03)
TV REST PULMONARY ARTERY PRESSURE: 52 MMHG
WBC # BLD AUTO: 5.1 K/UL (ref 3.9–12.7)

## 2019-07-11 PROCEDURE — 80053 COMPREHEN METABOLIC PANEL: CPT | Mod: HCWC

## 2019-07-11 PROCEDURE — 25000003 PHARM REV CODE 250: Mod: HCWC | Performed by: HOSPITALIST

## 2019-07-11 PROCEDURE — 25000242 PHARM REV CODE 250 ALT 637 W/ HCPCS: Mod: HCWC | Performed by: HOSPITALIST

## 2019-07-11 PROCEDURE — 84100 ASSAY OF PHOSPHORUS: CPT | Mod: HCWC

## 2019-07-11 PROCEDURE — G0378 HOSPITAL OBSERVATION PER HR: HCPCS | Mod: HCWC

## 2019-07-11 PROCEDURE — 63600175 PHARM REV CODE 636 W HCPCS: Mod: HCWC | Performed by: HOSPITALIST

## 2019-07-11 PROCEDURE — 84484 ASSAY OF TROPONIN QUANT: CPT | Mod: HCWC

## 2019-07-11 PROCEDURE — 99217 PR OBSERVATION CARE DISCHARGE: ICD-10-PCS | Mod: HCWC,,, | Performed by: HOSPITALIST

## 2019-07-11 PROCEDURE — 85610 PROTHROMBIN TIME: CPT | Mod: HCWC

## 2019-07-11 PROCEDURE — 83735 ASSAY OF MAGNESIUM: CPT | Mod: HCWC

## 2019-07-11 PROCEDURE — 83036 HEMOGLOBIN GLYCOSYLATED A1C: CPT | Mod: HCWC

## 2019-07-11 PROCEDURE — 99900035 HC TECH TIME PER 15 MIN (STAT): Mod: HCWC

## 2019-07-11 PROCEDURE — 85025 COMPLETE CBC W/AUTO DIFF WBC: CPT | Mod: HCWC

## 2019-07-11 PROCEDURE — 99217 PR OBSERVATION CARE DISCHARGE: CPT | Mod: HCWC,,, | Performed by: HOSPITALIST

## 2019-07-11 PROCEDURE — 36415 COLL VENOUS BLD VENIPUNCTURE: CPT | Mod: HCWC

## 2019-07-11 RX ORDER — FUROSEMIDE 80 MG/1
80 TABLET ORAL 2 TIMES DAILY
Qty: 30 TABLET | Refills: 11 | Status: SHIPPED | OUTPATIENT
Start: 2019-07-11 | End: 2019-08-21 | Stop reason: SDUPTHER

## 2019-07-11 RX ORDER — SPIRONOLACTONE 25 MG/1
25 TABLET ORAL DAILY
Qty: 30 TABLET | Refills: 11 | Status: ON HOLD | OUTPATIENT
Start: 2019-07-12 | End: 2021-07-27

## 2019-07-11 RX ORDER — FLUTICASONE FUROATE AND VILANTEROL 100; 25 UG/1; UG/1
1 POWDER RESPIRATORY (INHALATION) DAILY
Status: DISCONTINUED | OUTPATIENT
Start: 2019-07-11 | End: 2019-07-11 | Stop reason: HOSPADM

## 2019-07-11 RX ORDER — PREDNISONE 20 MG/1
40 TABLET ORAL DAILY
Qty: 8 TABLET | Refills: 0 | Status: SHIPPED | OUTPATIENT
Start: 2019-07-12 | End: 2019-07-16

## 2019-07-11 RX ORDER — SPIRONOLACTONE 25 MG/1
25 TABLET ORAL DAILY
Status: DISCONTINUED | OUTPATIENT
Start: 2019-07-11 | End: 2019-07-11 | Stop reason: HOSPADM

## 2019-07-11 RX ORDER — POTASSIUM CHLORIDE 20 MEQ/1
20 TABLET, EXTENDED RELEASE ORAL
Status: DISCONTINUED | OUTPATIENT
Start: 2019-07-11 | End: 2019-07-11 | Stop reason: HOSPADM

## 2019-07-11 RX ORDER — FLUTICASONE FUROATE AND VILANTEROL 100; 25 UG/1; UG/1
1 POWDER RESPIRATORY (INHALATION) DAILY
Qty: 30 EACH | Refills: 11 | Status: SHIPPED | OUTPATIENT
Start: 2019-07-12

## 2019-07-11 RX ORDER — CARVEDILOL 6.25 MG/1
6.25 TABLET ORAL 2 TIMES DAILY
Status: DISCONTINUED | OUTPATIENT
Start: 2019-07-11 | End: 2019-07-11 | Stop reason: HOSPADM

## 2019-07-11 RX ADMIN — PREDNISONE 40 MG: 20 TABLET ORAL at 12:07

## 2019-07-11 RX ADMIN — POTASSIUM CHLORIDE 20 MEQ: 1500 TABLET, EXTENDED RELEASE ORAL at 09:07

## 2019-07-11 RX ADMIN — FLUTICASONE FUROATE AND VILANTEROL TRIFENATATE 1 PUFF: 100; 25 POWDER RESPIRATORY (INHALATION) at 09:07

## 2019-07-11 RX ADMIN — CARVEDILOL 6.25 MG: 6.25 TABLET, FILM COATED ORAL at 09:07

## 2019-07-11 RX ADMIN — FUROSEMIDE 80 MG: 10 INJECTION, SOLUTION INTRAMUSCULAR; INTRAVENOUS at 09:07

## 2019-07-11 RX ADMIN — POTASSIUM CHLORIDE 20 MEQ: 1500 TABLET, EXTENDED RELEASE ORAL at 11:07

## 2019-07-11 RX ADMIN — TAMSULOSIN HYDROCHLORIDE 0.4 MG: 0.4 CAPSULE ORAL at 12:07

## 2019-07-11 RX ADMIN — PREDNISONE 40 MG: 20 TABLET ORAL at 09:07

## 2019-07-11 RX ADMIN — FLUTICASONE PROPIONATE 100 MCG: 50 SPRAY, METERED NASAL at 11:07

## 2019-07-11 RX ADMIN — FINASTERIDE 5 MG: 5 TABLET, FILM COATED ORAL at 09:07

## 2019-07-11 NOTE — H&P
"History and Physical  Hospital Medicine       Patient Name: Paul Villalobos Sr.  MRN:  [id number]  Hospital Medicine Team: [facility] MED C Rian Trimble MD  Date of Admission:  7/10/2019     Principal Problem:  Acute on chronic systolic congestive heart failure   Primary Care Physician: Tye Concepcion MD      History of Present Illness:     The patient is a 63 y.o. male with co-morbidities including: CHF, asthma, HTN, pulmonary HTN, portal HTN, and cirrhosis, and surgical history of mitral valve and aortic valve replacement, who presents to the ED with a complaint of chest discomfort, which he attributes to fluid overload around his lungs, with associated leg swelling and appetite reduction due to "feeling full all of the time". He takes lasix 40 mg per day, and has been told that he can take up to 80 mg per day. He also endorses diarrhea, which he attributes to eating ice cream 2 days in a row. No fever or chills. No nausea or vomiting. No dysuria. He makes     Patient states he has been coughing up a productive white thick mucus.  States coughing has increased.  H/O COPD, not on any inhalers and still actively smoking.  Patient states sob happens while walking and even at rest.      Review of Systems   Constitutional: Negative for chills, fatigue, fever.   HENT: Negative for sore throat, trouble swallowing.    Eyes: Negative for photophobia, visual disturbance.   Respiratory: postive for cough and shortness of breath.    Cardiovascular: Negative for chest pain, palpitations, leg swelling.   Gastrointestinal: Negative for abdominal pain, constipation, diarrhea, nausea, vomiting.   Endocrine: Negative for cold intolerance, heat intolerance.   Genitourinary: Negative for dysuria, frequency.   Musculoskeletal: Negative for arthralgias, myalgias.   Skin: Negative for rash, wound, erythema   Neurological: Negative for dizziness, syncope, weakness, light-headedness.   Psychiatric/Behavioral: Negative for confusion, " hallucinations, anxiety  All other systems reviewed and are negative.      Past Medical History: Patient has a past medical history of Anemia, Asthma, CHF (congestive heart failure), Chronic bronchitis, Cirrhosis (1/19/2015), Hepatitis C, Hyperlipidemia, Hypertension, Lung disease, Portal hypertension (1/30/2018), and Pulmonary hypertension.    Past Surgical History: Patient has a past surgical history that includes open heart surgery; Lung decortication; Hernia repair (2006); Cardiac valve replacement; Lung decortication (2010); Esophagogastroduodenoscopy (Left, 10/12/2018); and Colonoscopy (Left, 10/12/2018).    Social History: Patient reports that he has been smoking cigarettes.  He has a 24.00 pack-year smoking history. He has never used smokeless tobacco. He reports that he drinks alcohol. He reports that he does not use drugs.    Family History: family history includes Heart attack in his brother; Heart disease in his brother; Hypertension in his brother, father, mother, and sister.    Medications: Scheduled Meds:   albuterol-ipratropium  3 mL Nebulization Q4H WAKE    carvedilol  3.125 mg Oral BID    finasteride  5 mg Oral Daily    fluticasone propionate  2 spray Each Nare Daily    furosemide  80 mg Intravenous BID    lisinopril  10 mg Oral Daily    nicotine  1 patch Transdermal Daily    predniSONE  40 mg Oral Daily    tamsulosin  0.4 mg Oral QHS    warfarin  7.5 mg Oral Daily     Continuous Infusions:  PRN Meds:.acetaminophen, Dextrose 10% Bolus, Dextrose 10% Bolus, glucagon (human recombinant), glucose, glucose, ondansetron, sodium chloride 0.9%    Allergies: Patient has No Known Allergies.    Physical Exam:     Vital Signs (Most Recent):  Temp: 97.6 °F (36.4 °C) (07/10/19 2255)  Pulse: (!) 58 (07/10/19 2323)  Resp: 20 (07/10/19 2255)  BP: (!) 108/56 (07/10/19 2255)  SpO2: 96 % (07/10/19 2255) Vital Signs Range (Last 24H):  Temp:  [97.6 °F (36.4 °C)-98 °F (36.7 °C)]   Pulse:  [58-66]   Resp:   [14-20]   BP: (105-119)/(56-77)   SpO2:  [96 %-98 %]    Body mass index is 22.85 kg/m².     Physical Exam:  Constitutional: appears weak and ill  Head: Normocephalic and atraumatic.   Mouth/Throat: Oropharynx is clear and moist.   Eyes: EOM are normal. Pupils are equal, round, and reactive to light. No scleral icterus.   Neck: Normal range of motion. Neck supple.   Cardiovascular: Normal rate and regular rhythm.  No murmur heard.  Pulmonary/Chest: Effort normal, no respiratory distress; mild b/l expiratory wheezes  Abdominal: Soft. Bowel sounds are normal.  No distension or tenderness  Musculoskeletal: Normal range of motion. Plus 2 pitting edema.   Neurological: Alert and oriented to person, place, and time.   Skin: Skin is warm and dry.   Psychiatric: Normal mood and affect. Behavior is normal.   Vitals reviewed.    Recent Labs   Lab 07/10/19  1949   WBC 4.01   HGB 13.6*   HCT 42.0   *       Recent Labs   Lab 07/10/19  1949      K 3.4*   CL 95   CO2 33*   BUN 25*   CREATININE 1.3      CALCIUM 10.0     Recent Labs   Lab 07/10/19 07/10/19  1949   ALKPHOS  --  48*   ALT  --  22   AST  --  34   ALBUMIN  --  3.8   PROT  --  7.6   BILITOT  --  0.6   INR 2.8  --       No results for input(s): POCTGLUCOSE in the last 168 hours.      Assessment and Plan:     Mr. Paul Villalobos Sr. is a 63 y.o. male who presented to [facility] on 7/10/2019 with     Active Hospital Problems    Diagnosis  POA    *Acute on chronic systolic congestive heart failure [I50.23]  Yes    Pulmonary hypertension [I27.20]  Yes    Thrombocytopenia [D69.6]  Yes    COPD exacerbation [J44.1]  Yes    BPH with urinary obstruction [N40.1, N13.8]  Yes    Elevated troponin [R74.8]  Yes    Hepatic cirrhosis due to chronic hepatitis C infection [B18.2, K74.60]  Yes     02/23/2015 started Harvoni 12 weeks, completed May 18; SVR - 12      Paroxysmal atrial fibrillation [I48.0]  Yes    Anticoagulated on Coumadin [Z51.81, Z79.01]  Not Applicable     Status post heart valve replacement with mechanical valve [Z95.2]  Not Applicable       Status post aortic and mitral valve replacements 2007, redo [facility] AVR/MVR 8/09,( 21 mm [employer] Top Hat,  27-mm [employer] mechanical )      HTN (hypertension), benign [I10]  Yes    Tobacco abuse [Z72.0]  Yes     Says went to cessation clinic 2 different times and not interested in stopping now        Resolved Hospital Problems   No resolved problems to display.     # Acute on chronic systolic and diastolic heart failure  -  Patient's last echo was a year ago and his EF was 20% and patient does not have an AICD  - getting repeat echo in the AM  - will start patient in on lasix 80 mg IV BID, strict I/O and daily weights  - patient needs ischemic work up for his LEIVA plus severely depressed EF, could benefit from cardiac pet stress, will defer to primary team to do it inpatient versus outpatient  - needs referral for AICD evaluation   - will start low dose aldactone    # Elevated troponin  - in the setting of CHF; EKG shows no ST elevation  - trend  - 2d echo    # COPD exacerbation  # Tobacco abuse  - duonebs; prednisone 40 mg daily for 5 days  - will start breo; needs outpatient pulm follow up  - counseled on cessation, nicotine patch    # H/O Mechanical Mitral valve  # Chronic anticoagulation  - INR 2.8 today with goal of 2.5 to 3.5  - cont coumadin     # Chronic atrial fibrillation  - CHADSVASC 2  - on coreg, however patient is having low BPs and will scale back to 3.125 mg PO BID, likely needs to go home on lower dose; cont coumadin    # Essential HTN  - lowering coreg, cont lisinopril     # Chronic Hep C with cirrhosis   MELD-Na score: 20 at 7/10/2019  7:49 PM  MELD score: 20 at 7/10/2019  7:49 PM  Calculated from:  Serum Creatinine: 1.3 mg/dL at 7/10/2019  7:49 PM  Serum Sodium: 139 mmol/L (Rounded to 137 mmol/L) at 7/10/2019  7:49 PM  Total Bilirubin: 0.6 mg/dL (Rounded to 1 mg/dL) at 7/10/2019  7:49 PM  INR(ratio): 2.8  at 7/10/2019 12:00 AM  Age: 63 years  - compensated and MELD is being driven due to patient being on coumadin; LFTs stable  - patient has been treated for hep C and has SVR  - follow up with hepatology     # BPH  - cont home meds    # Thrombocytopenia  - likely due to hypersplenism   - monitor       Diet:  cardiac  GI PPx:    DVT PPx:  coumadin  Goals of Care:  full      Disposition:  Two days    Rian Trimble MD  Medical Director [facility]  Spectra:  [phone]  Pager: [phone]

## 2019-07-11 NOTE — HOSPITAL COURSE
"# Acute on chronic systolic and diastolic heart failure  - Patient admits to non-compliance with sodium/fluid restriction. He also states that he is supposed to take Lasix 80 mg bid at home but has only been taking 100 mg total daily due to "being out and about" with concern for frequent urination while out of the house. He is unaware of the metolazone medication on his medical history.   - Patient's last echo was a year ago and his EF was 20% and patient does not have an AICD  - Repeat TTE with improved EF 45%  - Started on IV Lasix 80 mg bid to good effect  - Patient feels that he is now back to his baseline and denies shortness of breath. Denies chest pain.   - Consider outpatient ischemic workup/stress test  - Consider referral for AICD evaluation   - He will take Lasix 80 mg bid. He was started on spironolactone   - He will follow up with Cardiology.     # Elevated troponin  - in the setting of CHF; EKG shows no ST elevation  - flat    # COPD exacerbation  # Tobacco abuse  - prednisone 40 mg daily for 5 days  - start breo; consider outpt pulm referral  - counseled on cessation    # H/O Mechanical Mitral valve  # Chronic anticoagulation  - INR 3.6 today with goal of 2.5 to 3.5  - cont coumadin     # Chronic atrial fibrillation  - CHADSVASC 2  - rate controlled  - on coreg, cont coumadin     # Chronic Hep C with cirrhosis   MELD-Na score: 20 at 7/10/2019  7:49 PM  MELD score: 20 at 7/10/2019  7:49 PM  Calculated from:  Serum Creatinine: 1.3 mg/dL at 7/10/2019  7:49 PM  Serum Sodium: 139 mmol/L (Rounded to 137 mmol/L) at 7/10/2019  7:49 PM  Total Bilirubin: 0.6 mg/dL (Rounded to 1 mg/dL) at 7/10/2019  7:49 PM  INR(ratio): 2.8 at 7/10/2019 12:00 AM  Age: 63 years  - compensated and MELD is being driven due to patient being on coumadin; LFTs stable  - patient has been treated for hep C and has SVR  - follow up with hepatology      # BPH  - cont home meds     # Thrombocytopenia  - likely due to hypersplenism   - " monitor

## 2019-07-11 NOTE — PLAN OF CARE
07/11/19 1332   Final Note   Assessment Type Final Discharge Note   Anticipated Discharge Disposition Home   Hospital Follow Up  Appt(s) scheduled? Yes

## 2019-07-11 NOTE — HPI
"The patient is a 63 y.o. male with co-morbidities including: CHF, asthma, HTN, pulmonary HTN, portal HTN, and cirrhosis, and surgical history of mitral valve and aortic valve replacement, who presents to the ED with a complaint of chest discomfort, which he attributes to fluid overload around his lungs, with associated leg swelling and appetite reduction due to "feeling full all of the time". He takes lasix 40 mg per day, and has been told that he can take up to 80 mg per day. He also endorses diarrhea, which he attributes to eating ice cream 2 days in a row. No fever or chills. No nausea or vomiting. No dysuria. He makes      Patient states he has been coughing up a productive white thick mucus.  States coughing has increased.  H/O COPD, not on any inhalers and still actively smoking.  Patient states sob happens while walking and even at rest.    "

## 2019-07-11 NOTE — DISCHARGE SUMMARY
"[facility] Hospital Medicine  Discharge Summary      Patient Name: Paul Villalobos Sr.  MRN: [id number]  Admission Date: 7/10/2019  Hospital Length of Stay: 0 days  Discharge Date and Time: 7/11/2019 12:57 PM  Attending Physician: No att. providers found   Discharging Provider: Yecenia Rosales MD  Primary Care Provider: Tye Concepcion MD  [facility] Medicine Team: [facility] HOSP MED  Yecenia Rosales MD    HPI:   The patient is a 63 y.o. male with co-morbidities including: CHF, asthma, HTN, pulmonary HTN, portal HTN, and cirrhosis, and surgical history of mitral valve and aortic valve replacement, who presents to the ED with a complaint of chest discomfort, which he attributes to fluid overload around his lungs, with associated leg swelling and appetite reduction due to "feeling full all of the time". He takes lasix 40 mg per day, and has been told that he can take up to 80 mg per day. He also endorses diarrhea, which he attributes to eating ice cream 2 days in a row. No fever or chills. No nausea or vomiting. No dysuria. He makes      Patient states he has been coughing up a productive white thick mucus.  States coughing has increased.  H/O COPD, not on any inhalers and still actively smoking.  Patient states sob happens while walking and even at rest.       * No surgery found *      Hospital Course:   # Acute on chronic systolic and diastolic heart failure  - Patient admits to non-compliance with sodium/fluid restriction. He also states that he is supposed to take Lasix 80 mg bid at home but has only been taking 100 mg total daily due to "being out and about" with concern for frequent urination while out of the house. He is unaware of the metolazone medication on his medical history.   - Patient's last echo was a year ago and his EF was 20% and patient does not have an AICD  - Repeat TTE with improved EF 45%  - Started on IV Lasix 80 mg bid to good effect  - Patient feels that he is now back to his baseline and " denies shortness of breath. Denies chest pain.   - Consider outpatient ischemic workup/stress test  - Consider referral for AICD evaluation   - He will take Lasix 80 mg bid. He was started on spironolactone   - He will follow up with Cardiology.     # Elevated troponin  - in the setting of CHF; EKG shows no ST elevation  - flat    # COPD exacerbation  # Tobacco abuse  - prednisone 40 mg daily for 5 days  - start breo; consider outpt pulm referral  - counseled on cessation    # H/O Mechanical Mitral valve  # Chronic anticoagulation  - INR 3.6 today with goal of 2.5 to 3.5  - cont coumadin     # Chronic atrial fibrillation  - CHADSVASC 2  - rate controlled  - on coreg, cont coumadin     # Chronic Hep C with cirrhosis   MELD-Na score: 20 at 7/10/2019  7:49 PM  MELD score: 20 at 7/10/2019  7:49 PM  Calculated from:  Serum Creatinine: 1.3 mg/dL at 7/10/2019  7:49 PM  Serum Sodium: 139 mmol/L (Rounded to 137 mmol/L) at 7/10/2019  7:49 PM  Total Bilirubin: 0.6 mg/dL (Rounded to 1 mg/dL) at 7/10/2019  7:49 PM  INR(ratio): 2.8 at 7/10/2019 12:00 AM  Age: 63 years  - compensated and MELD is being driven due to patient being on coumadin; LFTs stable  - patient has been treated for hep C and has SVR  - follow up with hepatology      # BPH  - cont home meds     # Thrombocytopenia  - likely due to hypersplenism   - monitor         Consults:     No new Assessment & Plan notes have been filed under this hospital service since the last note was generated.  Service: Hospital Medicine    Final Active Diagnoses:    Diagnosis Date Noted POA    PRINCIPAL PROBLEM:  Acute on chronic systolic congestive heart failure [I50.23] 07/10/2019 Yes    Pulmonary hypertension [I27.20] 07/10/2019 Yes    Thrombocytopenia [D69.6] 07/10/2019 Yes    COPD exacerbation [J44.1] 02/20/2018 Yes    BPH with urinary obstruction [N40.1, N13.8] 09/27/2017 Yes    Elevated troponin [R74.8] 08/08/2017 Yes    Hepatic cirrhosis due to chronic hepatitis C  infection [B18.2, K74.60] 12/05/2013 Yes    Paroxysmal atrial fibrillation [I48.0] 05/02/2013 Yes    Anticoagulated on Coumadin [Z51.81, Z79.01] 03/28/2013 Not Applicable    Status post heart valve replacement with mechanical valve [Z95.2] 07/17/2012 Not Applicable    HTN (hypertension), benign [I10] 07/17/2012 Yes    Tobacco abuse [Z72.0] 07/17/2012 Yes      Problems Resolved During this Admission:       Discharged Condition: good    Disposition: Home or Self Care    Follow Up:  Follow-up Information     Schedule an appointment as soon as possible for a visit with Tye Concepcion MD.    Specialty:  Internal Medicine  Contact information:  [address]  [place] [address] [phone]             [facility] - Cardiology.    Specialty:  Cardiology  Contact information:  [address] [facility] [address] [phone]  Additional information:  3rd floor               Patient Instructions:      Diet Cardiac     Activity as tolerated       Significant Diagnostic Studies: Labs:   CMP   Recent Labs   Lab 07/10/19  1949 07/11/19  0401    138   K 3.4* 3.2*   CL 95 94*   CO2 33* 34*    122*   BUN 25* 29*   CREATININE 1.3 1.2   CALCIUM 10.0 10.0   PROT 7.6 7.4   ALBUMIN 3.8 3.8   BILITOT 0.6 0.8   ALKPHOS 48* 48*   AST 34 31   ALT 22 22   ANIONGAP 11 10   ESTGFRAFRICA >60.0 >60.0   EGFRNONAA 57.3* >60.0    and CBC   Recent Labs   Lab 07/10/19  1949 07/11/19  0401   WBC 4.01 5.10   HGB 13.6* 13.4*   HCT 42.0 41.3   * 143*       Pending Diagnostic Studies:     None         Medications:  Reconciled Home Medications:      Medication List      START taking these medications    fluticasone furoate-vilanterol 100-25 mcg/dose diskus inhaler  Commonly known as:  BREO  Inhale 1 puff into the lungs once daily. Controller  Start taking on:  7/12/2019     predniSONE 20 MG tablet  Commonly known as:  DELTASONE  Take 2 tablets (40 mg total) by mouth once daily. for 4 days  Start taking on:   7/12/2019     spironolactone 25 MG tablet  Commonly known as:  ALDACTONE  Take 1 tablet (25 mg total) by mouth once daily.  Start taking on:  7/12/2019        CHANGE how you take these medications    finasteride 5 mg tablet  Commonly known as:  PROSCAR  Take 1 tablet (5 mg total) by mouth once daily.  What changed:  Another medication with the same name was removed. Continue taking this medication, and follow the directions you see here.     furosemide 80 MG tablet  Commonly known as:  LASIX  Take 1 tablet (80 mg total) by mouth 2 (two) times daily.  What changed:  See the new instructions.     warfarin 5 MG tablet  Commonly known as:  COUMADIN  TAKE 1 1/2 TABLETS BY MOUTH DAILY, EXCEPT ON SUNDAY, WEDNESDAY AND FRIDAY TAKE 2 TABLETS  What changed:  See the new instructions.        CONTINUE taking these medications    carvedilol 12.5 MG tablet  Commonly known as:  COREG     * COMBIVENT RESPIMAT  mcg/actuation inhaler  Generic drug:  ipratropium-albuterol  INHALE 1 PUFF INTO THE LUNGS EVERY 4 HOURS AS NEEDED FOR WHEEZING     * albuterol-ipratropium 2.5 mg-0.5 mg/3 mL nebulizer solution  Commonly known as:  DUO-NEB  USE 3 ML VIA NEBULIZER EVERY 6 HOURS AS NEEDED FOR WHEEZING     fluticasone propionate 50 mcg/actuation nasal spray  Commonly known as:  FLONASE  SPRAY TWICE IN EACH NOSTRIL EVERY DAY     lisinopril 10 MG tablet  Take 1 tablet (10 mg total) by mouth once daily.     nicotine 14 mg/24 hr  Commonly known as:  NICODERM CQ  Place 1 patch onto the skin once daily.     nicotine polacrilex 2 MG Lozg  Take 1 lozenge (2 mg total) by mouth as needed (1-2 per hour in the place of a cigarette).     polyethylene glycol 17 gram/dose powder  Commonly known as:  GLYCOLAX  MIX 17G( 1 CAPFUL) IN 8 OUNCES OF WATER OR JUICE AND DRINK DAILY     potassium chloride SA 20 MEQ tablet  Commonly known as:  K-DUR,KLOR-CON  Take 2 tablets (40 mEq total) by mouth once daily.     tamsulosin 0.4 mg Cap  Commonly known as:   FLOMAX  Take 1 capsule (0.4 mg total) by mouth every evening.         * This list has 2 medication(s) that are the same as other medications prescribed for you. Read the directions carefully, and ask your doctor or other care provider to review them with you.            STOP taking these medications    metOLazone 2.5 MG tablet  Commonly known as:  ZAROXOLYN            Indwelling Lines/Drains at time of discharge:   Lines/Drains/Airways          None          Time spent on the discharge of patient: 29 minutes  Patient was seen and examined on the date of discharge and determined to be suitable for discharge.         Yecenia Rosales MD  Department of Hospital Medicine  [facility]

## 2019-07-11 NOTE — ED TRIAGE NOTES
"Paul ROBLEDO Wilfredo Sanchez, an 63 y.o. male presents to the ED stating that he feels like the "fluid is on him because he can feel it in his back."  Patient states that he takes Lasix at home but thinks it isn't working because he's been spitting a lot.  Patient says liquid lasix is better than the pills.  Patient denies chest pain in intake.  States that he feels like he needs oxygen and liquid lasix and then he can go home.      Review of patient's allergies indicates:  No Known Allergies  Chief Complaint   Patient presents with    Chest Pain     sob, not urinating as often as should, hx valve replacement twice     Past Medical History:   Diagnosis Date    Anemia     Asthma     CHF (congestive heart failure)     Chronic bronchitis     Cirrhosis 1/19/2015    Hepatitis C     Hyperlipidemia     Hypertension     Lung disease     Portal hypertension 1/30/2018    Pulmonary hypertension        "

## 2019-07-11 NOTE — ED PROVIDER NOTES
"Encounter Date: 7/10/2019    SCRIBE #1 NOTE: I, Lucien Liu, am scribing for, and in the presence of,  Dr. Ortez. I have scribed the entire note.       History     Chief Complaint   Patient presents with    Chest Pain     sob, not urinating as often as should, hx valve replacement twice     Time patient was seen by the provider: 8:04 PM      The patient is a 63 y.o. male with co-morbidities including: CHF, asthma, HTN, pulmonary HTN, portal HTN, and cirrhosis, and surgical history of mitral valve and aortic valve replacement, who presents to the ED with a complaint of chest discomfort, which he attributes to fluid overload around his lungs, with associated leg swelling and appetite reduction due to "feeling full all of the time". He takes lasix 40 mg per day, and has been told that he can take up to 80 mg per day. He also endorses diarrhea, which he attributes to eating ice cream 2 days in a row. No fever or chills. No nausea or vomiting. No dysuria. He makes urine.  Onset of symptoms been gradual, associated with shortness of breath, concern for volume overload, chest tightness, with 0/10 pain. No other aggravating or alleviating factors.    The history is provided by the patient.     Review of patient's allergies indicates:  No Known Allergies  Past Medical History:   Diagnosis Date    Anemia     Asthma     CHF (congestive heart failure)     Chronic bronchitis     Cirrhosis 1/19/2015    Hepatitis C     Hyperlipidemia     Hypertension     Lung disease     Portal hypertension 1/30/2018    Pulmonary hypertension      Past Surgical History:   Procedure Laterality Date    CARDIAC VALVE REPLACEMENT      AVR WITH MVr 2007    COLONOSCOPY Left 10/12/2018    [facility] ([facility]ND [facility])    EGD (ESOPHAGOGASTRODUODENOSCOPY) Left 10/12/2018    Performed by [facility]ook [facility] at [facility] (2ND FLR)    HERNIA REPAIR  2006    questionable mesh, right inguinal, unbilical    " LUNG DECORTICATION      right thoracotomy 2010    LUNG DECORTICATION  2010    open heart surgery      redo AVR and MVR 2009     Family History   Problem Relation Age of Onset    Hypertension Mother     Hypertension Father     Hypertension Brother     Heart disease Brother     Heart attack Brother     Hypertension Sister      Social History     Tobacco Use    Smoking status: Current Every Day Smoker     Packs/day: 0.50     Years: 48.00     Pack years: 24.00     Types: Cigarettes    Smokeless tobacco: Never Used    Tobacco comment: 5 a day   Substance Use Topics    Alcohol use: Yes     Alcohol/week: 0.0 oz     Comment: seldom    Drug use: No     Review of Systems   Constitutional: Negative for chills and fever.   HENT: Negative for congestion and sore throat.    Eyes: Negative for pain and visual disturbance.   Respiratory: Positive for chest tightness. Negative for shortness of breath.    Cardiovascular: Positive for leg swelling. Negative for chest pain.   Gastrointestinal: Positive for diarrhea. Negative for nausea and vomiting.   Genitourinary: Negative for decreased urine volume and dysuria.   Musculoskeletal: Negative for back pain and neck pain.   Skin: Negative for rash and wound.   Neurological: Negative for numbness.   Hematological: Does not bruise/bleed easily.       Physical Exam     Initial Vitals [07/10/19 1835]   BP Pulse Resp Temp SpO2   119/77 66 20 98 °F (36.7 °C) 98 %      MAP       --         Physical Exam    Nursing note and vitals reviewed.    Gen/Constitutional: Interactive. No acute distress  Head: Normocephalic, Atraumatic  Neck: Positive JVD. Supple, no masses or LAD  Eyes: PERRLA, conjunctiva clear  Ears, Nose and Throat: No rhinorrhea or stridor.  Cardiac: Reg Rhythm, No murmur  Pulmonary: Bilateral bibasilar crackles, left greater than right. CTA Bilat, no wheezes, rhonchi.  GI: Abdomen soft, non-tender, non-distended; no rebound or guarding  : No CVA  tenderness.  Musculoskeletal: Positive bilateral lower extremity edema. Extremities warm, well perfused, no erythema  Skin: No rashes  Neuro: Alert and Oriented x 3; No focal motor or sensory deficits.    Psych: Normal affect      ED Course   Procedures  Labs Reviewed   CBC W/ AUTO DIFFERENTIAL - Abnormal; Notable for the following components:       Result Value    RBC 4.15 (*)     Hemoglobin 13.6 (*)     Mean Corpuscular Volume 101 (*)     Mean Corpuscular Hemoglobin 32.8 (*)     Platelets 144 (*)     All other components within normal limits   COMPREHENSIVE METABOLIC PANEL - Abnormal; Notable for the following components:    Potassium 3.4 (*)     CO2 33 (*)     BUN, Bld 25 (*)     Alkaline Phosphatase 48 (*)     eGFR if non  57.3 (*)     All other components within normal limits   TROPONIN I - Abnormal; Notable for the following components:    Troponin I 0.044 (*)     All other components within normal limits   B-TYPE NATRIURETIC PEPTIDE - Abnormal; Notable for the following components:     (*)     All other components within normal limits     EKG Readings: (Independently Interpreted)   Initial Reading: No STEMI. Rhythm: Atrial Fibrillation. Heart Rate: 62. Conduction: RBBB.   Similar to previous ECG.      ECG Results          EKG 12-lead (Final result)  Result time 07/11/19 11:32:12    Final result by Interface, Lab In [facility] (07/11/19 11:32:12)                 Narrative:    Test Reason : R06.02,    Vent. Rate : 066 BPM     Atrial Rate : 046 BPM     P-R Int : 000 ms          QRS Dur : 202 ms      QT Int : 510 ms       P-R-T Axes : 000 239 061 degrees     QTc Int : 534 ms    Atrial fibrillation  Right axis deviation  Right bundle branch block , plus right ventricular hypertrophy  Abnormal ECG  When compared with ECG of 25-MAR-2018 17:45,  No significant change was found  Confirmed by Angie Childers MD ([id number]) on 7/11/2019 11:32:04 AM    Referred By: AGUILAR   SELF           Confirmed  "By:Angie Childers MD                            Imaging Results          X-Ray Chest PA And Lateral (Final result)  Result time 07/10/19 22:01:44    Final result by Celio Mead MD (07/10/19 22:01:44)                 Impression:      Borderline cardiomegaly and minimal interstitial edema.      Electronically signed by: Celio Mead MD  Date:    07/10/2019  Time:    22:01             Narrative:    EXAMINATION:  XR CHEST PA AND LATERAL    CLINICAL HISTORY:  Provided history is "  Chest pain, unspecified".    TECHNIQUE:  Frontal and lateral views of the chest were performed.    COMPARISON:  03/25/2018.    FINDINGS:  Cardiac silhouette is prominent but stable.  Atherosclerotic calcifications overlie the aortic arch.  There are postoperative changes of median sternotomy and valve replacement.  Mild central vascular congestion and coarsened interstitial markings, possibly minimal edema.  Lungs appear mildly hyperinflated and there is stable chronic blunting of the bilateral costophrenic angles, right side greater than left.  There is scarring or subsegmental atelectasis again identified at the right lung base.  No focal consolidation.  No sizable pleural effusion.  No pneumothorax.  No detrimental change in lung aeration.                                 Medical Decision Making:   History:   Old Medical Records: I decided to obtain old medical records.  Old Records Summarized: records from clinic visits and records from previous admission(s).  Initial Assessment:   63 year old male presenting with chest discomfort.  Differential Diagnosis:   Differential diagnosis includes but is not limited to:  Acute on chronic heart failure, ACS, PE, pneumonia, aortic dissection, pulmonary hypertension, pleural effusions.      Independently Interpreted Test(s):   I have ordered and independently interpreted X-rays - see prior notes.  I have ordered and independently interpreted EKG Reading(s) - see prior notes  Clinical Tests: "   Lab Tests: Reviewed and Ordered  Radiological Study: Ordered and Reviewed  Medical Tests: Ordered and Reviewed  Other:   I have discussed this case with another health care provider.       <> Summary of the Discussion: 8:16 PM: Spoke with cardiology and they think that he should come in for observation.     Emergent evaluation of patient presenting with increasing dyspnea on exertion, shortness of breath, and concern for volume overload.  He is afebrile, vital signs are stable. Physical exam findings remarkable for positive JVD, bilateral basilar crackles, and slight pedal edema. Patient endorses volume overload and heart failure symptoms, worsening over the last several days.  If he has been compliant with his diuretic intermittently however not compliant with low-sodium diet.  Given concern for CHF versus ACS versus pleural effusion versus pulmonary hypertension, will obtain a broad cardiac and pulmonary workup.  ECG with no signs of ischemia or STEMI on my read.  Placed on cardiac and telemetry monitoring along with pulse oximetry.  Chest x-ray obtained, with increased pulmonary vascular markings, and slight pulmonary edema. Suspect likely CHF exacerbation based on exam, history and findings.  Elevated troponin, but near baseline will continue to trend.  Discussed case with Cardiology who recommends diuresing, and admission to observation given high risk.  Discussed case with hospital medicine team, will admit the patient for ongoing management of acute on chronic heart failure.    Complexity:  High - level 5          Scribe Attestation:   Scribe #1: I performed the above scribed service and the documentation accurately describes the services I performed. I attest to the accuracy of the note.    I, Dr. Bobby Ortez, personally performed the services described in this documentation. All medical record entries made by the scribe were at my direction and in my presence.  I have reviewed the chart and agree that  the record reflects my personal performance and is accurate and complete.              Clinical Impression:       ICD-10-CM ICD-9-CM   1. Acute on chronic systolic congestive heart failure I50.23 428.23     428.0   2. SOB (shortness of breath) R06.02 786.05   3. Chest pain R07.9 786.50   4. Pulmonary hypertension I27.20 416.8   5. Acute on chronic systolic right heart failure I50.23 428.23   6. History of mechanical aortic valve replacement Z95.2 V43.3   7. History of mitral valve replacement with mechanical valve Z95.2 V43.3   8. Systolic heart failure I50.20 428.20   9. CKD (chronic kidney disease) stage 2, GFR 60-89 ml/min N18.2 585.2   10. Chronic systolic congestive heart failure, NYHA class 3 I50.22 428.22     428.0         Disposition:   Disposition: Placed in Observation  Condition: Fair              Bobby Ortez DO  Dept of Emergency Medicine   [facility]  Spectralink: [phone]             Bobby Ortez DO  07/12/19 1104

## 2019-07-11 NOTE — NURSING
Patient has arrived on the unit via wheelchair. He is accompanied by his spouse and transport personnel. He is in stable condition.

## 2019-07-11 NOTE — PLAN OF CARE
Reviewed pt avs discharge papers. No questions noted. Pt has a follow up with PCP and cardiology. Instructed to return to emergency room if symptoms worsen. Tele monitor removed.  Pt discharged home to self and family care.

## 2019-07-15 ENCOUNTER — TELEPHONE (OUTPATIENT)
Dept: INTERNAL MEDICINE | Facility: CLINIC | Age: 64
End: 2019-07-15

## 2019-07-15 NOTE — TELEPHONE ENCOUNTER
----- Message from Rachelle Gatica sent at 7/15/2019 10:52 AM CDT -----  Contact: self [phone]  Patient was in the Hospital and was given a follow up apt for 7/25 which a Thursday, patient prefers Wed because that's a good day for his wife to bring him. Please call him back and see if you can get him in on a Wed.    Thank you

## 2019-07-15 NOTE — TELEPHONE ENCOUNTER
Spoke with patient who has a hosp f/u appointment on Thursday the 25th. He instead would like an appointment on Wednesday the 24th because he has to accommodate his wife's schedule? Is it possible for him to be squeezed on Wednesday? Told patient I would give his wife a call on Thursday when you return.

## 2019-07-16 DIAGNOSIS — Z95.2 STATUS POST HEART VALVE REPLACEMENT WITH MECHANICAL VALVE: ICD-10-CM

## 2019-07-16 DIAGNOSIS — Z79.01 ANTICOAGULATED ON COUMADIN: ICD-10-CM

## 2019-07-16 RX ORDER — WARFARIN SODIUM 5 MG/1
TABLET ORAL
Qty: 150 TABLET | Refills: 0 | Status: SHIPPED | OUTPATIENT
Start: 2019-07-16 | End: 2019-07-16 | Stop reason: SDUPTHER

## 2019-07-17 RX ORDER — WARFARIN SODIUM 5 MG/1
TABLET ORAL
Qty: 168 TABLET | Refills: 0 | Status: SHIPPED | OUTPATIENT
Start: 2019-07-17 | End: 2019-10-04 | Stop reason: SDUPTHER

## 2019-07-18 NOTE — TELEPHONE ENCOUNTER
Spoke to wife and advised of appointment. Even if wife does not come to appointment she is to be on the phone when appointment is taking place in regards to patient's care.

## 2019-07-22 DIAGNOSIS — J44.9 CHRONIC OBSTRUCTIVE PULMONARY DISEASE, UNSPECIFIED COPD TYPE: ICD-10-CM

## 2019-07-22 RX ORDER — IPRATROPIUM BROMIDE AND ALBUTEROL 20; 100 UG/1; UG/1
SPRAY, METERED RESPIRATORY (INHALATION)
Qty: 4 G | Refills: 11 | Status: SHIPPED | OUTPATIENT
Start: 2019-07-22 | End: 2020-08-04

## 2019-07-24 ENCOUNTER — OFFICE VISIT (OUTPATIENT)
Dept: INTERNAL MEDICINE | Facility: CLINIC | Age: 64
End: 2019-07-24
Payer: MEDICARE

## 2019-07-24 ENCOUNTER — ANTI-COAG VISIT (OUTPATIENT)
Dept: CARDIOLOGY | Facility: CLINIC | Age: 64
End: 2019-07-24
Payer: MEDICARE

## 2019-07-24 VITALS
OXYGEN SATURATION: 96 % | WEIGHT: 158.69 LBS | HEART RATE: 66 BPM | HEIGHT: 66 IN | BODY MASS INDEX: 25.5 KG/M2 | DIASTOLIC BLOOD PRESSURE: 70 MMHG | SYSTOLIC BLOOD PRESSURE: 130 MMHG

## 2019-07-24 DIAGNOSIS — Z79.01 ANTICOAGULATED ON COUMADIN: ICD-10-CM

## 2019-07-24 DIAGNOSIS — I50.22 CHRONIC SYSTOLIC (CONGESTIVE) HEART FAILURE: ICD-10-CM

## 2019-07-24 DIAGNOSIS — J44.1 COPD EXACERBATION: ICD-10-CM

## 2019-07-24 DIAGNOSIS — I50.23 ACUTE ON CHRONIC SYSTOLIC CONGESTIVE HEART FAILURE: Primary | ICD-10-CM

## 2019-07-24 DIAGNOSIS — Z95.2 STATUS POST HEART VALVE REPLACEMENT WITH MECHANICAL VALVE: ICD-10-CM

## 2019-07-24 LAB — INR PPP: 2.4

## 2019-07-24 PROCEDURE — 1101F PT FALLS ASSESS-DOCD LE1/YR: CPT | Mod: HCWC,CPTII,S$GLB, | Performed by: INTERNAL MEDICINE

## 2019-07-24 PROCEDURE — 1101F PR PT FALLS ASSESS DOC 0-1 FALLS W/OUT INJ PAST YR: ICD-10-PCS | Mod: HCWC,CPTII,S$GLB, | Performed by: INTERNAL MEDICINE

## 2019-07-24 PROCEDURE — 3078F PR MOST RECENT DIASTOLIC BLOOD PRESSURE < 80 MM HG: ICD-10-PCS | Mod: HCWC,CPTII,S$GLB, | Performed by: INTERNAL MEDICINE

## 2019-07-24 PROCEDURE — 99214 OFFICE O/P EST MOD 30 MIN: CPT | Mod: HCWC,S$GLB,, | Performed by: INTERNAL MEDICINE

## 2019-07-24 PROCEDURE — 3075F PR MOST RECENT SYSTOLIC BLOOD PRESS GE 130-139MM HG: ICD-10-PCS | Mod: HCWC,CPTII,S$GLB, | Performed by: INTERNAL MEDICINE

## 2019-07-24 PROCEDURE — 3008F PR BODY MASS INDEX (BMI) DOCUMENTED: ICD-10-PCS | Mod: HCWC,CPTII,S$GLB, | Performed by: INTERNAL MEDICINE

## 2019-07-24 PROCEDURE — 93793 PR ANTICOAGULANT MGMT FOR PT TAKING WARFARIN: ICD-10-PCS | Mod: S$GLB,,,

## 2019-07-24 PROCEDURE — 3078F DIAST BP <80 MM HG: CPT | Mod: HCWC,CPTII,S$GLB, | Performed by: INTERNAL MEDICINE

## 2019-07-24 PROCEDURE — 99214 PR OFFICE/OUTPT VISIT, EST, LEVL IV, 30-39 MIN: ICD-10-PCS | Mod: HCWC,S$GLB,, | Performed by: INTERNAL MEDICINE

## 2019-07-24 PROCEDURE — 99999 PR PBB SHADOW E&M-EST. PATIENT-LVL III: ICD-10-PCS | Mod: PBBFAC,HCWC,, | Performed by: INTERNAL MEDICINE

## 2019-07-24 PROCEDURE — 3075F SYST BP GE 130 - 139MM HG: CPT | Mod: HCWC,CPTII,S$GLB, | Performed by: INTERNAL MEDICINE

## 2019-07-24 PROCEDURE — 93793 ANTICOAG MGMT PT WARFARIN: CPT | Mod: S$GLB,,,

## 2019-07-24 PROCEDURE — 3008F BODY MASS INDEX DOCD: CPT | Mod: HCWC,CPTII,S$GLB, | Performed by: INTERNAL MEDICINE

## 2019-07-24 PROCEDURE — 99999 PR PBB SHADOW E&M-EST. PATIENT-LVL III: CPT | Mod: PBBFAC,HCWC,, | Performed by: INTERNAL MEDICINE

## 2019-07-24 RX ORDER — METOLAZONE 2.5 MG/1
2.5 TABLET ORAL DAILY
COMMUNITY
Start: 2019-07-22 | End: 2020-02-07

## 2019-07-24 NOTE — PROGRESS NOTES
Subjective:       Patient ID: Paul Villalobos Sr. is a 63 y.o. male.    Chief Complaint: Follow-up (ED)    Here for hosp f/u. Had incd sodium intake which may have lead to CHF flare, also may have had copd flare.     Feeling back to normal now.    Good med adh, good UOP. No chest pains. No leg swelling, no abd swelling. No blood in stools.    Review of Systems   Constitutional: Negative for activity change, appetite change, chills, fatigue, fever and unexpected weight change.   HENT: Negative for congestion, postnasal drip, rhinorrhea and voice change.    Respiratory: Positive for cough and shortness of breath (stable though). Negative for chest tightness and wheezing.    Cardiovascular: Negative for chest pain, palpitations and leg swelling.   Gastrointestinal: Negative for abdominal distention, abdominal pain, anal bleeding, blood in stool, nausea and vomiting.   Genitourinary: Negative for decreased urine volume, difficulty urinating, flank pain, frequency, hematuria, scrotal swelling and testicular pain.   Musculoskeletal: Negative for neck pain and neck stiffness.   Skin: Negative for rash and wound.   Neurological: Negative for tremors, syncope and weakness.   Hematological: Negative for adenopathy. Does not bruise/bleed easily.   Psychiatric/Behavioral: Negative for dysphoric mood.       Objective:      Physical Exam   Constitutional: He is oriented to person, place, and time. He appears well-developed and well-nourished. No distress.   HENT:   Head: Normocephalic and atraumatic.   Eyes: No scleral icterus.   Neck: Normal range of motion. No thyromegaly present.   Cardiovascular: Normal rate. Exam reveals no gallop and no friction rub.   Murmur heard.  Irregularly irregular, mechanical systolic murmur     Pulmonary/Chest: Effort normal. No respiratory distress. He has wheezes (mild and air flow decreased). He has no rales.   Abdominal: Soft. Bowel sounds are normal. He exhibits no distension and no mass. There is  no tenderness. There is no rebound and no guarding. No hernia.   Musculoskeletal: Normal range of motion. He exhibits no edema.   Lymphadenopathy:     He has no cervical adenopathy.   Neurological: He is alert and oriented to person, place, and time.   Skin: No lesion noted.   Psychiatric: He has a normal mood and affect. Thought content normal.       Assessment:       No diagnosis found.    Plan:       Here for hosp f/u from recent CHF flare.    Now appears euvolemic, good med adh. He knows btr as far as how to decrease sodium intake (dec prepared [employer]).    Re copd -- Lengthy discussion re importance of smoking cessation, not ready to set QD though.    There are no diagnoses linked to this encounter.    Health Maintenance       Date Due Completion Date    TETANUS VACCINE 12/29/1971 ---    Shingles Vaccine (1 of 2) 12/29/2003 ---    Influenza Vaccine 08/01/2019 9/10/2018    Fecal Occult Blood Test (FOBT)/FitKit 09/18/2019 9/18/2018    Pneumococcal Vaccine (65+ Low/Medium Risk) (2 of 2 - PPSV23) 01/18/2020 1/18/2019    Lipid Panel 11/14/2023 11/14/2018      Discussed shingrix    Follow up in about 3 months (around 10/24/2019) for Change September appt to late October.    Future Appointments   Date Time Provider Department Center   8/21/2019  3:00 PM Irene Chacon MD [facility] CARDIO [facility]   10/8/2019  8:20 AM Tye Concepcion MD [facility] IM [facility] PCW

## 2019-07-24 NOTE — PROGRESS NOTES
INR not at goal. Medications, chart, and patient findings reviewed. See calendar for adjustments to dose and follow up plan.  However, pt is really close to therapeutic range.  Will continue current regimen & re-assess in 2 weeks.

## 2019-08-07 ENCOUNTER — ANTI-COAG VISIT (OUTPATIENT)
Dept: CARDIOLOGY | Facility: CLINIC | Age: 64
End: 2019-08-07
Payer: MEDICARE

## 2019-08-07 DIAGNOSIS — Z95.2 STATUS POST HEART VALVE REPLACEMENT WITH MECHANICAL VALVE: ICD-10-CM

## 2019-08-07 DIAGNOSIS — Z79.01 ANTICOAGULATED ON COUMADIN: ICD-10-CM

## 2019-08-07 LAB — INR PPP: 2.2

## 2019-08-07 PROCEDURE — 93793 ANTICOAG MGMT PT WARFARIN: CPT | Mod: S$GLB,,, | Performed by: PHARMACIST

## 2019-08-07 PROCEDURE — 93793 PR ANTICOAGULANT MGMT FOR PT TAKING WARFARIN: ICD-10-PCS | Mod: S$GLB,,, | Performed by: PHARMACIST

## 2019-08-07 NOTE — PROGRESS NOTES
Confirmed correct dose of coumadin    Reports less greens, no greens since prior to last inr      Reports nosebleeds off and on, last bleed this am     Denies any other changes   I will increase his coumadin but only slightly due to nosebleeds.

## 2019-08-14 ENCOUNTER — ANTI-COAG VISIT (OUTPATIENT)
Dept: CARDIOLOGY | Facility: CLINIC | Age: 64
End: 2019-08-14
Payer: MEDICARE

## 2019-08-14 DIAGNOSIS — Z95.2 STATUS POST HEART VALVE REPLACEMENT WITH MECHANICAL VALVE: ICD-10-CM

## 2019-08-14 DIAGNOSIS — Z79.01 ANTICOAGULATED ON COUMADIN: ICD-10-CM

## 2019-08-14 LAB — INR PPP: 2.5

## 2019-08-14 PROCEDURE — 93793 PR ANTICOAGULANT MGMT FOR PT TAKING WARFARIN: ICD-10-PCS | Mod: S$GLB,,,

## 2019-08-14 PROCEDURE — 93793 ANTICOAG MGMT PT WARFARIN: CPT | Mod: S$GLB,,,

## 2019-08-21 ENCOUNTER — OFFICE VISIT (OUTPATIENT)
Dept: CARDIOLOGY | Facility: CLINIC | Age: 64
End: 2019-08-21
Payer: MEDICARE

## 2019-08-21 VITALS
WEIGHT: 151.69 LBS | HEART RATE: 66 BPM | BODY MASS INDEX: 22.47 KG/M2 | DIASTOLIC BLOOD PRESSURE: 59 MMHG | HEIGHT: 69 IN | SYSTOLIC BLOOD PRESSURE: 118 MMHG | OXYGEN SATURATION: 96 %

## 2019-08-21 DIAGNOSIS — I48.0 PAROXYSMAL ATRIAL FIBRILLATION: ICD-10-CM

## 2019-08-21 DIAGNOSIS — I10 HTN (HYPERTENSION), BENIGN: ICD-10-CM

## 2019-08-21 DIAGNOSIS — Z95.2 STATUS POST HEART VALVE REPLACEMENT WITH MECHANICAL VALVE: Primary | ICD-10-CM

## 2019-08-21 DIAGNOSIS — N18.2 CKD (CHRONIC KIDNEY DISEASE) STAGE 2, GFR 60-89 ML/MIN: ICD-10-CM

## 2019-08-21 DIAGNOSIS — I50.22 CHRONIC SYSTOLIC CONGESTIVE HEART FAILURE, NYHA CLASS 3: ICD-10-CM

## 2019-08-21 PROCEDURE — 3078F DIAST BP <80 MM HG: CPT | Mod: HCWC,CPTII,S$GLB, | Performed by: INTERNAL MEDICINE

## 2019-08-21 PROCEDURE — 3074F PR MOST RECENT SYSTOLIC BLOOD PRESSURE < 130 MM HG: ICD-10-PCS | Mod: HCWC,CPTII,S$GLB, | Performed by: INTERNAL MEDICINE

## 2019-08-21 PROCEDURE — 3008F BODY MASS INDEX DOCD: CPT | Mod: HCWC,CPTII,S$GLB, | Performed by: INTERNAL MEDICINE

## 2019-08-21 PROCEDURE — 3008F PR BODY MASS INDEX (BMI) DOCUMENTED: ICD-10-PCS | Mod: HCWC,CPTII,S$GLB, | Performed by: INTERNAL MEDICINE

## 2019-08-21 PROCEDURE — 99214 OFFICE O/P EST MOD 30 MIN: CPT | Mod: HCWC,S$GLB,, | Performed by: INTERNAL MEDICINE

## 2019-08-21 PROCEDURE — 99214 PR OFFICE/OUTPT VISIT, EST, LEVL IV, 30-39 MIN: ICD-10-PCS | Mod: HCWC,S$GLB,, | Performed by: INTERNAL MEDICINE

## 2019-08-21 PROCEDURE — 99999 PR PBB SHADOW E&M-EST. PATIENT-LVL III: CPT | Mod: PBBFAC,HCWC,, | Performed by: INTERNAL MEDICINE

## 2019-08-21 PROCEDURE — 1101F PR PT FALLS ASSESS DOC 0-1 FALLS W/OUT INJ PAST YR: ICD-10-PCS | Mod: HCWC,CPTII,S$GLB, | Performed by: INTERNAL MEDICINE

## 2019-08-21 PROCEDURE — 3074F SYST BP LT 130 MM HG: CPT | Mod: HCWC,CPTII,S$GLB, | Performed by: INTERNAL MEDICINE

## 2019-08-21 PROCEDURE — 1101F PT FALLS ASSESS-DOCD LE1/YR: CPT | Mod: HCWC,CPTII,S$GLB, | Performed by: INTERNAL MEDICINE

## 2019-08-21 PROCEDURE — 3078F PR MOST RECENT DIASTOLIC BLOOD PRESSURE < 80 MM HG: ICD-10-PCS | Mod: HCWC,CPTII,S$GLB, | Performed by: INTERNAL MEDICINE

## 2019-08-21 PROCEDURE — 99999 PR PBB SHADOW E&M-EST. PATIENT-LVL III: ICD-10-PCS | Mod: PBBFAC,HCWC,, | Performed by: INTERNAL MEDICINE

## 2019-08-21 RX ORDER — FUROSEMIDE 80 MG/1
80 TABLET ORAL 2 TIMES DAILY
Qty: 30 TABLET | Refills: 11 | Status: SHIPPED | OUTPATIENT
Start: 2019-08-21 | End: 2019-11-06

## 2019-08-21 NOTE — PROGRESS NOTES
"Subjective:   Patient ID:  Paul Villalobos Sr. is a 63 y.o. male who presents for follow-up of Chest Pain (ER fu 07/10/19)  The patient is a 63 y.o. male with co-morbidities including: CHF, asthma, HTN, pulmonary HTN, portal HTN, and cirrhosis, and surgical history of mitral valve and aortic valve replacement    HPI:   Recent CHF exacerbation and was hospitalized.   Does not exercise  LEIVA on moderate activity.  Patient does not want to get a defibrillator.       Echo:   1 - Eccentric hypertrophy.     2 - Severely depressed left ventricular systolic function (EF 25-30%).     3 - Right ventricular enlargement with moderately depressed systolic function.     4 - Biatrial enlargement.     5 - Mild tricuspid regurgitation.     6 - Mechanical aortic and mitral valve prostheses.     7 - Pulmonary hypertension. The estimated PA systolic pressure is 59 mmHg.     8 - Intermediate central venous pressure.     Patient Active Problem List   Diagnosis    Status post heart valve replacement with mechanical valve    HTN (hypertension), benign    Tobacco abuse    Chronic systolic (congestive) heart failure    Hyperlipidemia    Anticoagulated on Coumadin    H/O hepatitis C    CKD (chronic kidney disease) stage 2, GFR 60-89 ml/min    Asymptomatic cholelithiasis    Proteinuria    Right thyroid nodule    Paroxysmal atrial fibrillation    Hepatic cirrhosis due to chronic hepatitis C infection    Asthmatic bronchitis    Erectile dysfunction    Hypotension    Elevated troponin    Acute bilateral thoracic back pain    BPH with urinary obstruction    Portal hypertension    COPD exacerbation    Acute on chronic systolic congestive heart failure    Pulmonary hypertension     BP (!) 118/59 (BP Location: Left arm, Patient Position: Sitting, BP Method: Large (Automatic))   Pulse 66   Ht 5' 9" (1.753 m)   Wt 68.8 kg (151 lb 10.8 oz)   SpO2 96%   BMI 22.40 kg/m²   Body mass index is 22.4 kg/m².  CrCl cannot be calculated " (Patient's most recent lab result is older than the maximum 7 days allowed.).    Lab Results   Component Value Date     07/11/2019    K 3.2 (L) 07/11/2019    CL 94 (L) 07/11/2019    CO2 34 (H) 07/11/2019    BUN 29 (H) 07/11/2019    CREATININE 1.2 07/11/2019     (H) 07/11/2019    HGBA1C 5.6 07/11/2019    MG 1.8 07/11/2019    AST 31 07/11/2019    ALT 22 07/11/2019    ALBUMIN 3.8 07/11/2019    PROT 7.4 07/11/2019    BILITOT 0.8 07/11/2019    WBC 5.10 07/11/2019    HGB 13.4 (L) 07/11/2019    HCT 41.3 07/11/2019     (H) 07/11/2019     (L) 07/11/2019    INR 2.5 08/13/2019    INR 3.8 (H) 12/14/2009    PSA 0.73 04/03/2014    TSH 1.068 03/27/2013    CHOL 193 11/14/2018    HDL 64 11/14/2018    LDLCALC 117.6 11/14/2018    TRIG 57 11/14/2018       Current Outpatient Medications   Medication Sig    albuterol-ipratropium (DUO-NEB) 2.5 mg-0.5 mg/3 mL nebulizer solution USE 3 ML VIA NEBULIZER EVERY 6 HOURS AS NEEDED FOR WHEEZING    carvedilol (COREG) 12.5 MG tablet 12.5 mg 2 (two) times daily with meals.     COMBIVENT RESPIMAT  mcg/actuation inhaler INHALE 1 PUFF INTO THE LUNGS EVERY 4 HOURS AS NEEDED FOR WHEEZING    finasteride (PROSCAR) 5 mg tablet Take 1 tablet (5 mg total) by mouth once daily.    fluticasone (FLONASE) 50 mcg/actuation nasal spray SPRAY TWICE IN EACH NOSTRIL EVERY DAY    fluticasone furoate-vilanterol (BREO) 100-25 mcg/dose diskus inhaler Inhale 1 puff into the lungs once daily. Controller    furosemide (LASIX) 80 MG tablet Take 1 tablet (80 mg total) by mouth 2 (two) times daily.    metOLazone (ZAROXOLYN) 2.5 MG tablet Take 2.5 mg by mouth once daily.     polyethylene glycol (GLYCOLAX) 17 gram/dose powder MIX 17G( 1 CAPFUL) IN 8 OUNCES OF WATER OR JUICE AND DRINK DAILY    potassium chloride SA (K-DUR,KLOR-CON) 20 MEQ tablet Take 2 tablets (40 mEq total) by mouth once daily.    spironolactone (ALDACTONE) 25 MG tablet Take 1 tablet (25 mg total) by mouth once daily.     tamsulosin (FLOMAX) 0.4 mg Cap Take 1 capsule (0.4 mg total) by mouth every evening.    warfarin (COUMADIN) 5 MG tablet TAKE 1 AND 1/2 TABLETS BY MOUTH DAILY ON SATURDAY AND SUNDAY AND 2 TABLETS ALL OTHER DAYS AS DIRECTED BY COUMADIN CLINIC    sacubitril-valsartan (ENTRESTO) 24-26 mg per tablet Take 1 tablet by mouth 2 (two) times daily.     No current facility-administered medications for this visit.        Review of Systems   Constitution: Positive for malaise/fatigue (some fatigue). Negative for chills, fever, weight gain and weight loss.   Cardiovascular: Positive for dyspnea on exertion. Negative for chest pain, irregular heartbeat, near-syncope, orthopnea, palpitations, paroxysmal nocturnal dyspnea and syncope. Leg swelling: sometimes.   Respiratory: Positive for cough, shortness of breath, sputum production and wheezing.    Hematologic/Lymphatic: Does not bruise/bleed easily.   Musculoskeletal: Negative for arthritis, joint pain and stiffness.   Gastrointestinal: Positive for constipation. Negative for hematochezia and melena.   Genitourinary: Negative for hematuria.   Neurological: Negative for brief paralysis, focal weakness, seizures and weakness.       Objective:   Physical Exam   Constitutional: He is oriented to person, place, and time. He appears well-developed and well-nourished. No distress.   HENT:   Head: Normocephalic and atraumatic.   Nose: Nose normal.   Mouth/Throat: No oropharyngeal exudate.   Eyes: Pupils are equal, round, and reactive to light. Conjunctivae and EOM are normal. Right eye exhibits no discharge. Left eye exhibits no discharge. No scleral icterus.   Neck: Normal range of motion. Neck supple. No JVD present. No tracheal deviation present. No thyromegaly present.   Cardiovascular: Normal rate, regular rhythm, normal heart sounds and intact distal pulses. Exam reveals no gallop and no friction rub.   No murmur heard.  wheezing   Pulmonary/Chest: Effort normal and breath sounds  normal. No stridor. No respiratory distress. He has no wheezes. He has no rales. He exhibits no tenderness.   Abdominal: Soft. Bowel sounds are normal. He exhibits no distension and no mass. There is no tenderness.   Musculoskeletal: He exhibits no edema or tenderness.   Lymphadenopathy:     He has no cervical adenopathy.   Neurological: He is alert and oriented to person, place, and time. He displays normal reflexes. No cranial nerve deficit. He exhibits normal muscle tone. Coordination normal.   Skin: Skin is warm. No rash noted. He is not diaphoretic. No erythema. No pallor.   Psychiatric: He has a normal mood and affect. His behavior is normal. Judgment and thought content normal.       Assessment:     1. Status post heart valve replacement with mechanical valve    2. CKD (chronic kidney disease) stage 2, GFR 60-89 ml/min    3. Chronic systolic congestive heart failure, NYHA class 3    4. Paroxysmal atrial fibrillation    5. HTN (hypertension), benign        Plan:   Start entresto and stop lisinopril,  Gradual decrease in lasix.     Paul was seen today for chest pain.    Diagnoses and all orders for this visit:    Status post heart valve replacement with mechanical valve    CKD (chronic kidney disease) stage 2, GFR 60-89 ml/min  -     furosemide (LASIX) 80 MG tablet; Take 1 tablet (80 mg total) by mouth 2 (two) times daily.  -     Comprehensive metabolic panel; Future    Chronic systolic congestive heart failure, NYHA class 3  -     furosemide (LASIX) 80 MG tablet; Take 1 tablet (80 mg total) by mouth 2 (two) times daily.  -     Comprehensive metabolic panel; Future    Paroxysmal atrial fibrillation    HTN (hypertension), benign    Other orders  -     sacubitril-valsartan (ENTRESTO) 24-26 mg per tablet; Take 1 tablet by mouth 2 (two) times daily.      RTC 11 wks.

## 2019-08-23 NOTE — TELEPHONE ENCOUNTER
----- Message from Joanne Johnson sent at 8/23/2019  5:38 PM CDT -----  Contact: Pt  Patient called in regards to medication sacubitril-valsartan (ENTRESTO) 24-26 mg per tablet. Pharmacy need authorization      Patient can be reached at [phone]         Dermal Autograft Text: The defect edges were debeveled with a #15 scalpel blade.  Given the location of the defect, shape of the defect and the proximity to free margins a dermal autograft was deemed most appropriate.  Using a sterile surgical marker, the primary defect shape was transferred to the donor site. The area thus outlined was incised deep to adipose tissue with a #15 scalpel blade.  The harvested graft was then trimmed of adipose and epidermal tissue until only dermis was left.  The skin graft was then placed in the primary defect and oriented appropriately.

## 2019-08-24 NOTE — TELEPHONE ENCOUNTER
Hi, I have forwarded this to his cardiologist --  Dr Chacon, please let patient know.  Thank you, Tye Concepcion

## 2019-08-26 ENCOUNTER — PATIENT MESSAGE (OUTPATIENT)
Dept: CARDIOLOGY | Facility: CLINIC | Age: 64
End: 2019-08-26

## 2019-08-27 ENCOUNTER — TELEPHONE (OUTPATIENT)
Dept: CARDIOLOGY | Facility: CLINIC | Age: 64
End: 2019-08-27

## 2019-08-27 NOTE — TELEPHONE ENCOUNTER
----- Message from Anel Alonso MA sent at 8/27/2019 11:57 AM CDT -----  Contact: Self      ----- Message -----  From: Sophia Cohen  Sent: 8/27/2019  11:15 AM  To: Ines Bonilla Staff    Pt is calling because he is scheduled for blood work tomorrow, but say there was an issue with the medication he was to take.  Pt says the Pharmacy would not fill it and requests a returned call for guidance.    He can be reached at [phone].    Thank you.

## 2019-08-27 NOTE — TELEPHONE ENCOUNTER
----- Message from Marietta Villalobos MA sent at 8/27/2019  2:11 PM CDT -----  Contact: Michel  Please call Michel from [facility] he need to talk  to you about the patient medication Entresto and the cost. Please call [phone] . Last visit was on 8- f/u/ 11-6-2019 . Thank you.

## 2019-08-27 NOTE — TELEPHONE ENCOUNTER
Patient states was not allowed to  medication from pharmacy. Call placed to pharmacy and requires prior auth be completed. Fax number provided to pharmacy to fax over prior auth request. No forms received at this time.

## 2019-08-27 NOTE — TELEPHONE ENCOUNTER
PA Case: [id number], Status: Approved, Coverage Starts on: 8/27/2019 12:00:00 AM, Coverage Ends on: 8/27/2021 12:00:00 AM. Questions? Contact [phone].. Pt made aware

## 2019-08-28 ENCOUNTER — ANTI-COAG VISIT (OUTPATIENT)
Dept: CARDIOLOGY | Facility: CLINIC | Age: 64
End: 2019-08-28
Payer: MEDICARE

## 2019-08-28 DIAGNOSIS — Z95.2 STATUS POST HEART VALVE REPLACEMENT WITH MECHANICAL VALVE: ICD-10-CM

## 2019-08-28 DIAGNOSIS — Z79.01 ANTICOAGULATED ON COUMADIN: ICD-10-CM

## 2019-08-28 LAB — INR PPP: 4.1

## 2019-08-28 PROCEDURE — 93793 ANTICOAG MGMT PT WARFARIN: CPT | Mod: S$GLB,,,

## 2019-08-28 PROCEDURE — 93793 PR ANTICOAGULANT MGMT FOR PT TAKING WARFARIN: ICD-10-PCS | Mod: S$GLB,,,

## 2019-09-06 ENCOUNTER — LAB VISIT (OUTPATIENT)
Dept: LAB | Facility: HOSPITAL | Age: 64
End: 2019-09-06
Attending: INTERNAL MEDICINE
Payer: MEDICARE

## 2019-09-06 DIAGNOSIS — N18.2 CKD (CHRONIC KIDNEY DISEASE) STAGE 2, GFR 60-89 ML/MIN: ICD-10-CM

## 2019-09-06 DIAGNOSIS — I50.22 CHRONIC SYSTOLIC CONGESTIVE HEART FAILURE, NYHA CLASS 3: ICD-10-CM

## 2019-09-06 LAB
ALBUMIN SERPL BCP-MCNC: 3.9 G/DL (ref 3.5–5.2)
ALP SERPL-CCNC: 45 U/L (ref 55–135)
ALT SERPL W/O P-5'-P-CCNC: 29 U/L (ref 10–44)
ANION GAP SERPL CALC-SCNC: 9 MMOL/L (ref 8–16)
AST SERPL-CCNC: 30 U/L (ref 10–40)
BILIRUB SERPL-MCNC: 1 MG/DL (ref 0.1–1)
BUN SERPL-MCNC: 45 MG/DL (ref 8–23)
CALCIUM SERPL-MCNC: 9.3 MG/DL (ref 8.7–10.5)
CHLORIDE SERPL-SCNC: 99 MMOL/L (ref 95–110)
CO2 SERPL-SCNC: 34 MMOL/L (ref 23–29)
CREAT SERPL-MCNC: 1.4 MG/DL (ref 0.5–1.4)
EST. GFR  (AFRICAN AMERICAN): >60 ML/MIN/1.73 M^2
EST. GFR  (NON AFRICAN AMERICAN): 52 ML/MIN/1.73 M^2
GLUCOSE SERPL-MCNC: 115 MG/DL (ref 70–110)
POTASSIUM SERPL-SCNC: 3.7 MMOL/L (ref 3.5–5.1)
PROT SERPL-MCNC: 7.4 G/DL (ref 6–8.4)
SODIUM SERPL-SCNC: 142 MMOL/L (ref 136–145)

## 2019-09-06 PROCEDURE — 36415 COLL VENOUS BLD VENIPUNCTURE: CPT | Mod: HCWC

## 2019-09-06 PROCEDURE — 80053 COMPREHEN METABOLIC PANEL: CPT | Mod: HCWC

## 2019-09-10 ENCOUNTER — TELEPHONE (OUTPATIENT)
Dept: CARDIOLOGY | Facility: CLINIC | Age: 64
End: 2019-09-10

## 2019-09-11 ENCOUNTER — ANTI-COAG VISIT (OUTPATIENT)
Dept: CARDIOLOGY | Facility: CLINIC | Age: 64
End: 2019-09-11
Payer: MEDICARE

## 2019-09-11 DIAGNOSIS — Z95.2 STATUS POST HEART VALVE REPLACEMENT WITH MECHANICAL VALVE: ICD-10-CM

## 2019-09-11 DIAGNOSIS — Z79.01 ANTICOAGULATED ON COUMADIN: ICD-10-CM

## 2019-09-11 LAB — INR PPP: 1.9

## 2019-09-11 PROCEDURE — 93793 ANTICOAG MGMT PT WARFARIN: CPT | Mod: S$GLB,,,

## 2019-09-11 PROCEDURE — 93793 PR ANTICOAGULANT MGMT FOR PT TAKING WARFARIN: ICD-10-PCS | Mod: S$GLB,,,

## 2019-09-11 NOTE — PROGRESS NOTES
INR not at goal. Medications, chart, and patient findings reviewed. See calendar for adjustments to dose and follow up plan.  Findings:  Pt reports missed dose last night; he had a little more greens than usual; and started Entresto at the end of last month.  Will instruct pt to take 12.5mg 9/11 & resume maintenance dose.  Plan to re-assess in 1 week.

## 2019-09-12 DIAGNOSIS — I50.22 CHRONIC SYSTOLIC CONGESTIVE HEART FAILURE, NYHA CLASS 3: ICD-10-CM

## 2019-09-12 DIAGNOSIS — I10 HTN (HYPERTENSION), BENIGN: ICD-10-CM

## 2019-09-12 RX ORDER — CARVEDILOL 12.5 MG/1
TABLET ORAL
Qty: 180 TABLET | Refills: 11 | Status: SHIPPED | OUTPATIENT
Start: 2019-09-12 | End: 2020-11-30 | Stop reason: SDUPTHER

## 2019-09-13 ENCOUNTER — CLINICAL SUPPORT (OUTPATIENT)
Dept: SMOKING CESSATION | Facility: CLINIC | Age: 64
End: 2019-09-13
Payer: COMMERCIAL

## 2019-09-13 DIAGNOSIS — F17.200 NICOTINE DEPENDENCE: Primary | ICD-10-CM

## 2019-09-13 PROCEDURE — 99999 PR PBB SHADOW E&M-EST. PATIENT-LVL I: ICD-10-PCS | Mod: PBBFAC,,,

## 2019-09-13 PROCEDURE — 99999 PR PBB SHADOW E&M-EST. PATIENT-LVL I: CPT | Mod: PBBFAC,,,

## 2019-09-13 PROCEDURE — 99407 BEHAV CHNG SMOKING > 10 MIN: CPT | Mod: S$GLB,,,

## 2019-09-13 PROCEDURE — 99407 PR TOBACCO USE CESSATION INTENSIVE >10 MINUTES: ICD-10-PCS | Mod: S$GLB,,,

## 2019-09-18 ENCOUNTER — ANTI-COAG VISIT (OUTPATIENT)
Dept: CARDIOLOGY | Facility: CLINIC | Age: 64
End: 2019-09-18
Payer: MEDICARE

## 2019-09-18 DIAGNOSIS — Z95.2 STATUS POST HEART VALVE REPLACEMENT WITH MECHANICAL VALVE: ICD-10-CM

## 2019-09-18 DIAGNOSIS — Z79.01 ANTICOAGULATED ON COUMADIN: ICD-10-CM

## 2019-09-18 LAB — INR PPP: 2.7

## 2019-09-18 PROCEDURE — 93793 ANTICOAG MGMT PT WARFARIN: CPT | Mod: S$GLB,,,

## 2019-09-18 PROCEDURE — 93793 PR ANTICOAGULANT MGMT FOR PT TAKING WARFARIN: ICD-10-PCS | Mod: S$GLB,,,

## 2019-10-02 ENCOUNTER — ANTI-COAG VISIT (OUTPATIENT)
Dept: CARDIOLOGY | Facility: CLINIC | Age: 64
End: 2019-10-02
Payer: MEDICARE

## 2019-10-02 DIAGNOSIS — Z95.2 STATUS POST HEART VALVE REPLACEMENT WITH MECHANICAL VALVE: ICD-10-CM

## 2019-10-02 DIAGNOSIS — Z79.01 ANTICOAGULATED ON COUMADIN: ICD-10-CM

## 2019-10-02 LAB — INR PPP: 2

## 2019-10-02 PROCEDURE — 93793 ANTICOAG MGMT PT WARFARIN: CPT | Mod: ,,,

## 2019-10-02 PROCEDURE — 93793 PR ANTICOAGULANT MGMT FOR PT TAKING WARFARIN: ICD-10-PCS | Mod: ,,,

## 2019-10-02 NOTE — PROGRESS NOTES
INR not at goal. Medications, chart, and patient findings reviewed. See calendar for adjustments to dose and follow up plan. Findings:  Pt states he didn't have greens last week or so far this week (plans to resume regular intake); he possibly missed a dose last week & denies any other changes.  Will instruct pt to take 15mg 10/2 & resume maintenance dose.  Plan to re-assess in 1 week.  Pt to resume normal diet.

## 2019-10-04 DIAGNOSIS — Z95.2 STATUS POST HEART VALVE REPLACEMENT WITH MECHANICAL VALVE: ICD-10-CM

## 2019-10-04 DIAGNOSIS — N40.1 BPH WITH URINARY OBSTRUCTION: ICD-10-CM

## 2019-10-04 DIAGNOSIS — N39.0 RECURRENT UTI: ICD-10-CM

## 2019-10-04 DIAGNOSIS — N13.8 BPH WITH URINARY OBSTRUCTION: ICD-10-CM

## 2019-10-04 DIAGNOSIS — Z79.01 ANTICOAGULATED ON COUMADIN: ICD-10-CM

## 2019-10-04 RX ORDER — WARFARIN SODIUM 5 MG/1
TABLET ORAL
Qty: 168 TABLET | Refills: 0 | Status: SHIPPED | OUTPATIENT
Start: 2019-10-04 | End: 2019-12-16 | Stop reason: SDUPTHER

## 2019-10-08 ENCOUNTER — OFFICE VISIT (OUTPATIENT)
Dept: INTERNAL MEDICINE | Facility: CLINIC | Age: 64
End: 2019-10-08
Payer: MEDICARE

## 2019-10-08 VITALS — WEIGHT: 153 LBS | DIASTOLIC BLOOD PRESSURE: 70 MMHG | BODY MASS INDEX: 22.59 KG/M2 | SYSTOLIC BLOOD PRESSURE: 130 MMHG

## 2019-10-08 DIAGNOSIS — Z12.11 COLON CANCER SCREENING: ICD-10-CM

## 2019-10-08 DIAGNOSIS — J44.9 CHRONIC OBSTRUCTIVE PULMONARY DISEASE, UNSPECIFIED COPD TYPE: Primary | ICD-10-CM

## 2019-10-08 PROCEDURE — 99214 PR OFFICE/OUTPT VISIT, EST, LEVL IV, 30-39 MIN: ICD-10-PCS | Mod: S$PBB,,, | Performed by: INTERNAL MEDICINE

## 2019-10-08 PROCEDURE — 99212 OFFICE O/P EST SF 10 MIN: CPT | Mod: PBBFAC | Performed by: INTERNAL MEDICINE

## 2019-10-08 PROCEDURE — 99999 PR PBB SHADOW E&M-EST. PATIENT-LVL II: ICD-10-PCS | Mod: PBBFAC,,, | Performed by: INTERNAL MEDICINE

## 2019-10-08 PROCEDURE — 99999 PR PBB SHADOW E&M-EST. PATIENT-LVL II: CPT | Mod: PBBFAC,,, | Performed by: INTERNAL MEDICINE

## 2019-10-08 PROCEDURE — 99214 OFFICE O/P EST MOD 30 MIN: CPT | Mod: S$PBB,,, | Performed by: INTERNAL MEDICINE

## 2019-10-09 ENCOUNTER — ANTI-COAG VISIT (OUTPATIENT)
Dept: CARDIOLOGY | Facility: CLINIC | Age: 64
End: 2019-10-09
Payer: MEDICARE

## 2019-10-09 DIAGNOSIS — Z95.2 STATUS POST HEART VALVE REPLACEMENT WITH MECHANICAL VALVE: ICD-10-CM

## 2019-10-09 DIAGNOSIS — Z79.01 ANTICOAGULATED ON COUMADIN: ICD-10-CM

## 2019-10-09 LAB — INR PPP: 2.4

## 2019-10-09 PROCEDURE — 93793 ANTICOAG MGMT PT WARFARIN: CPT | Mod: ,,,

## 2019-10-09 PROCEDURE — 93793 PR ANTICOAGULANT MGMT FOR PT TAKING WARFARIN: ICD-10-PCS | Mod: ,,,

## 2019-10-09 RX ORDER — TAMSULOSIN HYDROCHLORIDE 0.4 MG/1
CAPSULE ORAL
Qty: 90 CAPSULE | Refills: 0 | OUTPATIENT
Start: 2019-10-09

## 2019-10-09 NOTE — PROGRESS NOTES
INR not at goal. Medications, chart, and patient findings reviewed. See calendar for adjustments to dose and follow up plan.  Findings: Pt states he had mustard greens and broccoli this week which is different from his normal diet. The last time he had greens was a month ago.  He denies any other changes.  Will instruct pt to take 12.5mg 10/9 & resume maintenance dose.  Plan to re-assess in 1 week.

## 2019-10-09 NOTE — PROGRESS NOTES
Verbal result taken from Pt called in_________. PT/INR _2.4______ Date drawn_10/09/19_______ Hardcopy to be faxed.

## 2019-10-10 NOTE — PROGRESS NOTES
Subjective:       Patient ID: Paul Villalobos Sr. is a 63 y.o. male.    Chief Complaint: No chief complaint on file.    Patient is here for followup for chronic conditions.    No new symptoms.    Urine is OK, bowels are OK, no blood in either.    Good med adh and avoiding salt in diet.    Still smoking!    Review of Systems   Constitutional: Negative for activity change, appetite change, chills, fatigue, fever and unexpected weight change.   HENT: Negative for congestion, postnasal drip, rhinorrhea and voice change.    Respiratory: Positive for cough and shortness of breath (stable though). Negative for chest tightness and wheezing.    Cardiovascular: Negative for chest pain, palpitations and leg swelling.   Gastrointestinal: Negative for abdominal distention, abdominal pain, anal bleeding, blood in stool, nausea and vomiting.   Genitourinary: Negative for decreased urine volume, difficulty urinating, flank pain, frequency, hematuria, scrotal swelling and testicular pain.   Musculoskeletal: Negative for neck pain and neck stiffness.   Skin: Negative for rash and wound.   Neurological: Negative for tremors, syncope and weakness.   Hematological: Negative for adenopathy. Does not bruise/bleed easily.   Psychiatric/Behavioral: Negative for dysphoric mood.       Objective:      Physical Exam   Constitutional: He is oriented to person, place, and time. He appears well-developed and well-nourished. No distress.   HENT:   Head: Normocephalic and atraumatic.   Eyes: No scleral icterus.   Neck: Normal range of motion. No thyromegaly present.   Cardiovascular: Normal rate. Exam reveals no gallop and no friction rub.   Murmur heard.  Irregularly irregular, mechanical systolic murmur     Pulmonary/Chest: Effort normal. No respiratory distress. He has wheezes (mild and air flow decreased). He has no rales.   Abdominal: Soft. Bowel sounds are normal. He exhibits no distension and no mass. There is no tenderness. There is no rebound  and no guarding. No hernia.   Musculoskeletal: Normal range of motion. He exhibits no edema.   Lymphadenopathy:     He has no cervical adenopathy.   Neurological: He is alert and oriented to person, place, and time.   Skin: No lesion noted.   Psychiatric: He has a normal mood and affect. Thought content normal.       Assessment:       1. Chronic obstructive pulmonary disease, unspecified COPD type        Plan:       Diagnoses and all orders for this visit:    Chronic obstructive pulmonary disease, unspecified COPD type  -     NEBULIZER FOR HOME USE  Needs new machine    Doing OK so far on Entresto    Still contemplative re smoking, claims to have cut back    Health Maintenance       Date Due Completion Date    TETANUS VACCINE 12/29/1971 ---    Shingles Vaccine (1 of 2) 12/29/2003 ---    Pneumococcal Vaccine (65+ High/Highest Risk) (2 of 2 - PPSV23) 03/15/2019 1/18/2019    Fecal Occult Blood Test (FOBT)/FitKit 09/18/2019 9/18/2018    Lipid Panel 11/14/2023 11/14/2018      Will send him stool test  pnavax 23 next time    Follow up in about 3 months (around 1/8/2020) for Shingles vaccine please.    Future Appointments   Date Time Provider Department Center   11/6/2019  8:30 AM Irene Chacon MD [facility] CARDIO [facility]   1/13/2020  9:00 AM Tye Concepcion MD [facility] IM [facility] PCW

## 2019-10-16 ENCOUNTER — ANTI-COAG VISIT (OUTPATIENT)
Dept: CARDIOLOGY | Facility: CLINIC | Age: 64
End: 2019-10-16
Payer: MEDICARE

## 2019-10-16 DIAGNOSIS — Z95.2 STATUS POST HEART VALVE REPLACEMENT WITH MECHANICAL VALVE: ICD-10-CM

## 2019-10-16 DIAGNOSIS — Z79.01 ANTICOAGULATED ON COUMADIN: ICD-10-CM

## 2019-10-16 LAB — INR PPP: 3.3

## 2019-10-16 PROCEDURE — 93793 ANTICOAG MGMT PT WARFARIN: CPT | Mod: ,,,

## 2019-10-16 PROCEDURE — 93793 PR ANTICOAGULANT MGMT FOR PT TAKING WARFARIN: ICD-10-PCS | Mod: ,,,

## 2019-10-16 NOTE — PROGRESS NOTES
INR not at goal. Medications, chart, and patient findings reviewed. See calendar for adjustments to dose and follow up plan.  Findings:   Pt reports missing a dose on yesterday and eating cabbage on Saturday and again on yesterday.  He had 1/2 glass wine on last Thursday & denies any further changes.  Will instruct pt to have another serving of greens 10/16 & resume maintenance dose.  Pt to keep alcohol & diet consistent.

## 2019-10-16 NOTE — PROGRESS NOTES
Verbal result taken from Pt called in_________. PT/INR _3.3______ Date drawn_10/16/19_______ Hardcopy to be faxed. Patient said he has to call in results because ([facility]) said they are waiting for approval of insurance.

## 2019-10-23 ENCOUNTER — ANTI-COAG VISIT (OUTPATIENT)
Dept: CARDIOLOGY | Facility: CLINIC | Age: 64
End: 2019-10-23
Payer: MEDICARE

## 2019-10-23 DIAGNOSIS — Z79.01 ANTICOAGULATED ON COUMADIN: ICD-10-CM

## 2019-10-23 DIAGNOSIS — Z95.2 STATUS POST HEART VALVE REPLACEMENT WITH MECHANICAL VALVE: ICD-10-CM

## 2019-10-23 LAB — INR PPP: 2.3

## 2019-10-23 PROCEDURE — 93793 ANTICOAG MGMT PT WARFARIN: CPT | Mod: ,,,

## 2019-10-23 PROCEDURE — 93793 PR ANTICOAGULANT MGMT FOR PT TAKING WARFARIN: ICD-10-PCS | Mod: ,,,

## 2019-10-23 NOTE — PROGRESS NOTES
INR not at goal. Medications, chart, and patient findings reviewed. See calendar for adjustments to dose and follow up plan.   Pt reports increased greens & taking imodium.  Will maintain current regimen & instruct pt keep diet & alcohol intake consistent.  Plan to re-assess in 2 weeks.

## 2019-10-29 ENCOUNTER — PATIENT OUTREACH (OUTPATIENT)
Dept: ADMINISTRATIVE | Facility: HOSPITAL | Age: 64
End: 2019-10-29

## 2019-10-29 NOTE — PROGRESS NOTES
Outreach to pt completed RE: colon cancer screening. Pt agrees to complete FIT Kit. Kit placed in mail today. Pt advised to document collection date. FitKit was given to patient on 10/29/2019 10:06 AM

## 2019-11-04 ENCOUNTER — PATIENT OUTREACH (OUTPATIENT)
Dept: ADMINISTRATIVE | Facility: OTHER | Age: 64
End: 2019-11-04

## 2019-11-05 ENCOUNTER — LAB VISIT (OUTPATIENT)
Dept: LAB | Facility: HOSPITAL | Age: 64
End: 2019-11-05
Attending: INTERNAL MEDICINE
Payer: MEDICARE

## 2019-11-05 DIAGNOSIS — Z12.11 COLON CANCER SCREENING: ICD-10-CM

## 2019-11-05 PROCEDURE — 82274 ASSAY TEST FOR BLOOD FECAL: CPT

## 2019-11-06 ENCOUNTER — ANTI-COAG VISIT (OUTPATIENT)
Dept: CARDIOLOGY | Facility: CLINIC | Age: 64
End: 2019-11-06
Payer: MEDICARE

## 2019-11-06 ENCOUNTER — OFFICE VISIT (OUTPATIENT)
Dept: CARDIOLOGY | Facility: CLINIC | Age: 64
End: 2019-11-06
Payer: MEDICARE

## 2019-11-06 VITALS
DIASTOLIC BLOOD PRESSURE: 72 MMHG | WEIGHT: 153.88 LBS | HEART RATE: 65 BPM | OXYGEN SATURATION: 99 % | HEIGHT: 69 IN | BODY MASS INDEX: 22.79 KG/M2 | SYSTOLIC BLOOD PRESSURE: 130 MMHG

## 2019-11-06 DIAGNOSIS — I50.22 CHRONIC SYSTOLIC CONGESTIVE HEART FAILURE, NYHA CLASS 3: Primary | ICD-10-CM

## 2019-11-06 DIAGNOSIS — Z95.2 STATUS POST HEART VALVE REPLACEMENT WITH MECHANICAL VALVE: ICD-10-CM

## 2019-11-06 DIAGNOSIS — Z79.01 ANTICOAGULATED ON COUMADIN: ICD-10-CM

## 2019-11-06 DIAGNOSIS — Z72.0 TOBACCO ABUSE: ICD-10-CM

## 2019-11-06 DIAGNOSIS — E78.5 HYPERLIPIDEMIA, UNSPECIFIED HYPERLIPIDEMIA TYPE: ICD-10-CM

## 2019-11-06 DIAGNOSIS — N18.2 CKD (CHRONIC KIDNEY DISEASE) STAGE 2, GFR 60-89 ML/MIN: ICD-10-CM

## 2019-11-06 LAB — INR PPP: 2.8

## 2019-11-06 PROCEDURE — 99999 PR PBB SHADOW E&M-EST. PATIENT-LVL V: CPT | Mod: PBBFAC,,, | Performed by: INTERNAL MEDICINE

## 2019-11-06 PROCEDURE — 93793 ANTICOAG MGMT PT WARFARIN: CPT | Mod: ,,,

## 2019-11-06 PROCEDURE — 99214 OFFICE O/P EST MOD 30 MIN: CPT | Mod: S$PBB,,, | Performed by: INTERNAL MEDICINE

## 2019-11-06 PROCEDURE — 99999 PR PBB SHADOW E&M-EST. PATIENT-LVL V: ICD-10-PCS | Mod: PBBFAC,,, | Performed by: INTERNAL MEDICINE

## 2019-11-06 PROCEDURE — 93793 PR ANTICOAGULANT MGMT FOR PT TAKING WARFARIN: ICD-10-PCS | Mod: ,,,

## 2019-11-06 PROCEDURE — 99214 PR OFFICE/OUTPT VISIT, EST, LEVL IV, 30-39 MIN: ICD-10-PCS | Mod: S$PBB,,, | Performed by: INTERNAL MEDICINE

## 2019-11-06 PROCEDURE — 99215 OFFICE O/P EST HI 40 MIN: CPT | Mod: PBBFAC | Performed by: INTERNAL MEDICINE

## 2019-11-06 RX ORDER — FUROSEMIDE 80 MG/1
80 TABLET ORAL DAILY
Qty: 30 TABLET | Refills: 11 | Status: SHIPPED | OUTPATIENT
Start: 2019-11-06 | End: 2020-11-09

## 2019-11-06 NOTE — PROGRESS NOTES
Mr. Villalobos is a patient of Dr. Chacon and was last seen in [facility] 8/21/2019.      Subjective:   Patient ID:  Paul Villalobos Sr. is a 63 y.o. male who presents for follow-up for CHF management (last hospitalization for CHF exacerbation on 7/10/19).     Patient Active Problem List   Diagnosis    Status post heart valve replacement with mechanical valve    HTN (hypertension), benign    Tobacco abuse    Chronic systolic (congestive) heart failure    Hyperlipidemia    Anticoagulated on Coumadin    H/O hepatitis C    CKD (chronic kidney disease) stage 2, GFR 60-89 ml/min    Asymptomatic cholelithiasis    Proteinuria    Right thyroid nodule    Paroxysmal atrial fibrillation    Hepatic cirrhosis due to chronic hepatitis C infection    Asthmatic bronchitis    Erectile dysfunction    Hypotension    Elevated troponin    Acute bilateral thoracic back pain    BPH with urinary obstruction    Portal hypertension    COPD exacerbation    Acute on chronic systolic congestive heart failure    Pulmonary hypertension       HPI:  Mr. Villalobos is a 63 year old gentleman with CHF (EF 45%), COPD, CKD II (Cr 1.2-1.4), HLD, and on chronic anticoagulation for mechanical AVR.    At previous visit, patient was started on Entresto 24-26 (), stopped Lisinopril, and decreased Lasix 80 mg BID.  Reports currently taking Lasix 80 mg once daily and metolazone 2.5 mg daily. Will take an additional Lasix tablet if experiences increased SOB and or weight gain.  Currently smoking 1/2 pack per day. Does not exercise on a daily basis. Follows a low salt diet.  Drinks 2-3 bottles of Gatorade per day. Drinks limited water.   No recent weight gain.  Denies chest pain, pressure, and or palpitations. Denies SOB, PND, some dyspnea on exertion. Sleeps with one pillow.    Patient with hx of Hepatitis C (cured). Patient followed by Dr. Mcfadden from hepatology. [facility] 9/2018 with coarsened hepatic texture in keeping with  fibrotic/cirrhotic changes. LFTs 9/2019 WNL.     Under ACC guidelines, this patient's 10 year risk for atherosclerotic cardiovascular disease (ASCVD) is The 10-year ASCVD risk score (Buras OMEGA Jr., et al., 2013) is: 24.9%    Values used to calculate the score:      Age: 63 years      Sex: Male      Is Non- : Yes      Diabetic: No      Tobacco smoker: Yes      Systolic Blood Pressure: 130 mmHg      Is BP treated: Yes      HDL Cholesterol: 64 mg/dL      Total Cholesterol: 193 mg/dL       Review of Systems   Constitution: Negative for decreased appetite, fever, malaise/fatigue and weight gain.   HENT: Negative for congestion, hearing loss and nosebleeds.    Eyes: Negative for visual disturbance.   Cardiovascular: Positive for dyspnea on exertion. Negative for chest pain, irregular heartbeat, leg swelling, palpitations, paroxysmal nocturnal dyspnea and syncope.   Respiratory: Negative for cough, shortness of breath and sleep disturbances due to breathing.    Endocrine: Negative for cold intolerance and heat intolerance.   Hematologic/Lymphatic: Negative for bleeding problem. Does not bruise/bleed easily.   Gastrointestinal: Negative for bloating, abdominal pain, change in bowel habit and heartburn.   Neurological: Negative for dizziness, loss of balance and numbness.   Psychiatric/Behavioral: Negative for altered mental status. The patient is not nervous/anxious.        Past Medical History:   Diagnosis Date    Anemia     Asthma     CHF (congestive heart failure)     Chronic bronchitis     Cirrhosis 1/19/2015    Hepatitis C     Hyperlipidemia     Hypertension     Lung disease     Portal hypertension 1/30/2018    Pulmonary hypertension      Past Surgical History:   Procedure Laterality Date    CARDIAC VALVE REPLACEMENT      AVR WITH MVr 2007    COLONOSCOPY Left 10/12/2018    Procedure: COLONOSCOPY;  Surgeon: Mook Clarke MD;  Location: [facility] ([address] [place] [address]);  Service: Endoscopy;   Laterality: Left;    ESOPHAGOGASTRODUODENOSCOPY Left 10/12/2018    Procedure: EGD (ESOPHAGOGASTRODUODENOSCOPY);  Surgeon: Mook Clarke MD;  Location: [facility] ([address] [place] [address]);  Service: Endoscopy;  Laterality: Left;  labs ordered.     Coumadin clinic will advise pt    HERNIA REPAIR  2006    questionable mesh, right inguinal, unbilical    LUNG DECORTICATION      right thoracotomy 2010    LUNG DECORTICATION  2010    open heart surgery      redo AVR and MVR 2009     Family History   Problem Relation Age of Onset    Hypertension Mother     Hypertension Father     Hypertension Brother     Heart disease Brother     Heart attack Brother     Hypertension Sister      Social History     Socioeconomic History    Marital status:      Spouse name: Not on file    Number of children: Not on file    Years of education: Not on file    Highest education level: Not on file   Occupational History    Not on file   Social Needs    Financial resource strain: Not on file    Food insecurity:     Worry: Not on file     Inability: Not on file    Transportation needs:     Medical: Not on file     Non-medical: Not on file   Tobacco Use    Smoking status: Current Every Day Smoker     Packs/day: 0.50     Years: 48.00     Pack years: 24.00     Types: Cigarettes    Smokeless tobacco: Never Used    Tobacco comment: 5 a day   Substance and Sexual Activity    Alcohol use: Yes     Alcohol/week: 0.0 standard drinks     Comment: seldom    Drug use: No    Sexual activity: Not on file   Lifestyle    Physical activity:     Days per week: Not on file     Minutes per session: Not on file    Stress: Not on file   Relationships    Social connections:     Talks on phone: Not on file     Gets together: Not on file     Attends [employer] service: Not on file     Active member of club or organization: Not on file     Attends meetings of clubs or organizations: Not on file     Relationship status: Not on file   Other Topics Concern     Not on file   Social History Narrative    , 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         Allergies and current medications updated and reviewed:  Review of patient's allergies indicates:  No Known Allergies  Current Outpatient Medications   Medication Sig    albuterol-ipratropium (DUO-NEB) 2.5 mg-0.5 mg/3 mL nebulizer solution USE 3 ML VIA NEBULIZER EVERY 6 HOURS AS NEEDED FOR WHEEZING    carvedilol (COREG) 12.5 MG tablet TAKE 1 TABLET(12.5 MG) BY MOUTH TWICE DAILY    COMBIVENT RESPIMAT  mcg/actuation inhaler INHALE 1 PUFF INTO THE LUNGS EVERY 4 HOURS AS NEEDED FOR WHEEZING    finasteride (PROSCAR) 5 mg tablet Take 1 tablet (5 mg total) by mouth once daily.    fluticasone (FLONASE) 50 mcg/actuation nasal spray SPRAY TWICE IN EACH NOSTRIL EVERY DAY    fluticasone furoate-vilanterol (BREO) 100-25 mcg/dose diskus inhaler Inhale 1 puff into the lungs once daily. Controller    furosemide (LASIX) 80 MG tablet Take 1 tablet (80 mg total) by mouth once daily.    metOLazone (ZAROXOLYN) 2.5 MG tablet Take 2.5 mg by mouth once daily.     polyethylene glycol (GLYCOLAX) 17 gram/dose powder MIX 17G( 1 CAPFUL) IN 8 OUNCES OF WATER OR JUICE AND DRINK DAILY    potassium chloride SA (K-DUR,KLOR-CON) 20 MEQ tablet Take 2 tablets (40 mEq total) by mouth once daily.    sacubitril-valsartan (ENTRESTO) 24-26 mg per tablet Take 1 tablet by mouth 2 (two) times daily.    spironolactone (ALDACTONE) 25 MG tablet Take 1 tablet (25 mg total) by mouth once daily.    tamsulosin (FLOMAX) 0.4 mg Cap Take 1 capsule (0.4 mg total) by mouth every evening.    warfarin (COUMADIN) 5 MG tablet TAKE 1 AND 1/2 TABLETS BY MOUTH DAILY ON SATURDAY AND  AND 2 TABLETS ALL OTHER DAYS AS DIRECTED BY COUMADIN CLINIC     No current facility-administered medications for this visit.        Objective:     Right Arm BP - Sittin/66 (19 0846)  Left Arm BP - Sittin/72 (19 0846)    /72    "Pulse 65   Ht 5' 9" (1.753 m)   Wt 69.8 kg (153 lb 14.1 oz)   SpO2 99%   BMI 22.72 kg/m²       Physical Exam   Constitutional: He is oriented to person, place, and time. Vital signs are normal. He appears well-developed and well-nourished.   HENT:   Head: Normocephalic.   Eyes: Pupils are equal, round, and reactive to light.   Neck: Normal range of motion. Neck supple. No JVD present. Carotid bruit is not present.   Cardiovascular: Normal rate, regular rhythm, S1 normal, S2 normal and intact distal pulses. PMI is not displaced. Exam reveals no gallop and no friction rub.   Murmur heard.  Pulses:       Carotid pulses are 2+ on the right side, and 2+ on the left side.       Radial pulses are 2+ on the right side, and 2+ on the left side.        Dorsalis pedis pulses are 2+ on the right side, and 2+ on the left side.        Posterior tibial pulses are 1+ on the right side, and 1+ on the left side.   Mechanical click    Pulmonary/Chest: Effort normal. No accessory muscle usage. He has no decreased breath sounds. He has wheezes.   Abdominal: Soft. Normal appearance and bowel sounds are normal. He exhibits no distension, no fluid wave and no ascites. There is no hepatosplenomegaly. There is no tenderness.   Musculoskeletal: Normal range of motion. He exhibits edema.   +1 dependent edema bilateral LE   Neurological: He is alert and oriented to person, place, and time. He has normal strength.   Skin: Skin is warm, dry and intact. No ecchymosis and no rash noted. No erythema.   Psychiatric: He has a normal mood and affect. His speech is normal and behavior is normal. Judgment and thought content normal. Cognition and memory are normal.   Vitals reviewed.      Chemistry        Component Value Date/Time     09/06/2019 0822    K 3.7 09/06/2019 0822    CL 99 09/06/2019 0822    CO2 34 (H) 09/06/2019 0822    BUN 45 (H) 09/06/2019 0822    CREATININE 1.4 09/06/2019 0822     (H) 09/06/2019 0822        Component Value " Date/Time    CALCIUM 9.3 09/06/2019 0822    ALKPHOS 45 (L) 09/06/2019 0822    AST 30 09/06/2019 0822    ALT 29 09/06/2019 0822    BILITOT 1.0 09/06/2019 0822    ESTGFRAFRICA >60 09/06/2019 0822    EGFRNONAA 52 (A) 09/06/2019 0822            Hemoglobin A1C   Date Value Ref Range Status   07/11/2019 5.6 4.0 - 5.6 % Final     Comment:     ADA Screening Guidelines:  5.7-6.4%  Consistent with prediabetes  >or=6.5%  Consistent with diabetes  High levels of fetal hemoglobin interfere with the HbA1C  assay. Heterozygous hemoglobin variants (HbS, HgC, etc)do  not significantly interfere with this assay.   However, presence of multiple variants may affect accuracy.     12/26/2017 5.3 4.0 - 5.6 % Final     Comment:     According to ADA guidelines, hemoglobin A1c <7.0% represents  optimal control in non-pregnant diabetic patients. Different  metrics may apply to specific patient populations.   Standards of Medical Care in Diabetes-2016.  For the purpose of screening for the presence of diabetes:  <5.7%     Consistent with the absence of diabetes  5.7-6.4%  Consistent with increasing risk for diabetes   (prediabetes)  >or=6.5%  Consistent with diabetes  Currently, no consensus exists for use of hemoglobin A1c  for diagnosis of diabetes for children.  This Hemoglobin A1c assay has significant interference with fetal   hemoglobin   (HbF). The results are invalid for patients with abnormal amounts of   HbF,   including those with known Hereditary Persistence   of Fetal Hemoglobin. Heterozygous hemoglobin variants (HbAS, HbAC,   HbAD, HbAE, HbA2) do not significantly interfere with this assay;   however, presence of multiple variants in a sample may impact the %   interference.     10/29/2015 5.6 4.5 - 6.2 % Final        Recent Labs   Lab 03/25/18  1800 04/02/18  1453  11/14/18  0803  07/10/19  1949 07/11/19  0401   WBC 5.80  --    < >  --    < > 4.01 5.10   Hemoglobin 12.1 L  --    < >  --    < > 13.6 L 13.4 L   Hematocrit 37.0 L   --    < >  --    < > 42.0 41.3   Mean Corpuscular Volume 100 H  --    < >  --    < > 101 H 101 H   Platelets 120 L  --    < >  --    < > 144 L 143 L   BNP 1,222 H 1,081 H  --   --   --  387 H  --    Cholesterol  --   --   --  193  --   --   --    HDL  --   --   --  64  --   --   --    LDL Cholesterol  --   --   --  117.6  --   --   --    Triglycerides  --   --   --  57  --   --   --    Hdl/Cholesterol Ratio  --   --   --  33.2  --   --   --     < > = values in this interval not displayed.       Recent Labs   Lab 10/09/19 10/16/19 10/23/19 11/06/19   INR 2.4 3.3 2.3 2.8        Test(s) Reviewed  I have reviewed the following in detail:  [] Stress test   [] Angiography   [x] Echocardiogram   [x] Labs   [] Other:         Assessment/Plan:   1. Chronic systolic congestive heart failure, NYHA class 3  - counseled patient to take Entresto at current dose twice daily. Patient currently only taking in once daily.   - furosemide (LASIX) 80 MG tablet; Take 1 tablet (80 mg total) by mouth once daily.  Dispense: 30 tablet; Refill: 11    2. Hyperlipidemia, unspecified hyperlipidemia type  - Lipid panel; Future    3. CKD (chronic kidney disease) stage 2, GFR 60-89 ml/min  - furosemide (LASIX) 80 MG tablet; Take 1 tablet (80 mg total) by mouth once daily.  Dispense: 30 tablet; Refill: 11    4. Tobacco abuse  - counseled patient on smoking cessation.        Follow up in about 8 months (around 7/6/2020).      This patient was seen and discussed with Dr. Chacon       ------------------------------------------------------------------    JONH West  11/06/2019  Consults Cardiology

## 2019-11-06 NOTE — PATIENT INSTRUCTIONS
Please take your Entresto two times daily.  Continue taking Lasix 80 mg daily. Take an additional tab in having fluid build up and or weight gain greater than 3 pounds in one day or 5 pounds in one week.

## 2019-11-13 LAB — HEMOCCULT STL QL IA: NEGATIVE

## 2019-11-20 ENCOUNTER — ANTI-COAG VISIT (OUTPATIENT)
Dept: CARDIOLOGY | Facility: CLINIC | Age: 64
End: 2019-11-20
Payer: MEDICARE

## 2019-11-20 DIAGNOSIS — Z95.2 STATUS POST HEART VALVE REPLACEMENT WITH MECHANICAL VALVE: ICD-10-CM

## 2019-11-20 DIAGNOSIS — Z79.01 ANTICOAGULATED ON COUMADIN: ICD-10-CM

## 2019-11-20 LAB — INR PPP: 3.5

## 2019-11-20 PROCEDURE — 93793 PR ANTICOAGULANT MGMT FOR PT TAKING WARFARIN: ICD-10-PCS | Mod: ,,,

## 2019-11-20 PROCEDURE — 93793 ANTICOAG MGMT PT WARFARIN: CPT | Mod: ,,,

## 2019-11-20 NOTE — PROGRESS NOTES
INR not at goal. Medications, chart, and patient findings reviewed. See calendar for adjustments to dose and follow up plan.  Findings:  Pt reports less greens (he plans to resume regular intake) and denies any other changes. Will instruct pt to hold coumadin 11/20 & resume maintenance dose.  Plan to re-assess in 2 weeks.

## 2019-12-04 ENCOUNTER — ANTI-COAG VISIT (OUTPATIENT)
Dept: CARDIOLOGY | Facility: CLINIC | Age: 64
End: 2019-12-04
Payer: MEDICARE

## 2019-12-04 DIAGNOSIS — Z95.2 STATUS POST HEART VALVE REPLACEMENT WITH MECHANICAL VALVE: ICD-10-CM

## 2019-12-04 DIAGNOSIS — Z79.01 ANTICOAGULATED ON COUMADIN: ICD-10-CM

## 2019-12-04 LAB — INR PPP: 2.7

## 2019-12-04 PROCEDURE — 93793 PR ANTICOAGULANT MGMT FOR PT TAKING WARFARIN: ICD-10-PCS | Mod: ,,,

## 2019-12-04 PROCEDURE — 93793 ANTICOAG MGMT PT WARFARIN: CPT | Mod: ,,,

## 2019-12-16 DIAGNOSIS — Z95.2 STATUS POST HEART VALVE REPLACEMENT WITH MECHANICAL VALVE: ICD-10-CM

## 2019-12-16 DIAGNOSIS — Z79.01 ANTICOAGULATED ON COUMADIN: ICD-10-CM

## 2019-12-16 RX ORDER — WARFARIN SODIUM 5 MG/1
TABLET ORAL
Qty: 168 TABLET | Refills: 0 | Status: SHIPPED | OUTPATIENT
Start: 2019-12-16 | End: 2020-02-20

## 2019-12-18 ENCOUNTER — ANTI-COAG VISIT (OUTPATIENT)
Dept: CARDIOLOGY | Facility: CLINIC | Age: 64
End: 2019-12-18
Payer: MEDICARE

## 2019-12-18 DIAGNOSIS — Z95.2 STATUS POST HEART VALVE REPLACEMENT WITH MECHANICAL VALVE: ICD-10-CM

## 2019-12-18 DIAGNOSIS — Z79.01 ANTICOAGULATED ON COUMADIN: ICD-10-CM

## 2019-12-18 LAB — INR PPP: 2.7

## 2019-12-18 PROCEDURE — 93793 ANTICOAG MGMT PT WARFARIN: CPT | Mod: ,,,

## 2019-12-18 PROCEDURE — 93793 PR ANTICOAGULANT MGMT FOR PT TAKING WARFARIN: ICD-10-PCS | Mod: ,,,

## 2020-01-08 ENCOUNTER — ANTI-COAG VISIT (OUTPATIENT)
Dept: CARDIOLOGY | Facility: CLINIC | Age: 65
End: 2020-01-08
Payer: MEDICARE

## 2020-01-08 DIAGNOSIS — Z95.2 STATUS POST HEART VALVE REPLACEMENT WITH MECHANICAL VALVE: ICD-10-CM

## 2020-01-08 DIAGNOSIS — Z79.01 ANTICOAGULATED ON COUMADIN: ICD-10-CM

## 2020-01-08 LAB — INR PPP: 2.3

## 2020-01-08 PROCEDURE — 93793 PR ANTICOAGULANT MGMT FOR PT TAKING WARFARIN: ICD-10-PCS | Mod: ,,,

## 2020-01-08 PROCEDURE — 93793 ANTICOAG MGMT PT WARFARIN: CPT | Mod: ,,,

## 2020-01-08 NOTE — PROGRESS NOTES
INR not at goal. Medications, chart, and patient findings reviewed. See calendar for adjustments to dose and follow up plan.  Findings:  Pt reports increased greens (to resume regular intake).  Will instruct pt to take 12.5mg 1/8 & resume maintenance dose.  Plan to re-assess in 3 weeks.

## 2020-01-13 ENCOUNTER — OFFICE VISIT (OUTPATIENT)
Dept: INTERNAL MEDICINE | Facility: CLINIC | Age: 65
End: 2020-01-13
Payer: MEDICARE

## 2020-01-13 VITALS
BODY MASS INDEX: 23.22 KG/M2 | SYSTOLIC BLOOD PRESSURE: 120 MMHG | HEART RATE: 65 BPM | DIASTOLIC BLOOD PRESSURE: 82 MMHG | WEIGHT: 156.75 LBS | OXYGEN SATURATION: 97 % | HEIGHT: 69 IN

## 2020-01-13 DIAGNOSIS — I27.20 PULMONARY HYPERTENSION: ICD-10-CM

## 2020-01-13 DIAGNOSIS — J44.1 COPD EXACERBATION: ICD-10-CM

## 2020-01-13 DIAGNOSIS — B18.2 HEPATIC CIRRHOSIS DUE TO CHRONIC HEPATITIS C INFECTION: ICD-10-CM

## 2020-01-13 DIAGNOSIS — I50.23 ACUTE ON CHRONIC SYSTOLIC CONGESTIVE HEART FAILURE: Primary | ICD-10-CM

## 2020-01-13 DIAGNOSIS — K76.6 PORTAL HYPERTENSION: ICD-10-CM

## 2020-01-13 DIAGNOSIS — K74.60 HEPATIC CIRRHOSIS DUE TO CHRONIC HEPATITIS C INFECTION: ICD-10-CM

## 2020-01-13 DIAGNOSIS — Z72.0 TOBACCO ABUSE: ICD-10-CM

## 2020-01-13 DIAGNOSIS — Z95.2 STATUS POST HEART VALVE REPLACEMENT WITH MECHANICAL VALVE: ICD-10-CM

## 2020-01-13 PROCEDURE — 1126F AMNT PAIN NOTED NONE PRSNT: CPT | Mod: ,,, | Performed by: INTERNAL MEDICINE

## 2020-01-13 PROCEDURE — 99214 PR OFFICE/OUTPT VISIT, EST, LEVL IV, 30-39 MIN: ICD-10-PCS | Mod: S$PBB,,, | Performed by: INTERNAL MEDICINE

## 2020-01-13 PROCEDURE — 99214 OFFICE O/P EST MOD 30 MIN: CPT | Mod: S$PBB,,, | Performed by: INTERNAL MEDICINE

## 2020-01-13 PROCEDURE — 1126F PR PAIN SEVERITY QUANTIFIED, NO PAIN PRESENT: ICD-10-PCS | Mod: ,,, | Performed by: INTERNAL MEDICINE

## 2020-01-13 PROCEDURE — 1159F PR MEDICATION LIST DOCUMENTED IN MEDICAL RECORD: ICD-10-PCS | Mod: ,,, | Performed by: INTERNAL MEDICINE

## 2020-01-13 PROCEDURE — 99999 PR PBB SHADOW E&M-EST. PATIENT-LVL III: CPT | Mod: PBBFAC,,, | Performed by: INTERNAL MEDICINE

## 2020-01-13 PROCEDURE — 99213 OFFICE O/P EST LOW 20 MIN: CPT | Mod: PBBFAC | Performed by: INTERNAL MEDICINE

## 2020-01-13 PROCEDURE — 1159F MED LIST DOCD IN RCRD: CPT | Mod: ,,, | Performed by: INTERNAL MEDICINE

## 2020-01-13 PROCEDURE — 99999 PR PBB SHADOW E&M-EST. PATIENT-LVL III: ICD-10-PCS | Mod: PBBFAC,,, | Performed by: INTERNAL MEDICINE

## 2020-01-13 NOTE — PROGRESS NOTES
Subjective:       Patient ID: Paul Villalobos Sr. is a 64 y.o. male.    Chief Complaint: Follow-up    Patient is here for followup for chronic conditions.    No fluid build up. Feeling OK. Taking meds as rxed.    No new recent health issues.    Review of Systems   Constitutional: Negative for activity change, appetite change, chills, fatigue, fever and unexpected weight change.   HENT: Negative for congestion, postnasal drip, rhinorrhea and voice change.    Respiratory: Positive for cough and shortness of breath (stable though). Negative for chest tightness and wheezing.    Cardiovascular: Negative for chest pain, palpitations and leg swelling.   Gastrointestinal: Negative for abdominal distention, abdominal pain, anal bleeding, blood in stool, nausea and vomiting.   Genitourinary: Negative for decreased urine volume, difficulty urinating, flank pain, frequency, hematuria, scrotal swelling and testicular pain.   Musculoskeletal: Negative for neck pain and neck stiffness.   Skin: Negative for rash and wound.   Neurological: Negative for tremors, syncope and weakness.   Hematological: Negative for adenopathy. Does not bruise/bleed easily.   Psychiatric/Behavioral: Negative for dysphoric mood.       Objective:      Physical Exam   Constitutional: He is oriented to person, place, and time. He appears well-developed and well-nourished. No distress.   HENT:   Head: Normocephalic and atraumatic.   Eyes: No scleral icterus.   Neck: Normal range of motion. No thyromegaly present.   Cardiovascular: Normal rate. Exam reveals no gallop and no friction rub.   Murmur heard.  Irregularly irregular, mechanical systolic murmur     Pulmonary/Chest: Effort normal. No respiratory distress. He has wheezes (mild and air flow decreased). He has no rales.   Abdominal: Soft. Bowel sounds are normal. He exhibits no distension and no mass. There is no tenderness. There is no rebound and no guarding. No hernia.   Musculoskeletal: Normal range of  motion. He exhibits no edema.   Lymphadenopathy:     He has no cervical adenopathy.   Neurological: He is alert and oriented to person, place, and time.   Skin: No lesion noted.   Psychiatric: He has a normal mood and affect. Thought content normal.       Assessment:       1. Acute on chronic systolic congestive heart failure    2. COPD exacerbation    3. Hepatic cirrhosis due to chronic hepatitis C infection    4. Pulmonary hypertension    5. Portal hypertension    6. Status post heart valve replacement with mechanical valve    7. Tobacco abuse        Plan:       Paul was seen today for follow-up.    Diagnoses and all orders for this visit:    Acute on chronic systolic congestive heart failure  Currently euvolemic    COPD exacerbation  Stable, Lengthy discussion re importance of quitting    Hepatic cirrhosis due to chronic hepatitis C infection  Hep c cleared    Pulmonary hypertension  stable    Portal hypertension  asymptomatic      Status post heart valve replacement with mechanical valve  Next coumadin due in 2 weeks    Tobacco abuse  As above      Health Maintenance       Date Due Completion Date    TETANUS VACCINE [birth year]/29/1971 ---    Pneumococcal Vaccine (65+ High/Highest Risk) (2 of 2 - PPSV23) 03/15/2019 1/18/2019    Shingles Vaccine (2 of 2) 12/10/2019 10/15/2019    Fecal Occult Blood Test (FOBT)/[employer] 11/05/2020 11/5/2019    Lipid Panel 11/14/2023 11/14/2018          pnavax next time  PSA next time  Declines above today      Follow up in about 4 months (around 5/13/2020).    No future appointments.

## 2020-01-29 ENCOUNTER — ANTI-COAG VISIT (OUTPATIENT)
Dept: CARDIOLOGY | Facility: CLINIC | Age: 65
End: 2020-01-29
Payer: MEDICARE

## 2020-01-29 DIAGNOSIS — Z95.2 STATUS POST HEART VALVE REPLACEMENT WITH MECHANICAL VALVE: ICD-10-CM

## 2020-01-29 DIAGNOSIS — Z79.01 ANTICOAGULATED ON COUMADIN: ICD-10-CM

## 2020-01-29 LAB — INR PPP: 4.1

## 2020-01-29 PROCEDURE — 93793 ANTICOAG MGMT PT WARFARIN: CPT | Mod: ,,,

## 2020-01-29 PROCEDURE — 93793 PR ANTICOAGULANT MGMT FOR PT TAKING WARFARIN: ICD-10-PCS | Mod: ,,,

## 2020-01-29 NOTE — PROGRESS NOTES
Jessica with [facility] called in a verbal result dated today 1/29/20 as: INR -4.1, hard copy was also faxed

## 2020-01-29 NOTE — PROGRESS NOTES
INR not at goal. Medications, chart, and patient findings reviewed. See calendar for adjustments to dose and follow up plan.  Findings: Pt reports no greens since New Years Day (to resume regular intake).  Will instruct pt to hold coumadin 1/29, reduce dosing for this week, resume normal diet, & re-assess.

## 2020-02-05 ENCOUNTER — ANTI-COAG VISIT (OUTPATIENT)
Dept: CARDIOLOGY | Facility: CLINIC | Age: 65
End: 2020-02-05
Payer: MEDICARE

## 2020-02-05 DIAGNOSIS — Z79.01 ANTICOAGULATED ON COUMADIN: ICD-10-CM

## 2020-02-05 DIAGNOSIS — Z95.2 STATUS POST HEART VALVE REPLACEMENT WITH MECHANICAL VALVE: ICD-10-CM

## 2020-02-05 LAB — INR PPP: 2.6

## 2020-02-05 PROCEDURE — 93793 ANTICOAG MGMT PT WARFARIN: CPT | Mod: ,,,

## 2020-02-05 PROCEDURE — 93793 PR ANTICOAGULANT MGMT FOR PT TAKING WARFARIN: ICD-10-PCS | Mod: ,,,

## 2020-02-07 RX ORDER — METOLAZONE 2.5 MG/1
TABLET ORAL
Qty: 90 TABLET | Refills: 1 | Status: SHIPPED | OUTPATIENT
Start: 2020-02-07 | End: 2020-03-25

## 2020-02-19 ENCOUNTER — ANTI-COAG VISIT (OUTPATIENT)
Dept: CARDIOLOGY | Facility: CLINIC | Age: 65
End: 2020-02-19
Payer: MEDICARE

## 2020-02-19 DIAGNOSIS — Z79.01 ANTICOAGULATED ON COUMADIN: ICD-10-CM

## 2020-02-19 DIAGNOSIS — Z95.2 STATUS POST HEART VALVE REPLACEMENT WITH MECHANICAL VALVE: ICD-10-CM

## 2020-02-19 LAB — INR PPP: 3.9

## 2020-02-19 PROCEDURE — 93793 PR ANTICOAGULANT MGMT FOR PT TAKING WARFARIN: ICD-10-PCS | Mod: ,,,

## 2020-02-19 PROCEDURE — 93793 ANTICOAG MGMT PT WARFARIN: CPT | Mod: ,,,

## 2020-02-20 DIAGNOSIS — Z95.2 STATUS POST HEART VALVE REPLACEMENT WITH MECHANICAL VALVE: ICD-10-CM

## 2020-02-20 DIAGNOSIS — Z79.01 ANTICOAGULATED ON COUMADIN: ICD-10-CM

## 2020-02-20 RX ORDER — WARFARIN SODIUM 5 MG/1
TABLET ORAL
Qty: 168 TABLET | Refills: 0 | Status: SHIPPED | OUTPATIENT
Start: 2020-02-20 | End: 2020-02-27

## 2020-02-26 ENCOUNTER — ANTI-COAG VISIT (OUTPATIENT)
Dept: CARDIOLOGY | Facility: CLINIC | Age: 65
End: 2020-02-26
Payer: MEDICARE

## 2020-02-26 DIAGNOSIS — Z95.2 STATUS POST HEART VALVE REPLACEMENT WITH MECHANICAL VALVE: ICD-10-CM

## 2020-02-26 DIAGNOSIS — Z79.01 ANTICOAGULATED ON COUMADIN: ICD-10-CM

## 2020-02-26 LAB — INR PPP: 3

## 2020-02-26 PROCEDURE — 93793 ANTICOAG MGMT PT WARFARIN: CPT | Mod: ,,,

## 2020-02-26 PROCEDURE — 93793 PR ANTICOAGULANT MGMT FOR PT TAKING WARFARIN: ICD-10-PCS | Mod: ,,,

## 2020-02-26 NOTE — PROGRESS NOTES
INR at goal. Medications and chart reviewed. No changes noted to necessitate adjustment of warfarin or follow-up plan. See calendar.  Pt remains on the high end of his range after dosing decrease.  Plan to decrease dosing further & re-assess in 1 week.

## 2020-02-27 RX ORDER — WARFARIN SODIUM 5 MG/1
TABLET ORAL
Qty: 270 TABLET | Refills: 6 | Status: SHIPPED | OUTPATIENT
Start: 2020-02-27 | End: 2021-05-10 | Stop reason: SDUPTHER

## 2020-02-29 DIAGNOSIS — I10 HTN (HYPERTENSION), BENIGN: ICD-10-CM

## 2020-02-29 DIAGNOSIS — I50.22 CHRONIC SYSTOLIC CONGESTIVE HEART FAILURE, NYHA CLASS 3: ICD-10-CM

## 2020-03-02 RX ORDER — POTASSIUM CHLORIDE 20 MEQ/1
TABLET, EXTENDED RELEASE ORAL
Qty: 180 TABLET | Refills: 11 | Status: SHIPPED | OUTPATIENT
Start: 2020-03-02 | End: 2021-05-24

## 2020-03-02 RX ORDER — LISINOPRIL 10 MG/1
TABLET ORAL
Qty: 90 TABLET | Refills: 3 | OUTPATIENT
Start: 2020-03-02

## 2020-03-05 ENCOUNTER — TELEPHONE (OUTPATIENT)
Dept: INTERNAL MEDICINE | Facility: CLINIC | Age: 65
End: 2020-03-05

## 2020-03-05 NOTE — PROGRESS NOTES
Called Patient to check if INR was tested 3/04/20, he reports he's supposed to be testing every other Wednesday so his next testing is not due until 3/11 so that's when he'll be testing

## 2020-03-05 NOTE — TELEPHONE ENCOUNTER
----- Message from Fannie Carbone sent at 3/5/2020  9:26 AM CST -----  Contact: Pt  Pt called and wants someone from your office to call him back as soon as possible     Pt is not feeling well and wants some medication     Pt wants to talk to someone from your office directly about what he has and has some question     Pt can be reached [phone]

## 2020-03-05 NOTE — TELEPHONE ENCOUNTER
Hi, I could add him on my schedule for tomorrow/Friday at 1240pm,. Please see if he wants an appt.  Let me know if patient has any questions.  Thank you, Tye Concepcion

## 2020-03-05 NOTE — TELEPHONE ENCOUNTER
Spoke to pt. He has not not traveled. He has been having yellow mucus, congested, coughing and sinus drip that started about 1 week ago. No fever, No SOB. He has not taken anything for symptoms. He would like to be prescribed something to get cleared up.      Do you want him to come in for a appt?

## 2020-03-05 NOTE — TELEPHONE ENCOUNTER
Spoke to pt. He accepted appt for tomorrow. Pt is scheduled   Pt has to be scheduled in the 1:00 spot but he is aware to come at 12:40

## 2020-03-06 ENCOUNTER — RESEARCH ENCOUNTER (OUTPATIENT)
Dept: RESEARCH | Facility: HOSPITAL | Age: 65
End: 2020-03-06

## 2020-03-06 ENCOUNTER — OFFICE VISIT (OUTPATIENT)
Dept: INTERNAL MEDICINE | Facility: CLINIC | Age: 65
End: 2020-03-06
Payer: MEDICARE

## 2020-03-06 VITALS
DIASTOLIC BLOOD PRESSURE: 68 MMHG | BODY MASS INDEX: 21.72 KG/M2 | WEIGHT: 147.06 LBS | SYSTOLIC BLOOD PRESSURE: 116 MMHG | HEART RATE: 64 BPM

## 2020-03-06 DIAGNOSIS — J44.1 COPD EXACERBATION: Primary | ICD-10-CM

## 2020-03-06 DIAGNOSIS — J44.9 CHRONIC OBSTRUCTIVE PULMONARY DISEASE, UNSPECIFIED COPD TYPE: ICD-10-CM

## 2020-03-06 DIAGNOSIS — R05.9 COUGH: ICD-10-CM

## 2020-03-06 LAB
INFLUENZA A, MOLECULAR: NEGATIVE
INFLUENZA B, MOLECULAR: NEGATIVE
SPECIMEN SOURCE: NORMAL

## 2020-03-06 PROCEDURE — 87502 INFLUENZA DNA AMP PROBE: CPT

## 2020-03-06 PROCEDURE — 99214 OFFICE O/P EST MOD 30 MIN: CPT | Mod: S$PBB,,, | Performed by: INTERNAL MEDICINE

## 2020-03-06 PROCEDURE — 99999 PR PBB SHADOW E&M-EST. PATIENT-LVL III: CPT | Mod: PBBFAC,,, | Performed by: INTERNAL MEDICINE

## 2020-03-06 PROCEDURE — 99214 PR OFFICE/OUTPT VISIT, EST, LEVL IV, 30-39 MIN: ICD-10-PCS | Mod: S$PBB,,, | Performed by: INTERNAL MEDICINE

## 2020-03-06 PROCEDURE — 99213 OFFICE O/P EST LOW 20 MIN: CPT | Mod: PBBFAC | Performed by: INTERNAL MEDICINE

## 2020-03-06 PROCEDURE — 99999 PR PBB SHADOW E&M-EST. PATIENT-LVL III: ICD-10-PCS | Mod: PBBFAC,,, | Performed by: INTERNAL MEDICINE

## 2020-03-06 RX ORDER — IPRATROPIUM BROMIDE AND ALBUTEROL SULFATE 2.5; .5 MG/3ML; MG/3ML
SOLUTION RESPIRATORY (INHALATION)
Qty: 360 ML | Refills: 11 | Status: SHIPPED | OUTPATIENT
Start: 2020-03-06

## 2020-03-06 RX ORDER — CODEINE PHOSPHATE AND GUAIFENESIN 10; 100 MG/5ML; MG/5ML
10 SOLUTION ORAL EVERY 8 HOURS PRN
Qty: 236 ML | Refills: 1 | Status: SHIPPED | OUTPATIENT
Start: 2020-03-06 | End: 2020-03-16

## 2020-03-06 RX ORDER — AMOXICILLIN AND CLAVULANATE POTASSIUM 875; 125 MG/1; MG/1
1 TABLET, FILM COATED ORAL 2 TIMES DAILY
Qty: 10 TABLET | Refills: 0 | Status: SHIPPED | OUTPATIENT
Start: 2020-03-06 | End: 2020-03-11

## 2020-03-06 NOTE — PROGRESS NOTES
3/6/2020 received message from MD that patient starting amox/clav. No significant DDI expected so will keep plan per calendar.

## 2020-03-06 NOTE — PROGRESS NOTES
RESEARCH STUDY APPROACH ENCOUNTER   INFECTIOUS DISEASE RESEARCH  [facility] JAMESON COX    Subject was seen in clinic today on 06Mar2020 and provided with information regarding the following study. Subject is eligible to participate in the study as a healthy control and they DO NOT have Alzheimer's Disease or any neurocognitive impairment that would exclude them from participation.     Subject was not consented at this time.     Study title: Gut Microbiome Diversity in Alzheimer's Disease  IRB #: [id number]  IRB approval date: 10/08/2019  Sponsor: Dr. Jt Miguel     Present for discussion: pts spouse was present    Subject was provided with an overview of the study as well as a handout detailing participation requirements.     Subject is aggregable to receive a phone call from research staff within 7 days in order to assess their interest in consenting for the study: YES/NO: yes    Research staff present: Real Harris  Infectious Disease Research

## 2020-03-06 NOTE — PROGRESS NOTES
Subjective     HPI:  Mr Paul Villalboos Sr, a 64 year old gentleman with a PMHx of CHF and COPD presents with cough and chest pain. He reports his symptoms began 1 week ago with onset of cough productive of thick, yellow sputum. He also reports sharp right sided chest pain with the cough. He denies pain while breathing. He denies any shortness of breath, rhinorrhea, congestion, sore throat. He has been using his nebulizer (most recent use was yesterday) and taking all his medications as prescribed. His wife was recently diagnosed with  Influenza 1 week ago.     Review of Systems   Constitutional: Negative for fever, malaise/fatigue and weight loss.   HENT: Negative for congestion, hearing loss, sinus pain and sore throat.    Eyes: Negative for blurred vision and double vision.   Respiratory: Positive for cough and sputum production (yellow, purulent, 1 cup per day). Negative for hemoptysis, shortness of breath and wheezing.    Cardiovascular: Positive for chest pain (right sided, worse with cough). Negative for palpitations and leg swelling.   Gastrointestinal: Negative for constipation, diarrhea, heartburn, nausea and vomiting.   Genitourinary: Negative for dysuria and frequency.   Musculoskeletal: Negative for joint pain and myalgias.   Neurological: Positive for headaches (intermittent, worse with cough). Negative for dizziness and weakness.   Psychiatric/Behavioral: Negative for depression. The patient is not nervous/anxious.      Objective     Vitals:    03/06/20 1247   BP: 116/68   Pulse: 64     Physical Exam   Constitutional: He appears well-developed and well-nourished. No distress.   HENT:   Mouth/Throat: Oropharynx is clear and moist. No oropharyngeal exudate.   Eyes: Conjunctivae are normal. No scleral icterus.   Neck: Normal range of motion. No JVD present.   Cardiovascular: Normal rate.  Occasional extrasystoles are present. Exam reveals no friction rub.   No murmur heard.  Mechanical heart valve  Somewhat  irregular   Pulmonary/Chest: Effort normal. No respiratory distress. He has wheezes. He has no rales.   Cough, productive of yellow sputum   Abdominal: Soft. Bowel sounds are normal. He exhibits no distension. There is no tenderness.   Musculoskeletal: Normal range of motion. He exhibits no edema.   Lymphadenopathy:     He has no cervical adenopathy.   Skin: Skin is warm and dry. No rash noted.   Psychiatric: He has a normal mood and affect. His behavior is normal.     Assessment     1. Acute Exacerbation of COPD    Plan     1. Acute Exacerbation of COPD   - Augmentin BID for 5 days   - Continue to use nebulizer treatments   - Guaifenesin-codeine cough syrup for cough   - Flu swab - will f/u   - RTC if symptoms worsen or do not improve    RTC in 3 months for management of chronic medical conditions or as needed.    Bulmaro Mejia, MS4  [employer]    I hereby acknowledge that I am relying upon documentation authored by a medical student working under my supervision and further I hereby attest that I have verified the student documentation or findings by personally performing the physical exam and medical decision making activities of the Evaluation and Management service to be billed.  Tye Concepcion

## 2020-03-11 ENCOUNTER — ANTI-COAG VISIT (OUTPATIENT)
Dept: CARDIOLOGY | Facility: CLINIC | Age: 65
End: 2020-03-11
Payer: MEDICARE

## 2020-03-11 DIAGNOSIS — Z79.01 ANTICOAGULATED ON COUMADIN: ICD-10-CM

## 2020-03-11 DIAGNOSIS — Z95.2 STATUS POST HEART VALVE REPLACEMENT WITH MECHANICAL VALVE: ICD-10-CM

## 2020-03-11 LAB — INR PPP: 5

## 2020-03-11 PROCEDURE — 93793 PR ANTICOAGULANT MGMT FOR PT TAKING WARFARIN: ICD-10-PCS | Mod: ,,,

## 2020-03-11 PROCEDURE — 93793 ANTICOAG MGMT PT WARFARIN: CPT | Mod: ,,,

## 2020-03-13 ENCOUNTER — ANTI-COAG VISIT (OUTPATIENT)
Dept: CARDIOLOGY | Facility: CLINIC | Age: 65
End: 2020-03-13
Payer: MEDICARE

## 2020-03-13 DIAGNOSIS — Z79.01 ANTICOAGULATED ON COUMADIN: ICD-10-CM

## 2020-03-13 DIAGNOSIS — Z95.2 STATUS POST HEART VALVE REPLACEMENT WITH MECHANICAL VALVE: ICD-10-CM

## 2020-03-13 LAB — INR PPP: 2.8

## 2020-03-13 PROCEDURE — 93793 PR ANTICOAGULANT MGMT FOR PT TAKING WARFARIN: ICD-10-PCS | Mod: ,,,

## 2020-03-13 PROCEDURE — 93793 ANTICOAG MGMT PT WARFARIN: CPT | Mod: ,,,

## 2020-03-13 NOTE — PROGRESS NOTES
INR at goal. Medications and chart reviewed. No changes noted to necessitate adjustment of warfarin or follow-up plan. See calendar.  Pt was previously taking the wrong dosing.  Will resume correct maintenance dose & re-assess in 2 weeks.  Pt to write down & repeat dosing.

## 2020-03-24 ENCOUNTER — TELEPHONE (OUTPATIENT)
Dept: INTERNAL MEDICINE | Facility: CLINIC | Age: 65
End: 2020-03-24

## 2020-03-24 DIAGNOSIS — I50.23 ACUTE ON CHRONIC SYSTOLIC CONGESTIVE HEART FAILURE: Primary | ICD-10-CM

## 2020-03-24 NOTE — TELEPHONE ENCOUNTER
----- Message from Elodia Knott sent at 3/24/2020  2:42 PM CDT -----  Contact: Michel/Michelle/ [phone]  Pharmacy is calling to clarify an RX.  RX name:  metOLazone (ZAROXOLYN) 2.5 MG tablet 90 tablet , insurance will no longer cover medication.   What do they need to clarify:  Insurance will cover Hydrochlorothiazide or Chlorthalidone please call .     [id number] - [place], [place] - [address] AT [facility] [phone] (Phone)  [phone] (Fax)

## 2020-03-25 ENCOUNTER — ANTI-COAG VISIT (OUTPATIENT)
Dept: CARDIOLOGY | Facility: CLINIC | Age: 65
End: 2020-03-25
Payer: MEDICARE

## 2020-03-25 DIAGNOSIS — Z79.01 ANTICOAGULATED ON COUMADIN: ICD-10-CM

## 2020-03-25 DIAGNOSIS — Z95.2 STATUS POST HEART VALVE REPLACEMENT WITH MECHANICAL VALVE: ICD-10-CM

## 2020-03-25 LAB — INR PPP: 3.8

## 2020-03-25 PROCEDURE — 93793 ANTICOAG MGMT PT WARFARIN: CPT | Mod: ,,,

## 2020-03-25 PROCEDURE — 93793 PR ANTICOAGULANT MGMT FOR PT TAKING WARFARIN: ICD-10-PCS | Mod: ,,,

## 2020-03-25 RX ORDER — HYDROCHLOROTHIAZIDE 25 MG/1
25 TABLET ORAL DAILY
Qty: 30 TABLET | Refills: 2 | Status: SHIPPED | OUTPATIENT
Start: 2020-03-25 | End: 2020-05-12

## 2020-03-25 NOTE — PROGRESS NOTES
INR not at goal. Medications, chart, and patient findings reviewed. See calendar for adjustments to dose and follow up plan.  Pt took a higher dose than recommend denies any other changes.  Recommend that he holds his coumadin 3/25 & will also decrease his dosing.  Plan to re-assess in 2 weeks.  Pt advised & indicated understanding.

## 2020-03-25 NOTE — TELEPHONE ENCOUNTER
Hi, please let the patient know that this is not covered by his insurance any more  Medications Discontinued During This Encounter   Medication Reason    metOLazone (ZAROXOLYN) 2.5 MG tablet      I have changed it to this medicine which is covered:  Orders Placed This Encounter    hydroCHLOROthiazide (HYDRODIURIL) 25 MG tablet     They should work in a similar way.    When he feels a need for the extra diuretic/fluid pill he can take the hydrochlorothiazide instead. He should call if he feels like the hydrochlorothiazide is not working.    Let me know if patient has any questions.  Thank you, Tye Concepcion

## 2020-04-08 ENCOUNTER — ANTI-COAG VISIT (OUTPATIENT)
Dept: CARDIOLOGY | Facility: CLINIC | Age: 65
End: 2020-04-08
Payer: MEDICARE

## 2020-04-08 DIAGNOSIS — Z95.2 STATUS POST HEART VALVE REPLACEMENT WITH MECHANICAL VALVE: ICD-10-CM

## 2020-04-08 DIAGNOSIS — Z79.01 ANTICOAGULATED ON COUMADIN: ICD-10-CM

## 2020-04-08 LAB — INR PPP: 3.4

## 2020-04-08 PROCEDURE — 93793 ANTICOAG MGMT PT WARFARIN: CPT | Mod: ,,,

## 2020-04-08 PROCEDURE — 93793 PR ANTICOAGULANT MGMT FOR PT TAKING WARFARIN: ICD-10-PCS | Mod: ,,,

## 2020-04-08 NOTE — PROGRESS NOTES
INR not at goal. Medications, chart, and patient findings reviewed. See calendar for adjustments to dose and follow up plan.  Pt has not had any greens in 2 weeks (plans to return to normal diet); he also had some alcohol over the weekend; he denies any other changes.  Instructed pt to resume normal diet; have a serving of greens 4/8; & decrease maintenance dose.  Will re-assess in 1 week.  Pt indicated understanding.    Patient was re-educated on situations that would require placing a call to the [facility], including bleeding or unusual bruising issues, changes in health, diet or medications,upcoming procedures that require warfarin interruption, and missed Coumadin dose(s). Patient expressed understanding that avoidance of consistency with these parameters could cause fluctuations in INR, leading to more frequent visits and increase risk of adverse events.

## 2020-04-15 ENCOUNTER — ANTI-COAG VISIT (OUTPATIENT)
Dept: CARDIOLOGY | Facility: CLINIC | Age: 65
End: 2020-04-15
Payer: MEDICARE

## 2020-04-15 DIAGNOSIS — Z95.2 STATUS POST HEART VALVE REPLACEMENT WITH MECHANICAL VALVE: ICD-10-CM

## 2020-04-15 DIAGNOSIS — Z79.01 ANTICOAGULATED ON COUMADIN: ICD-10-CM

## 2020-04-15 LAB — INR PPP: 2.1

## 2020-04-15 PROCEDURE — 93793 ANTICOAG MGMT PT WARFARIN: CPT | Mod: ,,,

## 2020-04-15 PROCEDURE — 93793 PR ANTICOAGULANT MGMT FOR PT TAKING WARFARIN: ICD-10-PCS | Mod: ,,,

## 2020-04-15 NOTE — PROGRESS NOTES
INR not at goal. Medications, chart, and patient findings reviewed. See calendar for adjustments to dose and follow up plan.  Instructed pt to maintain new regimen, resume normal diet & re-assess Monday.  Pt indicated understanding.

## 2020-04-20 ENCOUNTER — ANTI-COAG VISIT (OUTPATIENT)
Dept: CARDIOLOGY | Facility: CLINIC | Age: 65
End: 2020-04-20
Payer: MEDICARE

## 2020-04-20 DIAGNOSIS — Z95.2 STATUS POST HEART VALVE REPLACEMENT WITH MECHANICAL VALVE: ICD-10-CM

## 2020-04-20 DIAGNOSIS — Z79.01 ANTICOAGULATED ON COUMADIN: ICD-10-CM

## 2020-04-20 LAB — INR PPP: 3.2

## 2020-04-20 PROCEDURE — 93793 PR ANTICOAGULANT MGMT FOR PT TAKING WARFARIN: ICD-10-PCS | Mod: ,,,

## 2020-04-20 PROCEDURE — 93793 ANTICOAG MGMT PT WARFARIN: CPT | Mod: ,,,

## 2020-04-20 NOTE — PROGRESS NOTES
INR not at goal. Medications, chart, and patient findings reviewed. See calendar for adjustments to dose and follow up plan.  Pt denies any changes.  Will decrease pt's dosing & re-assess next week. Pt to keep diet consistent.  As requested left pt's dosing on VM.   Patient was re-educated on situations that would require placing a call to the [facility]in Clinic, including bleeding or unusual bruising issues, changes in health, diet or medications,upcoming procedures that require warfarin interruption, and missed Coumadin dose(s). Patient expressed understanding that avoidance of consistency with these parameters could cause fluctuations in INR, leading to more frequent visits and increase risk of adverse events.

## 2020-04-29 ENCOUNTER — ANTI-COAG VISIT (OUTPATIENT)
Dept: CARDIOLOGY | Facility: CLINIC | Age: 65
End: 2020-04-29
Payer: MEDICARE

## 2020-04-29 DIAGNOSIS — Z95.2 STATUS POST HEART VALVE REPLACEMENT WITH MECHANICAL VALVE: ICD-10-CM

## 2020-04-29 DIAGNOSIS — Z79.01 ANTICOAGULATED ON COUMADIN: ICD-10-CM

## 2020-04-29 LAB — INR PPP: 1.9

## 2020-04-29 PROCEDURE — 93793 PR ANTICOAGULANT MGMT FOR PT TAKING WARFARIN: ICD-10-PCS | Mod: ,,,

## 2020-04-29 PROCEDURE — 93793 ANTICOAG MGMT PT WARFARIN: CPT | Mod: ,,,

## 2020-04-29 NOTE — PROGRESS NOTES
INR not at goal. Medications, chart, and patient findings reviewed. See calendar for adjustments to dose and follow up plan.  Pt to resume lasix this week & denies any other changes.  Will increase maintenance dose & re-assess in 1 week.   Patient was re-educated on situations that would require placing a call to the [facility]madin Clinic, including bleeding or unusual bruising issues, changes in health, diet or medications,upcoming procedures that require warfarin interruption, and missed Coumadin dose(s). Patient expressed understanding that avoidance of consistency with these parameters could cause fluctuations in INR, leading to more frequent visits and increase risk of adverse events.

## 2020-04-30 ENCOUNTER — TELEPHONE (OUTPATIENT)
Dept: INTERNAL MEDICINE | Facility: CLINIC | Age: 65
End: 2020-04-30

## 2020-05-04 ENCOUNTER — ANTI-COAG VISIT (OUTPATIENT)
Dept: CARDIOLOGY | Facility: CLINIC | Age: 65
End: 2020-05-04
Payer: COMMERCIAL

## 2020-05-04 DIAGNOSIS — Z95.2 STATUS POST HEART VALVE REPLACEMENT WITH MECHANICAL VALVE: ICD-10-CM

## 2020-05-04 DIAGNOSIS — Z79.01 ANTICOAGULATED ON COUMADIN: ICD-10-CM

## 2020-05-04 LAB — INR PPP: 2.5

## 2020-05-04 PROCEDURE — 93793 ANTICOAG MGMT PT WARFARIN: CPT | Mod: ,,,

## 2020-05-04 PROCEDURE — 93793 PR ANTICOAGULANT MGMT FOR PT TAKING WARFARIN: ICD-10-PCS | Mod: ,,,

## 2020-05-12 ENCOUNTER — OFFICE VISIT (OUTPATIENT)
Dept: INTERNAL MEDICINE | Facility: CLINIC | Age: 65
End: 2020-05-12
Payer: COMMERCIAL

## 2020-05-12 ENCOUNTER — LAB VISIT (OUTPATIENT)
Dept: LAB | Facility: HOSPITAL | Age: 65
End: 2020-05-12
Attending: INTERNAL MEDICINE
Payer: COMMERCIAL

## 2020-05-12 VITALS
DIASTOLIC BLOOD PRESSURE: 76 MMHG | HEIGHT: 69 IN | SYSTOLIC BLOOD PRESSURE: 114 MMHG | HEART RATE: 51 BPM | WEIGHT: 143.75 LBS | BODY MASS INDEX: 21.29 KG/M2 | OXYGEN SATURATION: 98 %

## 2020-05-12 DIAGNOSIS — I50.23 ACUTE ON CHRONIC SYSTOLIC CONGESTIVE HEART FAILURE: Primary | ICD-10-CM

## 2020-05-12 DIAGNOSIS — I48.0 PAROXYSMAL ATRIAL FIBRILLATION: ICD-10-CM

## 2020-05-12 DIAGNOSIS — E78.5 HYPERLIPIDEMIA, UNSPECIFIED HYPERLIPIDEMIA TYPE: ICD-10-CM

## 2020-05-12 DIAGNOSIS — J44.9 CHRONIC OBSTRUCTIVE PULMONARY DISEASE, UNSPECIFIED COPD TYPE: ICD-10-CM

## 2020-05-12 DIAGNOSIS — Z72.0 TOBACCO ABUSE: ICD-10-CM

## 2020-05-12 DIAGNOSIS — I50.22 CHRONIC SYSTOLIC (CONGESTIVE) HEART FAILURE: ICD-10-CM

## 2020-05-12 LAB
ALBUMIN SERPL BCP-MCNC: 4.1 G/DL (ref 3.5–5.2)
ALP SERPL-CCNC: 59 U/L (ref 55–135)
ALT SERPL W/O P-5'-P-CCNC: 22 U/L (ref 10–44)
ANION GAP SERPL CALC-SCNC: 9 MMOL/L (ref 8–16)
AST SERPL-CCNC: 29 U/L (ref 10–40)
BASOPHILS # BLD AUTO: 0.06 K/UL (ref 0–0.2)
BASOPHILS NFR BLD: 1.6 % (ref 0–1.9)
BILIRUB SERPL-MCNC: 0.6 MG/DL (ref 0.1–1)
BUN SERPL-MCNC: 33 MG/DL (ref 8–23)
CALCIUM SERPL-MCNC: 9.9 MG/DL (ref 8.7–10.5)
CHLORIDE SERPL-SCNC: 96 MMOL/L (ref 95–110)
CHOLEST SERPL-MCNC: 204 MG/DL (ref 120–199)
CHOLEST/HDLC SERPL: 3.6 {RATIO} (ref 2–5)
CO2 SERPL-SCNC: 38 MMOL/L (ref 23–29)
CREAT SERPL-MCNC: 1.3 MG/DL (ref 0.5–1.4)
DIFFERENTIAL METHOD: ABNORMAL
EOSINOPHIL # BLD AUTO: 0.1 K/UL (ref 0–0.5)
EOSINOPHIL NFR BLD: 3.7 % (ref 0–8)
ERYTHROCYTE [DISTWIDTH] IN BLOOD BY AUTOMATED COUNT: 13.8 % (ref 11.5–14.5)
EST. GFR  (AFRICAN AMERICAN): >60 ML/MIN/1.73 M^2
EST. GFR  (NON AFRICAN AMERICAN): 56.9 ML/MIN/1.73 M^2
GLUCOSE SERPL-MCNC: 79 MG/DL (ref 70–110)
HCT VFR BLD AUTO: 44.5 % (ref 40–54)
HDLC SERPL-MCNC: 56 MG/DL (ref 40–75)
HDLC SERPL: 27.5 % (ref 20–50)
HGB BLD-MCNC: 13.8 G/DL (ref 14–18)
IMM GRANULOCYTES # BLD AUTO: 0.01 K/UL (ref 0–0.04)
IMM GRANULOCYTES NFR BLD AUTO: 0.3 % (ref 0–0.5)
LDLC SERPL CALC-MCNC: 137 MG/DL (ref 63–159)
LYMPHOCYTES # BLD AUTO: 1.2 K/UL (ref 1–4.8)
LYMPHOCYTES NFR BLD: 33.2 % (ref 18–48)
MCH RBC QN AUTO: 32.3 PG (ref 27–31)
MCHC RBC AUTO-ENTMCNC: 31 G/DL (ref 32–36)
MCV RBC AUTO: 104 FL (ref 82–98)
MONOCYTES # BLD AUTO: 0.6 K/UL (ref 0.3–1)
MONOCYTES NFR BLD: 16.3 % (ref 4–15)
NEUTROPHILS # BLD AUTO: 1.7 K/UL (ref 1.8–7.7)
NEUTROPHILS NFR BLD: 44.9 % (ref 38–73)
NONHDLC SERPL-MCNC: 148 MG/DL
NRBC BLD-RTO: 0 /100 WBC
PLATELET # BLD AUTO: 174 K/UL (ref 150–350)
PMV BLD AUTO: 11.3 FL (ref 9.2–12.9)
POTASSIUM SERPL-SCNC: 3.3 MMOL/L (ref 3.5–5.1)
PROT SERPL-MCNC: 8.1 G/DL (ref 6–8.4)
RBC # BLD AUTO: 4.27 M/UL (ref 4.6–6.2)
SODIUM SERPL-SCNC: 143 MMOL/L (ref 136–145)
TRIGL SERPL-MCNC: 55 MG/DL (ref 30–150)
WBC # BLD AUTO: 3.74 K/UL (ref 3.9–12.7)

## 2020-05-12 PROCEDURE — 99999 PR PBB SHADOW E&M-EST. PATIENT-LVL III: CPT | Mod: PBBFAC,,, | Performed by: INTERNAL MEDICINE

## 2020-05-12 PROCEDURE — 99999 PR PBB SHADOW E&M-EST. PATIENT-LVL III: ICD-10-PCS | Mod: PBBFAC,,, | Performed by: INTERNAL MEDICINE

## 2020-05-12 PROCEDURE — 99213 OFFICE O/P EST LOW 20 MIN: CPT | Mod: PBBFAC | Performed by: INTERNAL MEDICINE

## 2020-05-12 PROCEDURE — 99214 OFFICE O/P EST MOD 30 MIN: CPT | Mod: S$GLB,,, | Performed by: INTERNAL MEDICINE

## 2020-05-12 PROCEDURE — 80053 COMPREHEN METABOLIC PANEL: CPT

## 2020-05-12 PROCEDURE — 85025 COMPLETE CBC W/AUTO DIFF WBC: CPT

## 2020-05-12 PROCEDURE — 36415 COLL VENOUS BLD VENIPUNCTURE: CPT

## 2020-05-12 PROCEDURE — 99214 PR OFFICE/OUTPT VISIT, EST, LEVL IV, 30-39 MIN: ICD-10-PCS | Mod: S$GLB,,, | Performed by: INTERNAL MEDICINE

## 2020-05-12 PROCEDURE — 80061 LIPID PANEL: CPT

## 2020-05-12 RX ORDER — HYDROCHLOROTHIAZIDE 25 MG/1
25 TABLET ORAL DAILY
Qty: 30 TABLET | Refills: 11 | Status: SHIPPED | OUTPATIENT
Start: 2020-05-12 | End: 2021-01-21

## 2020-05-12 NOTE — PROGRESS NOTES
Subjective:       Patient ID: Paul Villalobos Sr. is a 64 y.o. male.    Chief Complaint: Follow-up    Patient is here for followup for chronic conditions.    Breathing stable. Good UOP.    Taking most meds, not clearly taking entresto.    Still smoking.    Active with biking.    No purulent mucous/cough.    Review of Systems   Constitutional: Negative for activity change, appetite change, chills, fatigue, fever and unexpected weight change.   HENT: Negative for congestion, postnasal drip, rhinorrhea and voice change.    Respiratory: Positive for cough and shortness of breath (stable though). Negative for chest tightness and wheezing.    Cardiovascular: Negative for chest pain, palpitations and leg swelling.   Gastrointestinal: Negative for abdominal distention, abdominal pain, anal bleeding, blood in stool, nausea and vomiting.   Genitourinary: Negative for decreased urine volume, difficulty urinating, flank pain, frequency, hematuria, scrotal swelling and testicular pain.   Musculoskeletal: Negative for neck pain and neck stiffness.   Skin: Negative for rash and wound.   Neurological: Negative for tremors, syncope and weakness.   Hematological: Negative for adenopathy. Does not bruise/bleed easily.   Psychiatric/Behavioral: Negative for dysphoric mood.       Objective:      Physical Exam   Constitutional: He is oriented to person, place, and time. He appears well-developed and well-nourished. No distress.   HENT:   Head: Normocephalic and atraumatic.   Eyes: No scleral icterus.   Neck: Normal range of motion. No thyromegaly present.   Cardiovascular: Normal rate. Exam reveals no gallop and no friction rub.   Murmur heard.  Irregularly irregular, mechanical systolic murmur     Pulmonary/Chest: Effort normal. No respiratory distress. He has wheezes (mild and air flow decreased). He has no rales.   Abdominal: Soft. Bowel sounds are normal. He exhibits no distension and no mass. There is no tenderness. There is no rebound  and no guarding. No hernia.   Musculoskeletal: Normal range of motion. He exhibits no edema.   Lymphadenopathy:     He has no cervical adenopathy.   Neurological: He is alert and oriented to person, place, and time.   Skin: No lesion noted.   Psychiatric: He has a normal mood and affect. Thought content normal.       Assessment:       1. Acute on chronic systolic congestive heart failure    2. Tobacco abuse    3. Chronic systolic (congestive) heart failure    4. Chronic obstructive pulmonary disease, unspecified COPD type    5. Hyperlipidemia, unspecified hyperlipidemia type    6. Paroxysmal atrial fibrillation        Plan:       Paul was seen today for follow-up.    Diagnoses and all orders for this visit:    Acute on chronic systolic congestive heart failure  -     hydroCHLOROthiazide (HYDRODIURIL) 25 MG tablet; Take 1 tablet (25 mg total) by mouth once daily.  Has been OK on med, check BMP today    Tobacco abuse  Contemplative, not action phase yet    Chronic systolic (congestive) heart failure  -     CBC auto differential; Future  -     Comprehensive metabolic panel; Future  -     Lipid Panel; Future  euvolemic    Chronic obstructive pulmonary disease, unspecified COPD type  Keep on checking willingness to quit    Hyperlipidemia, unspecified hyperlipidemia type  -     Lipid Panel; Future        Health Maintenance       Date Due Completion Date    TETANUS VACCINE 12/29/1971 ---    Pneumococcal Vaccine (65+ High/Highest Risk) (2 of 2 - PPSV23) 03/15/2019 1/18/2019    Fecal Occult Blood Test (FOBT)/[employer] 11/05/2020 11/5/2019    Lipid Panel 11/14/2023 11/14/2018      Next time pnavax and flu    Follow up in about 4 months (around 9/12/2020).    Future Appointments   Date Time Provider Department Center   9/14/2020  8:20 AM Tye Concepcion MD [facility]

## 2020-05-13 ENCOUNTER — ANTI-COAG VISIT (OUTPATIENT)
Dept: CARDIOLOGY | Facility: CLINIC | Age: 65
End: 2020-05-13
Payer: COMMERCIAL

## 2020-05-13 DIAGNOSIS — Z95.2 STATUS POST HEART VALVE REPLACEMENT WITH MECHANICAL VALVE: ICD-10-CM

## 2020-05-13 DIAGNOSIS — Z79.01 ANTICOAGULATED ON COUMADIN: ICD-10-CM

## 2020-05-13 LAB — INR PPP: 3.2

## 2020-05-13 PROCEDURE — 93793 PR ANTICOAGULANT MGMT FOR PT TAKING WARFARIN: ICD-10-PCS | Mod: ,,,

## 2020-05-13 PROCEDURE — 93793 ANTICOAG MGMT PT WARFARIN: CPT | Mod: ,,,

## 2020-05-13 NOTE — PROGRESS NOTES
INR not at goal. Medications, chart, and patient findings reviewed. See calendar for adjustments to dose and follow up plan.  Pt slightly elevated due to decrease in vit k intake.  Recommend to maintain current regimen & have serving of greens 5/13.  Resume normal diet.

## 2020-05-15 ENCOUNTER — TELEPHONE (OUTPATIENT)
Dept: INTERNAL MEDICINE | Facility: CLINIC | Age: 65
End: 2020-05-15

## 2020-05-20 ENCOUNTER — ANTI-COAG VISIT (OUTPATIENT)
Dept: CARDIOLOGY | Facility: CLINIC | Age: 65
End: 2020-05-20
Payer: COMMERCIAL

## 2020-05-20 DIAGNOSIS — Z95.2 STATUS POST HEART VALVE REPLACEMENT WITH MECHANICAL VALVE: ICD-10-CM

## 2020-05-20 DIAGNOSIS — Z79.01 ANTICOAGULATED ON COUMADIN: ICD-10-CM

## 2020-05-20 LAB — INR PPP: 2

## 2020-05-20 PROCEDURE — 93793 PR ANTICOAGULANT MGMT FOR PT TAKING WARFARIN: ICD-10-PCS | Mod: ,,,

## 2020-05-20 PROCEDURE — 93793 ANTICOAG MGMT PT WARFARIN: CPT | Mod: ,,,

## 2020-05-20 NOTE — PROGRESS NOTES
INR not at goal. Medications, chart, and patient findings reviewed. See calendar for adjustments to dose and follow up plan.  Pt states he may have went overboard w/ his greens with portions.  He states he had 2 plates of greens.  He will resume his normal diet & dosing per calendar.  Reviewed consistency w/ portion sizes.   Patient was re-educated on situations that would require placing a call to the Coumadin Clinic, including bleeding or unusual bruising issues, changes in health, diet or medications,upcoming procedures that require warfarin interruption, and missed Coumadin dose(s). Patient expressed understanding that avoidance of consistency with these parameters could cause fluctuations in INR, leading to more frequent visits and increase risk of adverse events.

## 2020-05-28 ENCOUNTER — ANTI-COAG VISIT (OUTPATIENT)
Dept: CARDIOLOGY | Facility: CLINIC | Age: 65
End: 2020-05-28
Payer: COMMERCIAL

## 2020-05-28 DIAGNOSIS — Z79.01 ANTICOAGULATED ON COUMADIN: ICD-10-CM

## 2020-05-28 DIAGNOSIS — Z95.2 STATUS POST HEART VALVE REPLACEMENT WITH MECHANICAL VALVE: ICD-10-CM

## 2020-05-28 LAB — INR PPP: 2.8

## 2020-05-28 PROCEDURE — 93793 ANTICOAG MGMT PT WARFARIN: CPT | Mod: ,,,

## 2020-05-28 PROCEDURE — 93793 PR ANTICOAGULANT MGMT FOR PT TAKING WARFARIN: ICD-10-PCS | Mod: ,,,

## 2020-05-28 NOTE — PROGRESS NOTES
Spoke with patient, he was notified of recheck date and plan. Pt request instructions to be left on his vmail. Verbalized understanding

## 2020-06-03 ENCOUNTER — ANTI-COAG VISIT (OUTPATIENT)
Dept: CARDIOLOGY | Facility: CLINIC | Age: 65
End: 2020-06-03
Payer: COMMERCIAL

## 2020-06-03 DIAGNOSIS — Z95.2 STATUS POST HEART VALVE REPLACEMENT WITH MECHANICAL VALVE: ICD-10-CM

## 2020-06-03 DIAGNOSIS — Z79.01 ANTICOAGULATED ON COUMADIN: ICD-10-CM

## 2020-06-03 LAB — INR PPP: 1.7

## 2020-06-03 PROCEDURE — 93793 PR ANTICOAGULANT MGMT FOR PT TAKING WARFARIN: ICD-10-PCS | Mod: ,,,

## 2020-06-03 PROCEDURE — 93793 ANTICOAG MGMT PT WARFARIN: CPT | Mod: ,,,

## 2020-06-10 ENCOUNTER — ANTI-COAG VISIT (OUTPATIENT)
Dept: CARDIOLOGY | Facility: CLINIC | Age: 65
End: 2020-06-10
Payer: COMMERCIAL

## 2020-06-10 DIAGNOSIS — Z95.2 STATUS POST HEART VALVE REPLACEMENT WITH MECHANICAL VALVE: ICD-10-CM

## 2020-06-10 DIAGNOSIS — Z79.01 ANTICOAGULATED ON COUMADIN: ICD-10-CM

## 2020-06-10 LAB — INR PPP: 1.9

## 2020-06-10 PROCEDURE — 93793 ANTICOAG MGMT PT WARFARIN: CPT | Mod: ,,,

## 2020-06-10 PROCEDURE — 93793 PR ANTICOAGULANT MGMT FOR PT TAKING WARFARIN: ICD-10-PCS | Mod: ,,,

## 2020-06-10 NOTE — PROGRESS NOTES
INR not at goal. Medications, chart, and patient findings reviewed. See calendar for adjustments to dose and follow up plan.  Pt took lower dosing than recommended & denies any other changes.  Recommend that he resumes recommended dosing & plan to assess in 2 weeks.  He has been advised.  He requests a call back to write down his dosing.

## 2020-06-24 ENCOUNTER — ANTI-COAG VISIT (OUTPATIENT)
Dept: CARDIOLOGY | Facility: CLINIC | Age: 65
End: 2020-06-24
Payer: COMMERCIAL

## 2020-06-24 DIAGNOSIS — Z95.2 STATUS POST HEART VALVE REPLACEMENT WITH MECHANICAL VALVE: ICD-10-CM

## 2020-06-24 DIAGNOSIS — Z79.01 ANTICOAGULATED ON COUMADIN: ICD-10-CM

## 2020-06-24 LAB — INR PPP: 2

## 2020-06-24 PROCEDURE — 93793 PR ANTICOAGULANT MGMT FOR PT TAKING WARFARIN: ICD-10-PCS | Mod: S$GLB,,,

## 2020-06-24 PROCEDURE — 93793 ANTICOAG MGMT PT WARFARIN: CPT | Mod: S$GLB,,,

## 2020-06-24 NOTE — PROGRESS NOTES
INR not at goal. Medications, chart, and patient findings reviewed. See calendar for adjustments to dose and follow up plan.  Pt had an increase in vit k this past week & denies any other changes.  He had a recent dosing increase.  Recommend he takes 10mg 6/24 & resume maintenance dose.  Pt to keep diet consistent.

## 2020-07-01 ENCOUNTER — ANTI-COAG VISIT (OUTPATIENT)
Dept: CARDIOLOGY | Facility: CLINIC | Age: 65
End: 2020-07-01
Payer: COMMERCIAL

## 2020-07-01 DIAGNOSIS — Z79.01 ANTICOAGULATED ON COUMADIN: ICD-10-CM

## 2020-07-01 DIAGNOSIS — Z95.2 STATUS POST HEART VALVE REPLACEMENT WITH MECHANICAL VALVE: ICD-10-CM

## 2020-07-01 LAB — INR PPP: 1.9

## 2020-07-01 PROCEDURE — 93793 ANTICOAG MGMT PT WARFARIN: CPT | Mod: S$GLB,,,

## 2020-07-01 PROCEDURE — 93793 PR ANTICOAGULANT MGMT FOR PT TAKING WARFARIN: ICD-10-PCS | Mod: S$GLB,,,

## 2020-07-01 NOTE — PROGRESS NOTES
Pt denies any missed. Pt confirmed correct doses. Pt states he had greens three times this week, fried okra from [employer]. Pt states he is drinking a lot of Gatorade, Body Amour and water.

## 2020-07-01 NOTE — PROGRESS NOTES
INR not at goal. Medications, chart, and patient findings reviewed. See calendar for adjustments to dose and follow up plan.  Pt continues to be inconsistent w/ his diet & had 3 serving of greens w/in the past week.  He remains subtherapeutic.  Recommend to increase his maintenance dose at this time.  Plan to re-assess in 1 week.

## 2020-07-08 ENCOUNTER — ANTI-COAG VISIT (OUTPATIENT)
Dept: CARDIOLOGY | Facility: CLINIC | Age: 65
End: 2020-07-08
Payer: COMMERCIAL

## 2020-07-08 DIAGNOSIS — Z95.2 STATUS POST HEART VALVE REPLACEMENT WITH MECHANICAL VALVE: ICD-10-CM

## 2020-07-08 DIAGNOSIS — Z79.01 ANTICOAGULATED ON COUMADIN: ICD-10-CM

## 2020-07-08 LAB — INR PPP: 2

## 2020-07-08 PROCEDURE — 93793 ANTICOAG MGMT PT WARFARIN: CPT | Mod: S$GLB,,,

## 2020-07-08 PROCEDURE — 93793 PR ANTICOAGULANT MGMT FOR PT TAKING WARFARIN: ICD-10-PCS | Mod: S$GLB,,,

## 2020-07-08 NOTE — PROGRESS NOTES
INR not at goal. Medications, chart, and patient findings reviewed. See calendar for adjustments to dose and follow up plan.  Pt states he had an increase in vit k.  Recommend he takes 12.5mg 7/8 & resume recently increased dose.  Plan to re-assess in 1 week.  Pt to keep diet consistent.

## 2020-07-15 ENCOUNTER — ANTI-COAG VISIT (OUTPATIENT)
Dept: CARDIOLOGY | Facility: CLINIC | Age: 65
End: 2020-07-15
Payer: COMMERCIAL

## 2020-07-15 DIAGNOSIS — Z79.01 ANTICOAGULATED ON COUMADIN: ICD-10-CM

## 2020-07-15 DIAGNOSIS — Z95.2 STATUS POST HEART VALVE REPLACEMENT WITH MECHANICAL VALVE: ICD-10-CM

## 2020-07-15 LAB — INR PPP: 2.4

## 2020-07-15 PROCEDURE — 93793 PR ANTICOAGULANT MGMT FOR PT TAKING WARFARIN: ICD-10-PCS | Mod: S$GLB,,,

## 2020-07-15 PROCEDURE — 93793 ANTICOAG MGMT PT WARFARIN: CPT | Mod: S$GLB,,,

## 2020-07-22 ENCOUNTER — ANTI-COAG VISIT (OUTPATIENT)
Dept: CARDIOLOGY | Facility: CLINIC | Age: 65
End: 2020-07-22
Payer: COMMERCIAL

## 2020-07-22 DIAGNOSIS — Z95.2 STATUS POST HEART VALVE REPLACEMENT WITH MECHANICAL VALVE: ICD-10-CM

## 2020-07-22 DIAGNOSIS — Z79.01 ANTICOAGULATED ON COUMADIN: ICD-10-CM

## 2020-07-22 LAB — INR PPP: 2.6

## 2020-07-22 PROCEDURE — 93793 ANTICOAG MGMT PT WARFARIN: CPT | Mod: S$GLB,,,

## 2020-07-22 PROCEDURE — 93793 PR ANTICOAGULANT MGMT FOR PT TAKING WARFARIN: ICD-10-PCS | Mod: S$GLB,,,

## 2020-07-30 ENCOUNTER — ANTI-COAG VISIT (OUTPATIENT)
Dept: CARDIOLOGY | Facility: CLINIC | Age: 65
End: 2020-07-30
Payer: COMMERCIAL

## 2020-07-30 DIAGNOSIS — Z95.2 STATUS POST HEART VALVE REPLACEMENT WITH MECHANICAL VALVE: ICD-10-CM

## 2020-07-30 DIAGNOSIS — Z79.01 ANTICOAGULATED ON COUMADIN: ICD-10-CM

## 2020-07-30 LAB — INR PPP: 3

## 2020-07-30 PROCEDURE — 93793 PR ANTICOAGULANT MGMT FOR PT TAKING WARFARIN: ICD-10-PCS | Mod: S$GLB,,,

## 2020-07-30 PROCEDURE — 93793 ANTICOAG MGMT PT WARFARIN: CPT | Mod: S$GLB,,,

## 2020-07-30 NOTE — PROGRESS NOTES
Called Patient to check if INR was tested 7/29, he reports he tested today and gave a verbal result as: 3.0 drawn today 7/30/20, reports he already called result in to meter company, verified correct dose of coumadin

## 2020-08-05 ENCOUNTER — ANTI-COAG VISIT (OUTPATIENT)
Dept: CARDIOLOGY | Facility: CLINIC | Age: 65
End: 2020-08-05
Payer: COMMERCIAL

## 2020-08-05 DIAGNOSIS — Z95.2 STATUS POST HEART VALVE REPLACEMENT WITH MECHANICAL VALVE: ICD-10-CM

## 2020-08-05 DIAGNOSIS — Z79.01 ANTICOAGULATED ON COUMADIN: ICD-10-CM

## 2020-08-05 LAB — INR PPP: 3.2

## 2020-08-05 PROCEDURE — 93793 PR ANTICOAGULANT MGMT FOR PT TAKING WARFARIN: ICD-10-PCS | Mod: S$GLB,,,

## 2020-08-05 PROCEDURE — 93793 ANTICOAG MGMT PT WARFARIN: CPT | Mod: S$GLB,,,

## 2020-08-05 NOTE — PROGRESS NOTES
INR not at goal. Medications, chart, and patient findings reviewed. See calendar for adjustments to dose and follow up plan.  Pt to keep his diet consistent.

## 2020-08-12 ENCOUNTER — ANTI-COAG VISIT (OUTPATIENT)
Dept: CARDIOLOGY | Facility: CLINIC | Age: 65
End: 2020-08-12
Payer: COMMERCIAL

## 2020-08-12 DIAGNOSIS — Z79.01 ANTICOAGULATED ON COUMADIN: ICD-10-CM

## 2020-08-12 DIAGNOSIS — Z95.2 STATUS POST HEART VALVE REPLACEMENT WITH MECHANICAL VALVE: ICD-10-CM

## 2020-08-12 LAB — INR PPP: 2.7

## 2020-08-12 PROCEDURE — 93793 ANTICOAG MGMT PT WARFARIN: CPT | Mod: S$GLB,,,

## 2020-08-12 PROCEDURE — 93793 PR ANTICOAGULANT MGMT FOR PT TAKING WARFARIN: ICD-10-PCS | Mod: S$GLB,,,

## 2020-08-19 ENCOUNTER — ANTI-COAG VISIT (OUTPATIENT)
Dept: CARDIOLOGY | Facility: CLINIC | Age: 65
End: 2020-08-19
Payer: COMMERCIAL

## 2020-08-19 DIAGNOSIS — Z79.01 ANTICOAGULATED ON COUMADIN: ICD-10-CM

## 2020-08-19 DIAGNOSIS — Z95.2 STATUS POST HEART VALVE REPLACEMENT WITH MECHANICAL VALVE: ICD-10-CM

## 2020-08-19 LAB — INR PPP: 2.4

## 2020-08-19 PROCEDURE — 93793 ANTICOAG MGMT PT WARFARIN: CPT | Mod: S$GLB,,,

## 2020-08-19 PROCEDURE — 93793 PR ANTICOAGULANT MGMT FOR PT TAKING WARFARIN: ICD-10-PCS | Mod: S$GLB,,,

## 2020-08-19 NOTE — PROGRESS NOTES
INR not at goal. Medications, chart, and patient findings reviewed. Plan to take 12.5mg 8/19 then resume dose. See calendar for adjustments to dose and follow up plan.

## 2020-08-27 ENCOUNTER — ANTI-COAG VISIT (OUTPATIENT)
Dept: CARDIOLOGY | Facility: CLINIC | Age: 65
End: 2020-08-27
Payer: COMMERCIAL

## 2020-08-27 DIAGNOSIS — N13.8 BPH WITH URINARY OBSTRUCTION: Primary | ICD-10-CM

## 2020-08-27 DIAGNOSIS — Z79.01 ANTICOAGULATED ON COUMADIN: ICD-10-CM

## 2020-08-27 DIAGNOSIS — N40.1 BPH WITH URINARY OBSTRUCTION: Primary | ICD-10-CM

## 2020-08-27 DIAGNOSIS — Z95.2 STATUS POST HEART VALVE REPLACEMENT WITH MECHANICAL VALVE: ICD-10-CM

## 2020-08-27 LAB — INR PPP: 3.8

## 2020-08-27 PROCEDURE — 93793 ANTICOAG MGMT PT WARFARIN: CPT | Mod: S$GLB,,,

## 2020-08-27 PROCEDURE — 93793 PR ANTICOAGULANT MGMT FOR PT TAKING WARFARIN: ICD-10-PCS | Mod: S$GLB,,,

## 2020-08-31 ENCOUNTER — PATIENT OUTREACH (OUTPATIENT)
Dept: ADMINISTRATIVE | Facility: HOSPITAL | Age: 65
End: 2020-08-31

## 2020-08-31 NOTE — PROGRESS NOTES
Health Maintenance Due   Topic Date Due    TETANUS VACCINE  12/29/1971    Pneumococcal Vaccine (65+ Low/Medium Risk) (2 of 2 - PPSV23) 01/18/2020     Chart review completed.

## 2020-09-10 ENCOUNTER — ANTI-COAG VISIT (OUTPATIENT)
Dept: CARDIOLOGY | Facility: CLINIC | Age: 65
End: 2020-09-10
Payer: COMMERCIAL

## 2020-09-10 DIAGNOSIS — Z95.2 STATUS POST HEART VALVE REPLACEMENT WITH MECHANICAL VALVE: ICD-10-CM

## 2020-09-10 DIAGNOSIS — Z79.01 ANTICOAGULATED ON COUMADIN: ICD-10-CM

## 2020-09-10 LAB — INR PPP: 2.1

## 2020-09-10 PROCEDURE — 93793 ANTICOAG MGMT PT WARFARIN: CPT | Mod: S$GLB,,,

## 2020-09-10 PROCEDURE — 93793 PR ANTICOAGULANT MGMT FOR PT TAKING WARFARIN: ICD-10-PCS | Mod: S$GLB,,,

## 2020-09-14 ENCOUNTER — OFFICE VISIT (OUTPATIENT)
Dept: INTERNAL MEDICINE | Facility: CLINIC | Age: 65
End: 2020-09-14
Payer: MEDICAID

## 2020-09-14 VITALS
OXYGEN SATURATION: 99 % | HEART RATE: 70 BPM | SYSTOLIC BLOOD PRESSURE: 150 MMHG | HEIGHT: 69 IN | BODY MASS INDEX: 22.92 KG/M2 | DIASTOLIC BLOOD PRESSURE: 82 MMHG | WEIGHT: 154.75 LBS

## 2020-09-14 DIAGNOSIS — I50.22 CHRONIC SYSTOLIC (CONGESTIVE) HEART FAILURE: ICD-10-CM

## 2020-09-14 DIAGNOSIS — I10 HTN (HYPERTENSION), BENIGN: ICD-10-CM

## 2020-09-14 DIAGNOSIS — I48.0 PAROXYSMAL ATRIAL FIBRILLATION: ICD-10-CM

## 2020-09-14 DIAGNOSIS — I50.23 ACUTE ON CHRONIC SYSTOLIC CONGESTIVE HEART FAILURE: Primary | ICD-10-CM

## 2020-09-14 DIAGNOSIS — J44.9 CHRONIC OBSTRUCTIVE PULMONARY DISEASE, UNSPECIFIED COPD TYPE: ICD-10-CM

## 2020-09-14 PROCEDURE — 99999 PR PBB SHADOW E&M-EST. PATIENT-LVL V: CPT | Mod: PBBFAC,,, | Performed by: INTERNAL MEDICINE

## 2020-09-14 PROCEDURE — 99214 PR OFFICE/OUTPT VISIT, EST, LEVL IV, 30-39 MIN: ICD-10-PCS | Mod: S$PBB,,, | Performed by: INTERNAL MEDICINE

## 2020-09-14 PROCEDURE — 99999 PR PBB SHADOW E&M-EST. PATIENT-LVL V: ICD-10-PCS | Mod: PBBFAC,,, | Performed by: INTERNAL MEDICINE

## 2020-09-14 PROCEDURE — 99215 OFFICE O/P EST HI 40 MIN: CPT | Mod: PBBFAC | Performed by: INTERNAL MEDICINE

## 2020-09-14 PROCEDURE — 99214 OFFICE O/P EST MOD 30 MIN: CPT | Mod: S$PBB,,, | Performed by: INTERNAL MEDICINE

## 2020-09-14 RX ORDER — LOSARTAN POTASSIUM 50 MG/1
50 TABLET ORAL DAILY
Qty: 90 TABLET | Refills: 11 | Status: ON HOLD | OUTPATIENT
Start: 2020-09-14 | End: 2021-07-27 | Stop reason: SDUPTHER

## 2020-09-14 NOTE — PROGRESS NOTES
Subjective:       Patient ID: Paul Villalobos Sr. is a 64 y.o. male.    Chief Complaint: Follow-up    Patient is here for followup for chronic conditions.    No new symptoms since last visit.    Has not been taking entresto and spironolactone as per med refill data.    Recent increased spicy foods with salt and has had incd fluid in legs and wt gain.    Still smoking, but now trying to quit with vaping.    Review of Systems   Constitutional: Positive for unexpected weight change (10 pounds recently). Negative for activity change, appetite change, chills, fatigue and fever.   HENT: Negative for congestion, postnasal drip, rhinorrhea and voice change.    Respiratory: Positive for cough and shortness of breath (stable though). Negative for chest tightness and wheezing.    Cardiovascular: Negative for chest pain, palpitations and leg swelling.   Gastrointestinal: Negative for abdominal distention, abdominal pain, anal bleeding, blood in stool, nausea and vomiting.   Genitourinary: Negative for decreased urine volume, difficulty urinating, flank pain, frequency, hematuria, scrotal swelling and testicular pain.   Musculoskeletal: Negative for neck pain and neck stiffness.   Skin: Negative for rash and wound.   Neurological: Negative for tremors, syncope and weakness.   Hematological: Negative for adenopathy. Does not bruise/bleed easily.   Psychiatric/Behavioral: Negative for dysphoric mood.           Objective:      Physical Exam  Constitutional:       General: He is not in acute distress.     Appearance: He is well-developed.   HENT:      Head: Normocephalic and atraumatic.   Eyes:      General: No scleral icterus.  Neck:      Musculoskeletal: Normal range of motion.      Thyroid: No thyromegaly.   Cardiovascular:      Rate and Rhythm: Normal rate.      Heart sounds: Murmur present. No friction rub. No gallop.       Comments: Irregularly irregular, mechanical systolic murmur    Pulmonary:      Effort: Pulmonary effort is  normal. No respiratory distress.      Breath sounds: Wheezing (mild and air flow decreased) present. No rales.   Abdominal:      General: Bowel sounds are normal. There is no distension.      Palpations: Abdomen is soft. There is no mass.      Tenderness: There is no abdominal tenderness. There is no guarding or rebound.      Hernia: No hernia is present.   Musculoskeletal: Normal range of motion.      Comments: Trace bilat lower leg edema   Lymphadenopathy:      Cervical: No cervical adenopathy.   Skin:     Findings: No lesion.   Neurological:      Mental Status: He is alert and oriented to person, place, and time.   Psychiatric:         Thought Content: Thought content normal.         Assessment:       1. Acute on chronic systolic congestive heart failure    2. Chronic systolic (congestive) heart failure    3. Paroxysmal atrial fibrillation    4. Chronic obstructive pulmonary disease, unspecified COPD type    5. HTN (hypertension), benign        Plan:       Paul was seen today for follow-up.    Diagnoses and all orders for this visit:    Acute on chronic systolic congestive heart failure  Watch for continued wt gain    Chronic systolic (congestive) heart failure  -     losartan (COZAAR) 50 MG tablet; Take 1 tablet (50 mg total) by mouth once daily.  -     Ambulatory referral/consult to Cardiology; Future  Add ARB since he is not taking entresto 2/2 cost. Also needs btr BP control    Paroxysmal atrial fibrillation  On coumadin, has good rate control    Chronic obstructive pulmonary disease, unspecified COPD type  Again counseled on smoking cessation    HTN (hypertension), benign  -     losartan (COZAAR) 50 MG tablet; Take 1 tablet (50 mg total) by mouth once daily.        Health Maintenance       Date Due Completion Date    TETANUS VACCINE 12/29/1971 ---    Pneumococcal Vaccine (65+ Low/Medium Risk) (2 of 2 - PPSV23) 01/18/2020 1/18/2019    Influenza Vaccine (1) 08/01/2020 9/17/2019    Colorectal Cancer Screening  11/05/2020 11/5/2019    Lipid Panel 05/12/2025 5/12/2020        Patient Instructions   Please get the Flu and pneumovax 23 vaccines          Follow up in about 4 months (around 1/14/2021).    Future Appointments   Date Time Provider Department Center   10/5/2020  9:00 AM Irene Chacon MD [facility]   1/11/2021  8:20 AM Tye Concepcion MD [facility]

## 2020-09-23 ENCOUNTER — ANTI-COAG VISIT (OUTPATIENT)
Dept: CARDIOLOGY | Facility: CLINIC | Age: 65
End: 2020-09-23
Payer: COMMERCIAL

## 2020-09-23 DIAGNOSIS — Z95.2 STATUS POST HEART VALVE REPLACEMENT WITH MECHANICAL VALVE: ICD-10-CM

## 2020-09-23 DIAGNOSIS — Z79.01 ANTICOAGULATED ON COUMADIN: ICD-10-CM

## 2020-09-23 LAB — INR PPP: 2.7

## 2020-09-23 PROCEDURE — 93793 PR ANTICOAGULANT MGMT FOR PT TAKING WARFARIN: ICD-10-PCS | Mod: S$GLB,,,

## 2020-09-23 PROCEDURE — 93793 ANTICOAG MGMT PT WARFARIN: CPT | Mod: S$GLB,,,

## 2020-10-01 ENCOUNTER — PATIENT OUTREACH (OUTPATIENT)
Dept: ADMINISTRATIVE | Facility: OTHER | Age: 65
End: 2020-10-01

## 2020-10-01 DIAGNOSIS — Z12.11 ENCOUNTER FOR FIT (FECAL IMMUNOCHEMICAL TEST) SCREENING: Primary | ICD-10-CM

## 2020-10-01 NOTE — PROGRESS NOTES
Requested updates within Care Everywhere.  Patient's chart was reviewed for overdue DICK topics.  Immunizations reconciled.    Orders placed:[employer]kit  Tasked appts:n/a  Labs Linked:n/a

## 2020-10-05 ENCOUNTER — OFFICE VISIT (OUTPATIENT)
Dept: CARDIOLOGY | Facility: CLINIC | Age: 65
End: 2020-10-05
Payer: COMMERCIAL

## 2020-10-05 ENCOUNTER — LAB VISIT (OUTPATIENT)
Dept: LAB | Facility: HOSPITAL | Age: 65
End: 2020-10-05
Attending: INTERNAL MEDICINE
Payer: COMMERCIAL

## 2020-10-05 VITALS
SYSTOLIC BLOOD PRESSURE: 157 MMHG | WEIGHT: 151.88 LBS | HEIGHT: 69 IN | HEART RATE: 75 BPM | OXYGEN SATURATION: 98 % | BODY MASS INDEX: 22.49 KG/M2 | DIASTOLIC BLOOD PRESSURE: 82 MMHG

## 2020-10-05 DIAGNOSIS — I50.22 CHRONIC SYSTOLIC (CONGESTIVE) HEART FAILURE: ICD-10-CM

## 2020-10-05 DIAGNOSIS — Z95.2 STATUS POST HEART VALVE REPLACEMENT WITH MECHANICAL VALVE: ICD-10-CM

## 2020-10-05 DIAGNOSIS — I10 HTN (HYPERTENSION), BENIGN: Primary | ICD-10-CM

## 2020-10-05 DIAGNOSIS — I48.0 PAROXYSMAL ATRIAL FIBRILLATION: ICD-10-CM

## 2020-10-05 DIAGNOSIS — Z79.01 ANTICOAGULATED ON COUMADIN: ICD-10-CM

## 2020-10-05 DIAGNOSIS — N18.2 CKD (CHRONIC KIDNEY DISEASE) STAGE 2, GFR 60-89 ML/MIN: ICD-10-CM

## 2020-10-05 LAB
ALBUMIN SERPL BCP-MCNC: 4.2 G/DL (ref 3.5–5.2)
ALP SERPL-CCNC: 55 U/L (ref 55–135)
ALT SERPL W/O P-5'-P-CCNC: 26 U/L (ref 10–44)
ANION GAP SERPL CALC-SCNC: 10 MMOL/L (ref 8–16)
AST SERPL-CCNC: 32 U/L (ref 10–40)
BILIRUB SERPL-MCNC: 1.1 MG/DL (ref 0.1–1)
BUN SERPL-MCNC: 24 MG/DL (ref 8–23)
CALCIUM SERPL-MCNC: 9.9 MG/DL (ref 8.7–10.5)
CHLORIDE SERPL-SCNC: 95 MMOL/L (ref 95–110)
CO2 SERPL-SCNC: 36 MMOL/L (ref 23–29)
CREAT SERPL-MCNC: 1.1 MG/DL (ref 0.5–1.4)
EST. GFR  (AFRICAN AMERICAN): >60 ML/MIN/1.73 M^2
EST. GFR  (NON AFRICAN AMERICAN): >60 ML/MIN/1.73 M^2
GLUCOSE SERPL-MCNC: 99 MG/DL (ref 70–110)
POTASSIUM SERPL-SCNC: 3.3 MMOL/L (ref 3.5–5.1)
PROT SERPL-MCNC: 8.2 G/DL (ref 6–8.4)
SODIUM SERPL-SCNC: 141 MMOL/L (ref 136–145)

## 2020-10-05 PROCEDURE — 36415 COLL VENOUS BLD VENIPUNCTURE: CPT

## 2020-10-05 PROCEDURE — 99214 OFFICE O/P EST MOD 30 MIN: CPT | Mod: S$GLB,,, | Performed by: INTERNAL MEDICINE

## 2020-10-05 PROCEDURE — 99999 PR PBB SHADOW E&M-EST. PATIENT-LVL V: ICD-10-PCS | Mod: PBBFAC,,, | Performed by: INTERNAL MEDICINE

## 2020-10-05 PROCEDURE — 99999 PR PBB SHADOW E&M-EST. PATIENT-LVL V: CPT | Mod: PBBFAC,,, | Performed by: INTERNAL MEDICINE

## 2020-10-05 PROCEDURE — 99214 PR OFFICE/OUTPT VISIT, EST, LEVL IV, 30-39 MIN: ICD-10-PCS | Mod: S$GLB,,, | Performed by: INTERNAL MEDICINE

## 2020-10-05 PROCEDURE — 99215 OFFICE O/P EST HI 40 MIN: CPT | Mod: PBBFAC | Performed by: INTERNAL MEDICINE

## 2020-10-05 PROCEDURE — 80053 COMPREHEN METABOLIC PANEL: CPT

## 2020-10-05 NOTE — LETTER
October 5, 2020      Tye Concepcion MD  [address]  [place] [facility] [address]           [facility] 3rd Floor  [address]  [facility] [address]  Phone: [phone]          Patient: Paul Villalobos Sr.   MR Number: [id number]   YOB: 1955   Date of Visit: 10/5/2020       Dear Dr. Tye Concepcion:    Thank you for referring Paul Villalobos to me for evaluation. Attached you will find relevant portions of my assessment and plan of care.    If you have questions, please do not hesitate to call me. I look forward to following Paul Villalobos along with you.    Sincerely,    Irene Chacon MD    Enclosure  CC:  No Recipients    If you would like to receive this communication electronically, please contact [email or web address] or [phone] to request more information on [employer] access.    For providers and/or their staff who would like to refer a patient to [facility], please contact us through our one-stop-shop provider referral line, [facility] , at [phone].    If you feel you have received this communication in error or would no longer like to receive these types of communications, please e-mail [email or web address]

## 2020-10-05 NOTE — PROGRESS NOTES
Subjective:   Patient ID:  Paul Villalobos Sr. is a 64 y.o. male who presents for follow-up of Chronic systolic congestive heart failure, NYHA class 3    Paul Villalobos Sr. is a 63 y.o. male who presents for follow-up of Chest Pain (ER fu 07/10/19)  The patient is a 63 y.o. male with co-morbidities including: CHF, asthma, HTN, pulmonary HTN, portal HTN, and cirrhosis, and surgical history of mitral valve and aortic valve replacement     HPI:   Recent CHF exacerbation and was hospitalized.   Does not exercise  LEIVA on moderate activity.  Patient does not want to get a defibrillator.       Echo 2019:  · Mildly decreased left ventricular systolic function. The estimated ejection fraction is 45%  · Low normal right ventricular systolic function.  · Severe left atrial enlargement.  · Mild right atrial enlargement.  · There is a normally functioning mechanical aortic valve present.  · There is a normally functioning mechanical mitral valve prosthesis. The mean gradient at a heart rate near 60bpm is 3mm Hg.  · The estimated PA systolic pressure is 52 mm Hg  · Intermediate central venous pressure (8 mm Hg).       HPI:   Pressure is high not taken morning medicine  No chest pain, Orthopnea, PND of heart failure symptoms.   Denies palpitations or fluttering in the chest  Prior BP reviewed and  His pressures are all over the place. He did not take his meds today.  Weight is appear unchanged.   Patient was prescribed oxygen in the past and he says that he needs oxygen.       Pulmonary stress test    Six minute walk distance is 91.44 meters (300 feet) with very   heavy dyspnea.   During exercise, there was no significant desaturation while   breathing room air.   Both blood pressure and heart rate remained stable with walking.   The patient reported non-pulmonary symptoms during exercise.   Severe exercise impairment is likely due to subjective symptoms.   The patient did complete the study, walking 360 seconds of the   360 second test.  "  No previous study performed.   Based upon age and body mass index, exercise capacity is less   than predicted    Patient Active Problem List   Diagnosis    Status post heart valve replacement with mechanical valve    HTN (hypertension), benign    Tobacco abuse    Chronic systolic (congestive) heart failure    Hyperlipidemia    Anticoagulated on Coumadin    H/O hepatitis C    CKD (chronic kidney disease) stage 2, GFR 60-89 ml/min    Asymptomatic cholelithiasis    Proteinuria    Right thyroid nodule    Paroxysmal atrial fibrillation    Hepatic cirrhosis due to chronic hepatitis C infection    Asthmatic bronchitis    Erectile dysfunction    Hypotension    Elevated troponin    Acute bilateral thoracic back pain    BPH with urinary obstruction    Portal hypertension    COPD exacerbation    Acute on chronic systolic congestive heart failure    Pulmonary hypertension     BP (!) 157/82 (BP Location: Left arm, Patient Position: Sitting, BP Method: Medium (Automatic))   Pulse 75   Ht 5' 9" (1.753 m)   Wt 68.9 kg (151 lb 14.4 oz)   SpO2 98%   BMI 22.43 kg/m²   Body mass index is 22.43 kg/m².  CrCl cannot be calculated (Patient's most recent lab result is older than the maximum 7 days allowed.).    Lab Results   Component Value Date     05/12/2020    K 3.3 (L) 05/12/2020    CL 96 05/12/2020    CO2 38 (H) 05/12/2020    BUN 33 (H) 05/12/2020    CREATININE 1.3 05/12/2020    GLU 79 05/12/2020    HGBA1C 5.6 07/11/2019    MG 1.8 07/11/2019    AST 29 05/12/2020    ALT 22 05/12/2020    ALBUMIN 4.1 05/12/2020    PROT 8.1 05/12/2020    BILITOT 0.6 05/12/2020    WBC 3.74 (L) 05/12/2020    HGB 13.8 (L) 05/12/2020    HCT 44.5 05/12/2020     (H) 05/12/2020     05/12/2020    INR 2.7 09/23/2020    INR 3.8 (H) 12/14/2009    PSA 0.73 04/03/2014    TSH 1.068 03/27/2013    CHOL 204 (H) 05/12/2020    HDL 56 05/12/2020    LDLCALC 137.0 05/12/2020    TRIG 55 05/12/2020       Current Outpatient " Medications   Medication Sig    albuterol-ipratropium (DUO-NEB) 2.5 mg-0.5 mg/3 mL nebulizer solution USE 3 ML VIA NEBULIZER EVERY 6 HOURS AS NEEDED FOR WHEEZING    carvedilol (COREG) 12.5 MG tablet TAKE 1 TABLET(12.5 MG) BY MOUTH TWICE DAILY    COMBIVENT RESPIMAT  mcg/actuation inhaler INHALE 1 PUFF INTO THE LUNGS EVERY 4 HOURS AS NEEDED FOR WHEEZING    finasteride (PROSCAR) 5 mg tablet TAKE 1 TABLET(5 MG) BY MOUTH EVERY DAY    fluticasone (FLONASE) 50 mcg/actuation nasal spray SPRAY TWICE IN EACH NOSTRIL EVERY DAY    fluticasone furoate-vilanterol (BREO) 100-25 mcg/dose diskus inhaler Inhale 1 puff into the lungs once daily. Controller    furosemide (LASIX) 80 MG tablet Take 1 tablet (80 mg total) by mouth once daily.    hydroCHLOROthiazide (HYDRODIURIL) 25 MG tablet Take 1 tablet (25 mg total) by mouth once daily.    losartan (COZAAR) 50 MG tablet Take 1 tablet (50 mg total) by mouth once daily.    polyethylene glycol (GLYCOLAX) 17 gram/dose powder MIX 17G( 1 CAPFUL) IN 8 OUNCES OF WATER OR JUICE AND DRINK DAILY    potassium chloride SA (K-DUR,KLOR-CON) 20 MEQ tablet TAKE 2 TABLETS(40 MEQ) BY MOUTH EVERY DAY    warfarin (COUMADIN) 5 MG tablet TAKE 1 AND 1/2 TABLETS BY MOUTH DAILY ON SATUDAY AND SUNDAY AND 2 TABLETS ALL OTHER DAYS AS DIRECTED    spironolactone (ALDACTONE) 25 MG tablet Take 1 tablet (25 mg total) by mouth once daily.     No current facility-administered medications for this visit.        Review of Systems   Constitution: Negative for chills, decreased appetite, malaise/fatigue, night sweats, weight gain and weight loss.   Eyes: Negative for blurred vision, double vision, visual disturbance and visual halos.   Cardiovascular: Negative for chest pain, claudication, cyanosis, dyspnea on exertion, irregular heartbeat, leg swelling, near-syncope, orthopnea, palpitations, paroxysmal nocturnal dyspnea and syncope.   Respiratory: Negative for cough, hemoptysis, snoring, sputum production  and wheezing.    Endocrine: Negative for cold intolerance, heat intolerance, polydipsia and polyphagia.   Hematologic/Lymphatic: Negative for adenopathy and bleeding problem. Does not bruise/bleed easily.   Skin: Negative for flushing, itching, poor wound healing and rash.   Musculoskeletal: Negative for arthritis, back pain, falls, gout, joint pain, joint swelling, muscle cramps, muscle weakness, myalgias, neck pain and stiffness.   Gastrointestinal: Negative for bloating, abdominal pain, anorexia, diarrhea, dysphagia, excessive appetite, flatus, hematemesis, jaundice, melena and nausea.   Genitourinary: Negative for hesitancy and incomplete emptying.   Neurological: Negative for aphonia, brief paralysis, difficulty with concentration, disturbances in coordination, excessive daytime sleepiness, dizziness, focal weakness, light-headedness, loss of balance and weakness.   Psychiatric/Behavioral: Negative for altered mental status, depression, hallucinations, hypervigilance, memory loss, substance abuse and suicidal ideas. The patient does not have insomnia and is not nervous/anxious.        Objective:   Physical Exam   Constitutional: He is oriented to person, place, and time. He appears well-developed and well-nourished. No distress.   HENT:   Head: Normocephalic and atraumatic.   Nose: Nose normal.   Mouth/Throat: No oropharyngeal exudate.   Eyes: Pupils are equal, round, and reactive to light. Conjunctivae and EOM are normal. Right eye exhibits no discharge. Left eye exhibits no discharge. No scleral icterus.   Neck: Normal range of motion. Neck supple. No JVD present. No tracheal deviation present. No thyromegaly present.   Cardiovascular: Normal rate, regular rhythm, normal heart sounds and intact distal pulses. Exam reveals no gallop and no friction rub.   No murmur heard.  Mechanical click of the mitral and the aortic valve   Pulmonary/Chest: Effort normal and breath sounds normal. No stridor. No respiratory  distress. He has no wheezes. He has no rales. He exhibits no tenderness.   Abdominal: Soft. Bowel sounds are normal. He exhibits no distension and no mass. There is no abdominal tenderness.   Musculoskeletal:         General: No tenderness or edema.   Lymphadenopathy:     He has no cervical adenopathy.   Neurological: He is alert and oriented to person, place, and time. He displays normal reflexes. No cranial nerve deficit. He exhibits normal muscle tone. Coordination normal.   Skin: Skin is warm. No rash noted. He is not diaphoretic. No erythema. No pallor.   Psychiatric: He has a normal mood and affect. His behavior is normal. Judgment and thought content normal.       Assessment:     1. HTN (hypertension), benign    2. Chronic systolic (congestive) heart failure    3. Paroxysmal atrial fibrillation    4. CKD (chronic kidney disease) stage 2, GFR 60-89 ml/min    5. Status post heart valve replacement with mechanical valve    6. Anticoagulated on Coumadin        Plan:     Patient is is saturating well and is not hypoxic on ambulation as well. His EF is not poor either and his Pulmonary stress test done in the past does not indicate that he needs oxygen, I have explained that to him. I will see him with a BP diary in 4 mo. Continue the current management. Diet liberal in sodium.     Paul was seen today for chronic systolic congestive heart failure, nyha class 3.    Diagnoses and all orders for this visit:    HTN (hypertension), benign    Chronic systolic (congestive) heart failure  -     Ambulatory referral/consult to Cardiology  -     Comprehensive Metabolic Panel; Future    Paroxysmal atrial fibrillation    CKD (chronic kidney disease) stage 2, GFR 60-89 ml/min    Status post heart valve replacement with mechanical valve    Anticoagulated on Coumadin      RTC 4 months.

## 2020-10-08 ENCOUNTER — ANTI-COAG VISIT (OUTPATIENT)
Dept: CARDIOLOGY | Facility: CLINIC | Age: 65
End: 2020-10-08
Payer: COMMERCIAL

## 2020-10-08 DIAGNOSIS — Z95.2 STATUS POST HEART VALVE REPLACEMENT WITH MECHANICAL VALVE: ICD-10-CM

## 2020-10-08 DIAGNOSIS — Z79.01 ANTICOAGULATED ON COUMADIN: ICD-10-CM

## 2020-10-08 LAB — INR PPP: 2.4

## 2020-10-08 PROCEDURE — 93793 PR ANTICOAGULANT MGMT FOR PT TAKING WARFARIN: ICD-10-PCS | Mod: ,,,

## 2020-10-08 PROCEDURE — 93793 ANTICOAG MGMT PT WARFARIN: CPT | Mod: ,,,

## 2020-10-15 ENCOUNTER — TELEPHONE (OUTPATIENT)
Dept: CARDIOLOGY | Facility: CLINIC | Age: 65
End: 2020-10-15

## 2020-10-21 ENCOUNTER — ANTI-COAG VISIT (OUTPATIENT)
Dept: CARDIOLOGY | Facility: CLINIC | Age: 65
End: 2020-10-21
Payer: COMMERCIAL

## 2020-10-21 DIAGNOSIS — Z95.2 STATUS POST HEART VALVE REPLACEMENT WITH MECHANICAL VALVE: ICD-10-CM

## 2020-10-21 DIAGNOSIS — Z79.01 ANTICOAGULATED ON COUMADIN: ICD-10-CM

## 2020-10-21 LAB — INR PPP: 3.6

## 2020-10-21 PROCEDURE — 93793 ANTICOAG MGMT PT WARFARIN: CPT | Mod: ,,,

## 2020-10-21 PROCEDURE — 93793 PR ANTICOAGULANT MGMT FOR PT TAKING WARFARIN: ICD-10-PCS | Mod: ,,,

## 2020-11-04 ENCOUNTER — ANTI-COAG VISIT (OUTPATIENT)
Dept: CARDIOLOGY | Facility: CLINIC | Age: 65
End: 2020-11-04
Payer: COMMERCIAL

## 2020-11-04 DIAGNOSIS — Z79.01 ANTICOAGULATED ON COUMADIN: ICD-10-CM

## 2020-11-04 DIAGNOSIS — Z95.2 STATUS POST HEART VALVE REPLACEMENT WITH MECHANICAL VALVE: ICD-10-CM

## 2020-11-04 LAB — INR PPP: 2.3

## 2020-11-04 PROCEDURE — 93793 PR ANTICOAGULANT MGMT FOR PT TAKING WARFARIN: ICD-10-PCS | Mod: ,,,

## 2020-11-04 PROCEDURE — 93793 ANTICOAG MGMT PT WARFARIN: CPT | Mod: ,,,

## 2020-11-09 ENCOUNTER — TELEPHONE (OUTPATIENT)
Dept: INTERNAL MEDICINE | Facility: CLINIC | Age: 65
End: 2020-11-09

## 2020-11-09 DIAGNOSIS — N18.2 CKD (CHRONIC KIDNEY DISEASE) STAGE 2, GFR 60-89 ML/MIN: ICD-10-CM

## 2020-11-09 DIAGNOSIS — I50.22 CHRONIC SYSTOLIC CONGESTIVE HEART FAILURE, NYHA CLASS 3: ICD-10-CM

## 2020-11-09 RX ORDER — FUROSEMIDE 80 MG/1
80 TABLET ORAL DAILY
Qty: 90 TABLET | Refills: 11 | Status: SHIPPED | OUTPATIENT
Start: 2020-11-09

## 2020-11-09 NOTE — TELEPHONE ENCOUNTER
----- Message from Azam Lu sent at 11/9/2020  2:54 PM CST -----  Regarding: Refill  Contact: CLAIRE DOCKERY SR. [[id number]]      Can the clinic reply in [facility]: no      Please refill the medication(s) listed below. Please call the patient when the prescription(s) is ready for  at this phone number   [phone]      Medication #1 furosemide (LASIX) 80 MG tablet      [id number] - [place], [place] - [address] AT [facility]

## 2020-11-18 ENCOUNTER — ANTI-COAG VISIT (OUTPATIENT)
Dept: CARDIOLOGY | Facility: CLINIC | Age: 65
End: 2020-11-18
Payer: COMMERCIAL

## 2020-11-18 DIAGNOSIS — Z79.01 ANTICOAGULATED ON COUMADIN: ICD-10-CM

## 2020-11-18 DIAGNOSIS — Z95.2 STATUS POST HEART VALVE REPLACEMENT WITH MECHANICAL VALVE: ICD-10-CM

## 2020-11-18 LAB — INR PPP: 2.8

## 2020-11-18 PROCEDURE — 93793 ANTICOAG MGMT PT WARFARIN: CPT | Mod: ,,,

## 2020-11-18 PROCEDURE — 93793 PR ANTICOAGULANT MGMT FOR PT TAKING WARFARIN: ICD-10-PCS | Mod: ,,,

## 2020-11-30 DIAGNOSIS — I50.22 CHRONIC SYSTOLIC CONGESTIVE HEART FAILURE, NYHA CLASS 3: ICD-10-CM

## 2020-11-30 DIAGNOSIS — I10 HTN (HYPERTENSION), BENIGN: ICD-10-CM

## 2020-11-30 RX ORDER — CARVEDILOL 12.5 MG/1
12.5 TABLET ORAL 2 TIMES DAILY
Qty: 180 TABLET | Refills: 11 | Status: SHIPPED | OUTPATIENT
Start: 2020-11-30

## 2020-12-02 ENCOUNTER — ANTI-COAG VISIT (OUTPATIENT)
Dept: CARDIOLOGY | Facility: CLINIC | Age: 65
End: 2020-12-02
Payer: COMMERCIAL

## 2020-12-02 DIAGNOSIS — Z95.2 STATUS POST HEART VALVE REPLACEMENT WITH MECHANICAL VALVE: ICD-10-CM

## 2020-12-02 DIAGNOSIS — Z79.01 ANTICOAGULATED ON COUMADIN: ICD-10-CM

## 2020-12-02 LAB — INR PPP: 3.4

## 2020-12-02 PROCEDURE — 93793 ANTICOAG MGMT PT WARFARIN: CPT | Mod: ,,,

## 2020-12-02 PROCEDURE — 93793 PR ANTICOAGULANT MGMT FOR PT TAKING WARFARIN: ICD-10-PCS | Mod: ,,,

## 2020-12-11 ENCOUNTER — PATIENT MESSAGE (OUTPATIENT)
Dept: OTHER | Facility: OTHER | Age: 65
End: 2020-12-11

## 2020-12-16 ENCOUNTER — ANTI-COAG VISIT (OUTPATIENT)
Dept: CARDIOLOGY | Facility: CLINIC | Age: 65
End: 2020-12-16
Payer: COMMERCIAL

## 2020-12-16 DIAGNOSIS — Z79.01 ANTICOAGULATED ON COUMADIN: ICD-10-CM

## 2020-12-16 DIAGNOSIS — Z95.2 STATUS POST HEART VALVE REPLACEMENT WITH MECHANICAL VALVE: ICD-10-CM

## 2020-12-16 LAB — INR PPP: 2.2

## 2020-12-16 PROCEDURE — 93793 PR ANTICOAGULANT MGMT FOR PT TAKING WARFARIN: ICD-10-PCS | Mod: S$GLB,,,

## 2020-12-16 PROCEDURE — 93793 ANTICOAG MGMT PT WARFARIN: CPT | Mod: S$GLB,,,

## 2020-12-16 NOTE — PROGRESS NOTES
Pt reports he started fasting from 5am -5pm daily and only eats spinach salads  and gatorade after breaking fast. Last day of fasting will be 10/21. He denies any other changes

## 2021-01-04 ENCOUNTER — ANTI-COAG VISIT (OUTPATIENT)
Dept: CARDIOLOGY | Facility: CLINIC | Age: 66
End: 2021-01-04
Payer: COMMERCIAL

## 2021-01-04 DIAGNOSIS — Z95.2 STATUS POST HEART VALVE REPLACEMENT WITH MECHANICAL VALVE: ICD-10-CM

## 2021-01-04 DIAGNOSIS — Z79.01 ANTICOAGULATED ON COUMADIN: Primary | ICD-10-CM

## 2021-01-04 LAB — INR PPP: 1.4

## 2021-01-04 PROCEDURE — 93793 PR ANTICOAGULANT MGMT FOR PT TAKING WARFARIN: ICD-10-PCS | Mod: S$GLB,,,

## 2021-01-04 PROCEDURE — 93793 ANTICOAG MGMT PT WARFARIN: CPT | Mod: S$GLB,,,

## 2021-01-04 RX ORDER — ENOXAPARIN SODIUM 100 MG/ML
70 INJECTION SUBCUTANEOUS EVERY 12 HOURS
Qty: 8 ML | Refills: 1 | Status: SHIPPED | OUTPATIENT
Start: 2021-01-04 | End: 2021-07-25

## 2021-01-04 RX ORDER — ENOXAPARIN SODIUM 100 MG/ML
70 INJECTION SUBCUTANEOUS EVERY 12 HOURS
Qty: 10 SYRINGE | Refills: 1 | Status: SHIPPED | OUTPATIENT
Start: 2021-01-04 | End: 2021-01-04

## 2021-01-05 ENCOUNTER — OFFICE VISIT (OUTPATIENT)
Dept: INTERNAL MEDICINE | Facility: CLINIC | Age: 66
End: 2021-01-05
Payer: COMMERCIAL

## 2021-01-05 VITALS
SYSTOLIC BLOOD PRESSURE: 118 MMHG | HEART RATE: 78 BPM | BODY MASS INDEX: 23.41 KG/M2 | OXYGEN SATURATION: 95 % | HEIGHT: 69 IN | WEIGHT: 158.06 LBS | DIASTOLIC BLOOD PRESSURE: 72 MMHG

## 2021-01-05 DIAGNOSIS — I48.0 PAROXYSMAL ATRIAL FIBRILLATION: ICD-10-CM

## 2021-01-05 DIAGNOSIS — Z72.0 TOBACCO ABUSE: ICD-10-CM

## 2021-01-05 DIAGNOSIS — I50.22 CHRONIC SYSTOLIC CONGESTIVE HEART FAILURE, NYHA CLASS 3: Primary | ICD-10-CM

## 2021-01-05 DIAGNOSIS — J44.9 CHRONIC OBSTRUCTIVE PULMONARY DISEASE, UNSPECIFIED COPD TYPE: ICD-10-CM

## 2021-01-05 PROCEDURE — 99999 PR PBB SHADOW E&M-EST. PATIENT-LVL IV: ICD-10-PCS | Mod: PBBFAC,,, | Performed by: INTERNAL MEDICINE

## 2021-01-05 PROCEDURE — 99999 PR PBB SHADOW E&M-EST. PATIENT-LVL IV: CPT | Mod: PBBFAC,,, | Performed by: INTERNAL MEDICINE

## 2021-01-05 PROCEDURE — 99214 PR OFFICE/OUTPT VISIT, EST, LEVL IV, 30-39 MIN: ICD-10-PCS | Mod: S$GLB,,, | Performed by: INTERNAL MEDICINE

## 2021-01-05 PROCEDURE — 99214 OFFICE O/P EST MOD 30 MIN: CPT | Mod: S$GLB,,, | Performed by: INTERNAL MEDICINE

## 2021-01-07 LAB — INR PPP: 3.6

## 2021-01-08 ENCOUNTER — ANTI-COAG VISIT (OUTPATIENT)
Dept: CARDIOLOGY | Facility: CLINIC | Age: 66
End: 2021-01-08
Payer: COMMERCIAL

## 2021-01-08 DIAGNOSIS — Z79.01 ANTICOAGULATED ON COUMADIN: ICD-10-CM

## 2021-01-08 DIAGNOSIS — Z95.2 STATUS POST HEART VALVE REPLACEMENT WITH MECHANICAL VALVE: ICD-10-CM

## 2021-01-08 PROCEDURE — 93793 PR ANTICOAGULANT MGMT FOR PT TAKING WARFARIN: ICD-10-PCS | Mod: S$GLB,,,

## 2021-01-08 PROCEDURE — 93793 ANTICOAG MGMT PT WARFARIN: CPT | Mod: S$GLB,,,

## 2021-01-13 ENCOUNTER — ANTI-COAG VISIT (OUTPATIENT)
Dept: CARDIOLOGY | Facility: CLINIC | Age: 66
End: 2021-01-13
Payer: COMMERCIAL

## 2021-01-13 DIAGNOSIS — Z95.2 STATUS POST HEART VALVE REPLACEMENT WITH MECHANICAL VALVE: ICD-10-CM

## 2021-01-13 DIAGNOSIS — Z79.01 ANTICOAGULATED ON COUMADIN: ICD-10-CM

## 2021-01-13 LAB — INR PPP: 2.4

## 2021-01-13 PROCEDURE — 93793 PR ANTICOAGULANT MGMT FOR PT TAKING WARFARIN: ICD-10-PCS | Mod: S$GLB,,,

## 2021-01-13 PROCEDURE — 93793 ANTICOAG MGMT PT WARFARIN: CPT | Mod: S$GLB,,,

## 2021-01-21 ENCOUNTER — TELEPHONE (OUTPATIENT)
Dept: INTERNAL MEDICINE | Facility: CLINIC | Age: 66
End: 2021-01-21

## 2021-01-21 DIAGNOSIS — I50.22 CHRONIC SYSTOLIC (CONGESTIVE) HEART FAILURE: Primary | ICD-10-CM

## 2021-01-21 DIAGNOSIS — I50.23 ACUTE ON CHRONIC SYSTOLIC CONGESTIVE HEART FAILURE: ICD-10-CM

## 2021-01-21 RX ORDER — METOLAZONE 2.5 MG/1
TABLET ORAL
COMMUNITY
Start: 2020-11-10 | End: 2021-01-21

## 2021-01-21 RX ORDER — METOLAZONE 2.5 MG/1
TABLET ORAL
Qty: 30 TABLET | Refills: 1 | Status: SHIPPED | OUTPATIENT
Start: 2021-01-21 | End: 2021-04-05 | Stop reason: SDUPTHER

## 2021-01-27 ENCOUNTER — ANTI-COAG VISIT (OUTPATIENT)
Dept: CARDIOLOGY | Facility: CLINIC | Age: 66
End: 2021-01-27
Payer: COMMERCIAL

## 2021-01-27 DIAGNOSIS — Z95.2 STATUS POST HEART VALVE REPLACEMENT WITH MECHANICAL VALVE: Primary | ICD-10-CM

## 2021-01-27 DIAGNOSIS — Z79.01 ANTICOAGULATED ON COUMADIN: ICD-10-CM

## 2021-01-27 LAB — INR PPP: 2.3

## 2021-01-27 PROCEDURE — 93793 ANTICOAG MGMT PT WARFARIN: CPT | Mod: S$GLB,,,

## 2021-01-27 PROCEDURE — 93793 PR ANTICOAGULANT MGMT FOR PT TAKING WARFARIN: ICD-10-PCS | Mod: S$GLB,,,

## 2021-02-08 ENCOUNTER — TELEPHONE (OUTPATIENT)
Dept: INTERNAL MEDICINE | Facility: CLINIC | Age: 66
End: 2021-02-08

## 2021-02-10 ENCOUNTER — ANTI-COAG VISIT (OUTPATIENT)
Dept: CARDIOLOGY | Facility: CLINIC | Age: 66
End: 2021-02-10
Payer: COMMERCIAL

## 2021-02-10 DIAGNOSIS — Z95.2 STATUS POST HEART VALVE REPLACEMENT WITH MECHANICAL VALVE: Primary | ICD-10-CM

## 2021-02-10 DIAGNOSIS — Z79.01 ANTICOAGULATED ON COUMADIN: ICD-10-CM

## 2021-02-10 LAB — INR PPP: 3.2

## 2021-02-10 PROCEDURE — 93793 PR ANTICOAGULANT MGMT FOR PT TAKING WARFARIN: ICD-10-PCS | Mod: S$GLB,,,

## 2021-02-10 PROCEDURE — 93793 ANTICOAG MGMT PT WARFARIN: CPT | Mod: S$GLB,,,

## 2021-02-18 ENCOUNTER — IMMUNIZATION (OUTPATIENT)
Dept: INTERNAL MEDICINE | Facility: CLINIC | Age: 66
End: 2021-02-18
Payer: COMMERCIAL

## 2021-02-18 DIAGNOSIS — Z23 NEED FOR VACCINATION: Primary | ICD-10-CM

## 2021-02-18 PROCEDURE — 91300 COVID-19, MRNA, LNP-S, PF, 30 MCG/0.3 ML DOSE VACCINE: CPT | Mod: PBBFAC | Performed by: INTERNAL MEDICINE

## 2021-02-24 ENCOUNTER — ANTI-COAG VISIT (OUTPATIENT)
Dept: CARDIOLOGY | Facility: CLINIC | Age: 66
End: 2021-02-24
Payer: COMMERCIAL

## 2021-02-24 DIAGNOSIS — Z95.2 STATUS POST HEART VALVE REPLACEMENT WITH MECHANICAL VALVE: Primary | ICD-10-CM

## 2021-02-24 DIAGNOSIS — Z79.01 ANTICOAGULATED ON COUMADIN: ICD-10-CM

## 2021-02-24 LAB — INR PPP: 4.2

## 2021-02-24 PROCEDURE — 93793 PR ANTICOAGULANT MGMT FOR PT TAKING WARFARIN: ICD-10-PCS | Mod: S$GLB,,,

## 2021-02-24 PROCEDURE — 93793 ANTICOAG MGMT PT WARFARIN: CPT | Mod: S$GLB,,,

## 2021-03-04 ENCOUNTER — ANTI-COAG VISIT (OUTPATIENT)
Dept: CARDIOLOGY | Facility: CLINIC | Age: 66
End: 2021-03-04
Payer: COMMERCIAL

## 2021-03-04 DIAGNOSIS — Z95.2 STATUS POST HEART VALVE REPLACEMENT WITH MECHANICAL VALVE: Primary | ICD-10-CM

## 2021-03-04 DIAGNOSIS — Z79.01 ANTICOAGULATED ON COUMADIN: ICD-10-CM

## 2021-03-04 LAB — INR PPP: 2.8

## 2021-03-04 PROCEDURE — 93793 ANTICOAG MGMT PT WARFARIN: CPT | Mod: S$GLB,,,

## 2021-03-04 PROCEDURE — 93793 PR ANTICOAGULANT MGMT FOR PT TAKING WARFARIN: ICD-10-PCS | Mod: S$GLB,,,

## 2021-03-11 ENCOUNTER — IMMUNIZATION (OUTPATIENT)
Dept: INTERNAL MEDICINE | Facility: CLINIC | Age: 66
End: 2021-03-11
Payer: COMMERCIAL

## 2021-03-11 DIAGNOSIS — Z23 NEED FOR VACCINATION: Primary | ICD-10-CM

## 2021-03-11 PROCEDURE — 0002A COVID-19, MRNA, LNP-S, PF, 30 MCG/0.3 ML DOSE VACCINE: CPT | Mod: CV19,S$GLB,, | Performed by: INTERNAL MEDICINE

## 2021-03-11 PROCEDURE — 91300 COVID-19, MRNA, LNP-S, PF, 30 MCG/0.3 ML DOSE VACCINE: CPT | Mod: S$GLB,,, | Performed by: INTERNAL MEDICINE

## 2021-03-11 PROCEDURE — 91300 COVID-19, MRNA, LNP-S, PF, 30 MCG/0.3 ML DOSE VACCINE: ICD-10-PCS | Mod: S$GLB,,, | Performed by: INTERNAL MEDICINE

## 2021-03-11 PROCEDURE — 0002A COVID-19, MRNA, LNP-S, PF, 30 MCG/0.3 ML DOSE VACCINE: ICD-10-PCS | Mod: CV19,S$GLB,, | Performed by: INTERNAL MEDICINE

## 2021-03-17 ENCOUNTER — ANTI-COAG VISIT (OUTPATIENT)
Dept: CARDIOLOGY | Facility: CLINIC | Age: 66
End: 2021-03-17
Payer: COMMERCIAL

## 2021-03-17 DIAGNOSIS — Z95.2 STATUS POST HEART VALVE REPLACEMENT WITH MECHANICAL VALVE: Primary | ICD-10-CM

## 2021-03-17 DIAGNOSIS — Z79.01 ANTICOAGULATED ON COUMADIN: ICD-10-CM

## 2021-03-17 LAB — INR PPP: 2.8

## 2021-03-17 PROCEDURE — 93793 ANTICOAG MGMT PT WARFARIN: CPT | Mod: S$GLB,,,

## 2021-03-17 PROCEDURE — 93793 PR ANTICOAGULANT MGMT FOR PT TAKING WARFARIN: ICD-10-PCS | Mod: S$GLB,,,

## 2021-03-31 ENCOUNTER — ANTI-COAG VISIT (OUTPATIENT)
Dept: CARDIOLOGY | Facility: CLINIC | Age: 66
End: 2021-03-31
Payer: COMMERCIAL

## 2021-03-31 DIAGNOSIS — Z95.2 STATUS POST HEART VALVE REPLACEMENT WITH MECHANICAL VALVE: Primary | ICD-10-CM

## 2021-03-31 DIAGNOSIS — Z79.01 ANTICOAGULATED ON COUMADIN: ICD-10-CM

## 2021-03-31 LAB — INR PPP: 3.2

## 2021-03-31 PROCEDURE — 93793 PR ANTICOAGULANT MGMT FOR PT TAKING WARFARIN: ICD-10-PCS | Mod: S$GLB,,,

## 2021-03-31 PROCEDURE — 93793 ANTICOAG MGMT PT WARFARIN: CPT | Mod: S$GLB,,,

## 2021-04-05 ENCOUNTER — PATIENT MESSAGE (OUTPATIENT)
Dept: ADMINISTRATIVE | Facility: HOSPITAL | Age: 66
End: 2021-04-05

## 2021-04-05 DIAGNOSIS — I50.22 CHRONIC SYSTOLIC (CONGESTIVE) HEART FAILURE: ICD-10-CM

## 2021-04-05 DIAGNOSIS — I50.23 ACUTE ON CHRONIC SYSTOLIC CONGESTIVE HEART FAILURE: ICD-10-CM

## 2021-04-05 RX ORDER — METOLAZONE 2.5 MG/1
TABLET ORAL
Qty: 30 TABLET | Refills: 11 | Status: ON HOLD | OUTPATIENT
Start: 2021-04-05 | End: 2021-07-27 | Stop reason: HOSPADM

## 2021-04-08 DIAGNOSIS — J44.9 CHRONIC OBSTRUCTIVE PULMONARY DISEASE, UNSPECIFIED COPD TYPE: ICD-10-CM

## 2021-04-08 RX ORDER — IPRATROPIUM BROMIDE AND ALBUTEROL 20; 100 UG/1; UG/1
SPRAY, METERED RESPIRATORY (INHALATION)
Qty: 4 G | Refills: 11 | Status: SHIPPED | OUTPATIENT
Start: 2021-04-08 | End: 2021-11-01

## 2021-04-14 ENCOUNTER — ANTI-COAG VISIT (OUTPATIENT)
Dept: CARDIOLOGY | Facility: CLINIC | Age: 66
End: 2021-04-14
Payer: COMMERCIAL

## 2021-04-14 DIAGNOSIS — Z79.01 ANTICOAGULATED ON COUMADIN: ICD-10-CM

## 2021-04-14 DIAGNOSIS — Z95.2 STATUS POST HEART VALVE REPLACEMENT WITH MECHANICAL VALVE: Primary | ICD-10-CM

## 2021-04-14 LAB — INR PPP: 4.2

## 2021-04-14 PROCEDURE — 93793 ANTICOAG MGMT PT WARFARIN: CPT | Mod: S$GLB,,,

## 2021-04-14 PROCEDURE — 93793 PR ANTICOAGULANT MGMT FOR PT TAKING WARFARIN: ICD-10-PCS | Mod: S$GLB,,,

## 2021-04-21 ENCOUNTER — ANTI-COAG VISIT (OUTPATIENT)
Dept: CARDIOLOGY | Facility: CLINIC | Age: 66
End: 2021-04-21
Payer: COMMERCIAL

## 2021-04-21 DIAGNOSIS — Z95.2 STATUS POST HEART VALVE REPLACEMENT WITH MECHANICAL VALVE: Primary | ICD-10-CM

## 2021-04-21 DIAGNOSIS — Z79.01 ANTICOAGULATED ON COUMADIN: ICD-10-CM

## 2021-04-21 LAB — INR PPP: 2.9

## 2021-04-21 PROCEDURE — 93793 PR ANTICOAGULANT MGMT FOR PT TAKING WARFARIN: ICD-10-PCS | Mod: S$GLB,,,

## 2021-04-21 PROCEDURE — 93793 ANTICOAG MGMT PT WARFARIN: CPT | Mod: S$GLB,,,

## 2021-04-28 ENCOUNTER — ANTI-COAG VISIT (OUTPATIENT)
Dept: CARDIOLOGY | Facility: CLINIC | Age: 66
End: 2021-04-28
Payer: COMMERCIAL

## 2021-04-28 DIAGNOSIS — Z79.01 ANTICOAGULATED ON COUMADIN: ICD-10-CM

## 2021-04-28 DIAGNOSIS — Z95.2 STATUS POST HEART VALVE REPLACEMENT WITH MECHANICAL VALVE: Primary | ICD-10-CM

## 2021-04-28 LAB — INR PPP: 3.6

## 2021-04-28 PROCEDURE — 93793 ANTICOAG MGMT PT WARFARIN: CPT | Mod: S$GLB,,,

## 2021-04-28 PROCEDURE — 93793 PR ANTICOAGULANT MGMT FOR PT TAKING WARFARIN: ICD-10-PCS | Mod: S$GLB,,,

## 2021-05-12 ENCOUNTER — ANTI-COAG VISIT (OUTPATIENT)
Dept: CARDIOLOGY | Facility: CLINIC | Age: 66
End: 2021-05-12
Payer: COMMERCIAL

## 2021-05-12 DIAGNOSIS — Z79.01 ANTICOAGULATED ON COUMADIN: ICD-10-CM

## 2021-05-12 DIAGNOSIS — Z95.2 STATUS POST HEART VALVE REPLACEMENT WITH MECHANICAL VALVE: Primary | ICD-10-CM

## 2021-05-12 LAB — INR PPP: 2.7

## 2021-05-12 PROCEDURE — 93793 PR ANTICOAGULANT MGMT FOR PT TAKING WARFARIN: ICD-10-PCS | Mod: S$GLB,,,

## 2021-05-12 PROCEDURE — 93793 ANTICOAG MGMT PT WARFARIN: CPT | Mod: S$GLB,,,

## 2021-05-26 ENCOUNTER — ANTI-COAG VISIT (OUTPATIENT)
Dept: CARDIOLOGY | Facility: CLINIC | Age: 66
End: 2021-05-26
Payer: COMMERCIAL

## 2021-05-26 DIAGNOSIS — Z79.01 ANTICOAGULATED ON COUMADIN: ICD-10-CM

## 2021-05-26 DIAGNOSIS — Z95.2 STATUS POST HEART VALVE REPLACEMENT WITH MECHANICAL VALVE: Primary | ICD-10-CM

## 2021-05-26 LAB — INR PPP: 4.1

## 2021-05-26 PROCEDURE — 93793 ANTICOAG MGMT PT WARFARIN: CPT | Mod: S$GLB,,,

## 2021-05-26 PROCEDURE — 93793 PR ANTICOAGULANT MGMT FOR PT TAKING WARFARIN: ICD-10-PCS | Mod: S$GLB,,,

## 2021-06-02 ENCOUNTER — ANTI-COAG VISIT (OUTPATIENT)
Dept: CARDIOLOGY | Facility: CLINIC | Age: 66
End: 2021-06-02
Payer: COMMERCIAL

## 2021-06-02 DIAGNOSIS — Z79.01 ANTICOAGULATED ON COUMADIN: ICD-10-CM

## 2021-06-02 DIAGNOSIS — Z95.2 STATUS POST HEART VALVE REPLACEMENT WITH MECHANICAL VALVE: Primary | ICD-10-CM

## 2021-06-02 LAB — INR PPP: 2.1

## 2021-06-02 PROCEDURE — 93793 PR ANTICOAGULANT MGMT FOR PT TAKING WARFARIN: ICD-10-PCS | Mod: S$GLB,,,

## 2021-06-02 PROCEDURE — 93793 ANTICOAG MGMT PT WARFARIN: CPT | Mod: S$GLB,,,

## 2021-06-09 ENCOUNTER — ANTI-COAG VISIT (OUTPATIENT)
Dept: CARDIOLOGY | Facility: CLINIC | Age: 66
End: 2021-06-09
Payer: COMMERCIAL

## 2021-06-09 DIAGNOSIS — Z79.01 ANTICOAGULATED ON COUMADIN: ICD-10-CM

## 2021-06-09 DIAGNOSIS — Z95.2 STATUS POST HEART VALVE REPLACEMENT WITH MECHANICAL VALVE: Primary | ICD-10-CM

## 2021-06-09 LAB — INR PPP: 2.8

## 2021-06-09 PROCEDURE — 93793 ANTICOAG MGMT PT WARFARIN: CPT | Mod: S$GLB,,,

## 2021-06-09 PROCEDURE — 93793 PR ANTICOAGULANT MGMT FOR PT TAKING WARFARIN: ICD-10-PCS | Mod: S$GLB,,,

## 2021-06-24 ENCOUNTER — ANTI-COAG VISIT (OUTPATIENT)
Dept: CARDIOLOGY | Facility: CLINIC | Age: 66
End: 2021-06-24
Payer: COMMERCIAL

## 2021-06-24 DIAGNOSIS — Z79.01 ANTICOAGULATED ON COUMADIN: ICD-10-CM

## 2021-06-24 DIAGNOSIS — Z95.2 STATUS POST HEART VALVE REPLACEMENT WITH MECHANICAL VALVE: Primary | ICD-10-CM

## 2021-06-24 LAB — INR PPP: 2.6

## 2021-06-24 PROCEDURE — 93793 PR ANTICOAGULANT MGMT FOR PT TAKING WARFARIN: ICD-10-PCS | Mod: S$GLB,,,

## 2021-06-24 PROCEDURE — 93793 ANTICOAG MGMT PT WARFARIN: CPT | Mod: S$GLB,,,

## 2021-07-06 ENCOUNTER — PATIENT MESSAGE (OUTPATIENT)
Dept: ADMINISTRATIVE | Facility: HOSPITAL | Age: 66
End: 2021-07-06

## 2021-07-07 ENCOUNTER — ANTI-COAG VISIT (OUTPATIENT)
Dept: CARDIOLOGY | Facility: CLINIC | Age: 66
End: 2021-07-07
Payer: COMMERCIAL

## 2021-07-07 DIAGNOSIS — Z95.2 STATUS POST HEART VALVE REPLACEMENT WITH MECHANICAL VALVE: Primary | ICD-10-CM

## 2021-07-07 DIAGNOSIS — Z79.01 ANTICOAGULATED ON COUMADIN: ICD-10-CM

## 2021-07-07 LAB — INR PPP: 3.2

## 2021-07-07 PROCEDURE — 93793 ANTICOAG MGMT PT WARFARIN: CPT | Mod: S$GLB,,,

## 2021-07-07 PROCEDURE — 93793 PR ANTICOAGULANT MGMT FOR PT TAKING WARFARIN: ICD-10-PCS | Mod: S$GLB,,,

## 2021-07-21 ENCOUNTER — ANTI-COAG VISIT (OUTPATIENT)
Dept: CARDIOLOGY | Facility: CLINIC | Age: 66
End: 2021-07-21
Payer: COMMERCIAL

## 2021-07-21 DIAGNOSIS — Z95.2 STATUS POST HEART VALVE REPLACEMENT WITH MECHANICAL VALVE: Primary | ICD-10-CM

## 2021-07-21 DIAGNOSIS — Z79.01 ANTICOAGULATED ON COUMADIN: ICD-10-CM

## 2021-07-21 LAB — INR PPP: 3.3

## 2021-07-21 PROCEDURE — 93793 PR ANTICOAGULANT MGMT FOR PT TAKING WARFARIN: ICD-10-PCS | Mod: S$GLB,,,

## 2021-07-21 PROCEDURE — 93793 ANTICOAG MGMT PT WARFARIN: CPT | Mod: S$GLB,,,

## 2021-07-25 ENCOUNTER — HOSPITAL ENCOUNTER (OUTPATIENT)
Facility: HOSPITAL | Age: 66
Discharge: HOME | DRG: 280 | End: 2021-07-27
Attending: EMERGENCY MEDICINE | Admitting: HOSPITALIST
Payer: COMMERCIAL

## 2021-07-25 DIAGNOSIS — R07.9 CHEST PAIN: ICD-10-CM

## 2021-07-25 DIAGNOSIS — E87.6 HYPOKALEMIA: ICD-10-CM

## 2021-07-25 DIAGNOSIS — Z79.01 ANTICOAGULATED ON COUMADIN: ICD-10-CM

## 2021-07-25 DIAGNOSIS — I50.22 CHRONIC SYSTOLIC (CONGESTIVE) HEART FAILURE: ICD-10-CM

## 2021-07-25 DIAGNOSIS — N28.9 RENAL INSUFFICIENCY: ICD-10-CM

## 2021-07-25 DIAGNOSIS — R06.02 SHORTNESS OF BREATH: ICD-10-CM

## 2021-07-25 DIAGNOSIS — I50.23 ACUTE ON CHRONIC SYSTOLIC CONGESTIVE HEART FAILURE: ICD-10-CM

## 2021-07-25 DIAGNOSIS — I50.30 (HFPEF) HEART FAILURE WITH PRESERVED EJECTION FRACTION: ICD-10-CM

## 2021-07-25 DIAGNOSIS — R06.02 SOB (SHORTNESS OF BREATH): ICD-10-CM

## 2021-07-25 DIAGNOSIS — I10 HTN (HYPERTENSION), BENIGN: ICD-10-CM

## 2021-07-25 DIAGNOSIS — Z95.2 STATUS POST HEART VALVE REPLACEMENT WITH MECHANICAL VALVE: ICD-10-CM

## 2021-07-25 DIAGNOSIS — I50.9 ACUTE CONGESTIVE HEART FAILURE, UNSPECIFIED HEART FAILURE TYPE: Primary | ICD-10-CM

## 2021-07-25 DIAGNOSIS — R79.1 SUBTHERAPEUTIC INTERNATIONAL NORMALIZED RATIO (INR): ICD-10-CM

## 2021-07-25 PROBLEM — I21.9 MYOCARDIAL INFARCTION: Status: ACTIVE | Noted: 2021-07-25

## 2021-07-25 PROBLEM — R06.03 RESPIRATORY DISTRESS: Status: ACTIVE | Noted: 2021-07-25

## 2021-07-25 PROBLEM — N17.9 AKI (ACUTE KIDNEY INJURY): Status: ACTIVE | Noted: 2021-07-25

## 2021-07-25 PROBLEM — R06.03 RESPIRATORY DISTRESS: Status: RESOLVED | Noted: 2021-07-25 | Resolved: 2021-07-25

## 2021-07-25 PROBLEM — J44.9 COPD (CHRONIC OBSTRUCTIVE PULMONARY DISEASE): Status: ACTIVE | Noted: 2021-07-25

## 2021-07-25 LAB
ALBUMIN SERPL BCP-MCNC: 4.2 G/DL (ref 3.5–5.2)
ALBUMIN SERPL BCP-MCNC: 4.3 G/DL (ref 3.5–5.2)
ALP SERPL-CCNC: 62 U/L (ref 55–135)
ALP SERPL-CCNC: 63 U/L (ref 55–135)
ALT SERPL W/O P-5'-P-CCNC: 26 U/L (ref 10–44)
ALT SERPL W/O P-5'-P-CCNC: 26 U/L (ref 10–44)
ANION GAP SERPL CALC-SCNC: 11 MMOL/L (ref 8–16)
ANION GAP SERPL CALC-SCNC: 12 MMOL/L (ref 8–16)
ASCENDING AORTA: 3.17 CM
AST SERPL-CCNC: 41 U/L (ref 10–40)
AST SERPL-CCNC: 42 U/L (ref 10–40)
AV INDEX (PROSTH): 0.33
AV MEAN GRADIENT: 13 MMHG
AV PEAK GRADIENT: 19 MMHG
AV VALVE AREA: 1.16 CM2
AV VELOCITY RATIO: 0.33
BASOPHILS # BLD AUTO: 0.04 K/UL (ref 0–0.2)
BASOPHILS # BLD AUTO: 0.04 K/UL (ref 0–0.2)
BASOPHILS NFR BLD: 0.7 % (ref 0–1.9)
BASOPHILS NFR BLD: 0.8 % (ref 0–1.9)
BILIRUB SERPL-MCNC: 1.2 MG/DL (ref 0.1–1)
BILIRUB SERPL-MCNC: 1.7 MG/DL (ref 0.1–1)
BNP SERPL-MCNC: 768 PG/ML (ref 0–99)
BSA FOR ECHO PROCEDURE: 1.78 M2
BUN SERPL-MCNC: 47 MG/DL (ref 8–23)
BUN SERPL-MCNC: 48 MG/DL (ref 8–23)
CALCIUM SERPL-MCNC: 10.3 MG/DL (ref 8.7–10.5)
CALCIUM SERPL-MCNC: 10.5 MG/DL (ref 8.7–10.5)
CHLORIDE SERPL-SCNC: 91 MMOL/L (ref 95–110)
CHLORIDE SERPL-SCNC: 93 MMOL/L (ref 95–110)
CO2 SERPL-SCNC: 36 MMOL/L (ref 23–29)
CO2 SERPL-SCNC: 39 MMOL/L (ref 23–29)
CREAT SERPL-MCNC: 1.6 MG/DL (ref 0.5–1.4)
CREAT SERPL-MCNC: 1.7 MG/DL (ref 0.5–1.4)
CTP QC/QA: YES
CV ECHO LV RWT: 0.3 CM
DIFFERENTIAL METHOD: ABNORMAL
DIFFERENTIAL METHOD: ABNORMAL
DOP CALC AO PEAK VEL: 2.18 M/S
DOP CALC AO VTI: 39.08 CM
DOP CALC LVOT AREA: 3.6 CM2
DOP CALC LVOT DIAMETER: 2.13 CM
DOP CALC LVOT PEAK VEL: 0.72 M/S
DOP CALC LVOT STROKE VOLUME: 45.37 CM3
DOP CALCLVOT PEAK VEL VTI: 12.74 CM
E/E' RATIO: 30.22 M/S
ECHO LV POSTERIOR WALL: 0.81 CM (ref 0.6–1.1)
EJECTION FRACTION: 30 %
EOSINOPHIL # BLD AUTO: 0.4 K/UL (ref 0–0.5)
EOSINOPHIL # BLD AUTO: 0.4 K/UL (ref 0–0.5)
EOSINOPHIL NFR BLD: 7.3 % (ref 0–8)
EOSINOPHIL NFR BLD: 7.4 % (ref 0–8)
ERYTHROCYTE [DISTWIDTH] IN BLOOD BY AUTOMATED COUNT: 14 % (ref 11.5–14.5)
ERYTHROCYTE [DISTWIDTH] IN BLOOD BY AUTOMATED COUNT: 14.1 % (ref 11.5–14.5)
EST. GFR  (AFRICAN AMERICAN): 47.2 ML/MIN/1.73 M^2
EST. GFR  (AFRICAN AMERICAN): 50.8 ML/MIN/1.73 M^2
EST. GFR  (NON AFRICAN AMERICAN): 40.8 ML/MIN/1.73 M^2
EST. GFR  (NON AFRICAN AMERICAN): 43.9 ML/MIN/1.73 M^2
FRACTIONAL SHORTENING: 17 % (ref 28–44)
GLUCOSE SERPL-MCNC: 104 MG/DL (ref 70–110)
GLUCOSE SERPL-MCNC: 112 MG/DL (ref 70–110)
HCT VFR BLD AUTO: 41.6 % (ref 40–54)
HCT VFR BLD AUTO: 42.7 % (ref 40–54)
HGB BLD-MCNC: 13.5 G/DL (ref 14–18)
HGB BLD-MCNC: 14.1 G/DL (ref 14–18)
HR MV ECHO: 73 BPM
IMM GRANULOCYTES # BLD AUTO: 0.01 K/UL (ref 0–0.04)
IMM GRANULOCYTES # BLD AUTO: 0.01 K/UL (ref 0–0.04)
IMM GRANULOCYTES NFR BLD AUTO: 0.2 % (ref 0–0.5)
IMM GRANULOCYTES NFR BLD AUTO: 0.2 % (ref 0–0.5)
INR PPP: 2 (ref 0.8–1.2)
INTERVENTRICULAR SEPTUM: 1.09 CM (ref 0.6–1.1)
IVRT: 88.49 MSEC
LA MAJOR: 6.09 CM
LA MINOR: 6.09 CM
LA WIDTH: 4.84 CM
LEFT ATRIUM SIZE: 4.5 CM
LEFT ATRIUM VOLUME INDEX MOD: 58.4 ML/M2
LEFT ATRIUM VOLUME INDEX: 62.6 ML/M2
LEFT ATRIUM VOLUME MOD: 105.13 CM3
LEFT ATRIUM VOLUME: 112.74 CM3
LEFT INTERNAL DIMENSION IN SYSTOLE: 4.55 CM (ref 2.1–4)
LEFT VENTRICLE DIASTOLIC VOLUME INDEX: 81.41 ML/M2
LEFT VENTRICLE DIASTOLIC VOLUME: 146.53 ML
LEFT VENTRICLE MASS INDEX: 110 G/M2
LEFT VENTRICLE SYSTOLIC VOLUME INDEX: 52.7 ML/M2
LEFT VENTRICLE SYSTOLIC VOLUME: 94.92 ML
LEFT VENTRICULAR INTERNAL DIMENSION IN DIASTOLE: 5.49 CM (ref 3.5–6)
LEFT VENTRICULAR MASS: 198.71 G
LV LATERAL E/E' RATIO: 22.67 M/S
LV SEPTAL E/E' RATIO: 45.33 M/S
LYMPHOCYTES # BLD AUTO: 0.6 K/UL (ref 1–4.8)
LYMPHOCYTES # BLD AUTO: 0.6 K/UL (ref 1–4.8)
LYMPHOCYTES NFR BLD: 10.7 % (ref 18–48)
LYMPHOCYTES NFR BLD: 10.9 % (ref 18–48)
MAGNESIUM SERPL-MCNC: 1.8 MG/DL (ref 1.6–2.6)
MCH RBC QN AUTO: 32 PG (ref 27–31)
MCH RBC QN AUTO: 33 PG (ref 27–31)
MCHC RBC AUTO-ENTMCNC: 32.5 G/DL (ref 32–36)
MCHC RBC AUTO-ENTMCNC: 33 G/DL (ref 32–36)
MCV RBC AUTO: 100 FL (ref 82–98)
MCV RBC AUTO: 99 FL (ref 82–98)
MONOCYTES # BLD AUTO: 0.7 K/UL (ref 0.3–1)
MONOCYTES # BLD AUTO: 0.8 K/UL (ref 0.3–1)
MONOCYTES NFR BLD: 12.5 % (ref 4–15)
MONOCYTES NFR BLD: 14.5 % (ref 4–15)
MV MEAN GRADIENT: 3 MMHG
MV PEAK E VEL: 1.36 M/S
MV PEAK GRADIENT: 11 MMHG
NEUTROPHILS # BLD AUTO: 3.3 K/UL (ref 1.8–7.7)
NEUTROPHILS # BLD AUTO: 4.1 K/UL (ref 1.8–7.7)
NEUTROPHILS NFR BLD: 66.2 % (ref 38–73)
NEUTROPHILS NFR BLD: 68.6 % (ref 38–73)
NRBC BLD-RTO: 0 /100 WBC
NRBC BLD-RTO: 0 /100 WBC
PHOSPHATE SERPL-MCNC: 4 MG/DL (ref 2.7–4.5)
PISA TR MAX VEL: 3.5 M/S
PLATELET # BLD AUTO: 126 K/UL (ref 150–450)
PLATELET # BLD AUTO: 143 K/UL (ref 150–450)
PMV BLD AUTO: 11.3 FL (ref 9.2–12.9)
PMV BLD AUTO: 11.4 FL (ref 9.2–12.9)
POTASSIUM SERPL-SCNC: 3.3 MMOL/L (ref 3.5–5.1)
POTASSIUM SERPL-SCNC: 3.4 MMOL/L (ref 3.5–5.1)
PROT SERPL-MCNC: 8.5 G/DL (ref 6–8.4)
PROT SERPL-MCNC: 8.7 G/DL (ref 6–8.4)
PROTHROMBIN TIME: 20.6 SEC (ref 9–12.5)
QEF: 29 %
RA MAJOR: 4.73 CM
RA PRESSURE: 3 MMHG
RA WIDTH: 4.18 CM
RBC # BLD AUTO: 4.22 M/UL (ref 4.6–6.2)
RBC # BLD AUTO: 4.27 M/UL (ref 4.6–6.2)
RIGHT ATRIAL AREA: 21 CM2
RIGHT VENTRICULAR END-DIASTOLIC DIMENSION: 4.9 CM
RV TISSUE DOPPLER FREE WALL SYSTOLIC VELOCITY 1 (APICAL 4 CHAMBER VIEW): 5.85 CM/S
SARS-COV-2 RDRP RESP QL NAA+PROBE: NEGATIVE
SINUS: 2.85 CM
SODIUM SERPL-SCNC: 141 MMOL/L (ref 136–145)
SODIUM SERPL-SCNC: 141 MMOL/L (ref 136–145)
STJ: 2.35 CM
TDI LATERAL: 0.06 M/S
TDI SEPTAL: 0.03 M/S
TDI: 0.05 M/S
TR MAX PG: 49 MMHG
TRICUSPID ANNULAR PLANE SYSTOLIC EXCURSION: 1.2 CM
TROPONIN I SERPL DL<=0.01 NG/ML-MCNC: 0.09 NG/ML (ref 0–0.03)
TROPONIN I SERPL DL<=0.01 NG/ML-MCNC: 0.09 NG/ML (ref 0–0.03)
TV REST PULMONARY ARTERY PRESSURE: 52 MMHG
WBC # BLD AUTO: 5.03 K/UL (ref 3.9–12.7)
WBC # BLD AUTO: 5.99 K/UL (ref 3.9–12.7)

## 2021-07-25 PROCEDURE — 99220 PR INITIAL OBSERVATION CARE,LEVL III: CPT | Mod: GC,,, | Performed by: HOSPITALIST

## 2021-07-25 PROCEDURE — 93010 ELECTROCARDIOGRAM REPORT: CPT | Mod: ,,, | Performed by: INTERNAL MEDICINE

## 2021-07-25 PROCEDURE — 93010 ELECTROCARDIOGRAM REPORT: CPT | Mod: 76,,, | Performed by: INTERNAL MEDICINE

## 2021-07-25 PROCEDURE — S4991 NICOTINE PATCH NONLEGEND: HCPCS

## 2021-07-25 PROCEDURE — 25000003 PHARM REV CODE 250

## 2021-07-25 PROCEDURE — 27000221 HC OXYGEN, UP TO 24 HOURS

## 2021-07-25 PROCEDURE — 63600175 PHARM REV CODE 636 W HCPCS: Performed by: PHYSICIAN ASSISTANT

## 2021-07-25 PROCEDURE — 94640 AIRWAY INHALATION TREATMENT: CPT

## 2021-07-25 PROCEDURE — 96372 THER/PROPH/DIAG INJ SC/IM: CPT | Mod: 59 | Performed by: EMERGENCY MEDICINE

## 2021-07-25 PROCEDURE — 84100 ASSAY OF PHOSPHORUS: CPT

## 2021-07-25 PROCEDURE — 94640 AIRWAY INHALATION TREATMENT: CPT | Mod: 76

## 2021-07-25 PROCEDURE — 85025 COMPLETE CBC W/AUTO DIFF WBC: CPT | Mod: 91

## 2021-07-25 PROCEDURE — 99285 EMERGENCY DEPT VISIT HI MDM: CPT | Mod: CR,CS,, | Performed by: PHYSICIAN ASSISTANT

## 2021-07-25 PROCEDURE — 97165 OT EVAL LOW COMPLEX 30 MIN: CPT

## 2021-07-25 PROCEDURE — 63600175 PHARM REV CODE 636 W HCPCS

## 2021-07-25 PROCEDURE — 25000242 PHARM REV CODE 250 ALT 637 W/ HCPCS: Performed by: PHYSICIAN ASSISTANT

## 2021-07-25 PROCEDURE — 85025 COMPLETE CBC W/AUTO DIFF WBC: CPT | Performed by: PHYSICIAN ASSISTANT

## 2021-07-25 PROCEDURE — 25000003 PHARM REV CODE 250: Performed by: PHYSICIAN ASSISTANT

## 2021-07-25 PROCEDURE — 25000003 PHARM REV CODE 250: Performed by: STUDENT IN AN ORGANIZED HEALTH CARE EDUCATION/TRAINING PROGRAM

## 2021-07-25 PROCEDURE — 80053 COMPREHEN METABOLIC PANEL: CPT | Performed by: PHYSICIAN ASSISTANT

## 2021-07-25 PROCEDURE — 84484 ASSAY OF TROPONIN QUANT: CPT | Performed by: PHYSICIAN ASSISTANT

## 2021-07-25 PROCEDURE — G0378 HOSPITAL OBSERVATION PER HR: HCPCS

## 2021-07-25 PROCEDURE — 63700000 PHARM REV CODE 250 ALT 637 W/O HCPCS

## 2021-07-25 PROCEDURE — 63600175 PHARM REV CODE 636 W HCPCS: Performed by: STUDENT IN AN ORGANIZED HEALTH CARE EDUCATION/TRAINING PROGRAM

## 2021-07-25 PROCEDURE — 25000242 PHARM REV CODE 250 ALT 637 W/ HCPCS

## 2021-07-25 PROCEDURE — 99285 EMERGENCY DEPT VISIT HI MDM: CPT | Mod: 25

## 2021-07-25 PROCEDURE — 97530 THERAPEUTIC ACTIVITIES: CPT

## 2021-07-25 PROCEDURE — 85610 PROTHROMBIN TIME: CPT | Performed by: PHYSICIAN ASSISTANT

## 2021-07-25 PROCEDURE — 83880 ASSAY OF NATRIURETIC PEPTIDE: CPT | Performed by: PHYSICIAN ASSISTANT

## 2021-07-25 PROCEDURE — 93005 ELECTROCARDIOGRAM TRACING: CPT

## 2021-07-25 PROCEDURE — 99220 PR INITIAL OBSERVATION CARE,LEVL III: ICD-10-PCS | Mod: GC,,, | Performed by: HOSPITALIST

## 2021-07-25 PROCEDURE — 93010 EKG 12-LEAD: ICD-10-PCS | Mod: ,,, | Performed by: INTERNAL MEDICINE

## 2021-07-25 PROCEDURE — 83735 ASSAY OF MAGNESIUM: CPT

## 2021-07-25 PROCEDURE — 94761 N-INVAS EAR/PLS OXIMETRY MLT: CPT

## 2021-07-25 PROCEDURE — 99285 PR EMERGENCY DEPT VISIT,LEVEL V: ICD-10-PCS | Mod: CR,CS,, | Performed by: PHYSICIAN ASSISTANT

## 2021-07-25 PROCEDURE — U0002 COVID-19 LAB TEST NON-CDC: HCPCS | Performed by: EMERGENCY MEDICINE

## 2021-07-25 PROCEDURE — 96374 THER/PROPH/DIAG INJ IV PUSH: CPT | Mod: 59

## 2021-07-25 PROCEDURE — 96376 TX/PRO/DX INJ SAME DRUG ADON: CPT | Performed by: EMERGENCY MEDICINE

## 2021-07-25 PROCEDURE — 80053 COMPREHEN METABOLIC PANEL: CPT | Mod: 91

## 2021-07-25 RX ORDER — SODIUM CHLORIDE 0.9 % (FLUSH) 0.9 %
10 SYRINGE (ML) INJECTION
Status: DISCONTINUED | OUTPATIENT
Start: 2021-07-25 | End: 2021-07-27 | Stop reason: HOSPADM

## 2021-07-25 RX ORDER — POLYETHYLENE GLYCOL 3350 17 G/17G
17 POWDER, FOR SOLUTION ORAL DAILY
Status: DISCONTINUED | OUTPATIENT
Start: 2021-07-25 | End: 2021-07-27 | Stop reason: HOSPADM

## 2021-07-25 RX ORDER — FUROSEMIDE 10 MG/ML
80 INJECTION INTRAMUSCULAR; INTRAVENOUS 2 TIMES DAILY
Status: DISCONTINUED | OUTPATIENT
Start: 2021-07-25 | End: 2021-07-26

## 2021-07-25 RX ORDER — IBUPROFEN 200 MG
16 TABLET ORAL
Status: DISCONTINUED | OUTPATIENT
Start: 2021-07-25 | End: 2021-07-27 | Stop reason: HOSPADM

## 2021-07-25 RX ORDER — IBUPROFEN 200 MG
1 TABLET ORAL DAILY
Status: DISCONTINUED | OUTPATIENT
Start: 2021-07-25 | End: 2021-07-27 | Stop reason: HOSPADM

## 2021-07-25 RX ORDER — IBUPROFEN 200 MG
24 TABLET ORAL
Status: DISCONTINUED | OUTPATIENT
Start: 2021-07-25 | End: 2021-07-27 | Stop reason: HOSPADM

## 2021-07-25 RX ORDER — AZITHROMYCIN 250 MG/1
500 TABLET, FILM COATED ORAL DAILY
Status: DISCONTINUED | OUTPATIENT
Start: 2021-07-25 | End: 2021-07-26

## 2021-07-25 RX ORDER — ENOXAPARIN SODIUM 100 MG/ML
1 INJECTION SUBCUTANEOUS
Status: DISCONTINUED | OUTPATIENT
Start: 2021-07-25 | End: 2021-07-27 | Stop reason: HOSPADM

## 2021-07-25 RX ORDER — FUROSEMIDE 10 MG/ML
80 INJECTION INTRAMUSCULAR; INTRAVENOUS
Status: COMPLETED | OUTPATIENT
Start: 2021-07-25 | End: 2021-07-25

## 2021-07-25 RX ORDER — IPRATROPIUM BROMIDE AND ALBUTEROL SULFATE 2.5; .5 MG/3ML; MG/3ML
3 SOLUTION RESPIRATORY (INHALATION)
Status: COMPLETED | OUTPATIENT
Start: 2021-07-25 | End: 2021-07-25

## 2021-07-25 RX ORDER — IPRATROPIUM BROMIDE AND ALBUTEROL SULFATE 2.5; .5 MG/3ML; MG/3ML
3 SOLUTION RESPIRATORY (INHALATION) EVERY 4 HOURS PRN
Status: DISCONTINUED | OUTPATIENT
Start: 2021-07-25 | End: 2021-07-27 | Stop reason: HOSPADM

## 2021-07-25 RX ORDER — PREDNISONE 20 MG/1
40 TABLET ORAL DAILY
Status: DISCONTINUED | OUTPATIENT
Start: 2021-07-25 | End: 2021-07-27 | Stop reason: HOSPADM

## 2021-07-25 RX ORDER — FUROSEMIDE 10 MG/ML
100 INJECTION INTRAMUSCULAR; INTRAVENOUS
Status: DISCONTINUED | OUTPATIENT
Start: 2021-07-25 | End: 2021-07-25

## 2021-07-25 RX ORDER — CARVEDILOL 12.5 MG/1
12.5 TABLET ORAL 2 TIMES DAILY
Status: DISCONTINUED | OUTPATIENT
Start: 2021-07-25 | End: 2021-07-27 | Stop reason: HOSPADM

## 2021-07-25 RX ORDER — FLUTICASONE FUROATE AND VILANTEROL 100; 25 UG/1; UG/1
1 POWDER RESPIRATORY (INHALATION) DAILY
Status: DISCONTINUED | OUTPATIENT
Start: 2021-07-25 | End: 2021-07-27 | Stop reason: HOSPADM

## 2021-07-25 RX ORDER — FLUTICASONE PROPIONATE 50 MCG
2 SPRAY, SUSPENSION (ML) NASAL DAILY
Status: DISCONTINUED | OUTPATIENT
Start: 2021-07-25 | End: 2021-07-27 | Stop reason: HOSPADM

## 2021-07-25 RX ORDER — BENZONATATE 100 MG/1
100 CAPSULE ORAL ONCE
Status: COMPLETED | OUTPATIENT
Start: 2021-07-25 | End: 2021-07-25

## 2021-07-25 RX ORDER — IPRATROPIUM BROMIDE AND ALBUTEROL SULFATE 2.5; .5 MG/3ML; MG/3ML
3 SOLUTION RESPIRATORY (INHALATION) EVERY 6 HOURS
Status: DISCONTINUED | OUTPATIENT
Start: 2021-07-25 | End: 2021-07-25

## 2021-07-25 RX ORDER — IPRATROPIUM BROMIDE AND ALBUTEROL SULFATE 2.5; .5 MG/3ML; MG/3ML
3 SOLUTION RESPIRATORY (INHALATION) 2 TIMES DAILY
Status: DISCONTINUED | OUTPATIENT
Start: 2021-07-25 | End: 2021-07-27 | Stop reason: HOSPADM

## 2021-07-25 RX ORDER — CETIRIZINE HYDROCHLORIDE 5 MG/1
5 TABLET ORAL ONCE
Status: COMPLETED | OUTPATIENT
Start: 2021-07-25 | End: 2021-07-25

## 2021-07-25 RX ORDER — FINASTERIDE 5 MG/1
5 TABLET, FILM COATED ORAL DAILY
Status: DISCONTINUED | OUTPATIENT
Start: 2021-07-25 | End: 2021-07-27 | Stop reason: HOSPADM

## 2021-07-25 RX ORDER — POTASSIUM CHLORIDE 20 MEQ/1
40 TABLET, EXTENDED RELEASE ORAL DAILY
Status: DISCONTINUED | OUTPATIENT
Start: 2021-07-25 | End: 2021-07-26

## 2021-07-25 RX ORDER — WARFARIN 7.5 MG/1
7.5 TABLET ORAL DAILY
Status: DISCONTINUED | OUTPATIENT
Start: 2021-07-25 | End: 2021-07-27 | Stop reason: HOSPADM

## 2021-07-25 RX ORDER — GLUCAGON 1 MG
1 KIT INJECTION
Status: DISCONTINUED | OUTPATIENT
Start: 2021-07-25 | End: 2021-07-27 | Stop reason: HOSPADM

## 2021-07-25 RX ORDER — SPIRONOLACTONE 25 MG/1
25 TABLET ORAL DAILY
Status: DISCONTINUED | OUTPATIENT
Start: 2021-07-25 | End: 2021-07-27 | Stop reason: HOSPADM

## 2021-07-25 RX ADMIN — IPRATROPIUM BROMIDE AND ALBUTEROL SULFATE 3 ML: .5; 2.5 SOLUTION RESPIRATORY (INHALATION) at 05:07

## 2021-07-25 RX ADMIN — IPRATROPIUM BROMIDE AND ALBUTEROL SULFATE 3 ML: .5; 2.5 SOLUTION RESPIRATORY (INHALATION) at 02:07

## 2021-07-25 RX ADMIN — FUROSEMIDE 80 MG: 10 INJECTION INTRAMUSCULAR; INTRAVENOUS at 03:07

## 2021-07-25 RX ADMIN — IPRATROPIUM BROMIDE AND ALBUTEROL SULFATE 3 ML: .5; 2.5 SOLUTION RESPIRATORY (INHALATION) at 09:07

## 2021-07-25 RX ADMIN — FLUTICASONE PROPIONATE 100 MCG: 50 SPRAY, METERED NASAL at 12:07

## 2021-07-25 RX ADMIN — POLYETHYLENE GLYCOL 3350 17 G: 17 POWDER, FOR SOLUTION ORAL at 08:07

## 2021-07-25 RX ADMIN — WARFARIN SODIUM 7.5 MG: 7.5 TABLET ORAL at 04:07

## 2021-07-25 RX ADMIN — ENOXAPARIN SODIUM 70 MG: 80 INJECTION SUBCUTANEOUS at 09:07

## 2021-07-25 RX ADMIN — AZITHROMYCIN MONOHYDRATE 500 MG: 250 TABLET ORAL at 08:07

## 2021-07-25 RX ADMIN — CARVEDILOL 12.5 MG: 12.5 TABLET, FILM COATED ORAL at 09:07

## 2021-07-25 RX ADMIN — SPIRONOLACTONE 25 MG: 25 TABLET, FILM COATED ORAL at 08:07

## 2021-07-25 RX ADMIN — FUROSEMIDE 80 MG: 10 INJECTION, SOLUTION INTRAMUSCULAR; INTRAVENOUS at 08:07

## 2021-07-25 RX ADMIN — POTASSIUM BICARBONATE 50 MEQ: 978 TABLET, EFFERVESCENT ORAL at 03:07

## 2021-07-25 RX ADMIN — BENZONATATE 100 MG: 100 CAPSULE ORAL at 02:07

## 2021-07-25 RX ADMIN — FINASTERIDE 5 MG: 5 TABLET, FILM COATED ORAL at 08:07

## 2021-07-25 RX ADMIN — CARVEDILOL 12.5 MG: 12.5 TABLET, FILM COATED ORAL at 08:07

## 2021-07-25 RX ADMIN — NICOTINE 1 PATCH: 21 PATCH, EXTENDED RELEASE TRANSDERMAL at 08:07

## 2021-07-25 RX ADMIN — FUROSEMIDE 80 MG: 10 INJECTION, SOLUTION INTRAMUSCULAR; INTRAVENOUS at 09:07

## 2021-07-25 RX ADMIN — POTASSIUM CHLORIDE 40 MEQ: 1500 TABLET, EXTENDED RELEASE ORAL at 08:07

## 2021-07-25 RX ADMIN — PREDNISONE 40 MG: 20 TABLET ORAL at 08:07

## 2021-07-25 RX ADMIN — CETIRIZINE HYDROCHLORIDE 5 MG: 5 TABLET, FILM COATED ORAL at 06:07

## 2021-07-25 RX ADMIN — ENOXAPARIN SODIUM 70 MG: 80 INJECTION SUBCUTANEOUS at 12:07

## 2021-07-25 RX ADMIN — POTASSIUM BICARBONATE 40 MEQ: 391 TABLET, EFFERVESCENT ORAL at 08:07

## 2021-07-25 RX ADMIN — FLUTICASONE FUROATE AND VILANTEROL TRIFENATATE 1 PUFF: 100; 25 POWDER RESPIRATORY (INHALATION) at 09:07

## 2021-07-26 PROBLEM — I50.9 ACUTE CONGESTIVE HEART FAILURE: Status: ACTIVE | Noted: 2021-07-26

## 2021-07-26 LAB
ALBUMIN SERPL BCP-MCNC: 3.9 G/DL (ref 3.5–5.2)
ALP SERPL-CCNC: 58 U/L (ref 55–135)
ALT SERPL W/O P-5'-P-CCNC: 24 U/L (ref 10–44)
ANION GAP SERPL CALC-SCNC: 17 MMOL/L (ref 8–16)
AST SERPL-CCNC: 35 U/L (ref 10–40)
BASOPHILS # BLD AUTO: 0.03 K/UL (ref 0–0.2)
BASOPHILS NFR BLD: 0.5 % (ref 0–1.9)
BILIRUB SERPL-MCNC: 0.9 MG/DL (ref 0.1–1)
BUN SERPL-MCNC: 60 MG/DL (ref 8–23)
CALCIUM SERPL-MCNC: 10.8 MG/DL (ref 8.7–10.5)
CHLORIDE SERPL-SCNC: 90 MMOL/L (ref 95–110)
CO2 SERPL-SCNC: 32 MMOL/L (ref 23–29)
CREAT SERPL-MCNC: 1.7 MG/DL (ref 0.5–1.4)
DIFFERENTIAL METHOD: ABNORMAL
EOSINOPHIL # BLD AUTO: 0 K/UL (ref 0–0.5)
EOSINOPHIL NFR BLD: 0.7 % (ref 0–8)
ERYTHROCYTE [DISTWIDTH] IN BLOOD BY AUTOMATED COUNT: 13.8 % (ref 11.5–14.5)
EST. GFR  (AFRICAN AMERICAN): 47.2 ML/MIN/1.73 M^2
EST. GFR  (NON AFRICAN AMERICAN): 40.8 ML/MIN/1.73 M^2
GLUCOSE SERPL-MCNC: 92 MG/DL (ref 70–110)
HCT VFR BLD AUTO: 41.7 % (ref 40–54)
HGB BLD-MCNC: 13.8 G/DL (ref 14–18)
IMM GRANULOCYTES # BLD AUTO: 0.01 K/UL (ref 0–0.04)
IMM GRANULOCYTES NFR BLD AUTO: 0.2 % (ref 0–0.5)
INR PPP: 1.9 (ref 0.8–1.2)
LYMPHOCYTES # BLD AUTO: 0.8 K/UL (ref 1–4.8)
LYMPHOCYTES NFR BLD: 14 % (ref 18–48)
MAGNESIUM SERPL-MCNC: 2 MG/DL (ref 1.6–2.6)
MCH RBC QN AUTO: 32.5 PG (ref 27–31)
MCHC RBC AUTO-ENTMCNC: 33.1 G/DL (ref 32–36)
MCV RBC AUTO: 98 FL (ref 82–98)
MONOCYTES # BLD AUTO: 0.9 K/UL (ref 0.3–1)
MONOCYTES NFR BLD: 16.4 % (ref 4–15)
NEUTROPHILS # BLD AUTO: 3.9 K/UL (ref 1.8–7.7)
NEUTROPHILS NFR BLD: 68.2 % (ref 38–73)
NRBC BLD-RTO: 0 /100 WBC
PHOSPHATE SERPL-MCNC: 3.2 MG/DL (ref 2.7–4.5)
PLATELET # BLD AUTO: 154 K/UL (ref 150–450)
PMV BLD AUTO: 11.9 FL (ref 9.2–12.9)
POTASSIUM SERPL-SCNC: 3.2 MMOL/L (ref 3.5–5.1)
PROT SERPL-MCNC: 8 G/DL (ref 6–8.4)
PROTHROMBIN TIME: 20.2 SEC (ref 9–12.5)
RBC # BLD AUTO: 4.24 M/UL (ref 4.6–6.2)
SODIUM SERPL-SCNC: 139 MMOL/L (ref 136–145)
WBC # BLD AUTO: 5.72 K/UL (ref 3.9–12.7)

## 2021-07-26 PROCEDURE — 63600175 PHARM REV CODE 636 W HCPCS: Performed by: STUDENT IN AN ORGANIZED HEALTH CARE EDUCATION/TRAINING PROGRAM

## 2021-07-26 PROCEDURE — 94640 AIRWAY INHALATION TREATMENT: CPT | Mod: 76

## 2021-07-26 PROCEDURE — G0378 HOSPITAL OBSERVATION PER HR: HCPCS

## 2021-07-26 PROCEDURE — 94640 AIRWAY INHALATION TREATMENT: CPT

## 2021-07-26 PROCEDURE — 25000003 PHARM REV CODE 250: Performed by: STUDENT IN AN ORGANIZED HEALTH CARE EDUCATION/TRAINING PROGRAM

## 2021-07-26 PROCEDURE — 96372 THER/PROPH/DIAG INJ SC/IM: CPT | Mod: 59 | Performed by: EMERGENCY MEDICINE

## 2021-07-26 PROCEDURE — 27000221 HC OXYGEN, UP TO 24 HOURS

## 2021-07-26 PROCEDURE — 36415 COLL VENOUS BLD VENIPUNCTURE: CPT

## 2021-07-26 PROCEDURE — 25000242 PHARM REV CODE 250 ALT 637 W/ HCPCS

## 2021-07-26 PROCEDURE — S4991 NICOTINE PATCH NONLEGEND: HCPCS

## 2021-07-26 PROCEDURE — 25000003 PHARM REV CODE 250

## 2021-07-26 PROCEDURE — 97110 THERAPEUTIC EXERCISES: CPT

## 2021-07-26 PROCEDURE — 83735 ASSAY OF MAGNESIUM: CPT

## 2021-07-26 PROCEDURE — 85610 PROTHROMBIN TIME: CPT | Performed by: STUDENT IN AN ORGANIZED HEALTH CARE EDUCATION/TRAINING PROGRAM

## 2021-07-26 PROCEDURE — 80053 COMPREHEN METABOLIC PANEL: CPT

## 2021-07-26 PROCEDURE — 84100 ASSAY OF PHOSPHORUS: CPT

## 2021-07-26 PROCEDURE — 36415 COLL VENOUS BLD VENIPUNCTURE: CPT | Performed by: STUDENT IN AN ORGANIZED HEALTH CARE EDUCATION/TRAINING PROGRAM

## 2021-07-26 PROCEDURE — 63600175 PHARM REV CODE 636 W HCPCS

## 2021-07-26 PROCEDURE — 94761 N-INVAS EAR/PLS OXIMETRY MLT: CPT

## 2021-07-26 PROCEDURE — 99233 PR SUBSEQUENT HOSPITAL CARE,LEVL III: ICD-10-PCS | Mod: GC,,, | Performed by: STUDENT IN AN ORGANIZED HEALTH CARE EDUCATION/TRAINING PROGRAM

## 2021-07-26 PROCEDURE — 85025 COMPLETE CBC W/AUTO DIFF WBC: CPT

## 2021-07-26 PROCEDURE — 97161 PT EVAL LOW COMPLEX 20 MIN: CPT

## 2021-07-26 PROCEDURE — 20600001 HC STEP DOWN PRIVATE ROOM

## 2021-07-26 PROCEDURE — 99233 SBSQ HOSP IP/OBS HIGH 50: CPT | Mod: GC,,, | Performed by: STUDENT IN AN ORGANIZED HEALTH CARE EDUCATION/TRAINING PROGRAM

## 2021-07-26 RX ORDER — FUROSEMIDE 80 MG/1
80 TABLET ORAL DAILY
Status: DISCONTINUED | OUTPATIENT
Start: 2021-07-26 | End: 2021-07-27 | Stop reason: HOSPADM

## 2021-07-26 RX ORDER — DOXYCYCLINE HYCLATE 100 MG
100 TABLET ORAL EVERY 12 HOURS
Status: DISCONTINUED | OUTPATIENT
Start: 2021-07-26 | End: 2021-07-27 | Stop reason: HOSPADM

## 2021-07-26 RX ORDER — POTASSIUM CHLORIDE 20 MEQ/1
40 TABLET, EXTENDED RELEASE ORAL
Status: COMPLETED | OUTPATIENT
Start: 2021-07-26 | End: 2021-07-26

## 2021-07-26 RX ADMIN — POTASSIUM CHLORIDE 40 MEQ: 1500 TABLET, EXTENDED RELEASE ORAL at 10:07

## 2021-07-26 RX ADMIN — FINASTERIDE 5 MG: 5 TABLET, FILM COATED ORAL at 09:07

## 2021-07-26 RX ADMIN — DOXYCYCLINE HYCLATE 100 MG: 100 TABLET, COATED ORAL at 09:07

## 2021-07-26 RX ADMIN — CARVEDILOL 12.5 MG: 12.5 TABLET, FILM COATED ORAL at 09:07

## 2021-07-26 RX ADMIN — IPRATROPIUM BROMIDE AND ALBUTEROL SULFATE 3 ML: .5; 2.5 SOLUTION RESPIRATORY (INHALATION) at 09:07

## 2021-07-26 RX ADMIN — SPIRONOLACTONE 25 MG: 25 TABLET, FILM COATED ORAL at 09:07

## 2021-07-26 RX ADMIN — POLYETHYLENE GLYCOL 3350 17 G: 17 POWDER, FOR SOLUTION ORAL at 09:07

## 2021-07-26 RX ADMIN — POTASSIUM CHLORIDE 40 MEQ: 1500 TABLET, EXTENDED RELEASE ORAL at 09:07

## 2021-07-26 RX ADMIN — FUROSEMIDE 80 MG: 80 TABLET ORAL at 09:07

## 2021-07-26 RX ADMIN — ENOXAPARIN SODIUM 70 MG: 80 INJECTION SUBCUTANEOUS at 10:07

## 2021-07-26 RX ADMIN — FLUTICASONE PROPIONATE 100 MCG: 50 SPRAY, METERED NASAL at 10:07

## 2021-07-26 RX ADMIN — PREDNISONE 40 MG: 20 TABLET ORAL at 09:07

## 2021-07-26 RX ADMIN — ENOXAPARIN SODIUM 70 MG: 80 INJECTION SUBCUTANEOUS at 09:07

## 2021-07-26 RX ADMIN — FLUTICASONE FUROATE AND VILANTEROL TRIFENATATE 1 PUFF: 100; 25 POWDER RESPIRATORY (INHALATION) at 07:07

## 2021-07-26 RX ADMIN — NICOTINE 1 PATCH: 21 PATCH, EXTENDED RELEASE TRANSDERMAL at 09:07

## 2021-07-26 RX ADMIN — IPRATROPIUM BROMIDE AND ALBUTEROL SULFATE 3 ML: .5; 2.5 SOLUTION RESPIRATORY (INHALATION) at 07:07

## 2021-07-26 RX ADMIN — WARFARIN SODIUM 7.5 MG: 7.5 TABLET ORAL at 04:07

## 2021-07-27 VITALS
RESPIRATION RATE: 18 BRPM | TEMPERATURE: 97 F | SYSTOLIC BLOOD PRESSURE: 105 MMHG | BODY MASS INDEX: 20.67 KG/M2 | WEIGHT: 139.56 LBS | DIASTOLIC BLOOD PRESSURE: 65 MMHG | OXYGEN SATURATION: 95 % | HEART RATE: 61 BPM | HEIGHT: 69 IN

## 2021-07-27 LAB
ALBUMIN SERPL BCP-MCNC: 3.8 G/DL (ref 3.5–5.2)
ALP SERPL-CCNC: 57 U/L (ref 55–135)
ALT SERPL W/O P-5'-P-CCNC: 24 U/L (ref 10–44)
ANION GAP SERPL CALC-SCNC: 10 MMOL/L (ref 8–16)
AST SERPL-CCNC: 33 U/L (ref 10–40)
BASOPHILS # BLD AUTO: 0.02 K/UL (ref 0–0.2)
BASOPHILS NFR BLD: 0.4 % (ref 0–1.9)
BILIRUB SERPL-MCNC: 0.9 MG/DL (ref 0.1–1)
BUN SERPL-MCNC: 62 MG/DL (ref 8–23)
CALCIUM SERPL-MCNC: 10.6 MG/DL (ref 8.7–10.5)
CHLORIDE SERPL-SCNC: 95 MMOL/L (ref 95–110)
CO2 SERPL-SCNC: 34 MMOL/L (ref 23–29)
CREAT SERPL-MCNC: 1.5 MG/DL (ref 0.5–1.4)
DIFFERENTIAL METHOD: ABNORMAL
EOSINOPHIL # BLD AUTO: 0 K/UL (ref 0–0.5)
EOSINOPHIL NFR BLD: 0.4 % (ref 0–8)
ERYTHROCYTE [DISTWIDTH] IN BLOOD BY AUTOMATED COUNT: 13.8 % (ref 11.5–14.5)
EST. GFR  (AFRICAN AMERICAN): 54.9 ML/MIN/1.73 M^2
EST. GFR  (NON AFRICAN AMERICAN): 47.5 ML/MIN/1.73 M^2
GLUCOSE SERPL-MCNC: 92 MG/DL (ref 70–110)
HCT VFR BLD AUTO: 41.5 % (ref 40–54)
HGB BLD-MCNC: 13.8 G/DL (ref 14–18)
IMM GRANULOCYTES # BLD AUTO: 0.01 K/UL (ref 0–0.04)
IMM GRANULOCYTES NFR BLD AUTO: 0.2 % (ref 0–0.5)
INR PPP: 2.2 (ref 0.8–1.2)
LYMPHOCYTES # BLD AUTO: 0.9 K/UL (ref 1–4.8)
LYMPHOCYTES NFR BLD: 18.8 % (ref 18–48)
MAGNESIUM SERPL-MCNC: 2.1 MG/DL (ref 1.6–2.6)
MCH RBC QN AUTO: 32.4 PG (ref 27–31)
MCHC RBC AUTO-ENTMCNC: 33.3 G/DL (ref 32–36)
MCV RBC AUTO: 97 FL (ref 82–98)
MONOCYTES # BLD AUTO: 0.6 K/UL (ref 0.3–1)
MONOCYTES NFR BLD: 11.2 % (ref 4–15)
NEUTROPHILS # BLD AUTO: 3.5 K/UL (ref 1.8–7.7)
NEUTROPHILS NFR BLD: 69 % (ref 38–73)
NRBC BLD-RTO: 0 /100 WBC
PHOSPHATE SERPL-MCNC: 2.4 MG/DL (ref 2.7–4.5)
PLATELET # BLD AUTO: 152 K/UL (ref 150–450)
PMV BLD AUTO: 11.9 FL (ref 9.2–12.9)
POTASSIUM SERPL-SCNC: 3.5 MMOL/L (ref 3.5–5.1)
PROT SERPL-MCNC: 7.9 G/DL (ref 6–8.4)
PROTHROMBIN TIME: 23.2 SEC (ref 9–12.5)
RBC # BLD AUTO: 4.26 M/UL (ref 4.6–6.2)
SODIUM SERPL-SCNC: 139 MMOL/L (ref 136–145)
WBC # BLD AUTO: 5.01 K/UL (ref 3.9–12.7)

## 2021-07-27 PROCEDURE — 25000242 PHARM REV CODE 250 ALT 637 W/ HCPCS

## 2021-07-27 PROCEDURE — 83735 ASSAY OF MAGNESIUM: CPT

## 2021-07-27 PROCEDURE — 80053 COMPREHEN METABOLIC PANEL: CPT

## 2021-07-27 PROCEDURE — 99239 HOSP IP/OBS DSCHRG MGMT >30: CPT | Mod: GC,,, | Performed by: STUDENT IN AN ORGANIZED HEALTH CARE EDUCATION/TRAINING PROGRAM

## 2021-07-27 PROCEDURE — 63600175 PHARM REV CODE 636 W HCPCS: Performed by: STUDENT IN AN ORGANIZED HEALTH CARE EDUCATION/TRAINING PROGRAM

## 2021-07-27 PROCEDURE — 85025 COMPLETE CBC W/AUTO DIFF WBC: CPT

## 2021-07-27 PROCEDURE — 25000003 PHARM REV CODE 250

## 2021-07-27 PROCEDURE — 36415 COLL VENOUS BLD VENIPUNCTURE: CPT

## 2021-07-27 PROCEDURE — 94640 AIRWAY INHALATION TREATMENT: CPT

## 2021-07-27 PROCEDURE — 85610 PROTHROMBIN TIME: CPT | Performed by: STUDENT IN AN ORGANIZED HEALTH CARE EDUCATION/TRAINING PROGRAM

## 2021-07-27 PROCEDURE — 94761 N-INVAS EAR/PLS OXIMETRY MLT: CPT

## 2021-07-27 PROCEDURE — 99239 PR HOSPITAL DISCHARGE DAY,>30 MIN: ICD-10-PCS | Mod: GC,,, | Performed by: STUDENT IN AN ORGANIZED HEALTH CARE EDUCATION/TRAINING PROGRAM

## 2021-07-27 PROCEDURE — G0378 HOSPITAL OBSERVATION PER HR: HCPCS

## 2021-07-27 PROCEDURE — 84100 ASSAY OF PHOSPHORUS: CPT

## 2021-07-27 PROCEDURE — S4991 NICOTINE PATCH NONLEGEND: HCPCS

## 2021-07-27 PROCEDURE — 25000003 PHARM REV CODE 250: Performed by: STUDENT IN AN ORGANIZED HEALTH CARE EDUCATION/TRAINING PROGRAM

## 2021-07-27 PROCEDURE — 63600175 PHARM REV CODE 636 W HCPCS

## 2021-07-27 RX ORDER — SPIRONOLACTONE 25 MG/1
25 TABLET ORAL DAILY
Qty: 30 TABLET | Refills: 11 | Status: SHIPPED | OUTPATIENT
Start: 2021-07-27 | End: 2022-07-27

## 2021-07-27 RX ORDER — PREDNISONE 20 MG/1
40 TABLET ORAL DAILY
Qty: 4 TABLET | Refills: 0 | Status: SHIPPED | OUTPATIENT
Start: 2021-07-28 | End: 2021-07-30

## 2021-07-27 RX ORDER — LOSARTAN POTASSIUM 50 MG/1
50 TABLET ORAL DAILY
Qty: 90 TABLET | Refills: 11
Start: 2021-07-27 | End: 2021-09-16

## 2021-07-27 RX ORDER — DOXYCYCLINE HYCLATE 100 MG
100 TABLET ORAL EVERY 12 HOURS
Qty: 5 TABLET | Refills: 0 | Status: SHIPPED | OUTPATIENT
Start: 2021-07-27 | End: 2021-07-30

## 2021-07-27 RX ORDER — ENOXAPARIN SODIUM 100 MG/ML
60 INJECTION SUBCUTANEOUS EVERY 12 HOURS
Qty: 6 ML | Refills: 0 | Status: SHIPPED | OUTPATIENT
Start: 2021-07-27 | End: 2021-08-01

## 2021-07-27 RX ADMIN — FINASTERIDE 5 MG: 5 TABLET, FILM COATED ORAL at 09:07

## 2021-07-27 RX ADMIN — FLUTICASONE PROPIONATE 100 MCG: 50 SPRAY, METERED NASAL at 09:07

## 2021-07-27 RX ADMIN — PREDNISONE 40 MG: 20 TABLET ORAL at 09:07

## 2021-07-27 RX ADMIN — IPRATROPIUM BROMIDE AND ALBUTEROL SULFATE 3 ML: .5; 2.5 SOLUTION RESPIRATORY (INHALATION) at 08:07

## 2021-07-27 RX ADMIN — DOXYCYCLINE HYCLATE 100 MG: 100 TABLET, COATED ORAL at 09:07

## 2021-07-27 RX ADMIN — CARVEDILOL 12.5 MG: 12.5 TABLET, FILM COATED ORAL at 09:07

## 2021-07-27 RX ADMIN — FUROSEMIDE 80 MG: 80 TABLET ORAL at 09:07

## 2021-07-27 RX ADMIN — SPIRONOLACTONE 25 MG: 25 TABLET, FILM COATED ORAL at 09:07

## 2021-07-27 RX ADMIN — POLYETHYLENE GLYCOL 3350 17 G: 17 POWDER, FOR SOLUTION ORAL at 09:07

## 2021-07-27 RX ADMIN — NICOTINE 1 PATCH: 21 PATCH, EXTENDED RELEASE TRANSDERMAL at 09:07

## 2021-07-27 RX ADMIN — FLUTICASONE FUROATE AND VILANTEROL TRIFENATATE 1 PUFF: 100; 25 POWDER RESPIRATORY (INHALATION) at 08:07

## 2021-07-27 RX ADMIN — ENOXAPARIN SODIUM 70 MG: 80 INJECTION SUBCUTANEOUS at 12:07

## 2021-07-29 ENCOUNTER — OFFICE VISIT (OUTPATIENT)
Dept: INTERNAL MEDICINE | Facility: CLINIC | Age: 66
End: 2021-07-29
Payer: COMMERCIAL

## 2021-07-29 ENCOUNTER — ANTI-COAG VISIT (OUTPATIENT)
Dept: CARDIOLOGY | Facility: CLINIC | Age: 66
End: 2021-07-29
Payer: COMMERCIAL

## 2021-07-29 VITALS
HEIGHT: 69 IN | HEART RATE: 69 BPM | OXYGEN SATURATION: 98 % | DIASTOLIC BLOOD PRESSURE: 73 MMHG | SYSTOLIC BLOOD PRESSURE: 136 MMHG | WEIGHT: 145.06 LBS | BODY MASS INDEX: 21.49 KG/M2

## 2021-07-29 DIAGNOSIS — Z86.19 H/O HEPATITIS: ICD-10-CM

## 2021-07-29 DIAGNOSIS — I50.9 ACUTE CONGESTIVE HEART FAILURE, UNSPECIFIED HEART FAILURE TYPE: Primary | ICD-10-CM

## 2021-07-29 DIAGNOSIS — N18.2 CKD (CHRONIC KIDNEY DISEASE) STAGE 2, GFR 60-89 ML/MIN: ICD-10-CM

## 2021-07-29 DIAGNOSIS — Z95.2 STATUS POST HEART VALVE REPLACEMENT WITH MECHANICAL VALVE: Primary | ICD-10-CM

## 2021-07-29 DIAGNOSIS — I50.23 ACUTE ON CHRONIC SYSTOLIC CONGESTIVE HEART FAILURE: ICD-10-CM

## 2021-07-29 DIAGNOSIS — J44.1 CHRONIC OBSTRUCTIVE PULMONARY DISEASE WITH ACUTE EXACERBATION: ICD-10-CM

## 2021-07-29 DIAGNOSIS — Z95.2 STATUS POST HEART VALVE REPLACEMENT WITH MECHANICAL VALVE: Chronic | ICD-10-CM

## 2021-07-29 DIAGNOSIS — Z79.01 ANTICOAGULATED ON COUMADIN: ICD-10-CM

## 2021-07-29 DIAGNOSIS — R79.1 SUBTHERAPEUTIC INTERNATIONAL NORMALIZED RATIO (INR): ICD-10-CM

## 2021-07-29 DIAGNOSIS — N17.9 AKI (ACUTE KIDNEY INJURY): ICD-10-CM

## 2021-07-29 DIAGNOSIS — Z72.0 TOBACCO ABUSE: ICD-10-CM

## 2021-07-29 PROBLEM — I21.9 MYOCARDIAL INFARCTION: Status: RESOLVED | Noted: 2021-07-25 | Resolved: 2021-07-29

## 2021-07-29 PROBLEM — J44.9 COPD (CHRONIC OBSTRUCTIVE PULMONARY DISEASE): Status: RESOLVED | Noted: 2021-07-25 | Resolved: 2021-07-29

## 2021-07-29 LAB — INR PPP: 1.9

## 2021-07-29 PROCEDURE — 93793 PR ANTICOAGULANT MGMT FOR PT TAKING WARFARIN: ICD-10-PCS | Mod: S$GLB,,,

## 2021-07-29 PROCEDURE — 93793 ANTICOAG MGMT PT WARFARIN: CPT | Mod: S$GLB,,,

## 2021-07-29 PROCEDURE — 99495 TCM SERVICES (MODERATE COMPLEXITY): ICD-10-PCS | Mod: S$GLB,,, | Performed by: INTERNAL MEDICINE

## 2021-07-29 PROCEDURE — 99999 PR PBB SHADOW E&M-EST. PATIENT-LVL IV: ICD-10-PCS | Mod: PBBFAC,,, | Performed by: INTERNAL MEDICINE

## 2021-07-29 PROCEDURE — 99495 TRANSJ CARE MGMT MOD F2F 14D: CPT | Mod: S$GLB,,, | Performed by: INTERNAL MEDICINE

## 2021-07-29 PROCEDURE — 99999 PR PBB SHADOW E&M-EST. PATIENT-LVL IV: CPT | Mod: PBBFAC,,, | Performed by: INTERNAL MEDICINE

## 2021-07-29 RX ORDER — IBUPROFEN 200 MG
1 TABLET ORAL DAILY
Qty: 14 PATCH | Refills: 0 | Status: SHIPPED | OUTPATIENT
Start: 2021-07-29 | End: 2021-08-21 | Stop reason: SDUPTHER

## 2021-08-02 ENCOUNTER — ANTI-COAG VISIT (OUTPATIENT)
Dept: CARDIOLOGY | Facility: CLINIC | Age: 66
End: 2021-08-02
Payer: COMMERCIAL

## 2021-08-02 DIAGNOSIS — Z79.01 ANTICOAGULATED ON COUMADIN: ICD-10-CM

## 2021-08-02 DIAGNOSIS — Z95.2 STATUS POST HEART VALVE REPLACEMENT WITH MECHANICAL VALVE: Primary | ICD-10-CM

## 2021-08-02 LAB — INR PPP: 3.3

## 2021-08-02 PROCEDURE — 93793 PR ANTICOAGULANT MGMT FOR PT TAKING WARFARIN: ICD-10-PCS | Mod: S$GLB,,,

## 2021-08-02 PROCEDURE — 93793 ANTICOAG MGMT PT WARFARIN: CPT | Mod: S$GLB,,,

## 2021-08-11 ENCOUNTER — ANTI-COAG VISIT (OUTPATIENT)
Dept: CARDIOLOGY | Facility: CLINIC | Age: 66
End: 2021-08-11
Payer: COMMERCIAL

## 2021-08-11 DIAGNOSIS — Z79.01 ANTICOAGULATED ON COUMADIN: ICD-10-CM

## 2021-08-11 DIAGNOSIS — Z95.2 STATUS POST HEART VALVE REPLACEMENT WITH MECHANICAL VALVE: Primary | ICD-10-CM

## 2021-08-11 LAB — INR PPP: 2.3

## 2021-08-11 PROCEDURE — 93793 PR ANTICOAGULANT MGMT FOR PT TAKING WARFARIN: ICD-10-PCS | Mod: S$GLB,,,

## 2021-08-11 PROCEDURE — 93793 ANTICOAG MGMT PT WARFARIN: CPT | Mod: S$GLB,,,

## 2021-08-25 ENCOUNTER — TELEPHONE (OUTPATIENT)
Dept: INTERNAL MEDICINE | Facility: CLINIC | Age: 66
End: 2021-08-25

## 2021-08-25 ENCOUNTER — ANTI-COAG VISIT (OUTPATIENT)
Dept: CARDIOLOGY | Facility: CLINIC | Age: 66
End: 2021-08-25
Payer: COMMERCIAL

## 2021-08-25 DIAGNOSIS — Z95.2 STATUS POST HEART VALVE REPLACEMENT WITH MECHANICAL VALVE: Primary | ICD-10-CM

## 2021-08-25 DIAGNOSIS — Z79.01 ANTICOAGULATED ON COUMADIN: ICD-10-CM

## 2021-08-25 DIAGNOSIS — Z72.0 TOBACCO ABUSE: Primary | ICD-10-CM

## 2021-08-25 LAB — INR PPP: 1.7

## 2021-08-25 PROCEDURE — 93793 PR ANTICOAGULANT MGMT FOR PT TAKING WARFARIN: ICD-10-PCS | Mod: S$GLB,,,

## 2021-08-25 PROCEDURE — 93793 ANTICOAG MGMT PT WARFARIN: CPT | Mod: S$GLB,,,

## 2021-08-25 RX ORDER — IBUPROFEN 200 MG
1 TABLET ORAL DAILY
Qty: 28 PATCH | Refills: 11 | Status: SHIPPED | OUTPATIENT
Start: 2021-08-25

## 2021-09-01 ENCOUNTER — ANTI-COAG VISIT (OUTPATIENT)
Dept: CARDIOLOGY | Facility: CLINIC | Age: 66
End: 2021-09-01
Payer: COMMERCIAL

## 2021-09-01 DIAGNOSIS — Z95.2 STATUS POST HEART VALVE REPLACEMENT WITH MECHANICAL VALVE: Primary | ICD-10-CM

## 2021-09-01 DIAGNOSIS — Z79.01 ANTICOAGULATED ON COUMADIN: ICD-10-CM

## 2021-09-01 LAB — INR PPP: 3.2

## 2021-09-01 PROCEDURE — 93793 ANTICOAG MGMT PT WARFARIN: CPT | Mod: S$GLB,,,

## 2021-09-01 PROCEDURE — 93793 PR ANTICOAGULANT MGMT FOR PT TAKING WARFARIN: ICD-10-PCS | Mod: S$GLB,,,

## 2021-09-08 ENCOUNTER — ANTI-COAG VISIT (OUTPATIENT)
Dept: CARDIOLOGY | Facility: CLINIC | Age: 66
End: 2021-09-08
Payer: COMMERCIAL

## 2021-09-08 DIAGNOSIS — Z79.01 ANTICOAGULATED ON COUMADIN: ICD-10-CM

## 2021-09-08 DIAGNOSIS — Z95.2 STATUS POST HEART VALVE REPLACEMENT WITH MECHANICAL VALVE: Primary | ICD-10-CM

## 2021-09-08 LAB — INR PPP: 1.5

## 2021-09-08 PROCEDURE — 93793 ANTICOAG MGMT PT WARFARIN: CPT | Mod: S$GLB,,,

## 2021-09-08 PROCEDURE — 93793 PR ANTICOAGULANT MGMT FOR PT TAKING WARFARIN: ICD-10-PCS | Mod: S$GLB,,,

## 2021-09-15 ENCOUNTER — ANTI-COAG VISIT (OUTPATIENT)
Dept: CARDIOLOGY | Facility: CLINIC | Age: 66
End: 2021-09-15
Payer: COMMERCIAL

## 2021-09-15 DIAGNOSIS — Z79.01 ANTICOAGULATED ON COUMADIN: ICD-10-CM

## 2021-09-15 DIAGNOSIS — Z95.2 STATUS POST HEART VALVE REPLACEMENT WITH MECHANICAL VALVE: Primary | ICD-10-CM

## 2021-09-15 LAB — INR PPP: 2.3

## 2021-09-15 PROCEDURE — 93793 ANTICOAG MGMT PT WARFARIN: CPT | Mod: S$GLB,,,

## 2021-09-15 PROCEDURE — 93793 PR ANTICOAGULANT MGMT FOR PT TAKING WARFARIN: ICD-10-PCS | Mod: S$GLB,,,

## 2021-09-22 ENCOUNTER — ANTI-COAG VISIT (OUTPATIENT)
Dept: CARDIOLOGY | Facility: CLINIC | Age: 66
End: 2021-09-22
Payer: COMMERCIAL

## 2021-09-22 DIAGNOSIS — Z95.2 STATUS POST HEART VALVE REPLACEMENT WITH MECHANICAL VALVE: Primary | ICD-10-CM

## 2021-09-22 DIAGNOSIS — Z79.01 ANTICOAGULATED ON COUMADIN: ICD-10-CM

## 2021-09-22 LAB — INR PPP: 1.6

## 2021-09-22 PROCEDURE — 93793 PR ANTICOAGULANT MGMT FOR PT TAKING WARFARIN: ICD-10-PCS | Mod: S$GLB,,,

## 2021-09-22 PROCEDURE — 93793 ANTICOAG MGMT PT WARFARIN: CPT | Mod: S$GLB,,,

## 2021-09-29 ENCOUNTER — ANTI-COAG VISIT (OUTPATIENT)
Dept: CARDIOLOGY | Facility: CLINIC | Age: 66
End: 2021-09-29
Payer: COMMERCIAL

## 2021-09-29 DIAGNOSIS — Z79.01 ANTICOAGULATED ON COUMADIN: ICD-10-CM

## 2021-09-29 DIAGNOSIS — Z95.2 STATUS POST HEART VALVE REPLACEMENT WITH MECHANICAL VALVE: Primary | ICD-10-CM

## 2021-09-29 LAB — INR PPP: 2.7

## 2021-09-29 PROCEDURE — 93793 ANTICOAG MGMT PT WARFARIN: CPT | Mod: S$GLB,,,

## 2021-09-29 PROCEDURE — 93793 PR ANTICOAGULANT MGMT FOR PT TAKING WARFARIN: ICD-10-PCS | Mod: S$GLB,,,

## 2021-10-04 ENCOUNTER — PATIENT MESSAGE (OUTPATIENT)
Dept: ADMINISTRATIVE | Facility: HOSPITAL | Age: 66
End: 2021-10-04

## 2021-10-13 ENCOUNTER — ANTI-COAG VISIT (OUTPATIENT)
Dept: CARDIOLOGY | Facility: CLINIC | Age: 66
End: 2021-10-13
Payer: COMMERCIAL

## 2021-10-13 DIAGNOSIS — Z79.01 ANTICOAGULATED ON COUMADIN: ICD-10-CM

## 2021-10-13 DIAGNOSIS — Z95.2 STATUS POST HEART VALVE REPLACEMENT WITH MECHANICAL VALVE: Primary | ICD-10-CM

## 2021-10-13 LAB — INR PPP: 1.6

## 2021-10-13 PROCEDURE — 93793 ANTICOAG MGMT PT WARFARIN: CPT | Mod: S$GLB,,,

## 2021-10-13 PROCEDURE — 93793 PR ANTICOAGULANT MGMT FOR PT TAKING WARFARIN: ICD-10-PCS | Mod: S$GLB,,,

## 2021-10-20 ENCOUNTER — ANTI-COAG VISIT (OUTPATIENT)
Dept: CARDIOLOGY | Facility: CLINIC | Age: 66
End: 2021-10-20
Payer: COMMERCIAL

## 2021-10-20 DIAGNOSIS — Z79.01 ANTICOAGULATED ON COUMADIN: ICD-10-CM

## 2021-10-20 DIAGNOSIS — Z95.2 STATUS POST HEART VALVE REPLACEMENT WITH MECHANICAL VALVE: Primary | ICD-10-CM

## 2021-10-20 LAB — INR PPP: 2.1

## 2021-10-20 PROCEDURE — 93793 PR ANTICOAGULANT MGMT FOR PT TAKING WARFARIN: ICD-10-PCS | Mod: S$GLB,,,

## 2021-10-20 PROCEDURE — 93793 ANTICOAG MGMT PT WARFARIN: CPT | Mod: S$GLB,,,

## 2021-10-27 ENCOUNTER — ANTI-COAG VISIT (OUTPATIENT)
Dept: CARDIOLOGY | Facility: CLINIC | Age: 66
End: 2021-10-27
Payer: COMMERCIAL

## 2021-10-27 DIAGNOSIS — Z79.01 ANTICOAGULATED ON COUMADIN: ICD-10-CM

## 2021-10-27 DIAGNOSIS — Z95.2 STATUS POST HEART VALVE REPLACEMENT WITH MECHANICAL VALVE: Primary | ICD-10-CM

## 2021-10-27 LAB — INR PPP: 3.1

## 2021-10-27 PROCEDURE — 93793 PR ANTICOAGULANT MGMT FOR PT TAKING WARFARIN: ICD-10-PCS | Mod: S$GLB,,,

## 2021-10-27 PROCEDURE — 93793 ANTICOAG MGMT PT WARFARIN: CPT | Mod: S$GLB,,,

## 2021-11-09 ENCOUNTER — PATIENT MESSAGE (OUTPATIENT)
Dept: CARDIOLOGY | Facility: CLINIC | Age: 66
End: 2021-11-09
Payer: COMMERCIAL

## 2022-10-12 NOTE — TELEPHONE ENCOUNTER
Patient notified that he has  bacteria in urine. Start the macrobid today and take up to day of procedure: Cysto next Friday.   Don't skip any doses.    No

## 2023-05-02 NOTE — ASSESSMENT & PLAN NOTE
- continue coreg, lasix, lisinopril, imdur  - per patient, he is usually around SBP 100s   What Type Of Note Output Would You Prefer (Optional)?: Standard Output Hpi Title: Evaluation of Skin Lesions How Severe Are Your Spot(S)?: mild Have Your Spot(S) Been Treated In The Past?: has not been treated